# Patient Record
Sex: FEMALE | Race: BLACK OR AFRICAN AMERICAN | URBAN - METROPOLITAN AREA
[De-identification: names, ages, dates, MRNs, and addresses within clinical notes are randomized per-mention and may not be internally consistent; named-entity substitution may affect disease eponyms.]

---

## 2017-04-28 ENCOUNTER — INPATIENT (INPATIENT)
Facility: HOSPITAL | Age: 37
LOS: 10 days | Discharge: HOME | End: 2017-05-09
Attending: INTERNAL MEDICINE

## 2017-05-24 ENCOUNTER — INPATIENT (INPATIENT)
Facility: HOSPITAL | Age: 37
LOS: 18 days | Discharge: HOME HEALTH | End: 2017-06-12
Attending: INTERNAL MEDICINE

## 2017-05-24 DIAGNOSIS — R06.02 SHORTNESS OF BREATH: ICD-10-CM

## 2017-05-24 DIAGNOSIS — J84.89 OTHER SPECIFIED INTERSTITIAL PULMONARY DISEASES: ICD-10-CM

## 2017-06-13 ENCOUNTER — OUTPATIENT (OUTPATIENT)
Dept: OUTPATIENT SERVICES | Facility: HOSPITAL | Age: 37
LOS: 1 days | Discharge: HOME | End: 2017-06-13

## 2017-06-13 ENCOUNTER — OTHER (OUTPATIENT)
Age: 37
End: 2017-06-13

## 2017-06-13 DIAGNOSIS — E78.5 HYPERLIPIDEMIA, UNSPECIFIED: ICD-10-CM

## 2017-06-13 DIAGNOSIS — R06.02 SHORTNESS OF BREATH: ICD-10-CM

## 2017-06-13 DIAGNOSIS — J84.89 OTHER SPECIFIED INTERSTITIAL PULMONARY DISEASES: ICD-10-CM

## 2017-06-19 ENCOUNTER — APPOINTMENT (OUTPATIENT)
Dept: OBGYN | Facility: CLINIC | Age: 37
End: 2017-06-19

## 2017-06-28 DIAGNOSIS — J84.116 CRYPTOGENIC ORGANIZING PNEUMONIA: ICD-10-CM

## 2017-06-30 DIAGNOSIS — J82 PULMONARY EOSINOPHILIA, NOT ELSEWHERE CLASSIFIED: ICD-10-CM

## 2017-06-30 DIAGNOSIS — E78.5 HYPERLIPIDEMIA, UNSPECIFIED: ICD-10-CM

## 2017-06-30 DIAGNOSIS — E03.9 HYPOTHYROIDISM, UNSPECIFIED: ICD-10-CM

## 2017-06-30 DIAGNOSIS — E66.01 MORBID (SEVERE) OBESITY DUE TO EXCESS CALORIES: ICD-10-CM

## 2017-06-30 DIAGNOSIS — Z91.010 ALLERGY TO PEANUTS: ICD-10-CM

## 2017-06-30 DIAGNOSIS — T41.1X5A ADVERSE EFFECT OF INTRAVENOUS ANESTHETICS, INITIAL ENCOUNTER: ICD-10-CM

## 2017-06-30 DIAGNOSIS — Z91.013 ALLERGY TO SEAFOOD: ICD-10-CM

## 2017-06-30 DIAGNOSIS — E06.3 AUTOIMMUNE THYROIDITIS: ICD-10-CM

## 2017-06-30 DIAGNOSIS — J95.821 ACUTE POSTPROCEDURAL RESPIRATORY FAILURE: ICD-10-CM

## 2017-06-30 DIAGNOSIS — M79.1 MYALGIA: ICD-10-CM

## 2017-06-30 DIAGNOSIS — I95.2 HYPOTENSION DUE TO DRUGS: ICD-10-CM

## 2017-06-30 DIAGNOSIS — J84.116 CRYPTOGENIC ORGANIZING PNEUMONIA: ICD-10-CM

## 2017-07-11 ENCOUNTER — OUTPATIENT (OUTPATIENT)
Dept: OUTPATIENT SERVICES | Facility: HOSPITAL | Age: 37
LOS: 1 days | Discharge: HOME | End: 2017-07-11

## 2017-07-11 ENCOUNTER — APPOINTMENT (OUTPATIENT)
Dept: PULMONOLOGY | Facility: CLINIC | Age: 37
End: 2017-07-11

## 2017-07-11 VITALS
HEART RATE: 118 BPM | DIASTOLIC BLOOD PRESSURE: 80 MMHG | SYSTOLIC BLOOD PRESSURE: 110 MMHG | WEIGHT: 249 LBS | OXYGEN SATURATION: 94 %

## 2017-07-11 DIAGNOSIS — R06.02 SHORTNESS OF BREATH: ICD-10-CM

## 2017-07-11 DIAGNOSIS — J84.89 OTHER SPECIFIED INTERSTITIAL PULMONARY DISEASES: ICD-10-CM

## 2017-07-14 DIAGNOSIS — J84.9 INTERSTITIAL PULMONARY DISEASE, UNSPECIFIED: ICD-10-CM

## 2017-07-25 ENCOUNTER — APPOINTMENT (OUTPATIENT)
Dept: NEUROLOGY | Facility: CLINIC | Age: 37
End: 2017-07-25

## 2017-07-26 ENCOUNTER — RX RENEWAL (OUTPATIENT)
Age: 37
End: 2017-07-26

## 2017-07-31 DIAGNOSIS — E66.9 OBESITY, UNSPECIFIED: ICD-10-CM

## 2017-07-31 DIAGNOSIS — J20.9 ACUTE BRONCHITIS, UNSPECIFIED: ICD-10-CM

## 2017-07-31 DIAGNOSIS — J90 PLEURAL EFFUSION, NOT ELSEWHERE CLASSIFIED: ICD-10-CM

## 2017-07-31 DIAGNOSIS — D25.9 LEIOMYOMA OF UTERUS, UNSPECIFIED: ICD-10-CM

## 2017-07-31 DIAGNOSIS — Z91.013 ALLERGY TO SEAFOOD: ICD-10-CM

## 2017-07-31 DIAGNOSIS — R07.89 OTHER CHEST PAIN: ICD-10-CM

## 2017-07-31 DIAGNOSIS — J96.01 ACUTE RESPIRATORY FAILURE WITH HYPOXIA: ICD-10-CM

## 2017-07-31 DIAGNOSIS — J84.116 CRYPTOGENIC ORGANIZING PNEUMONIA: ICD-10-CM

## 2017-07-31 DIAGNOSIS — J98.11 ATELECTASIS: ICD-10-CM

## 2017-07-31 DIAGNOSIS — Z91.010 ALLERGY TO PEANUTS: ICD-10-CM

## 2017-07-31 DIAGNOSIS — J45.909 UNSPECIFIED ASTHMA, UNCOMPLICATED: ICD-10-CM

## 2017-07-31 DIAGNOSIS — M46.92 UNSPECIFIED INFLAMMATORY SPONDYLOPATHY, CERVICAL REGION: ICD-10-CM

## 2017-07-31 DIAGNOSIS — J18.9 PNEUMONIA, UNSPECIFIED ORGANISM: ICD-10-CM

## 2017-07-31 DIAGNOSIS — M35.00 SJOGREN SYNDROME, UNSPECIFIED: ICD-10-CM

## 2017-07-31 DIAGNOSIS — E06.3 AUTOIMMUNE THYROIDITIS: ICD-10-CM

## 2017-07-31 DIAGNOSIS — D86.0 SARCOIDOSIS OF LUNG: ICD-10-CM

## 2017-08-15 ENCOUNTER — APPOINTMENT (OUTPATIENT)
Dept: PULMONOLOGY | Facility: CLINIC | Age: 37
End: 2017-08-15

## 2017-09-26 ENCOUNTER — APPOINTMENT (OUTPATIENT)
Dept: PULMONOLOGY | Facility: CLINIC | Age: 37
End: 2017-09-26

## 2017-10-03 ENCOUNTER — MEDICATION RENEWAL (OUTPATIENT)
Age: 37
End: 2017-10-03

## 2017-10-10 ENCOUNTER — APPOINTMENT (OUTPATIENT)
Dept: PULMONOLOGY | Facility: CLINIC | Age: 37
End: 2017-10-10

## 2017-10-24 ENCOUNTER — APPOINTMENT (OUTPATIENT)
Dept: NEUROLOGY | Facility: CLINIC | Age: 37
End: 2017-10-24

## 2018-02-12 ENCOUNTER — MEDICATION RENEWAL (OUTPATIENT)
Age: 38
End: 2018-02-12

## 2018-02-12 ENCOUNTER — RX RENEWAL (OUTPATIENT)
Age: 38
End: 2018-02-12

## 2018-04-22 ENCOUNTER — INPATIENT (INPATIENT)
Facility: HOSPITAL | Age: 38
LOS: 5 days | Discharge: HOME HEALTH | End: 2018-04-28
Attending: INTERNAL MEDICINE | Admitting: INTERNAL MEDICINE

## 2018-04-22 VITALS
OXYGEN SATURATION: 98 % | DIASTOLIC BLOOD PRESSURE: 58 MMHG | RESPIRATION RATE: 20 BRPM | TEMPERATURE: 98 F | HEART RATE: 106 BPM | SYSTOLIC BLOOD PRESSURE: 115 MMHG

## 2018-04-22 LAB
ALBUMIN SERPL ELPH-MCNC: 2.9 G/DL — LOW (ref 3.5–5.2)
ALP SERPL-CCNC: 65 U/L — SIGNIFICANT CHANGE UP (ref 30–115)
ALT FLD-CCNC: 188 U/L — HIGH (ref 0–41)
ANION GAP SERPL CALC-SCNC: 13 MMOL/L — SIGNIFICANT CHANGE UP (ref 7–14)
APPEARANCE UR: (no result)
APTT BLD: 26.7 SEC — LOW (ref 27–39.2)
AST SERPL-CCNC: 376 U/L — HIGH (ref 0–41)
BACTERIA # UR AUTO: (no result) /HPF
BASOPHILS # BLD AUTO: 0.04 K/UL — SIGNIFICANT CHANGE UP (ref 0–0.2)
BASOPHILS NFR BLD AUTO: 0.3 % — SIGNIFICANT CHANGE UP (ref 0–1)
BILIRUB SERPL-MCNC: <0.2 MG/DL — SIGNIFICANT CHANGE UP (ref 0.2–1.2)
BILIRUB UR-MCNC: NEGATIVE — SIGNIFICANT CHANGE UP
BUN SERPL-MCNC: 11 MG/DL — SIGNIFICANT CHANGE UP (ref 10–20)
CALCIUM SERPL-MCNC: 7.9 MG/DL — LOW (ref 8.5–10.1)
CHLORIDE SERPL-SCNC: 100 MMOL/L — SIGNIFICANT CHANGE UP (ref 98–110)
CK MB CFR SERPL CALC: 283 NG/ML — HIGH (ref 0.6–6.3)
CK MB CFR SERPL CALC: >300 NG/ML — HIGH (ref 0.6–6.3)
CK SERPL-CCNC: >2000 U/L — HIGH (ref 0–225)
CK SERPL-CCNC: HIGH U/L (ref 0–225)
CO2 SERPL-SCNC: 23 MMOL/L — SIGNIFICANT CHANGE UP (ref 17–32)
COLOR SPEC: YELLOW — SIGNIFICANT CHANGE UP
CREAT SERPL-MCNC: 0.5 MG/DL — LOW (ref 0.7–1.5)
D DIMER BLD IA.RAPID-MCNC: 1192 NG/ML DDU — HIGH (ref 0–230)
DIFF PNL FLD: (no result)
EOSINOPHIL # BLD AUTO: 0.55 K/UL — SIGNIFICANT CHANGE UP (ref 0–0.7)
EOSINOPHIL NFR BLD AUTO: 3.7 % — SIGNIFICANT CHANGE UP (ref 0–8)
EPI CELLS # UR: (no result) /HPF
ERYTHROCYTE [SEDIMENTATION RATE] IN BLOOD: 70 MM/HR — HIGH (ref 0–15)
GLUCOSE SERPL-MCNC: 85 MG/DL — SIGNIFICANT CHANGE UP (ref 70–99)
GLUCOSE UR QL: NEGATIVE MG/DL — SIGNIFICANT CHANGE UP
HCG UR QL: NEGATIVE — SIGNIFICANT CHANGE UP
HCT VFR BLD CALC: 37.1 % — SIGNIFICANT CHANGE UP (ref 37–47)
HGB BLD-MCNC: 12 G/DL — SIGNIFICANT CHANGE UP (ref 12–16)
IMM GRANULOCYTES NFR BLD AUTO: 0.7 % — HIGH (ref 0.1–0.3)
INR BLD: 1.06 RATIO — SIGNIFICANT CHANGE UP (ref 0.65–1.3)
KETONES UR-MCNC: NEGATIVE — SIGNIFICANT CHANGE UP
LEUKOCYTE ESTERASE UR-ACNC: NEGATIVE — SIGNIFICANT CHANGE UP
LYMPHOCYTES # BLD AUTO: 0.78 K/UL — LOW (ref 1.2–3.4)
LYMPHOCYTES # BLD AUTO: 5.3 % — LOW (ref 20.5–51.1)
MAGNESIUM SERPL-MCNC: 1.8 MG/DL — SIGNIFICANT CHANGE UP (ref 1.8–2.4)
MCHC RBC-ENTMCNC: 30.5 PG — SIGNIFICANT CHANGE UP (ref 27–31)
MCHC RBC-ENTMCNC: 32.3 G/DL — SIGNIFICANT CHANGE UP (ref 32–37)
MCV RBC AUTO: 94.4 FL — SIGNIFICANT CHANGE UP (ref 81–99)
MONOCYTES # BLD AUTO: 0.46 K/UL — SIGNIFICANT CHANGE UP (ref 0.1–0.6)
MONOCYTES NFR BLD AUTO: 3.1 % — SIGNIFICANT CHANGE UP (ref 1.7–9.3)
NEUTROPHILS # BLD AUTO: 12.8 K/UL — HIGH (ref 1.4–6.5)
NEUTROPHILS NFR BLD AUTO: 86.9 % — HIGH (ref 42.2–75.2)
NITRITE UR-MCNC: NEGATIVE — SIGNIFICANT CHANGE UP
NRBC # BLD: 0 /100 WBCS — SIGNIFICANT CHANGE UP (ref 0–0)
NT-PROBNP SERPL-SCNC: 58 PG/ML — SIGNIFICANT CHANGE UP (ref 0–300)
PH UR: 6.5 — SIGNIFICANT CHANGE UP (ref 5–8)
PLATELET # BLD AUTO: 486 K/UL — HIGH (ref 130–400)
POTASSIUM SERPL-MCNC: 4.5 MMOL/L — SIGNIFICANT CHANGE UP (ref 3.5–5)
POTASSIUM SERPL-SCNC: 4.5 MMOL/L — SIGNIFICANT CHANGE UP (ref 3.5–5)
PROT SERPL-MCNC: 6.8 G/DL — SIGNIFICANT CHANGE UP (ref 6–8)
PROT UR-MCNC: 100 MG/DL
PROTHROM AB SERPL-ACNC: 11.5 SEC — SIGNIFICANT CHANGE UP (ref 9.95–12.87)
RBC # BLD: 3.93 M/UL — LOW (ref 4.2–5.4)
RBC # FLD: 14.9 % — HIGH (ref 11.5–14.5)
RBC CASTS # UR COMP ASSIST: (no result) /HPF
SODIUM SERPL-SCNC: 136 MMOL/L — SIGNIFICANT CHANGE UP (ref 135–146)
SP GR SPEC: 1.02 — SIGNIFICANT CHANGE UP (ref 1.01–1.03)
TROPONIN T SERPL-MCNC: 1.44 NG/ML — CRITICAL HIGH
UROBILINOGEN FLD QL: 1 MG/DL (ref 0.2–0.2)
WBC # BLD: 14.74 K/UL — HIGH (ref 4.8–10.8)
WBC # FLD AUTO: 14.74 K/UL — HIGH (ref 4.8–10.8)
WBC UR QL: SIGNIFICANT CHANGE UP /HPF

## 2018-04-22 RX ORDER — ACETAMINOPHEN 500 MG
650 TABLET ORAL ONCE
Qty: 0 | Refills: 0 | Status: COMPLETED | OUTPATIENT
Start: 2018-04-22 | End: 2018-04-22

## 2018-04-22 RX ORDER — CEFEPIME 1 G/1
1000 INJECTION, POWDER, FOR SOLUTION INTRAMUSCULAR; INTRAVENOUS EVERY 12 HOURS
Qty: 0 | Refills: 0 | Status: DISCONTINUED | OUTPATIENT
Start: 2018-04-22 | End: 2018-04-24

## 2018-04-22 RX ORDER — PANTOPRAZOLE SODIUM 20 MG/1
40 TABLET, DELAYED RELEASE ORAL
Qty: 0 | Refills: 0 | Status: DISCONTINUED | OUTPATIENT
Start: 2018-04-22 | End: 2018-04-28

## 2018-04-22 RX ORDER — METOPROLOL TARTRATE 50 MG
25 TABLET ORAL ONCE
Qty: 0 | Refills: 0 | Status: COMPLETED | OUTPATIENT
Start: 2018-04-22 | End: 2018-04-22

## 2018-04-22 RX ORDER — SODIUM CHLORIDE 9 MG/ML
1000 INJECTION, SOLUTION INTRAVENOUS ONCE
Qty: 0 | Refills: 0 | Status: COMPLETED | OUTPATIENT
Start: 2018-04-22 | End: 2018-04-22

## 2018-04-22 RX ORDER — AZATHIOPRINE 100 MG/1
50 TABLET ORAL DAILY
Qty: 0 | Refills: 0 | Status: DISCONTINUED | OUTPATIENT
Start: 2018-04-22 | End: 2018-04-23

## 2018-04-22 RX ORDER — AZTREONAM 2 G
1 VIAL (EA) INJECTION
Qty: 0 | Refills: 0 | COMMUNITY

## 2018-04-22 RX ORDER — ASPIRIN/CALCIUM CARB/MAGNESIUM 324 MG
325 TABLET ORAL ONCE
Qty: 0 | Refills: 0 | Status: COMPLETED | OUTPATIENT
Start: 2018-04-22 | End: 2018-04-22

## 2018-04-22 RX ORDER — SODIUM CHLORIDE 9 MG/ML
1000 INJECTION INTRAMUSCULAR; INTRAVENOUS; SUBCUTANEOUS
Qty: 0 | Refills: 0 | Status: DISCONTINUED | OUTPATIENT
Start: 2018-04-22 | End: 2018-04-25

## 2018-04-22 RX ORDER — METHOCARBAMOL 500 MG/1
750 TABLET, FILM COATED ORAL ONCE
Qty: 0 | Refills: 0 | Status: COMPLETED | OUTPATIENT
Start: 2018-04-22 | End: 2018-04-22

## 2018-04-22 RX ORDER — ENOXAPARIN SODIUM 100 MG/ML
40 INJECTION SUBCUTANEOUS EVERY 24 HOURS
Qty: 0 | Refills: 0 | Status: DISCONTINUED | OUTPATIENT
Start: 2018-04-22 | End: 2018-04-28

## 2018-04-22 RX ORDER — MORPHINE SULFATE 50 MG/1
6 CAPSULE, EXTENDED RELEASE ORAL ONCE
Qty: 0 | Refills: 0 | Status: DISCONTINUED | OUTPATIENT
Start: 2018-04-22 | End: 2018-04-22

## 2018-04-22 RX ORDER — KETOROLAC TROMETHAMINE 30 MG/ML
15 SYRINGE (ML) INJECTION ONCE
Qty: 0 | Refills: 0 | Status: DISCONTINUED | OUTPATIENT
Start: 2018-04-22 | End: 2018-04-22

## 2018-04-22 RX ADMIN — Medication 650 MILLIGRAM(S): at 12:21

## 2018-04-22 RX ADMIN — Medication 25 MILLIGRAM(S): at 23:23

## 2018-04-22 RX ADMIN — METHOCARBAMOL 750 MILLIGRAM(S): 500 TABLET, FILM COATED ORAL at 12:21

## 2018-04-22 RX ADMIN — Medication 15 MILLIGRAM(S): at 12:21

## 2018-04-22 RX ADMIN — Medication 15 MILLIGRAM(S): at 22:28

## 2018-04-22 RX ADMIN — SODIUM CHLORIDE 1000 MILLILITER(S): 9 INJECTION, SOLUTION INTRAVENOUS at 14:02

## 2018-04-22 RX ADMIN — MORPHINE SULFATE 6 MILLIGRAM(S): 50 CAPSULE, EXTENDED RELEASE ORAL at 23:23

## 2018-04-22 RX ADMIN — CEFEPIME 100 MILLIGRAM(S): 1 INJECTION, POWDER, FOR SOLUTION INTRAMUSCULAR; INTRAVENOUS at 23:23

## 2018-04-22 RX ADMIN — Medication 325 MILLIGRAM(S): at 16:10

## 2018-04-22 NOTE — ED PROVIDER NOTE - ATTENDING CONTRIBUTION TO CARE
38 y/o F pmh , on azathioprine, p/w chest tightness and exertional SOB worsening x about 1 wk, worse today.  also c/o diffuse myalgia and arthralgia.  + dry cough.  No fever, leg swelling, smoking, hx dvt/pe/mi.  PE: tired but non toxic; Reg tachycardia; faint bibasilar wheeze; abd s/nt; + pedal edema.  IMP: PE vs acs; consider autoimmune/ rheum; viral illness.  P: labs, ekg, cxr, ddimer, analgesia, reassess, admit.

## 2018-04-22 NOTE — ED PROVIDER NOTE - PROGRESS NOTE DETAILS
Discussed with cardiology case. cardio will f/u Cardiology at bedside.  Pt continues to have diffuse myalgia, but no focal cp, is well appearing. Bedside ECHO by Dr. Maloney; NL EF; no PCEFF, hyperdynamic.  He recc BBlocker, admission to CCU Bedside ECHO by Dr. Maloney; NL EF; no PCEFF, hyperdynamic.  He recc BBlocker, admission to CCU.  concern for myocarditis; also recc no anticoag at this time.

## 2018-04-22 NOTE — ED PROVIDER NOTE - OBJECTIVE STATEMENT
38y/o F w/ Cryptogenic organizing pneumonia (on azathioprine and smx since june 2017) present for generalized body pain for 6 days, but has been worsening. Today pt hand and feet were swollen.  Pt feels some tightness to chest--feels like she "just ran"--+sob. +dry cough.no congestion, runny nose, no vomiting, diarrhea, legg swelling. 38y/o F w/ Cryptogenic organizing pneumonia (on azathioprine and smx since june 2017) present for generalized body pain for 6 days, but has been worsening. Today pt hand and feet were swollen.  Pt feels some tightness to chest--feels like she "just ran"--+sob. +dry cough.no congestion, runny nose, no vomiting, diarrhea,

## 2018-04-22 NOTE — ED PROVIDER NOTE - NS ED ROS FT
ROS: No fever/chills, No headache/photophobia/eye pain/changes in vision, No ear pain/sore throat/dysphagia,No abdominal pain, No N/V/D/melena, no dysuria/frequency/discharge, No neck/back pain, no rash, no changes in neurological status/function.    +cough, weakness see hpi

## 2018-04-22 NOTE — ED PROVIDER NOTE - CARE PLAN
Principal Discharge DX:	Chest pain  Secondary Diagnosis:	Shortness of breath  Secondary Diagnosis:	Weakness

## 2018-04-22 NOTE — H&P ADULT - ASSESSMENT
38 y/o F w/ hx of , on azathioprine and bactrim, ? polymyositis / dermatomyositis was pending EMG, p/w fatigue and generalized muscle aches for the past weeks, along w/ sharp CP w/ SOB and cough productive of whitish sputum, found to have troponemia and transaminits    1- troponemia: 2/2 rhabdomyolisis due to inflammatory myopathy (?dermatomyosists / polymyositis) vs. less likely ACS vs. myocarditis / pericarditis    admit to CCU - approved by cardiology  f/u serial CE and CK  IVF NS @ 200 cc/hr  TTE to check EF, check BNP  serial EKGs  trend CK  check aldolase, ALEX, LDH, anti-Oksana, SRP, ESR, CRP   check TSH  will give prednisone at 1 mg/kg q24h    2- transaminitis: probably 2/2 inflammatory myopathy vs. less likely 2/2 CLD given pattern    workup and rx as above  will get RUQ sono  will get hepatitis serologies    3- COOP: will hold off AZA  prednisone as above    4- GI ppx: protonix 40 q24  DVT ppx: lovenox 40 q24  full code 36 y/o F w/ hx of , on azathioprine and bactrim, ? polymyositis / dermatomyositis was pending EMG, p/w fatigue and generalized muscle aches for the past weeks, along w/ sharp CP w/ SOB and cough productive of whitish sputum, found to have troponemia and transaminits    1- troponemia: 2/2 rhabdomyolisis due to inflammatory myopathy (?dermatomyosists / polymyositis) vs. less likely ACS vs. myocarditis / pericarditis    admit to CCU - approved by cardiology  f/u serial CE and CK  IVF NS @ 200 cc/hr  TTE to check EF, check BNP  serial EKGs  trend CK  check aldolase, ALEX, LDH, anti-Oksana, SRP, ESR, CRP   check TSH  will give prednisone at 1 mg/kg q24h  ASA 81 for now    2- transaminitis: probably 2/2 inflammatory myopathy vs. less likely 2/2 CLD given pattern    workup and rx as above  will get RUQ sono  will get hepatitis serologies    3- COOP: will hold off AZA  prednisone as above    4- GI ppx: protonix 40 q24  DVT ppx: lovenox 40 q24  full code

## 2018-04-22 NOTE — ED PROVIDER NOTE - PHYSICAL EXAMINATION
VITAL SIGNS: I have reviewed nursing notes and confirm.  CONSTITUTIONAL: Well-developed; well-nourished; in no acute distress. obese. uncomfortable moving around stretcher.  SKIN: skin exam is warm and dry, no acute rash. no visible swelling to hands and feet. no redness, no warmth.  HEAD: Normocephalic; atraumatic.  EYES:  EOM intact; conjunctiva and sclera clear.  ENT: No nasal discharge; airway clear. moist oral mucosa;     NECK: Supple; non tender.  CARD: S1, S2 normal; no murmurs, gallops, or rubs. Regular rate and rhythm. posterior tibial and radial pulses 2+  RESP: No wheezes, rales or rhonchi. cta b/l. no use of accessory muscles. no retractions  ABD: Normal bowel sounds; soft; non-distended; non-tender;  EXT: Normal ROM. No  cyanosis or edema.  BACK: No cva tenderness  LYMPH: No acute cervical adenopathy.  NEURO: Alert, oriented, grossly unremarkable.    PSYCH: Cooperative, appropriate.

## 2018-04-22 NOTE — H&P ADULT - HISTORY OF PRESENT ILLNESS
in ED:  / LR 1 L / cefepime 1 g / tylenol / ketorlac / robaxin / metoprolol ER 25 / morphine 6 IV 38 y/o F w/ hx of , on azathioprine and bactrim, ? polymyositis / dermatomyositis was pending EMG, p/w fatigue and generalized muscle aches for the past weeks.   pt also reports sharp CP and exertional SOB worsening over the past week, worse today but denies any palpitations, no lightheadedness dizziness or LOC.  pt reports having a cough productive of whitish sputum, feeling that extremities were swollen but denies fever, or chills, no N/V/D, no recent medication changes, no weight loss.  pt states that she has not been able to ambulate over the past week and has been unable to do ADLs as usual.  pt was being followed by Dr. Rubio last seen in July 2017 but did not follow up, workup was pending EMG for possible polymyositis, dermatomyositis diagnosis.   ROS otherwise negative.    in ED:  / LR 1 L / cefepime 1 g / tylenol / ketorlac / robaxin / metoprolol ER 25 / morphine 6 IV

## 2018-04-22 NOTE — H&P ADULT - NSHPLABSRESULTS_GEN_ALL_CORE
12.0   14.74 )-----------( 486      ( 2018 10:32 )             37.1           136  |  100  |  11  ----------------------------<  85  4.5   |  23  |  0.5<L>    Ca    7.9<L>      2018 10:32  Mg     1.8         TPro  6.8  /  Alb  2.9<L>  /  TBili  <0.2  /  DBili  x   /  AST  376<H>  /  ALT  188<H>  /  AlkPhos  65  -22          Urinalysis Basic - ( 2018 10:32 )    Color: Yellow / Appearance: Cloudy / S.025 / pH: x  Gluc: x / Ketone: Negative  / Bili: Negative / Urobili: 1.0 mg/dL   Blood: x / Protein: 100 mg/dL / Nitrite: Negative   Leuk Esterase: Negative / RBC: 10-25 /HPF / WBC 3-5 /HPF   Sq Epi: x / Non Sq Epi: Many /HPF / Bacteria: Moderate /HPF      PT/INR - ( 2018 20:51 )   PT: 11.50 sec;   INR: 1.06 ratio       PTT - ( 2018 20:51 )  PTT:26.7 sec      CARDIAC MARKERS ( 2018 20:50 )  x     / x     / 51017 U/L / x     / 283.0 ng/mL  CARDIAC MARKERS ( 2018 12:44 )  x     / 1.44 ng/mL / x     / x     / >300.0 ng/mL  CARDIAC MARKERS ( 2018 10:32 )  x     / x     / >2000 U/L / x     / x 12.0   14.74 )-----------( 486      ( 2018 10:32 )             37.1           136  |  100  |  11  ----------------------------<  85  4.5   |  23  |  0.5<L>    Ca    7.9<L>      2018 10:32  Mg     1.8         TPro  6.8  /  Alb  2.9<L>  /  TBili  <0.2  /  DBili  x   /  AST  376<H>  /  ALT  188<H>  /  AlkPhos  65  22          Urinalysis Basic - ( 2018 10:32 )    Color: Yellow / Appearance: Cloudy / S.025 / pH: x  Gluc: x / Ketone: Negative  / Bili: Negative / Urobili: 1.0 mg/dL   Blood: x / Protein: 100 mg/dL / Nitrite: Negative   Leuk Esterase: Negative / RBC: 10-25 /HPF / WBC 3-5 /HPF   Sq Epi: x / Non Sq Epi: Many /HPF / Bacteria: Moderate /HPF      PT/INR - ( 2018 20:51 )   PT: 11.50 sec;   INR: 1.06 ratio       PTT - ( 2018 20:51 )  PTT:26.7 sec      CARDIAC MARKERS ( 2018 20:50 )  x     / x     / 82578 U/L / x     / 283.0 ng/mL  CARDIAC MARKERS ( 2018 12:44 )  x     / 1.44 ng/mL / x     / x     / >300.0 ng/mL  CARDIAC MARKERS ( 2018 10:32 )  x     / x     / >2000 U/L / x     / x        CT Chest w/ IV Cont (18 @ 18:05)    No pulmonary embolus.  Bilateral lower lobe patchy airspace opacities, decreased from prior   exam. Findings may represent pneumonia in the correct clinical setting.        Xray Chest 2 Views PA/Lat (18 @ 12:38)    Low lung volumes, without radiographic evidence of acute cardiopulmonary   disease. 12.0   14.74 )-----------( 486      ( 2018 10:32 )             37.1           136  |  100  |  11  ----------------------------<  85  4.5   |  23  |  0.5<L>    Ca    7.9<L>      2018 10:32  Mg     1.8         TPro  6.8  /  Alb  2.9<L>  /  TBili  <0.2  /  DBili  x   /  AST  376<H>  /  ALT  188<H>  /  AlkPhos  65  22          Urinalysis Basic - ( 2018 10:32 )    Color: Yellow / Appearance: Cloudy / S.025 / pH: x  Gluc: x / Ketone: Negative  / Bili: Negative / Urobili: 1.0 mg/dL   Blood: x / Protein: 100 mg/dL / Nitrite: Negative   Leuk Esterase: Negative / RBC: 10-25 /HPF / WBC 3-5 /HPF   Sq Epi: x / Non Sq Epi: Many /HPF / Bacteria: Moderate /HPF      PT/INR - ( 2018 20:51 )   PT: 11.50 sec;   INR: 1.06 ratio       PTT - ( 2018 20:51 )  PTT:26.7 sec      CARDIAC MARKERS ( 2018 20:50 )  x     / x     / 06171 U/L / x     / 283.0 ng/mL  CARDIAC MARKERS ( 2018 12:44 )  x     / 1.44 ng/mL / x     / x     / >300.0 ng/mL  CARDIAC MARKERS ( 2018 10:32 )  x     / x     / >2000 U/L / x     / x      EKG: NSR, low voltage, ?RBBB, no acute ST, Q or t wave abnormalities      CT Chest w/ IV Cont (18 @ 18:05)    No pulmonary embolus.  Bilateral lower lobe patchy airspace opacities, decreased from prior   exam. Findings may represent pneumonia in the correct clinical setting.        Xray Chest 2 Views PA/Lat (18 @ 12:38)    Low lung volumes, without radiographic evidence of acute cardiopulmonary   disease.

## 2018-04-22 NOTE — H&P ADULT - ATTENDING COMMENTS
I fully agree with the resident's history/physical/assessment and plan after my independent history/exam/assessment.  I reviewed the cardiology consult and discussed the current care with the patient a few minutes ago.  I am waiting for the pulmonary and rheumatology teams and will repeat the labs in the morning.  patient will likely benefit from a physiatry eval also.

## 2018-04-22 NOTE — H&P ADULT - NSHPPHYSICALEXAM_GEN_ALL_CORE
GENERAL: NAD, well-developed  HEAD:  Atraumatic, Normocephalic  EYES: EOMI, PERRLA, conjunctiva and sclera clear  NECK: Supple, No JVD  CHEST/LUNG: Clear to auscultation bilaterally; No wheeze  HEART: Regular rate and rhythm; No murmurs, rubs, or gallops  ABDOMEN: Soft, Nontender, Nondistended; Bowel sounds present  EXTREMITIES:  2+ Peripheral Pulses, No clubbing, cyanosis, or edema  PSYCH: AAOx3  NEUROLOGY: non-focal  SKIN: No rashes or lesions GENERAL: NAD, well-developed  HEAD:  Atraumatic, Normocephalic  EYES: EOMI, PERRLA, conjunctiva and sclera clear  NECK: Supple, No JVD  CHEST/LUNG: + crackles at bases b/l, no rhonchi or wheezes  HEART: Regular rate and rhythm; No murmurs, rubs, or gallops  ABDOMEN: Soft, Nontender, Nondistended; Bowel sounds present, obese  EXTREMITIES:  2+ Peripheral Pulses, No clubbing, cyanosis, or edema  PSYCH: AAOx3  NEUROLOGY: non-focal

## 2018-04-23 LAB
ALBUMIN SERPL ELPH-MCNC: 2.7 G/DL — LOW (ref 3.5–5.2)
ALP SERPL-CCNC: 57 U/L — SIGNIFICANT CHANGE UP (ref 30–115)
ALT FLD-CCNC: 162 U/L — HIGH (ref 0–41)
ANION GAP SERPL CALC-SCNC: 13 MMOL/L — SIGNIFICANT CHANGE UP (ref 7–14)
ANION GAP SERPL CALC-SCNC: 9 MMOL/L — SIGNIFICANT CHANGE UP (ref 7–14)
AST SERPL-CCNC: 335 U/L — HIGH (ref 0–41)
BASOPHILS # BLD AUTO: 0.04 K/UL — SIGNIFICANT CHANGE UP (ref 0–0.2)
BASOPHILS NFR BLD AUTO: 0.4 % — SIGNIFICANT CHANGE UP (ref 0–1)
BILIRUB SERPL-MCNC: <0.2 MG/DL — SIGNIFICANT CHANGE UP (ref 0.2–1.2)
BUN SERPL-MCNC: 8 MG/DL — LOW (ref 10–20)
BUN SERPL-MCNC: 9 MG/DL — LOW (ref 10–20)
CALCIUM SERPL-MCNC: 8.1 MG/DL — LOW (ref 8.5–10.1)
CALCIUM SERPL-MCNC: 8.1 MG/DL — LOW (ref 8.5–10.1)
CHLORIDE SERPL-SCNC: 100 MMOL/L — SIGNIFICANT CHANGE UP (ref 98–110)
CHLORIDE SERPL-SCNC: 101 MMOL/L — SIGNIFICANT CHANGE UP (ref 98–110)
CHOLEST SERPL-MCNC: 257 MG/DL — HIGH (ref 100–200)
CK MB CFR SERPL CALC: 276.1 NG/ML — HIGH (ref 0.6–6.3)
CK MB CFR SERPL CALC: 297.5 NG/ML — HIGH (ref 0.6–6.3)
CK SERPL-CCNC: 8865 U/L — HIGH (ref 0–225)
CK SERPL-CCNC: >2000 U/L — HIGH (ref 0–225)
CK SERPL-CCNC: HIGH U/L (ref 0–225)
CK SERPL-CCNC: HIGH U/L (ref 0–225)
CO2 SERPL-SCNC: 24 MMOL/L — SIGNIFICANT CHANGE UP (ref 17–32)
CO2 SERPL-SCNC: 26 MMOL/L — SIGNIFICANT CHANGE UP (ref 17–32)
CREAT SERPL-MCNC: 0.5 MG/DL — LOW (ref 0.7–1.5)
CREAT SERPL-MCNC: 0.5 MG/DL — LOW (ref 0.7–1.5)
CRP SERPL-MCNC: 3.1 MG/DL — HIGH (ref 0–0.4)
CULTURE RESULTS: NO GROWTH — SIGNIFICANT CHANGE UP
EOSINOPHIL # BLD AUTO: 0.41 K/UL — SIGNIFICANT CHANGE UP (ref 0–0.7)
EOSINOPHIL NFR BLD AUTO: 3.7 % — SIGNIFICANT CHANGE UP (ref 0–8)
ERYTHROCYTE [SEDIMENTATION RATE] IN BLOOD: 69 MM/HR — HIGH (ref 0–15)
ESTIMATED AVERAGE GLUCOSE: 126 MG/DL — HIGH (ref 68–114)
GLUCOSE SERPL-MCNC: 82 MG/DL — SIGNIFICANT CHANGE UP (ref 70–99)
GLUCOSE SERPL-MCNC: 98 MG/DL — SIGNIFICANT CHANGE UP (ref 70–99)
HBA1C BLD-MCNC: 6 % — HIGH (ref 4–5.6)
HCT VFR BLD CALC: 36.9 % — LOW (ref 37–47)
HDLC SERPL-MCNC: 29 MG/DL — LOW (ref 40–125)
HGB BLD-MCNC: 11.9 G/DL — LOW (ref 12–16)
IMM GRANULOCYTES NFR BLD AUTO: 0.9 % — HIGH (ref 0.1–0.3)
LDH SERPL L TO P-CCNC: 819 — HIGH (ref 50–242)
LIPID PNL WITH DIRECT LDL SERPL: 188 MG/DL — HIGH (ref 4–129)
LYMPHOCYTES # BLD AUTO: 0.65 K/UL — LOW (ref 1.2–3.4)
LYMPHOCYTES # BLD AUTO: 5.9 % — LOW (ref 20.5–51.1)
MAGNESIUM SERPL-MCNC: 1.8 MG/DL — SIGNIFICANT CHANGE UP (ref 1.8–2.4)
MCHC RBC-ENTMCNC: 30.7 PG — SIGNIFICANT CHANGE UP (ref 27–31)
MCHC RBC-ENTMCNC: 32.2 G/DL — SIGNIFICANT CHANGE UP (ref 32–37)
MCV RBC AUTO: 95.3 FL — SIGNIFICANT CHANGE UP (ref 81–99)
MONOCYTES # BLD AUTO: 0.43 K/UL — SIGNIFICANT CHANGE UP (ref 0.1–0.6)
MONOCYTES NFR BLD AUTO: 3.9 % — SIGNIFICANT CHANGE UP (ref 1.7–9.3)
NEUTROPHILS # BLD AUTO: 9.41 K/UL — HIGH (ref 1.4–6.5)
NEUTROPHILS NFR BLD AUTO: 85.2 % — HIGH (ref 42.2–75.2)
PLATELET # BLD AUTO: 454 K/UL — HIGH (ref 130–400)
POTASSIUM SERPL-MCNC: 4.2 MMOL/L — SIGNIFICANT CHANGE UP (ref 3.5–5)
POTASSIUM SERPL-MCNC: 4.2 MMOL/L — SIGNIFICANT CHANGE UP (ref 3.5–5)
POTASSIUM SERPL-SCNC: 4.2 MMOL/L — SIGNIFICANT CHANGE UP (ref 3.5–5)
POTASSIUM SERPL-SCNC: 4.2 MMOL/L — SIGNIFICANT CHANGE UP (ref 3.5–5)
PROT SERPL-MCNC: 6.3 G/DL — SIGNIFICANT CHANGE UP (ref 6–8)
RBC # BLD: 3.87 M/UL — LOW (ref 4.2–5.4)
RBC # FLD: 15.4 % — HIGH (ref 11.5–14.5)
SODIUM SERPL-SCNC: 135 MMOL/L — SIGNIFICANT CHANGE UP (ref 135–146)
SODIUM SERPL-SCNC: 138 MMOL/L — SIGNIFICANT CHANGE UP (ref 135–146)
SPECIMEN SOURCE: SIGNIFICANT CHANGE UP
TOTAL CHOLESTEROL/HDL RATIO MEASUREMENT: 8.9 RATIO — HIGH (ref 4–5.5)
TRIGL SERPL-MCNC: 213 MG/DL — HIGH (ref 10–149)
TROPONIN T SERPL-MCNC: 1.58 NG/ML — CRITICAL HIGH
TROPONIN T SERPL-MCNC: 1.73 NG/ML — CRITICAL HIGH
TROPONIN T SERPL-MCNC: 1.78 NG/ML — CRITICAL HIGH
TROPONIN T SERPL-MCNC: 1.85 NG/ML — CRITICAL HIGH
WBC # BLD: 11.04 K/UL — HIGH (ref 4.8–10.8)
WBC # FLD AUTO: 11.04 K/UL — HIGH (ref 4.8–10.8)

## 2018-04-23 RX ORDER — MORPHINE SULFATE 50 MG/1
2 CAPSULE, EXTENDED RELEASE ORAL ONCE
Qty: 0 | Refills: 0 | Status: DISCONTINUED | OUTPATIENT
Start: 2018-04-23 | End: 2018-04-23

## 2018-04-23 RX ORDER — ASPIRIN/CALCIUM CARB/MAGNESIUM 324 MG
81 TABLET ORAL DAILY
Qty: 0 | Refills: 0 | Status: DISCONTINUED | OUTPATIENT
Start: 2018-04-23 | End: 2018-04-25

## 2018-04-23 RX ORDER — TRAMADOL HYDROCHLORIDE 50 MG/1
50 TABLET ORAL ONCE
Qty: 0 | Refills: 0 | Status: DISCONTINUED | OUTPATIENT
Start: 2018-04-23 | End: 2018-04-23

## 2018-04-23 RX ORDER — ACETAMINOPHEN 500 MG
650 TABLET ORAL EVERY 6 HOURS
Qty: 0 | Refills: 0 | Status: DISCONTINUED | OUTPATIENT
Start: 2018-04-23 | End: 2018-04-28

## 2018-04-23 RX ORDER — TRAMADOL HYDROCHLORIDE 50 MG/1
25 TABLET ORAL ONCE
Qty: 0 | Refills: 0 | Status: DISCONTINUED | OUTPATIENT
Start: 2018-04-23 | End: 2018-04-23

## 2018-04-23 RX ADMIN — CEFEPIME 100 MILLIGRAM(S): 1 INJECTION, POWDER, FOR SOLUTION INTRAMUSCULAR; INTRAVENOUS at 11:09

## 2018-04-23 RX ADMIN — TRAMADOL HYDROCHLORIDE 50 MILLIGRAM(S): 50 TABLET ORAL at 11:09

## 2018-04-23 RX ADMIN — MORPHINE SULFATE 6 MILLIGRAM(S): 50 CAPSULE, EXTENDED RELEASE ORAL at 00:50

## 2018-04-23 RX ADMIN — ENOXAPARIN SODIUM 40 MILLIGRAM(S): 100 INJECTION SUBCUTANEOUS at 14:43

## 2018-04-23 RX ADMIN — MORPHINE SULFATE 2 MILLIGRAM(S): 50 CAPSULE, EXTENDED RELEASE ORAL at 11:08

## 2018-04-23 RX ADMIN — MORPHINE SULFATE 2 MILLIGRAM(S): 50 CAPSULE, EXTENDED RELEASE ORAL at 10:20

## 2018-04-23 RX ADMIN — PANTOPRAZOLE SODIUM 40 MILLIGRAM(S): 20 TABLET, DELAYED RELEASE ORAL at 11:10

## 2018-04-23 RX ADMIN — MORPHINE SULFATE 2 MILLIGRAM(S): 50 CAPSULE, EXTENDED RELEASE ORAL at 23:14

## 2018-04-23 RX ADMIN — Medication 81 MILLIGRAM(S): at 11:09

## 2018-04-23 RX ADMIN — CEFEPIME 100 MILLIGRAM(S): 1 INJECTION, POWDER, FOR SOLUTION INTRAMUSCULAR; INTRAVENOUS at 17:18

## 2018-04-23 RX ADMIN — SODIUM CHLORIDE 100 MILLILITER(S): 9 INJECTION INTRAMUSCULAR; INTRAVENOUS; SUBCUTANEOUS at 12:40

## 2018-04-23 RX ADMIN — SODIUM CHLORIDE 100 MILLILITER(S): 9 INJECTION INTRAMUSCULAR; INTRAVENOUS; SUBCUTANEOUS at 14:44

## 2018-04-23 RX ADMIN — MORPHINE SULFATE 2 MILLIGRAM(S): 50 CAPSULE, EXTENDED RELEASE ORAL at 14:44

## 2018-04-23 NOTE — CONSULT NOTE ADULT - ASSESSMENT
38 y/o F w/ hx of , on azathioprine and bactrim, ? polymyositis / dermatomyositis was pending EMG, p/w fatigue and generalized muscle aches for the past weeks and SOB.     A/P    Abnormal CE  inflammatory etiology possible Myocarditis  No chest pain, No SWMA, No PE    Recommendations    admit ot ccu  serial CE, EKGs  low dose ASA  2d ECho  Pulmonary and Rheumatology eval 38 y/o F w/ hx of , on azathioprine and bactrim, ? polymyositis / dermatomyositis was pending EMG, p/w fatigue and generalized muscle aches for the past weeks and SOB.     A/P    Abnormal CE  inflammatory etiology possible Myocarditis  No chest pain, No SWMA, No PE    Recommendations    admit to tele  serial CE, EKGs  low dose ASA, steroids  2d ECho  Pulmonary and Rheumatology eval

## 2018-04-24 LAB
ANION GAP SERPL CALC-SCNC: 10 MMOL/L — SIGNIFICANT CHANGE UP (ref 7–14)
BASOPHILS # BLD AUTO: 0.03 K/UL — SIGNIFICANT CHANGE UP (ref 0–0.2)
BASOPHILS NFR BLD AUTO: 0.3 % — SIGNIFICANT CHANGE UP (ref 0–1)
BUN SERPL-MCNC: 7 MG/DL — LOW (ref 10–20)
CALCIUM SERPL-MCNC: 7.4 MG/DL — LOW (ref 8.5–10.1)
CHLORIDE SERPL-SCNC: 104 MMOL/L — SIGNIFICANT CHANGE UP (ref 98–110)
CK MB CFR SERPL CALC: 299.9 NG/ML — HIGH (ref 0.6–6.3)
CK MB CFR SERPL CALC: 325.3 NG/ML — HIGH (ref 0.6–6.3)
CK SERPL-CCNC: HIGH U/L (ref 0–225)
CO2 SERPL-SCNC: 22 MMOL/L — SIGNIFICANT CHANGE UP (ref 17–32)
CREAT SERPL-MCNC: 0.5 MG/DL — LOW (ref 0.7–1.5)
EOSINOPHIL # BLD AUTO: 0.53 K/UL — SIGNIFICANT CHANGE UP (ref 0–0.7)
EOSINOPHIL NFR BLD AUTO: 4.9 % — SIGNIFICANT CHANGE UP (ref 0–8)
GLUCOSE SERPL-MCNC: 76 MG/DL — SIGNIFICANT CHANGE UP (ref 70–99)
HAV IGM SER-ACNC: SIGNIFICANT CHANGE UP
HBV CORE IGM SER-ACNC: SIGNIFICANT CHANGE UP
HBV SURFACE AB SER-ACNC: SIGNIFICANT CHANGE UP
HBV SURFACE AG SER-ACNC: SIGNIFICANT CHANGE UP
HCT VFR BLD CALC: 34.2 % — LOW (ref 37–47)
HCV AB S/CO SERPL IA: 0.2 S/CO — SIGNIFICANT CHANGE UP
HCV AB SERPL-IMP: SIGNIFICANT CHANGE UP
HGB BLD-MCNC: 10.8 G/DL — LOW (ref 12–16)
IMM GRANULOCYTES NFR BLD AUTO: 1.2 % — HIGH (ref 0.1–0.3)
LYMPHOCYTES # BLD AUTO: 0.83 K/UL — LOW (ref 1.2–3.4)
LYMPHOCYTES # BLD AUTO: 7.7 % — LOW (ref 20.5–51.1)
MCHC RBC-ENTMCNC: 30.3 PG — SIGNIFICANT CHANGE UP (ref 27–31)
MCHC RBC-ENTMCNC: 31.6 G/DL — LOW (ref 32–37)
MCV RBC AUTO: 96.1 FL — SIGNIFICANT CHANGE UP (ref 81–99)
MONOCYTES # BLD AUTO: 0.46 K/UL — SIGNIFICANT CHANGE UP (ref 0.1–0.6)
MONOCYTES NFR BLD AUTO: 4.3 % — SIGNIFICANT CHANGE UP (ref 1.7–9.3)
NEUTROPHILS # BLD AUTO: 8.79 K/UL — HIGH (ref 1.4–6.5)
NEUTROPHILS NFR BLD AUTO: 81.6 % — HIGH (ref 42.2–75.2)
PLATELET # BLD AUTO: 430 K/UL — HIGH (ref 130–400)
POTASSIUM SERPL-MCNC: 4.3 MMOL/L — SIGNIFICANT CHANGE UP (ref 3.5–5)
POTASSIUM SERPL-SCNC: 4.3 MMOL/L — SIGNIFICANT CHANGE UP (ref 3.5–5)
RBC # BLD: 3.56 M/UL — LOW (ref 4.2–5.4)
RBC # FLD: 15.4 % — HIGH (ref 11.5–14.5)
SODIUM SERPL-SCNC: 136 MMOL/L — SIGNIFICANT CHANGE UP (ref 135–146)
TROPONIN T SERPL-MCNC: 1.5 NG/ML — CRITICAL HIGH
TROPONIN T SERPL-MCNC: 1.91 NG/ML — CRITICAL HIGH
TSH SERPL-MCNC: 4.3 UIU/ML — HIGH (ref 0.27–4.2)
WBC # BLD: 10.77 K/UL — SIGNIFICANT CHANGE UP (ref 4.8–10.8)
WBC # FLD AUTO: 10.77 K/UL — SIGNIFICANT CHANGE UP (ref 4.8–10.8)

## 2018-04-24 RX ORDER — MORPHINE SULFATE 50 MG/1
2 CAPSULE, EXTENDED RELEASE ORAL EVERY 4 HOURS
Qty: 0 | Refills: 0 | Status: DISCONTINUED | OUTPATIENT
Start: 2018-04-24 | End: 2018-04-25

## 2018-04-24 RX ORDER — MORPHINE SULFATE 50 MG/1
2 CAPSULE, EXTENDED RELEASE ORAL ONCE
Qty: 0 | Refills: 0 | Status: DISCONTINUED | OUTPATIENT
Start: 2018-04-24 | End: 2018-04-24

## 2018-04-24 RX ORDER — AZATHIOPRINE 100 MG/1
100 TABLET ORAL DAILY
Qty: 0 | Refills: 0 | Status: DISCONTINUED | OUTPATIENT
Start: 2018-04-24 | End: 2018-04-28

## 2018-04-24 RX ADMIN — MORPHINE SULFATE 2 MILLIGRAM(S): 50 CAPSULE, EXTENDED RELEASE ORAL at 22:58

## 2018-04-24 RX ADMIN — MORPHINE SULFATE 2 MILLIGRAM(S): 50 CAPSULE, EXTENDED RELEASE ORAL at 06:47

## 2018-04-24 RX ADMIN — MORPHINE SULFATE 2 MILLIGRAM(S): 50 CAPSULE, EXTENDED RELEASE ORAL at 04:06

## 2018-04-24 RX ADMIN — AZATHIOPRINE 100 MILLIGRAM(S): 100 TABLET ORAL at 12:51

## 2018-04-24 RX ADMIN — PANTOPRAZOLE SODIUM 40 MILLIGRAM(S): 20 TABLET, DELAYED RELEASE ORAL at 09:33

## 2018-04-24 RX ADMIN — MORPHINE SULFATE 2 MILLIGRAM(S): 50 CAPSULE, EXTENDED RELEASE ORAL at 09:33

## 2018-04-24 RX ADMIN — Medication 1 TABLET(S): at 12:51

## 2018-04-24 RX ADMIN — MORPHINE SULFATE 2 MILLIGRAM(S): 50 CAPSULE, EXTENDED RELEASE ORAL at 18:19

## 2018-04-24 RX ADMIN — SODIUM CHLORIDE 100 MILLILITER(S): 9 INJECTION INTRAMUSCULAR; INTRAVENOUS; SUBCUTANEOUS at 16:52

## 2018-04-24 RX ADMIN — MORPHINE SULFATE 2 MILLIGRAM(S): 50 CAPSULE, EXTENDED RELEASE ORAL at 16:52

## 2018-04-24 RX ADMIN — ENOXAPARIN SODIUM 40 MILLIGRAM(S): 100 INJECTION SUBCUTANEOUS at 12:52

## 2018-04-24 RX ADMIN — CEFEPIME 100 MILLIGRAM(S): 1 INJECTION, POWDER, FOR SOLUTION INTRAMUSCULAR; INTRAVENOUS at 06:46

## 2018-04-24 RX ADMIN — MORPHINE SULFATE 2 MILLIGRAM(S): 50 CAPSULE, EXTENDED RELEASE ORAL at 08:41

## 2018-04-24 RX ADMIN — TRAMADOL HYDROCHLORIDE 50 MILLIGRAM(S): 50 TABLET ORAL at 08:41

## 2018-04-24 RX ADMIN — Medication 81 MILLIGRAM(S): at 10:58

## 2018-04-24 RX ADMIN — MORPHINE SULFATE 2 MILLIGRAM(S): 50 CAPSULE, EXTENDED RELEASE ORAL at 09:51

## 2018-04-24 NOTE — PROGRESS NOTE ADULT - SUBJECTIVE AND OBJECTIVE BOX
Patient is a 37y old  Female who presents with a chief complaint of CP, SOB, cough and myalgia for last few days and admitted with Troponemia, , and possible Polymyositis      OVERNIGHT EVENTS: Her breathing is much better, no need of O2, she is still c/o diffuse muscle aches.    PAST MEDICAL & SURGICAL HISTORY:  Cryptogenic organizing pneumonia      Vital Signs Last 24 Hrs  T(C): 36.2 (24 Apr 2018 07:42), Max: 37.1 (23 Apr 2018 16:45)  T(F): 97.1 (24 Apr 2018 07:42), Max: 98.8 (23 Apr 2018 16:45)  HR: 106 (24 Apr 2018 07:42) (99 - 106)  BP: 111/63 (24 Apr 2018 07:42) (98/53 - 113/86)  BP(mean): --  RR: 20 (24 Apr 2018 07:42) (18 - 20)  SpO2: 97% (24 Apr 2018 07:42) (95% - 97%)    PHYSICAL EXAM:      Constitutional: well developed and well nourished    Respiratory: lungs B/L clear on auscultation    Cardiovascular: S1S2 normal, no murmur heard    Gastrointestinal: Abd soft, non distended, non tender, BS+    Extremities: No pedal edema, diffuse muscle tenderness    Neurological: AAOX3, No FND          I&O's Summary    24 Apr 2018 07:01  -  24 Apr 2018 13:23  --------------------------------------------------------  IN: 600 mL / OUT: 0 mL / NET: 600 mL                              10.8   10.77 )-----------( 430      ( 24 Apr 2018 07:20 )             34.2     04-24    136  |  104  |  7<L>  ----------------------------<  76  4.3   |  22  |  0.5<L>    Ca    7.4<L>      24 Apr 2018 07:20  Mg     1.8     04-23    TPro  6.3  /  Alb  2.7<L>  /  TBili  <0.2  /  DBili  x   /  AST  335<H>  /  ALT  162<H>  /  AlkPhos  57  04-23    CARDIAC MARKERS ( 24 Apr 2018 07:20 )  x     / 1.50 ng/mL / 61087 U/L / x     / 299.9 ng/mL  CARDIAC MARKERS ( 23 Apr 2018 16:19 )  x     / 1.85 ng/mL / >2000 U/L / x     / x      CARDIAC MARKERS ( 23 Apr 2018 12:16 )  x     / 1.73 ng/mL / 12397 U/L / x     / x      CARDIAC MARKERS ( 23 Apr 2018 07:13 )  x     / 1.78 ng/mL / 8865 U/L / x     / 297.5 ng/mL  CARDIAC MARKERS ( 23 Apr 2018 00:51 )  x     / 1.58 ng/mL / 06364 U/L / x     / 276.1 ng/mL  CARDIAC MARKERS ( 22 Apr 2018 20:50 )  x     / 1.58 ng/mL / 74287 U/L / x     / 283.0 ng/mL      CAPILLARY BLOOD GLUCOSE        PT/INR - ( 22 Apr 2018 20:51 )   PT: 11.50 sec;   INR: 1.06 ratio         PTT - ( 22 Apr 2018 20:51 )  PTT:26.7 sec    Culture - Urine (collected 22 Apr 2018 10:32)  Source: .Urine Clean Catch (Midstream)  Final Report (23 Apr 2018 22:37):    No growth        MEDICATIONS  (STANDING):  aspirin  chewable 81 milliGRAM(s) Oral daily  azaTHIOprine 100 milliGRAM(s) Oral daily  enoxaparin Injectable 40 milliGRAM(s) SubCutaneous every 24 hours  pantoprazole    Tablet 40 milliGRAM(s) Oral before breakfast  sodium chloride 0.9%. 1000 milliLiter(s) (100 mL/Hr) IV Continuous <Continuous>  trimethoprim  160 mG/sulfamethoxazole 800 mG 1 Tablet(s) Oral every 48 hours    MEDICATIONS  (PRN):  acetaminophen    Suspension. 650 milliGRAM(s) Oral every 6 hours PRN Moderate Pain (4 - 6)

## 2018-04-24 NOTE — PROGRESS NOTE ADULT - ASSESSMENT
37y old  Female who presents with a chief complaint of CP, SOB, cough and myalgia for last few days and admitted with Troponemia, COOP, and possible Polymyositis    # Troponemia : Likely 2/2 Myocarditis, pending ECHO, trend CE, c/w ASA 81 as of now.    # R/O Polymoyositis: get Oksana-1 antibodies, ALEX profile, start on prednisone 60MG daily ( Patient is refusing to take the medications because of previous side effects), increase Azathioprine to 100mg daily. pain control with morphine.  -I spoke to Dr. Phillips over the phone, will see the patient tomorrow. Agrees with the recommendation.    # : was following Dr. Burris as outpatient, on bacterim and azathioprine daily. As of now patient is stable and can f/u as outpatient.    Full code  DVT PPX  OOB with assistance  Diet: regular. 37y old  Female who presents with a chief complaint of CP, SOB, cough and myalgia for last few days and admitted with Troponemia, COOP, and possible Polymyositis    # Troponemia : Likely 2/2 Myocarditis, pending ECHO, trend CE, c/w ASA 81 as of now.    # R/O Polymoyositis: get Oksana-1 antibodies, ALEX profile, start on prednisone 60MG daily ( Patient is refusing to take the medications because of previous side effects) which is complicating the therapeutic approach,   increase Azathioprine to 100mg daily. pain control with morphine.  -I spoke to Dr. Phillips over the phone, will see the patient tomorrow. Agrees with the recommendation.    # : was following Dr. Burris as outpatient, on bacterim and azathioprine daily. As of now patient is stable and can f/u as outpatient.    Full code  DVT PPX  OOB with assistance  Diet: regular.

## 2018-04-25 DIAGNOSIS — J84.116 CRYPTOGENIC ORGANIZING PNEUMONIA: ICD-10-CM

## 2018-04-25 DIAGNOSIS — R53.1 WEAKNESS: ICD-10-CM

## 2018-04-25 LAB
ALBUMIN SERPL ELPH-MCNC: 2.5 G/DL — LOW (ref 3.5–5.2)
ALDOLASE SERPL-CCNC: 92 U/L — HIGH (ref 3.3–10.3)
ALP SERPL-CCNC: 61 U/L — SIGNIFICANT CHANGE UP (ref 30–115)
ALT FLD-CCNC: 209 U/L — HIGH (ref 0–41)
ANA PAT FLD IF-IMP: (no result)
ANA TITR SER: (no result)
ANION GAP SERPL CALC-SCNC: 10 MMOL/L — SIGNIFICANT CHANGE UP (ref 7–14)
AST SERPL-CCNC: 413 U/L — HIGH (ref 0–41)
BILIRUB SERPL-MCNC: 0.3 MG/DL — SIGNIFICANT CHANGE UP (ref 0.2–1.2)
BUN SERPL-MCNC: 7 MG/DL — LOW (ref 10–20)
CALCIUM SERPL-MCNC: 7.5 MG/DL — LOW (ref 8.5–10.1)
CHLORIDE SERPL-SCNC: 101 MMOL/L — SIGNIFICANT CHANGE UP (ref 98–110)
CO2 SERPL-SCNC: 23 MMOL/L — SIGNIFICANT CHANGE UP (ref 17–32)
CREAT SERPL-MCNC: 0.5 MG/DL — LOW (ref 0.7–1.5)
ENA JO1 AB SER-ACNC: >8 AI — HIGH
GLUCOSE SERPL-MCNC: 82 MG/DL — SIGNIFICANT CHANGE UP (ref 70–99)
POTASSIUM SERPL-MCNC: 4.5 MMOL/L — SIGNIFICANT CHANGE UP (ref 3.5–5)
POTASSIUM SERPL-SCNC: 4.5 MMOL/L — SIGNIFICANT CHANGE UP (ref 3.5–5)
PROT SERPL-MCNC: 5.8 G/DL — LOW (ref 6–8)
SODIUM SERPL-SCNC: 134 MMOL/L — LOW (ref 135–146)

## 2018-04-25 RX ORDER — OXYCODONE AND ACETAMINOPHEN 5; 325 MG/1; MG/1
1 TABLET ORAL EVERY 6 HOURS
Qty: 0 | Refills: 0 | Status: DISCONTINUED | OUTPATIENT
Start: 2018-04-25 | End: 2018-04-28

## 2018-04-25 RX ORDER — SODIUM CHLORIDE 9 MG/ML
1000 INJECTION INTRAMUSCULAR; INTRAVENOUS; SUBCUTANEOUS
Qty: 0 | Refills: 0 | Status: DISCONTINUED | OUTPATIENT
Start: 2018-04-25 | End: 2018-04-26

## 2018-04-25 RX ADMIN — OXYCODONE AND ACETAMINOPHEN 1 TABLET(S): 5; 325 TABLET ORAL at 14:45

## 2018-04-25 RX ADMIN — OXYCODONE AND ACETAMINOPHEN 1 TABLET(S): 5; 325 TABLET ORAL at 22:30

## 2018-04-25 RX ADMIN — SODIUM CHLORIDE 75 MILLILITER(S): 9 INJECTION INTRAMUSCULAR; INTRAVENOUS; SUBCUTANEOUS at 14:13

## 2018-04-25 RX ADMIN — ENOXAPARIN SODIUM 40 MILLIGRAM(S): 100 INJECTION SUBCUTANEOUS at 13:18

## 2018-04-25 RX ADMIN — AZATHIOPRINE 100 MILLIGRAM(S): 100 TABLET ORAL at 12:21

## 2018-04-25 RX ADMIN — PANTOPRAZOLE SODIUM 40 MILLIGRAM(S): 20 TABLET, DELAYED RELEASE ORAL at 06:16

## 2018-04-25 RX ADMIN — MORPHINE SULFATE 2 MILLIGRAM(S): 50 CAPSULE, EXTENDED RELEASE ORAL at 08:09

## 2018-04-25 RX ADMIN — MORPHINE SULFATE 2 MILLIGRAM(S): 50 CAPSULE, EXTENDED RELEASE ORAL at 04:00

## 2018-04-25 RX ADMIN — MORPHINE SULFATE 2 MILLIGRAM(S): 50 CAPSULE, EXTENDED RELEASE ORAL at 09:34

## 2018-04-25 RX ADMIN — OXYCODONE AND ACETAMINOPHEN 1 TABLET(S): 5; 325 TABLET ORAL at 22:16

## 2018-04-25 RX ADMIN — Medication 58 MILLIGRAM(S): at 14:46

## 2018-04-25 RX ADMIN — OXYCODONE AND ACETAMINOPHEN 1 TABLET(S): 5; 325 TABLET ORAL at 14:30

## 2018-04-25 NOTE — CONSULT NOTE ADULT - SUBJECTIVE AND OBJECTIVE BOX
Cardiology Consultation    CHIEF COMPLAINT: Patient is a 37y old  Female who presents with a chief complaint of weakness (22 Apr 2018 22:10)      HPI:  36 y/o F w/ hx of , on azathioprine and bactrim, ? polymyositis / dermatomyositis was pending EMG, p/w fatigue and generalized muscle aches for the past weeks.   pt also reports sharp CP and exertional SOB worsening over the past week, worse today but denies any palpitations, no lightheadedness dizziness or LOC.  pt reports having a cough productive of whitish sputum, feeling that extremities were swollen but denies fever, or chills, no N/V/D, no recent medication changes, no weight loss.  pt states that she has not been able to ambulate over the past week and has been unable to do ADLs as usual.  pt was being followed by Dr. Rubio last seen in July 2017 but did not follow up, workup was pending EMG for possible polymyositis, dermatomyositis diagnosis.   ROS otherwise negative.    in ED:  / LR 1 L / cefepime 1 g / tylenol / ketorlac / robaxin / metoprolol ER 25 / morphine 6 IV (22 Apr 2018 22:10)      PAST MEDICAL & SURGICAL HISTORY:  Cryptogenic organizing pneumonia      SOCIAL HISTORY: Non smoker, no etoh or drug abuse    FAMILY HISTORY: FAMILY HISTORY:  Family history of sarcoidosis (Father)      Home Medications:  azaTHIOprine 50 mg oral tablet: 1 tab(s) orally once a day (22 Apr 2018 23:03)  Bactrim  mg-160 mg oral tablet: 1 tab(s) orally 3 times a week (22 Apr 2018 23:03)      MEDICATIONS  (STANDING):  cefepime  IVPB 1000 milliGRAM(s) IV Intermittent every 12 hours  enoxaparin Injectable 40 milliGRAM(s) SubCutaneous every 24 hours  morphine  - Injectable 2 milliGRAM(s) IV Push once  pantoprazole    Tablet 40 milliGRAM(s) Oral before breakfast  predniSONE   Tablet 90 milliGRAM(s) Oral daily  sodium chloride 0.9%. 1000 milliLiter(s) (200 mL/Hr) IV Continuous <Continuous>    MEDICATIONS  (PRN):  acetaminophen    Suspension. 650 milliGRAM(s) Oral every 6 hours PRN Moderate Pain (4 - 6)      Allergies    contrast media (iodine-based) (Unknown)  peanuts (Unknown)  shellfish (Unknown)    Intolerances        REVIEW OF SYSTEMS:    All other review of systems is negative unless indicated above    VITAL SIGNS:   Vital Signs Last 24 Hrs  T(C): 36.7 (22 Apr 2018 16:27), Max: 36.7 (22 Apr 2018 16:27)  T(F): 98.1 (22 Apr 2018 16:27), Max: 98.1 (22 Apr 2018 16:27)  HR: 104 (22 Apr 2018 16:27) (104 - 106)  BP: 97/52 (22 Apr 2018 16:27) (97/52 - 115/58)  BP(mean): --  RR: 18 (22 Apr 2018 16:27) (18 - 20)  SpO2: 97% (22 Apr 2018 16:27) (97% - 98%)    I&O's Summary      PHYSICAL EXAM:    Constitutional: obese, eye xanthomas  Pulmonary: Non-labored, breath sounds are clear bilaterally, No wheezing, rales or rhonchi  Cardiovascular:  S1 and S2, no murmurs, +ve gallops    Gastrointestinal: Bowel Sounds present, soft, nontender.   Neurological: Alert, no focal deficits  LE: no edema      LABS: All Labs Reviewed:                        12.0   14.74 )-----------( 486      ( 22 Apr 2018 10:32 )             37.1     22 Apr 2018 10:32    136    |  100    |  11     ----------------------------<  85     4.5     |  23     |  0.5      Ca    7.9        22 Apr 2018 10:32  Mg     1.8       22 Apr 2018 10:32    TPro  6.8    /  Alb  2.9    /  TBili  <0.2   /  DBili  x      /  AST  376    /  ALT  188    /  AlkPhos  65     22 Apr 2018 10:32    PT/INR - ( 22 Apr 2018 20:51 )   PT: 11.50 sec;   INR: 1.06 ratio         PTT - ( 22 Apr 2018 20:51 )  PTT:26.7 sec  CARDIAC MARKERS ( 23 Apr 2018 00:51 )  x     / x     / x     / x     / 276.1 ng/mL  CARDIAC MARKERS ( 22 Apr 2018 20:50 )  x     / 1.58 ng/mL / 67280 U/L / x     / 283.0 ng/mL  CARDIAC MARKERS ( 22 Apr 2018 12:44 )  x     / 1.44 ng/mL / x     / x     / >300.0 ng/mL  CARDIAC MARKERS ( 22 Apr 2018 10:32 )  x     / x     / >2000 U/L / x     / x          Serum Pro-Brain Natriuretic Peptide: 58 pg/mL (04-22-18 @ 15:17)      04-22 @ 15:17  Pro Bnp 58        RADIOLOGY/EKG:  EKG    < from: 12 Lead ECG (04.22.18 @ 19:45) >  Ventricular Rate 99 BPM    Atrial Rate 99 BPM    P-R Interval 150 ms    QRS Duration 80 ms    Q-T Interval 362 ms    QTC Calculation(Bezet) 464 ms    P Axis 54 degrees    R Axis -7 degrees    T Axis 8 degrees    Diagnosis Line Normal sinus rhythm  Low voltage QRS  Abnormal ECG    < end of copied text >    ECHO Bedside    Hyperdynamic LV, Normal EF, No SWMA, moderate TR
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 Patient notes improvement in  lung symptoms but developed progressive pain and proximal muscle weakness beginning  early this year but worsening over last 3 weeks. She now has difficulty raising her arms  to shoulder level. Patient noted to have elevated CK (>10,000) she denies rashes , hair loss.  She was previously treated with prednisone and notes she didn't tolerate it after one month.    Physical exam  Vital Signs Last 24 Hrs  T(C): 37.2 (25 Apr 2018 05:04), Max: 37.6 (24 Apr 2018 20:00)  T(F): 99 (25 Apr 2018 05:04), Max: 99.6 (24 Apr 2018 20:00)  HR: 99 (25 Apr 2018 05:04) (99 - 113)  BP: 123/72 (25 Apr 2018 05:04) (115/57 - 170/63)  BP(mean): --  RR: 18 (25 Apr 2018 05:04) (18 - 18)  SpO2: 97% (25 Apr 2018 06:03) (97% - 97%)    WD female in NAD  w/o rash  Chest -clear  Cor S1S2  Abdomen-BS+  Ext- w/o synovitis  Neuro- neck strength 5/5  deltoids 2/5  hip flex 3/5
HPI:  38 y/o F w/ hx of , on azathioprine and bactrim, ? polymyositis / dermatomyositis was pending EMG, p/w fatigue and generalized muscle aches for the past weeks.   pt also reports sharp CP and exertional SOB worsening over the past week, worse today but denies any palpitations, no lightheadedness dizziness or LOC.  pt reports having a cough productive of whitish sputum, feeling that extremities were swollen but denies fever, or chills, no N/V/D, no recent medication changes, no weight loss.  pt states that she has not been able to ambulate over the past week and has been unable to do ADLs as usual.  pt was being followed by Dr. Rubio last seen in July 2017 but did not follow up, workup was pending EMG for possible polymyositis, dermatomyositis diagnosis.   ROS otherwise negative.    in ED:  / LR 1 L / cefepime 1 g / tylenol / ketorlac / robaxin / metoprolol ER 25 / morphine 6 IV (22 Apr 2018 22:10)      PAST MEDICAL & SURGICAL HISTORY:  Cryptogenic organizing pneumonia      Hospital Course:    TODAY'S SUBJECTIVE & REVIEW OF SYMPTOMS:     Constitutional WNL   Cardio WNL   Resp WNL   GI WNL  Heme WNL  Endo WNL  Skin WNL  MSK generalized body aches  Neuro WNL  Cognitive WNL  Psych WNL      MEDICATIONS  (STANDING):  azaTHIOprine 100 milliGRAM(s) Oral daily  enoxaparin Injectable 40 milliGRAM(s) SubCutaneous every 24 hours  methylPREDNISolone sodium succinate IVPB 1000 milliGRAM(s) IV Intermittent daily  pantoprazole    Tablet 40 milliGRAM(s) Oral before breakfast  sodium chloride 0.9%. 1000 milliLiter(s) (75 mL/Hr) IV Continuous <Continuous>    MEDICATIONS  (PRN):  acetaminophen    Suspension. 650 milliGRAM(s) Oral every 6 hours PRN Moderate Pain (4 - 6)  oxyCODONE    5 mG/acetaminophen 325 mG 1 Tablet(s) Oral every 6 hours PRN Moderate Pain (4 - 6)      FAMILY HISTORY:  Family history of sarcoidosis (Father)      Allergies    contrast media (iodine-based) (Unknown)  peanuts (Unknown)  shellfish (Unknown)    Intolerances        SOCIAL HISTORY:    [  ] Etoh  [  ] Smoking  [  ] Substance abuse     Home Environment:  [  ] Home Alone  [x  ] Lives with Family  [  ] Home Health Aid    Dwelling:  [x  ] Apartment  [  ] Private House  [  ] Adult Home  [  ] Skilled Nursing Facility      [  ] Short Term  [  ] Long Term  [  ] Stairs       Elevator [x  ]    FUNCTIONAL STATUS PTA: (Check all that apply)  Ambulation: [x   ]Independent    [  ] Dependent     [  ] Non-Ambulatory  Assistive Device: [  ] SA Cane  [  ]  Q Cane  [  ] Walker  [  ]  Wheelchair  ADL : [x  ] Independent  [  ]  Dependent       Vital Signs Last 24 Hrs  T(C): 37.4 (25 Apr 2018 13:41), Max: 37.6 (24 Apr 2018 20:00)  T(F): 99.4 (25 Apr 2018 13:41), Max: 99.6 (24 Apr 2018 20:00)  HR: 116 (25 Apr 2018 13:41) (99 - 116)  BP: 125/76 (25 Apr 2018 13:41) (115/57 - 125/76)  BP(mean): --  RR: 18 (25 Apr 2018 13:41) (18 - 18)  SpO2: 97% (25 Apr 2018 06:03) (97% - 97%)      PHYSICAL EXAM: Alert & Oriented X3  GENERAL: NAD, well-groomed, well-developed  HEAD:  Atraumatic, Normocephalic  CHEST/LUNG: Clear  HEART: Regular   ABDOMEN: Soft, Nontender  EXTREMITIES:  no calf tenderness    NERVOUS SYSTEM:  Cranial Nerves 2-12 intact [  ] Abnormal  [  ]  ROM: WFL all extremities [  ]  Abnormal [x  ]limited jose maria shoulders  Motor Strength: WFL all extremities  [  ]  Abnormal [x  ]4/5 all ext  Sensation: intact to light touch [x  ] Abnormal [  ]  Reflexes: Symmetric [  ]  Abnormal [  ]    FUNCTIONAL STATUS:  Bed Mobility: Independent [  ]  Supervision [  ]  Needs Assistance [ x ]  N/A [  ]  Transfers: Independent [  ]  Supervision [  ]  Needs Assistance [x  ]  N/A [  ]   Ambulation: Independent [  ]  Supervision [  ]  Needs Assistance [x  ]  N/A [  ]  ADL: Independent [  ] Requires Assistance [  ] N/A [  ]      LABS:                        10.8   10.77 )-----------( 430      ( 24 Apr 2018 07:20 )             34.2     04-25    134<L>  |  101  |  7<L>  ----------------------------<  82  4.5   |  23  |  0.5<L>    Ca    7.5<L>      25 Apr 2018 07:19    TPro  5.8<L>  /  Alb  2.5<L>  /  TBili  0.3  /  DBili  x   /  AST  413<H>  /  ALT  209<H>  /  AlkPhos  61  04-25          RADIOLOGY & ADDITIONAL STUDIES:    Assesment:

## 2018-04-25 NOTE — PROGRESS NOTE ADULT - SUBJECTIVE AND OBJECTIVE BOX
SUBJECTIVE:    Patient is a 37y old Female who presents with a chief complaint of weakness (22 Apr 2018 22:10)    Currently admitted to medicine with the primary diagnosis of Chest pain     Today is hospital day 3d. This morning she is resting comfortably in bed and reports no new issues or overnight events.     PAST MEDICAL & SURGICAL HISTORY  Cryptogenic organizing pneumonia    SOCIAL HISTORY:  Negative for smoking/alcohol/drug use.     ALLERGIES:  contrast media (iodine-based) (Unknown)  peanuts (Unknown)  shellfish (Unknown)    MEDICATIONS:  STANDING MEDICATIONS  aspirin  chewable 81 milliGRAM(s) Oral daily  azaTHIOprine 100 milliGRAM(s) Oral daily  enoxaparin Injectable 40 milliGRAM(s) SubCutaneous every 24 hours  pantoprazole    Tablet 40 milliGRAM(s) Oral before breakfast  sodium chloride 0.9%. 1000 milliLiter(s) IV Continuous <Continuous>  trimethoprim  160 mG/sulfamethoxazole 800 mG 1 Tablet(s) Oral every 48 hours    PRN MEDICATIONS  acetaminophen    Suspension. 650 milliGRAM(s) Oral every 6 hours PRN  morphine  - Injectable 2 milliGRAM(s) IV Push every 4 hours PRN    VITALS:   T(F): 99  HR: 99  BP: 123/72  RR: 18  SpO2: 97%    LABS:                        10.8   10.77 )-----------( 430      ( 24 Apr 2018 07:20 )             34.2     04-25    134<L>  |  101  |  7<L>  ----------------------------<  82  4.5   |  23  |  0.5<L>    Ca    7.5<L>      25 Apr 2018 07:19    TPro  5.8<L>  /  Alb  2.5<L>  /  TBili  0.3  /  DBili  x   /  AST  413<H>  /  ALT  209<H>  /  AlkPhos  61  04-25          Troponin T, Serum: 1.91 ng/mL <HH> (04-24-18 @ 17:09)      CARDIAC MARKERS ( 24 Apr 2018 17:09 )  x     / 1.91 ng/mL / x     / x     / 325.3 ng/mL  CARDIAC MARKERS ( 24 Apr 2018 07:20 )  x     / 1.50 ng/mL / 34457 U/L / x     / 299.9 ng/mL  CARDIAC MARKERS ( 23 Apr 2018 16:19 )  x     / 1.85 ng/mL / >2000 U/L / x     / x      CARDIAC MARKERS ( 23 Apr 2018 12:16 )  x     / 1.73 ng/mL / 40916 U/L / x     / x          RADIOLOGY:  < from: US Abdomen Limited (04.23.18 @ 06:29) >    Unremarkable right upper quadrant ultrasound.    < end of copied text >  < from: Transthoracic Echocardiogram (04.23.18 @ 09:25) >  1. Left ventricular ejection fraction, by visual estimation, is 65 to   70%.   2. Normal global left ventricular systolic function.    < end of copied text >        PHYSICAL EXAM:  GEN: No acute distress  LUNGS: Clear to auscultation bilaterally   HEART: S1/S2 present. RRR.   ABD: Soft, non-tender, non-distended. Bowel sounds present  EXT: NC/NC/NE/2+PP/[place]  NEURO: AAOX3 SUBJECTIVE:    Patient is a 37y old Female who presents with a chief complaint of weakness (22 Apr 2018 22:10)    Currently admitted to medicine with the primary diagnosis of Chest pain     Today is hospital day 3d. This morning she is resting comfortably in bed and reports no new issues or overnight events.     PAST MEDICAL & SURGICAL HISTORY  Cryptogenic organizing pneumonia    SOCIAL HISTORY:  Negative for smoking/alcohol/drug use.     ALLERGIES:  contrast media (iodine-based) (Unknown)  peanuts (Unknown)  shellfish (Unknown)    MEDICATIONS:  STANDING MEDICATIONS  aspirin  chewable 81 milliGRAM(s) Oral daily  azaTHIOprine 100 milliGRAM(s) Oral daily  enoxaparin Injectable 40 milliGRAM(s) SubCutaneous every 24 hours  pantoprazole    Tablet 40 milliGRAM(s) Oral before breakfast  sodium chloride 0.9%. 1000 milliLiter(s) IV Continuous <Continuous>  trimethoprim  160 mG/sulfamethoxazole 800 mG 1 Tablet(s) Oral every 48 hours    PRN MEDICATIONS  acetaminophen    Suspension. 650 milliGRAM(s) Oral every 6 hours PRN  morphine  - Injectable 2 milliGRAM(s) IV Push every 4 hours PRN    VITALS:   T(F): 99  HR: 99  BP: 123/72  RR: 18  SpO2: 97%    LABS:                        10.8   10.77 )-----------( 430      ( 24 Apr 2018 07:20 )             34.2     04-25    134<L>  |  101  |  7<L>  ----------------------------<  82  4.5   |  23  |  0.5<L>    Ca    7.5<L>      25 Apr 2018 07:19    TPro  5.8<L>  /  Alb  2.5<L>  /  TBili  0.3  /  DBili  x   /  AST  413<H>  /  ALT  209<H>  /  AlkPhos  61  04-25          Troponin T, Serum: 1.91 ng/mL <HH> (04-24-18 @ 17:09)      CARDIAC MARKERS ( 24 Apr 2018 17:09 )  x     / 1.91 ng/mL / x     / x     / 325.3 ng/mL  CARDIAC MARKERS ( 24 Apr 2018 07:20 )  x     / 1.50 ng/mL / 79296 U/L / x     / 299.9 ng/mL  CARDIAC MARKERS ( 23 Apr 2018 16:19 )  x     / 1.85 ng/mL / >2000 U/L / x     / x      CARDIAC MARKERS ( 23 Apr 2018 12:16 )  x     / 1.73 ng/mL / 05234 U/L / x     / x          RADIOLOGY:  < from: US Abdomen Limited (04.23.18 @ 06:29) >    Unremarkable right upper quadrant ultrasound.    < end of copied text >  < from: Transthoracic Echocardiogram (04.23.18 @ 09:25) >  1. Left ventricular ejection fraction, by visual estimation, is 65 to   70%.   2. Normal global left ventricular systolic function.    < end of copied text >  < from: CT Chest w/ IV Cont (04.22.18 @ 18:05) >  No pulmonary bolus.    Bilateral lower lobe patchy airspace opacities, decreased from prior   exam. Findings may represent pneumonia in the correct clinical setting.    < end of copied text >          PHYSICAL EXAM:  GEN: No acute distress  LUNGS: Clear to auscultation bilaterally   HEART: S1/S2 present. RRR.   ABD: Soft, non-tender, non-distended. Bowel sounds present  EXT: no edema  NEURO: AAOX3 SUBJECTIVE:    Patient is a 37y old Female who presents with a chief complaint of weakness (22 Apr 2018 22:10)    Currently admitted to medicine with the primary diagnosis of Chest pain     Today is hospital day 3d. This morning she is resting comfortably in bed and reports no new issues or overnight events.     PAST MEDICAL & SURGICAL HISTORY  Cryptogenic organizing pneumonia    SOCIAL HISTORY:  Negative for smoking/alcohol/drug use.     ALLERGIES:  contrast media (iodine-based) (Unknown)  peanuts (Unknown)  shellfish (Unknown)    MEDICATIONS:  STANDING MEDICATIONS  aspirin  chewable 81 milliGRAM(s) Oral daily  azaTHIOprine 100 milliGRAM(s) Oral daily  enoxaparin Injectable 40 milliGRAM(s) SubCutaneous every 24 hours  pantoprazole    Tablet 40 milliGRAM(s) Oral before breakfast  sodium chloride 0.9%. 1000 milliLiter(s) IV Continuous <Continuous>  trimethoprim  160 mG/sulfamethoxazole 800 mG 1 Tablet(s) Oral every 48 hours    PRN MEDICATIONS  acetaminophen    Suspension. 650 milliGRAM(s) Oral every 6 hours PRN  morphine  - Injectable 2 milliGRAM(s) IV Push every 4 hours PRN    VITALS:   T(F): 99  HR: 99  BP: 123/72  RR: 18  SpO2: 97%    LABS:                        10.8   10.77 )-----------( 430      ( 24 Apr 2018 07:20 )             34.2     04-25    134<L>  |  101  |  7<L>  ----------------------------<  82  4.5   |  23  |  0.5<L>    Ca    7.5<L>      25 Apr 2018 07:19    TPro  5.8<L>  /  Alb  2.5<L>  /  TBili  0.3  /  DBili  x   /  AST  413<H>  /  ALT  209<H>  /  AlkPhos  61  04-25          Troponin T, Serum: 1.91 ng/mL <HH> (04-24-18 @ 17:09)      CARDIAC MARKERS ( 24 Apr 2018 17:09 )  x     / 1.91 ng/mL / x     / x     / 325.3 ng/mL  CARDIAC MARKERS ( 24 Apr 2018 07:20 )  x     / 1.50 ng/mL / 05285 U/L / x     / 299.9 ng/mL  CARDIAC MARKERS ( 23 Apr 2018 16:19 )  x     / 1.85 ng/mL / >2000 U/L / x     / x      CARDIAC MARKERS ( 23 Apr 2018 12:16 )  x     / 1.73 ng/mL / 53739 U/L / x     / x          RADIOLOGY:  < from: US Abdomen Limited (04.23.18 @ 06:29) >    Unremarkable right upper quadrant ultrasound.    < end of copied text >  < from: Transthoracic Echocardiogram (04.23.18 @ 09:25) >  1. Left ventricular ejection fraction, by visual estimation, is 65 to   70%.   2. Normal global left ventricular systolic function.    < end of copied text >  < from: CT Chest w/ IV Cont (04.22.18 @ 18:05) >  No pulmonary bolus.    Bilateral lower lobe patchy airspace opacities, decreased from prior   exam. Findings may represent pneumonia in the correct clinical setting.    < end of copied text >          PHYSICAL EXAM:  GEN: No acute distress  PULM: Clear to auscultation bilaterally   CV: S1/S2 present. RRR.   GI: Soft, non-tender, non-distended. Bowel sounds present  EXT: no edema  NEURO: 2/5 B/L UPPER EXTREMITIES, SENSATION INTACT  PSYCH: AAOX3

## 2018-04-25 NOTE — CONSULT NOTE ADULT - PROBLEM SELECTOR RECOMMENDATION 9
Discussed diagnosis and treatment with patient who is willing take short course of corticosteroids-suggest IV medrol 1000mg QD X3 increase azathioprine to 100mg and then 150 next week, consider adding methotrexate if WBC OK as out patient. Patient need f/u in rheumatology clinic . Check ALEX anti Oksana-1 antibodies

## 2018-04-25 NOTE — PROGRESS NOTE ADULT - ASSESSMENT
37y old  Female who presents with a chief complaint of CP, SOB, cough and myalgia for last few days and admitted with Troponemia, COOP, and possible Polymyositis    # Troponemia : Likely 2/2 Myocarditis  Cardio following  c/w aspirin    # R/O Inflammatory Myopathy/ Polymoyositis:   - Rheum cs  - ESR elevated   f/u Oksana-1 antibodies, ALEX profile, started on prednisone 60MG daily ( Patient is refusing to take the medications because of previous side effects) which is complicating the therapeutic approach,   increased Azathioprine to 100mg daily. pain control with morphine.    # Transaminitis likely autoimmune related  rheum cs    # h/o :    on bacterim and azathioprine daily.   OP pulm f/u Dr Jim    Full code  DVT PPX  OOB with assistance  Diet: regular. 37y old  Female who presents with a chief complaint of CP, SOB, cough and myalgia for last few days and admitted with Troponemia, COOP, and possible Polymyositis    # Troponemia : Likely 2/2 Myositis  Cardio following  c/w aspirin    # R/O Inflammatory Myopathy/ Polymoyositis:   - Rheum cs- pulse steroids for 3 days, increase azathioprine to 100mg OD, will consider starting methotrexate later on  - ESR elevated   f/u Oksana-1 antibodies, ALEX profile,  increased Azathioprine to 100mg daily.  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   OP pulm f/u Dr Jim    Full code  DVT PPX  OOB with assistance  Diet: regular. 37y old  Female who presents with a chief complaint of CP, SOB, cough and myalgia for last few days and admitted with Troponemia, COOP, and possible Polymyositis    # R/O Inflammatory Myopathy/ Polymoyositis:   - Rheum cs- pulse steroids for 3 days, increase azathioprine to 100mg OD, will consider starting methotrexate later on  - ESR elevated   f/u Oksana-1 antibodies, ALEX profile,  increased Azathioprine to 100mg daily.  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

## 2018-04-25 NOTE — CONSULT NOTE ADULT - CONSULT REASON
36 yo female with hx of BOOP now with progressive  proximal muscle weakness
gait dysfunction
Abnormal troponin

## 2018-04-25 NOTE — CONSULT NOTE ADULT - ASSESSMENT

## 2018-04-25 NOTE — PROGRESS NOTE ADULT - ATTENDING COMMENTS
I FULLY AGREE WITHE RESIDENT'S EXCELLENT HISTORY/EXAM/ASSESSMENT AFTER MY INDEPENDENT EXAM/ASSESSMENT.  I ALSO REVIEWED THE RHEUM AND PHYSIATRY CONSULTS AND WILL MONITOR HER RESPONSE TO IV STEROIDS AND P/T.    TELEMETRY D/CD

## 2018-04-25 NOTE — CONSULT NOTE ADULT - ASSESSMENT
36 yo female with history of organizing pneumonia on low dose  azathioprine now with subacute progression of muscle pain and weakness  associated with elevated Muscle enzymes c/w polymyositis

## 2018-04-26 LAB
ALBUMIN SERPL ELPH-MCNC: 2.9 G/DL — LOW (ref 3.5–5.2)
ALP SERPL-CCNC: 65 U/L — SIGNIFICANT CHANGE UP (ref 30–115)
ALT FLD-CCNC: 232 U/L — HIGH (ref 0–41)
ANA PAT FLD IF-IMP: (no result)
ANA TITR SER: (no result)
ANION GAP SERPL CALC-SCNC: 13 MMOL/L — SIGNIFICANT CHANGE UP (ref 7–14)
AST SERPL-CCNC: 403 U/L — HIGH (ref 0–41)
BILIRUB SERPL-MCNC: 0.2 MG/DL — SIGNIFICANT CHANGE UP (ref 0.2–1.2)
BUN SERPL-MCNC: 11 MG/DL — SIGNIFICANT CHANGE UP (ref 10–20)
CALCIUM SERPL-MCNC: 8.2 MG/DL — LOW (ref 8.5–10.1)
CHLORIDE SERPL-SCNC: 100 MMOL/L — SIGNIFICANT CHANGE UP (ref 98–110)
CK SERPL-CCNC: HIGH U/L (ref 0–225)
CO2 SERPL-SCNC: 22 MMOL/L — SIGNIFICANT CHANGE UP (ref 17–32)
CREAT SERPL-MCNC: <0.5 MG/DL — LOW (ref 0.7–1.5)
GLUCOSE SERPL-MCNC: 139 MG/DL — HIGH (ref 70–99)
HCT VFR BLD CALC: 36.6 % — LOW (ref 37–47)
HGB BLD-MCNC: 11.8 G/DL — LOW (ref 12–16)
MAGNESIUM SERPL-MCNC: 2.1 MG/DL — SIGNIFICANT CHANGE UP (ref 1.8–2.4)
MCHC RBC-ENTMCNC: 30.5 PG — SIGNIFICANT CHANGE UP (ref 27–31)
MCHC RBC-ENTMCNC: 32.2 G/DL — SIGNIFICANT CHANGE UP (ref 32–37)
MCV RBC AUTO: 94.6 FL — SIGNIFICANT CHANGE UP (ref 81–99)
NRBC # BLD: 0 /100 WBCS — SIGNIFICANT CHANGE UP (ref 0–0)
PLATELET # BLD AUTO: 529 K/UL — HIGH (ref 130–400)
POTASSIUM SERPL-MCNC: 4.6 MMOL/L — SIGNIFICANT CHANGE UP (ref 3.5–5)
POTASSIUM SERPL-SCNC: 4.6 MMOL/L — SIGNIFICANT CHANGE UP (ref 3.5–5)
PROT SERPL-MCNC: 6.9 G/DL — SIGNIFICANT CHANGE UP (ref 6–8)
RBC # BLD: 3.87 M/UL — LOW (ref 4.2–5.4)
RBC # FLD: 15 % — HIGH (ref 11.5–14.5)
SODIUM SERPL-SCNC: 135 MMOL/L — SIGNIFICANT CHANGE UP (ref 135–146)
WBC # BLD: 16.25 K/UL — HIGH (ref 4.8–10.8)
WBC # FLD AUTO: 16.25 K/UL — HIGH (ref 4.8–10.8)

## 2018-04-26 RX ORDER — SENNA PLUS 8.6 MG/1
1 TABLET ORAL
Qty: 0 | Refills: 0 | Status: DISCONTINUED | OUTPATIENT
Start: 2018-04-26 | End: 2018-04-28

## 2018-04-26 RX ORDER — DOCUSATE SODIUM 100 MG
100 CAPSULE ORAL THREE TIMES A DAY
Qty: 0 | Refills: 0 | Status: DISCONTINUED | OUTPATIENT
Start: 2018-04-26 | End: 2018-04-28

## 2018-04-26 RX ADMIN — Medication 650 MILLIGRAM(S): at 21:30

## 2018-04-26 RX ADMIN — OXYCODONE AND ACETAMINOPHEN 1 TABLET(S): 5; 325 TABLET ORAL at 15:00

## 2018-04-26 RX ADMIN — SENNA PLUS 1 TABLET(S): 8.6 TABLET ORAL at 17:20

## 2018-04-26 RX ADMIN — ENOXAPARIN SODIUM 40 MILLIGRAM(S): 100 INJECTION SUBCUTANEOUS at 14:30

## 2018-04-26 RX ADMIN — PANTOPRAZOLE SODIUM 40 MILLIGRAM(S): 20 TABLET, DELAYED RELEASE ORAL at 09:10

## 2018-04-26 RX ADMIN — Medication 650 MILLIGRAM(S): at 20:49

## 2018-04-26 RX ADMIN — Medication 100 MILLIGRAM(S): at 14:30

## 2018-04-26 RX ADMIN — OXYCODONE AND ACETAMINOPHEN 1 TABLET(S): 5; 325 TABLET ORAL at 23:05

## 2018-04-26 RX ADMIN — OXYCODONE AND ACETAMINOPHEN 1 TABLET(S): 5; 325 TABLET ORAL at 14:30

## 2018-04-26 RX ADMIN — Medication 58 MILLIGRAM(S): at 06:07

## 2018-04-26 RX ADMIN — AZATHIOPRINE 100 MILLIGRAM(S): 100 TABLET ORAL at 12:09

## 2018-04-26 RX ADMIN — OXYCODONE AND ACETAMINOPHEN 1 TABLET(S): 5; 325 TABLET ORAL at 06:22

## 2018-04-26 NOTE — PROGRESS NOTE ADULT - ASSESSMENT
37y old  Female who presents with a chief complaint of CP, SOB, cough and myalgia for last few days and admitted with Troponemia, COOP, and possible Polymyositis    # R/O Inflammatory Myopathy/ Polymoyositis:   - Rheum cs- pulse steroids for 3 days, increase azathioprine to 100mg OD, will consider starting methotrexate later on OP  - ESR elevated   f/u Oksana-1 antibodies, ALEX profile,  increased Azathioprine to 100mg daily.  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  rheum following    # h/o / BOOP 2/2 autoimmune etiology:   - was on azathriprine; increased to 100mg OD  - was on bactrim - couldnot find why in charts; DC for now  OP pulm f/u Dr Jim    # DVT PPX    # Dispo  home with PT 37y old  Female who presents with a chief complaint of CP, SOB, cough and myalgia for last few days and admitted with Troponemia, COOP, and possible Polymyositis    # Inflammatory Myopathy/ Polymoyositis:   - Rheum cs- pulse steroids for 3 days, increase azathioprine to 100mg OD, will consider starting methotrexate later on OP  - ESR elevated  - positive anti Oksana-1 antibodies, positive ALEX profile,  increased Azathioprine to 100mg daily.  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

## 2018-04-26 NOTE — PROGRESS NOTE ADULT - ASSESSMENT
37y old  Female who presents with a chief complaint of CP, SOB, cough and myalgia for last few days and admitted with Troponemia, COOP, and possible Polymyositis    # R/O Inflammatory Myopathy/ Polymoyositis:   - Rheum cs- pulse steroids for 3 days, increase azathioprine to 100mg OD, will consider starting methotrexate later on OP  - ESR elevated   f/u Oksana-1 antibodies, ALEX profile,  increased Azathioprine to 100mg daily.  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

## 2018-04-26 NOTE — PROGRESS NOTE ADULT - SUBJECTIVE AND OBJECTIVE BOX
SUBJECTIVE:    Patient is a 37y old Female who presents with a chief complaint of weakness (22 Apr 2018 22:10)    Currently admitted to medicine with the primary diagnosis of Chest pain     Today is hospital day 4d. This morning she is resting comfortably in bed and reports no new issues or overnight events.     PAST MEDICAL & SURGICAL HISTORY  Cryptogenic organizing pneumonia    SOCIAL HISTORY:  Negative for smoking/alcohol/drug use.     ALLERGIES:  contrast media (iodine-based) (Unknown)  peanuts (Unknown)  shellfish (Unknown)    MEDICATIONS:  STANDING MEDICATIONS  azaTHIOprine 100 milliGRAM(s) Oral daily  enoxaparin Injectable 40 milliGRAM(s) SubCutaneous every 24 hours  methylPREDNISolone sodium succinate IVPB 1000 milliGRAM(s) IV Intermittent daily  pantoprazole    Tablet 40 milliGRAM(s) Oral before breakfast    PRN MEDICATIONS  acetaminophen    Suspension. 650 milliGRAM(s) Oral every 6 hours PRN  oxyCODONE    5 mG/acetaminophen 325 mG 1 Tablet(s) Oral every 6 hours PRN    VITALS:   T(F): 97  HR: 96  BP: 126/71  RR: 18  SpO2: --    LABS:                        11.8   16.25 )-----------( 529      ( 26 Apr 2018 08:42 )             36.6     04-26    135  |  100  |  11  ----------------------------<  139<H>  4.6   |  22  |  <0.5<L>    Ca    8.2<L>      26 Apr 2018 08:42  Mg     2.1     04-26    TPro  6.9  /  Alb  2.9<L>  /  TBili  0.2  /  DBili  x   /  AST  403<H>  /  ALT  232<H>  /  AlkPhos  65  04-26          Creatine Kinase, Serum: 69184 U/L <H> (04-26-18 @ 08:42)      CARDIAC MARKERS ( 26 Apr 2018 08:42 )  x     / x     / 66855 U/L / x     / x      CARDIAC MARKERS ( 24 Apr 2018 17:09 )  x     / 1.91 ng/mL / x     / x     / 325.3 ng/mL      RADIOLOGY:    PHYSICAL EXAM:  GEN: No acute distress  LUNGS: Clear to auscultation bilaterally   HEART: S1/S2 present. RRR.   ABD: Soft, non-tender, non-distended. Bowel sounds present  EXT: NC/NC/NE/2+PP/FRANKLIN  NEURO: AAOX3

## 2018-04-26 NOTE — PROGRESS NOTE ADULT - SUBJECTIVE AND OBJECTIVE BOX
SUBJECTIVE:    Patient is a 37y old Female who presents with a chief complaint of weakness (22 Apr 2018 22:10)    Currently admitted to medicine with the primary diagnosis of Chest pain     Today is hospital day 4d. This morning she is resting comfortably in bed and reports no new issues or overnight events.     PAST MEDICAL & SURGICAL HISTORY  Cryptogenic organizing pneumonia    SOCIAL HISTORY:  Negative for smoking/alcohol/drug use.     ALLERGIES:  contrast media (iodine-based) (Unknown)  peanuts (Unknown)  shellfish (Unknown)    MEDICATIONS:  STANDING MEDICATIONS  azaTHIOprine 100 milliGRAM(s) Oral daily  enoxaparin Injectable 40 milliGRAM(s) SubCutaneous every 24 hours  methylPREDNISolone sodium succinate IVPB 1000 milliGRAM(s) IV Intermittent daily  pantoprazole    Tablet 40 milliGRAM(s) Oral before breakfast  sodium chloride 0.9%. 1000 milliLiter(s) IV Continuous <Continuous>    PRN MEDICATIONS  acetaminophen    Suspension. 650 milliGRAM(s) Oral every 6 hours PRN  oxyCODONE    5 mG/acetaminophen 325 mG 1 Tablet(s) Oral every 6 hours PRN    VITALS:   T(F): 97  HR: 96  BP: 126/71  RR: 18  SpO2: --    LABS:    04-25    134<L>  |  101  |  7<L>  ----------------------------<  82  4.5   |  23  |  0.5<L>    Ca    7.5<L>      25 Apr 2018 07:19    TPro  5.8<L>  /  Alb  2.5<L>  /  TBili  0.3  /  DBili  x   /  AST  413<H>  /  ALT  209<H>  /  AlkPhos  61  04-25        CARDIAC MARKERS ( 24 Apr 2018 17:09 )  x     / 1.91 ng/mL / x     / x     / 325.3 ng/mL      RADIOLOGY:\  < from: Transthoracic Echocardiogram (04.23.18 @ 09:25) >  1. Left ventricular ejection fraction, by visual estimation, is 65 to   70%.   2. Normal global left ventricular systolic function.    < end of copied text >      PHYSICAL EXAM:  GEN: No acute distress  LUNGS: Clear to auscultation bilaterally   HEART: S1/S2 present. RRR.   ABD: Soft, non-tender, non-distended. Bowel sounds present  EXT: NC/NC/NE/2+PP/FRANKLIN  NEURO: AAOX3

## 2018-04-27 ENCOUNTER — TRANSCRIPTION ENCOUNTER (OUTPATIENT)
Age: 38
End: 2018-04-27

## 2018-04-27 LAB
ALBUMIN SERPL ELPH-MCNC: 2.7 G/DL — LOW (ref 3.5–5.2)
ALP SERPL-CCNC: 54 U/L — SIGNIFICANT CHANGE UP (ref 30–115)
ALT FLD-CCNC: 198 U/L — HIGH (ref 0–41)
ANION GAP SERPL CALC-SCNC: 10 MMOL/L — SIGNIFICANT CHANGE UP (ref 7–14)
AST SERPL-CCNC: 265 U/L — HIGH (ref 0–41)
BILIRUB SERPL-MCNC: <0.2 MG/DL — SIGNIFICANT CHANGE UP (ref 0.2–1.2)
BUN SERPL-MCNC: 14 MG/DL — SIGNIFICANT CHANGE UP (ref 10–20)
CALCIUM SERPL-MCNC: 8.3 MG/DL — LOW (ref 8.5–10.1)
CHLORIDE SERPL-SCNC: 103 MMOL/L — SIGNIFICANT CHANGE UP (ref 98–110)
CK SERPL-CCNC: 6821 U/L — HIGH (ref 0–225)
CO2 SERPL-SCNC: 26 MMOL/L — SIGNIFICANT CHANGE UP (ref 17–32)
CREAT SERPL-MCNC: 0.5 MG/DL — LOW (ref 0.7–1.5)
GLUCOSE SERPL-MCNC: 106 MG/DL — HIGH (ref 70–99)
HCT VFR BLD CALC: 34 % — LOW (ref 37–47)
HGB BLD-MCNC: 10.8 G/DL — LOW (ref 12–16)
MAGNESIUM SERPL-MCNC: 2.1 MG/DL — SIGNIFICANT CHANGE UP (ref 1.8–2.4)
MCHC RBC-ENTMCNC: 30.7 PG — SIGNIFICANT CHANGE UP (ref 27–31)
MCHC RBC-ENTMCNC: 31.8 G/DL — LOW (ref 32–37)
MCV RBC AUTO: 96.6 FL — SIGNIFICANT CHANGE UP (ref 81–99)
NRBC # BLD: 0 /100 WBCS — SIGNIFICANT CHANGE UP (ref 0–0)
PLATELET # BLD AUTO: 531 K/UL — HIGH (ref 130–400)
POTASSIUM SERPL-MCNC: 4.4 MMOL/L — SIGNIFICANT CHANGE UP (ref 3.5–5)
POTASSIUM SERPL-SCNC: 4.4 MMOL/L — SIGNIFICANT CHANGE UP (ref 3.5–5)
PROT SERPL-MCNC: 6.4 G/DL — SIGNIFICANT CHANGE UP (ref 6–8)
RBC # BLD: 3.52 M/UL — LOW (ref 4.2–5.4)
RBC # FLD: 15 % — HIGH (ref 11.5–14.5)
SODIUM SERPL-SCNC: 139 MMOL/L — SIGNIFICANT CHANGE UP (ref 135–146)
WBC # BLD: 18.28 K/UL — HIGH (ref 4.8–10.8)
WBC # FLD AUTO: 18.28 K/UL — HIGH (ref 4.8–10.8)

## 2018-04-27 RX ORDER — AZATHIOPRINE 100 MG/1
1 TABLET ORAL
Qty: 30 | Refills: 0 | OUTPATIENT
Start: 2018-04-27

## 2018-04-27 RX ORDER — AZTREONAM 2 G
1 VIAL (EA) INJECTION
Qty: 0 | Refills: 0 | COMMUNITY

## 2018-04-27 RX ORDER — AZATHIOPRINE 100 MG/1
1 TABLET ORAL
Qty: 0 | Refills: 0 | COMMUNITY

## 2018-04-27 RX ORDER — TRAMADOL HYDROCHLORIDE 50 MG/1
1 TABLET ORAL
Qty: 15 | Refills: 0 | OUTPATIENT
Start: 2018-04-27

## 2018-04-27 RX ADMIN — Medication 100 MILLIGRAM(S): at 06:18

## 2018-04-27 RX ADMIN — SENNA PLUS 1 TABLET(S): 8.6 TABLET ORAL at 17:21

## 2018-04-27 RX ADMIN — OXYCODONE AND ACETAMINOPHEN 1 TABLET(S): 5; 325 TABLET ORAL at 18:54

## 2018-04-27 RX ADMIN — Medication 58 MILLIGRAM(S): at 06:14

## 2018-04-27 RX ADMIN — OXYCODONE AND ACETAMINOPHEN 1 TABLET(S): 5; 325 TABLET ORAL at 10:13

## 2018-04-27 RX ADMIN — OXYCODONE AND ACETAMINOPHEN 1 TABLET(S): 5; 325 TABLET ORAL at 21:57

## 2018-04-27 RX ADMIN — ENOXAPARIN SODIUM 40 MILLIGRAM(S): 100 INJECTION SUBCUTANEOUS at 14:25

## 2018-04-27 RX ADMIN — PANTOPRAZOLE SODIUM 40 MILLIGRAM(S): 20 TABLET, DELAYED RELEASE ORAL at 06:15

## 2018-04-27 RX ADMIN — AZATHIOPRINE 100 MILLIGRAM(S): 100 TABLET ORAL at 12:04

## 2018-04-27 RX ADMIN — SENNA PLUS 1 TABLET(S): 8.6 TABLET ORAL at 06:18

## 2018-04-27 RX ADMIN — Medication 100 MILLIGRAM(S): at 14:23

## 2018-04-27 RX ADMIN — OXYCODONE AND ACETAMINOPHEN 1 TABLET(S): 5; 325 TABLET ORAL at 06:14

## 2018-04-27 NOTE — DISCHARGE NOTE ADULT - CARE PLAN
Principal Discharge DX:	Polymyositis associated with autoimmune disease  Goal:	medical management  Assessment and plan of treatment:	f/u with pmd & rheumatology

## 2018-04-27 NOTE — PROGRESS NOTE ADULT - SUBJECTIVE AND OBJECTIVE BOX
SUBJECTIVE:    Patient is a 37y old Female who presents with a chief complaint of weakness (27 Apr 2018 10:27)    Currently admitted to medicine with the primary diagnosis of Polymyositis associated with autoimmune disease     Today is hospital day 5d. This morning she is resting comfortably in bed and reports no new issues or overnight events.     PAST MEDICAL & SURGICAL HISTORY  Cryptogenic organizing pneumonia    SOCIAL HISTORY:  Negative for smoking/alcohol/drug use.     ALLERGIES:  contrast media (iodine-based) (Unknown)  peanuts (Unknown)  shellfish (Unknown)    MEDICATIONS:  STANDING MEDICATIONS  azaTHIOprine 100 milliGRAM(s) Oral daily  docusate sodium 100 milliGRAM(s) Oral three times a day  enoxaparin Injectable 40 milliGRAM(s) SubCutaneous every 24 hours  pantoprazole    Tablet 40 milliGRAM(s) Oral before breakfast  senna 1 Tablet(s) Oral two times a day    PRN MEDICATIONS  acetaminophen    Suspension. 650 milliGRAM(s) Oral every 6 hours PRN  oxyCODONE    5 mG/acetaminophen 325 mG 1 Tablet(s) Oral every 6 hours PRN    VITALS:   T(F): 98  HR: 83  BP: 154/70  RR: 18  SpO2: --    LABS:                        10.8   18.28 )-----------( 531      ( 27 Apr 2018 07:44 )             34.0     04-27    139  |  103  |  14  ----------------------------<  106<H>  4.4   |  26  |  0.5<L>    Ca    8.3<L>      27 Apr 2018 07:44  Mg     2.1     04-27    TPro  6.4  /  Alb  2.7<L>  /  TBili  <0.2  /  DBili  x   /  AST  265<H>  /  ALT  198<H>  /  AlkPhos  54  04-27          Creatine Kinase, Serum: 6821 U/L <H> (04-27-18 @ 07:44)      CARDIAC MARKERS ( 27 Apr 2018 07:44 )  x     / x     / 6821 U/L / x     / x      CARDIAC MARKERS ( 26 Apr 2018 08:42 )  x     / x     / 65298 U/L / x     / x          RADIOLOGY:    PHYSICAL EXAM:  GEN: No acute distress  LUNGS: Clear to auscultation bilaterally   HEART: S1/S2 present. RRR.   ABD: Soft, non-tender, non-distended. Bowel sounds present  EXT: NC/NC/NE/2+PP/FRANKLIN  NEURO: AAOX3

## 2018-04-27 NOTE — DISCHARGE NOTE ADULT - PATIENT PORTAL LINK FT
You can access the [facility] Patient Portal, offered by [facility], by registering with the following website: [email or web address]

## 2018-04-27 NOTE — PROGRESS NOTE ADULT - SUBJECTIVE AND OBJECTIVE BOX
SUBJECTIVE:    Patient is a 37y old Female who presents with a chief complaint of weakness (22 Apr 2018 22:10)    Currently admitted to medicine with the primary diagnosis of Chest pain     Today is hospital day 5d. This morning she is resting comfortably in bed and reports no new issues or overnight events.     PAST MEDICAL & SURGICAL HISTORY  Cryptogenic organizing pneumonia    SOCIAL HISTORY:  Negative for smoking/alcohol/drug use.     ALLERGIES:  contrast media (iodine-based) (Unknown)  peanuts (Unknown)  shellfish (Unknown)    MEDICATIONS:  STANDING MEDICATIONS  azaTHIOprine 100 milliGRAM(s) Oral daily  docusate sodium 100 milliGRAM(s) Oral three times a day  enoxaparin Injectable 40 milliGRAM(s) SubCutaneous every 24 hours  pantoprazole    Tablet 40 milliGRAM(s) Oral before breakfast  senna 1 Tablet(s) Oral two times a day    PRN MEDICATIONS  acetaminophen    Suspension. 650 milliGRAM(s) Oral every 6 hours PRN  oxyCODONE    5 mG/acetaminophen 325 mG 1 Tablet(s) Oral every 6 hours PRN    VITALS:   T(F): 98  HR: 83  BP: 154/70  RR: 18  SpO2: --    LABS:                        10.8   18.28 )-----------( 531      ( 27 Apr 2018 07:44 )             34.0     04-27    139  |  103  |  14  ----------------------------<  106<H>  4.4   |  26  |  0.5<L>    Ca    8.3<L>      27 Apr 2018 07:44  Mg     2.1     04-27    TPro  6.4  /  Alb  2.7<L>  /  TBili  <0.2  /  DBili  x   /  AST  265<H>  /  ALT  198<H>  /  AlkPhos  54  04-27      Creatine Kinase, Serum: 6821 U/L <H> (04-27-18 @ 07:44)    CARDIAC MARKERS ( 27 Apr 2018 07:44 )  x     / x     / 6821 U/L / x     / x      CARDIAC MARKERS ( 26 Apr 2018 08:42 )  x     / x     / 99560 U/L / x     / x        RADIOLOGY:  < from: Transthoracic Echocardiogram (04.23.18 @ 09:25) >   1. Left ventricular ejection fraction, by visual estimation, is 65 to   70%.   2. Normal global left ventricular systolic function.    < end of copied text >    PHYSICAL EXAM:  GEN: No acute distress  LUNGS: Clear to auscultation bilaterally   HEART: S1/S2 present. RRR.   ABD: Soft, non-tender, non-distended. Bowel sounds present  EXT: NC/NC/NE/2+PP/FRANKLIN  NEURO: AAOX3

## 2018-04-27 NOTE — DISCHARGE NOTE ADULT - MEDICATION SUMMARY - MEDICATIONS TO TAKE
I will START or STAY ON the medications listed below when I get home from the hospital:    traMADol 50 mg oral tablet  -- 1 tab(s) by mouth once a day, As Needed MDD:2 tablets  -- Caution federal law prohibits the transfer of this drug to any person other  than the person for whom it was prescribed.  May cause drowsiness.  Alcohol may intensify this effect.  Use care when operating dangerous machinery.  Obtain medical advice before taking any non-prescription drugs as some may affect the action of this medication.    -- Indication: For Pain    azaTHIOprine 100 mg oral tablet  -- 1 tab(s) by mouth once a day   -- Do not take this drug if you are pregnant.  It is very important that you take or use this exactly as directed.  Do not skip doses or discontinue unless directed by your doctor.  Take with food or milk.    -- Indication: For Polymyositis associated with autoimmune disease

## 2018-04-27 NOTE — PROGRESS NOTE ADULT - ASSESSMENT
37y old  Female who presents with a chief complaint of CP, SOB, cough and myalgia for last few days and admitted with Troponemia, COOP, and possible Polymyositis    # R/O Inflammatory Myopathy/ Polymoyositis:   - Rheum cs- pulse steroids for 3 days, increase azathioprine to 100mg OD, will consider starting methotrexate later on OP  - ESR elevated   f/u Oksana-1 antibodies.>8, ALEX 1:320 with speckled pattern,  increased Azathioprine to 100mg daily.  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 Refuses to leave today.

## 2018-04-27 NOTE — DISCHARGE NOTE ADULT - CARE PROVIDERS DIRECT ADDRESSES
[email or web address]

## 2018-04-27 NOTE — PROGRESS NOTE ADULT - ASSESSMENT
37y old  Female who presents with a chief complaint of CP, SOB, cough and myalgia for last few days and admitted with Troponemia, COOP, and possible Polymyositis    # Inflammatory Myopathy/ Polymoyositis:   - Rheum cs- pulse steroids for 3 days, increase azathioprine to 100mg OD, will consider starting methotrexate later on OP  - ESR elevated  - positive anti Oksana-1 antibodies, positive ALEX profile,  increased Azathioprine to 100mg daily.  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

## 2018-04-27 NOTE — DISCHARGE NOTE ADULT - CARE PROVIDER_API CALL
Tomás Phillips), Internal Medicine; Rheumatology  [address] [place] [address]  Phone: [phone]  Fax: [phone]    Wisam Jim; CHAO), Critical Care Medicine; Internal Medicine; Pulmonary Disease  [address] [place] [address]  Phone: [phone]  Fax: [phone]

## 2018-04-27 NOTE — DISCHARGE NOTE ADULT - HOSPITAL COURSE
37y old  Female who presents with a chief complaint of CP, SOB, cough and myalgia for last few days and admitted with Polymyositis;  During hospitalization, pt was seen by rheumatolgy; pt's antibody titres were positive  - pt was treated with steroids  - pt advised to f/u with pmd & rheumatolgy

## 2018-04-28 VITALS
HEART RATE: 92 BPM | RESPIRATION RATE: 18 BRPM | DIASTOLIC BLOOD PRESSURE: 57 MMHG | TEMPERATURE: 98 F | SYSTOLIC BLOOD PRESSURE: 114 MMHG

## 2018-04-28 RX ADMIN — AZATHIOPRINE 100 MILLIGRAM(S): 100 TABLET ORAL at 11:36

## 2018-04-28 RX ADMIN — PANTOPRAZOLE SODIUM 40 MILLIGRAM(S): 20 TABLET, DELAYED RELEASE ORAL at 10:03

## 2018-04-28 RX ADMIN — OXYCODONE AND ACETAMINOPHEN 1 TABLET(S): 5; 325 TABLET ORAL at 05:56

## 2018-04-28 RX ADMIN — ENOXAPARIN SODIUM 40 MILLIGRAM(S): 100 INJECTION SUBCUTANEOUS at 13:12

## 2018-04-28 RX ADMIN — Medication 100 MILLIGRAM(S): at 05:57

## 2018-04-28 RX ADMIN — SENNA PLUS 1 TABLET(S): 8.6 TABLET ORAL at 05:57

## 2018-04-28 RX ADMIN — OXYCODONE AND ACETAMINOPHEN 1 TABLET(S): 5; 325 TABLET ORAL at 11:35

## 2018-04-30 LAB — ALDOLASE SERPL-CCNC: 36.1 U/L — HIGH (ref 3.3–10.3)

## 2018-05-02 PROBLEM — J84.116 CRYPTOGENIC ORGANIZING PNEUMONIA: Chronic | Status: ACTIVE | Noted: 2018-04-22

## 2018-05-10 DIAGNOSIS — Z91.041 RADIOGRAPHIC DYE ALLERGY STATUS: ICD-10-CM

## 2018-05-10 DIAGNOSIS — Z91.013 ALLERGY TO SEAFOOD: ICD-10-CM

## 2018-05-10 DIAGNOSIS — M79.1 MYALGIA: ICD-10-CM

## 2018-05-10 DIAGNOSIS — M33.20 POLYMYOSITIS, ORGAN INVOLVEMENT UNSPECIFIED: ICD-10-CM

## 2018-05-10 DIAGNOSIS — R74.0 NONSPECIFIC ELEVATION OF LEVELS OF TRANSAMINASE AND LACTIC ACID DEHYDROGENASE [LDH]: ICD-10-CM

## 2018-05-10 DIAGNOSIS — R53.1 WEAKNESS: ICD-10-CM

## 2018-05-10 DIAGNOSIS — Z87.01 PERSONAL HISTORY OF PNEUMONIA (RECURRENT): ICD-10-CM

## 2018-05-10 DIAGNOSIS — Z91.010 ALLERGY TO PEANUTS: ICD-10-CM

## 2018-05-14 ENCOUNTER — APPOINTMENT (OUTPATIENT)
Dept: INTERNAL MEDICINE | Facility: CLINIC | Age: 38
End: 2018-05-14

## 2018-05-14 ENCOUNTER — OUTPATIENT (OUTPATIENT)
Dept: OUTPATIENT SERVICES | Facility: HOSPITAL | Age: 38
LOS: 1 days | Discharge: HOME | End: 2018-05-14

## 2018-05-14 VITALS
DIASTOLIC BLOOD PRESSURE: 79 MMHG | WEIGHT: 242 LBS | TEMPERATURE: 98.8 F | HEIGHT: 64 IN | BODY MASS INDEX: 41.32 KG/M2 | SYSTOLIC BLOOD PRESSURE: 115 MMHG | HEART RATE: 111 BPM

## 2018-05-14 DIAGNOSIS — Z82.69 FAMILY HISTORY OF OTHER DISEASES OF THE MUSCULOSKELETAL SYSTEM AND CONNECTIVE TISSUE: ICD-10-CM

## 2018-05-14 DIAGNOSIS — Z00.00 ENCOUNTER FOR GENERAL ADULT MEDICAL EXAMINATION W/OUT ABNORMAL FINDINGS: ICD-10-CM

## 2018-05-14 DIAGNOSIS — Z83.2 FAMILY HISTORY OF DISEASES OF THE BLOOD AND BLOOD-FORMING ORGANS AND CERTAIN DISORDERS INVOLVING THE IMMUNE MECHANISM: ICD-10-CM

## 2018-05-14 DIAGNOSIS — Z83.3 FAMILY HISTORY OF DIABETES MELLITUS: ICD-10-CM

## 2018-05-14 RX ORDER — AZATHIOPRINE 50 1/1
50 TABLET ORAL DAILY
Qty: 90 | Refills: 3 | Status: COMPLETED | COMMUNITY
Start: 2017-10-03 | End: 2018-05-14

## 2018-05-14 RX ORDER — SULFAMETHOXAZOLE AND TRIMETHOPRIM 800; 160 MG/1; MG/1
800-160 TABLET ORAL
Qty: 90 | Refills: 2 | Status: ACTIVE | COMMUNITY
Start: 2017-07-26 | End: 1900-01-01

## 2018-05-14 RX ORDER — PREDNISONE 5 MG/1
5 TABLET ORAL
Qty: 60 | Refills: 0 | Status: COMPLETED | COMMUNITY
Start: 2018-02-12 | End: 2018-05-14

## 2018-05-24 DIAGNOSIS — J84.116 CRYPTOGENIC ORGANIZING PNEUMONIA: ICD-10-CM

## 2018-05-24 DIAGNOSIS — M33.22 POLYMYOSITIS WITH MYOPATHY: ICD-10-CM

## 2018-05-29 ENCOUNTER — APPOINTMENT (OUTPATIENT)
Dept: PULMONOLOGY | Facility: CLINIC | Age: 38
End: 2018-05-29

## 2018-06-07 ENCOUNTER — OUTPATIENT (OUTPATIENT)
Dept: OUTPATIENT SERVICES | Facility: HOSPITAL | Age: 38
LOS: 1 days | Discharge: HOME | End: 2018-06-07

## 2018-06-07 ENCOUNTER — APPOINTMENT (OUTPATIENT)
Dept: RHEUMATOLOGY | Facility: CLINIC | Age: 38
End: 2018-06-07

## 2018-06-07 VITALS — DIASTOLIC BLOOD PRESSURE: 78 MMHG | SYSTOLIC BLOOD PRESSURE: 110 MMHG | HEART RATE: 100 BPM

## 2018-06-07 VITALS — BODY MASS INDEX: 41.83 KG/M2 | HEIGHT: 64 IN | WEIGHT: 245 LBS

## 2018-06-07 LAB
BASOPHILS # BLD AUTO: 0.08 K/UL
BASOPHILS NFR BLD AUTO: 0.8 %
EOSINOPHIL # BLD AUTO: 0.8 K/UL
EOSINOPHIL NFR BLD AUTO: 7.8 %
HCT VFR BLD CALC: 42.5 %
HGB BLD-MCNC: 13 G/DL
IMM GRANULOCYTES NFR BLD AUTO: 0.9 %
LYMPHOCYTES # BLD AUTO: 0.76 K/UL
LYMPHOCYTES NFR BLD AUTO: 7.4 %
MAN DIFF?: NORMAL
MCHC RBC-ENTMCNC: 30.6 G/DL
MCHC RBC-ENTMCNC: 30.6 PG
MCV RBC AUTO: 100 FL
MONOCYTES # BLD AUTO: 0.43 K/UL
MONOCYTES NFR BLD AUTO: 4.2 %
NEUTROPHILS # BLD AUTO: 8.11 K/UL
NEUTROPHILS NFR BLD AUTO: 78.9 %
PLATELET # BLD AUTO: 468 K/UL
RBC # BLD: 4.25 M/UL
RBC # FLD: 16 %
WBC # FLD AUTO: 10.27 K/UL

## 2018-06-07 RX ORDER — AZATHIOPRINE 50 1/1
50 TABLET ORAL DAILY
Qty: 1 | Refills: 1 | Status: ACTIVE | COMMUNITY
Start: 2017-06-13 | End: 1900-01-01

## 2018-06-14 ENCOUNTER — OTHER (OUTPATIENT)
Age: 38
End: 2018-06-14

## 2018-06-21 ENCOUNTER — APPOINTMENT (OUTPATIENT)
Dept: RHEUMATOLOGY | Facility: CLINIC | Age: 38
End: 2018-06-21

## 2018-06-21 LAB
ALBUMIN SERPL ELPH-MCNC: 3.2 G/DL
ALP BLD-CCNC: 72 U/L
ALT SERPL-CCNC: 135 U/L
ANA PAT FLD IF-IMP: ABNORMAL
ANA SER IF-ACNC: ABNORMAL
ANION GAP SERPL CALC-SCNC: 15 MMOL/L
AST SERPL-CCNC: 299 U/L
BILIRUB SERPL-MCNC: 0.2 MG/DL
BUN SERPL-MCNC: 9 MG/DL
CALCIUM SERPL-MCNC: 8.7 MG/DL
CHLORIDE SERPL-SCNC: 98 MMOL/L
CK SERPL-CCNC: ABNORMAL U/L
CO2 SERPL-SCNC: 22 MMOL/L
CREAT SERPL-MCNC: 0.6 MG/DL
ENA JO1 AB SER IA-ACNC: >8 AL
ENA RNP AB SER IA-ACNC: 0.3 AL
GLUCOSE SERPL-MCNC: 73 MG/DL
MYELOPEROXIDASE AB SER QL IA: <5 UNITS
MYELOPEROXIDASE CELLS FLD QL: NEGATIVE
POTASSIUM SERPL-SCNC: 4.5 MMOL/L
PROT SERPL-MCNC: 8 G/DL
PROTEINASE3 AB SER IA-ACNC: 9.9 UNITS
PROTEINASE3 AB SER-ACNC: NEGATIVE
SODIUM SERPL-SCNC: 135 MMOL/L

## 2018-06-26 ENCOUNTER — OUTPATIENT (OUTPATIENT)
Dept: OUTPATIENT SERVICES | Facility: HOSPITAL | Age: 38
LOS: 1 days | Discharge: HOME | End: 2018-06-26

## 2018-06-26 ENCOUNTER — APPOINTMENT (OUTPATIENT)
Dept: NEUROLOGY | Facility: CLINIC | Age: 38
End: 2018-06-26

## 2018-06-26 ENCOUNTER — INPATIENT (INPATIENT)
Facility: HOSPITAL | Age: 38
LOS: 2 days | Discharge: HOME | End: 2018-06-29
Attending: INTERNAL MEDICINE | Admitting: INTERNAL MEDICINE

## 2018-06-26 VITALS
WEIGHT: 236 LBS | TEMPERATURE: 97 F | DIASTOLIC BLOOD PRESSURE: 63 MMHG | BODY MASS INDEX: 40.29 KG/M2 | SYSTOLIC BLOOD PRESSURE: 127 MMHG | HEIGHT: 64 IN | HEART RATE: 130 BPM

## 2018-06-26 VITALS
HEART RATE: 71 BPM | OXYGEN SATURATION: 98 % | SYSTOLIC BLOOD PRESSURE: 113 MMHG | DIASTOLIC BLOOD PRESSURE: 65 MMHG | TEMPERATURE: 97 F | RESPIRATION RATE: 18 BRPM

## 2018-06-26 DIAGNOSIS — E78.5 HYPERLIPIDEMIA, UNSPECIFIED: ICD-10-CM

## 2018-06-26 LAB
ALBUMIN SERPL ELPH-MCNC: 3.2 G/DL — LOW (ref 3.5–5.2)
ALP SERPL-CCNC: 80 U/L — SIGNIFICANT CHANGE UP (ref 30–115)
ALT FLD-CCNC: 92 U/L — HIGH (ref 0–41)
ANION GAP SERPL CALC-SCNC: 16 MMOL/L — HIGH (ref 7–14)
AST SERPL-CCNC: 194 U/L — HIGH (ref 0–41)
BASE EXCESS BLDV CALC-SCNC: -2.3 MMOL/L — LOW (ref -2–2)
BASOPHILS # BLD AUTO: 0.04 K/UL — SIGNIFICANT CHANGE UP (ref 0–0.2)
BASOPHILS NFR BLD AUTO: 0.4 % — SIGNIFICANT CHANGE UP (ref 0–1)
BILIRUB SERPL-MCNC: 0.2 MG/DL — SIGNIFICANT CHANGE UP (ref 0.2–1.2)
BUN SERPL-MCNC: 12 MG/DL — SIGNIFICANT CHANGE UP (ref 10–20)
CA-I SERPL-SCNC: 1.17 MMOL/L — SIGNIFICANT CHANGE UP (ref 1.12–1.3)
CALCIUM SERPL-MCNC: 8.6 MG/DL — SIGNIFICANT CHANGE UP (ref 8.5–10.1)
CHLORIDE SERPL-SCNC: 98 MMOL/L — SIGNIFICANT CHANGE UP (ref 98–110)
CK MB CFR SERPL CALC: 173 NG/ML — HIGH (ref 0.6–6.3)
CK SERPL-CCNC: 6411 U/L — HIGH (ref 0–225)
CO2 SERPL-SCNC: 20 MMOL/L — SIGNIFICANT CHANGE UP (ref 17–32)
CREAT SERPL-MCNC: 0.5 MG/DL — LOW (ref 0.7–1.5)
EOSINOPHIL # BLD AUTO: 0.68 K/UL — SIGNIFICANT CHANGE UP (ref 0–0.7)
EOSINOPHIL NFR BLD AUTO: 6.9 % — SIGNIFICANT CHANGE UP (ref 0–8)
GAS PNL BLDV: 133 MMOL/L — LOW (ref 136–145)
GAS PNL BLDV: SIGNIFICANT CHANGE UP
GLUCOSE SERPL-MCNC: 72 MG/DL — SIGNIFICANT CHANGE UP (ref 70–99)
HCG SERPL QL: NEGATIVE — SIGNIFICANT CHANGE UP
HCO3 BLDV-SCNC: 23 MMOL/L — SIGNIFICANT CHANGE UP (ref 22–29)
HCT VFR BLD CALC: 35.3 % — LOW (ref 37–47)
HCT VFR BLDA CALC: 38.4 % — SIGNIFICANT CHANGE UP (ref 34–44)
HGB BLD CALC-MCNC: 12.5 G/DL — LOW (ref 14–18)
HGB BLD-MCNC: 11.3 G/DL — LOW (ref 12–16)
IMM GRANULOCYTES NFR BLD AUTO: 1 % — HIGH (ref 0.1–0.3)
LACTATE BLDV-MCNC: 1 MMOL/L — SIGNIFICANT CHANGE UP (ref 0.5–1.6)
LACTATE SERPL-SCNC: 1.2 MMOL/L — SIGNIFICANT CHANGE UP (ref 0.5–2.2)
LYMPHOCYTES # BLD AUTO: 0.45 K/UL — LOW (ref 1.2–3.4)
LYMPHOCYTES # BLD AUTO: 4.5 % — LOW (ref 20.5–51.1)
MCHC RBC-ENTMCNC: 30.1 PG — SIGNIFICANT CHANGE UP (ref 27–31)
MCHC RBC-ENTMCNC: 32 G/DL — SIGNIFICANT CHANGE UP (ref 32–37)
MCV RBC AUTO: 94.1 FL — SIGNIFICANT CHANGE UP (ref 81–99)
MONOCYTES # BLD AUTO: 0.31 K/UL — SIGNIFICANT CHANGE UP (ref 0.1–0.6)
MONOCYTES NFR BLD AUTO: 3.1 % — SIGNIFICANT CHANGE UP (ref 1.7–9.3)
NEUTROPHILS # BLD AUTO: 8.32 K/UL — HIGH (ref 1.4–6.5)
NEUTROPHILS NFR BLD AUTO: 84.1 % — HIGH (ref 42.2–75.2)
NRBC # BLD: 0 /100 WBCS — SIGNIFICANT CHANGE UP (ref 0–0)
PCO2 BLDV: 42 MMHG — SIGNIFICANT CHANGE UP (ref 41–51)
PH BLDV: 7.35 — SIGNIFICANT CHANGE UP (ref 7.26–7.43)
PLATELET # BLD AUTO: 496 K/UL — HIGH (ref 130–400)
PO2 BLDV: 35 MMHG — SIGNIFICANT CHANGE UP (ref 20–40)
POTASSIUM BLDV-SCNC: 4.1 MMOL/L — SIGNIFICANT CHANGE UP (ref 3.3–5.6)
POTASSIUM SERPL-MCNC: 4.4 MMOL/L — SIGNIFICANT CHANGE UP (ref 3.5–5)
POTASSIUM SERPL-SCNC: 4.4 MMOL/L — SIGNIFICANT CHANGE UP (ref 3.5–5)
PROT SERPL-MCNC: 7.9 G/DL — SIGNIFICANT CHANGE UP (ref 6–8)
RBC # BLD: 3.75 M/UL — LOW (ref 4.2–5.4)
RBC # FLD: 15.5 % — HIGH (ref 11.5–14.5)
SAO2 % BLDV: 58 % — SIGNIFICANT CHANGE UP
SODIUM SERPL-SCNC: 134 MMOL/L — LOW (ref 135–146)
TROPONIN T SERPL-MCNC: 1.25 NG/ML — CRITICAL HIGH
WBC # BLD: 9.9 K/UL — SIGNIFICANT CHANGE UP (ref 4.8–10.8)
WBC # FLD AUTO: 9.9 K/UL — SIGNIFICANT CHANGE UP (ref 4.8–10.8)

## 2018-06-26 RX ORDER — SODIUM CHLORIDE 9 MG/ML
1000 INJECTION INTRAMUSCULAR; INTRAVENOUS; SUBCUTANEOUS ONCE
Qty: 0 | Refills: 0 | Status: COMPLETED | OUTPATIENT
Start: 2018-06-26 | End: 2018-06-26

## 2018-06-26 RX ORDER — SODIUM CHLORIDE 9 MG/ML
1000 INJECTION, SOLUTION INTRAVENOUS ONCE
Qty: 0 | Refills: 0 | Status: COMPLETED | OUTPATIENT
Start: 2018-06-26 | End: 2018-06-26

## 2018-06-26 RX ORDER — MORPHINE SULFATE 50 MG/1
4 CAPSULE, EXTENDED RELEASE ORAL ONCE
Qty: 0 | Refills: 0 | Status: DISCONTINUED | OUTPATIENT
Start: 2018-06-26 | End: 2018-06-26

## 2018-06-26 RX ADMIN — MORPHINE SULFATE 4 MILLIGRAM(S): 50 CAPSULE, EXTENDED RELEASE ORAL at 21:53

## 2018-06-26 RX ADMIN — Medication 258 MILLIGRAM(S): at 22:46

## 2018-06-26 RX ADMIN — MORPHINE SULFATE 4 MILLIGRAM(S): 50 CAPSULE, EXTENDED RELEASE ORAL at 21:23

## 2018-06-26 RX ADMIN — SODIUM CHLORIDE 1000 MILLILITER(S): 9 INJECTION, SOLUTION INTRAVENOUS at 21:23

## 2018-06-26 NOTE — ED ADULT NURSE NOTE - OBJECTIVE STATEMENT
Pt sent from [facility] for increasing weakness and muscle pain, pt dx with polymyositis, pt states her pain is currently a 10/10  and has been increasing worse since the start of the new year. Pt sent for IV steroids x3days.

## 2018-06-26 NOTE — ED PROVIDER NOTE - MEDICAL DECISION MAKING DETAILS
pt aware of all labs and imaging, cardiolgoy following due to elevated troponin, ekg reviewed, no monty or depressions, pt resting on stretcher, aware of plan for admission and agrees, admitting team made aware of pt as well.

## 2018-06-26 NOTE — ED ADULT NURSE REASSESSMENT NOTE - NS ED NURSE REASSESS COMMENT FT1
Attempted to get IV access on pt x2. Unable to obtain IV access, ELIZABETH Joel made aware and will assess.

## 2018-06-26 NOTE — ED PROVIDER NOTE - CARE PLAN
Principal Discharge DX:	Polymyositis with other organ involvement  Secondary Diagnosis:	Elevated creatine kinase  Secondary Diagnosis:	Elevated troponin

## 2018-06-26 NOTE — ED ADULT NURSE REASSESSMENT NOTE - NS ED NURSE REASSESS COMMENT FT1
Pt resting comfortably, pain medication given, pt given PO diet as per ELIZABETH Joel. Pt tolerating PO diet well. IV fluids in place, awaiting solumedrol IV.

## 2018-06-26 NOTE — ED PROVIDER NOTE - OBJECTIVE STATEMENT
38 yo F with PMHx of cryptogenic organizing pneumonia on Bactrim x 1 year, hyperlipidemia and polymyositis presents to the ED sent in for [facility] for admission by Dr. Hatfield. Pt has had worsening generalized weakness 2/2 worsening of polymyositis, pt to get IV 1 gram solumedrol x 3 days and an increase in azathioprine dose. Pt denies fever, chills, nausea, vomiting, abdominal pain, diarrhea, headache, dizziness, chest pain, SOB, back pain, LOC, trauma, urinary symptoms, cough, calf pain/swelling, recent travel, recent surgery.

## 2018-06-26 NOTE — ED PROVIDER NOTE - PROGRESS NOTE DETAILS
I personally evaluated the patient. I reviewed the Resident’s or Physician Assistant’s note (as assigned above), and agree with the findings and plan except as documented in my note.     38 y/o M with PMH of organizing pneumonia on a year of Bactrim, polymyositis that was diagnosed in April 2018, was admitted and seen by Rheumatologist Dr. Phillips, presents from the [facility] clinic after being seen by neurologist Dr. Hatfield for generalized weakness. Pt was told that her polymyositis is worsening so he wanted her admitted  for IV Solumedrol 1 gram daily x 3 days, increase azathioprine to 200MG/day and rheumatology consultation with Dr. Lucia Rea or Dr. Goldberg for methotrexate.No fever, chills, n/v, cp, sob, pleuritic cp, palpitations, diaphoresis, cough, ha/lh/dizziness, numbness/tingling, neck pain/ stiffness, abd pain, diarrhea, constipation, melena/brbpr, urinary symptoms, trauma, edema, calf pain/swelling/erythema, sick contacts, recent travel or rash. Vital Signs: I have reviewed the initial vital signs. Constitutional: WDWN in nad. Sitting on stretcher in nad. Integumentary: No rash. ENT: MMM NECK: Supple, non-tender, no menineal signs. Cardiovascular: RRR, radial pulses 2/4 b/l. No JVD. Respiratory: BS present b/l, ctabl, no wheezing or crackles, good resp effort and excursion, good air exchange,  no accessory muscle use, no stridor. Gastrointestinal: BS present throughout all 4 quadrants, soft, nd, nt, no rebound tenderness or guarding, no cvat. Musculoskeletal: FROM, no edema, no calf pain/swelling/erythema. Reports generalized pain to palpation over her muscles. Neurologic: AAOx3, motor 4/5 to upper and lower extremity, sensation intact throughout upper and lowe ext, CN II-XII intact, No facial droop or slurring of speech. No focal deficits. Plan: Will get EKG, CXR, labs, IVF, IV Solumedrol, admission and reassess. I personally evaluated the patient. I reviewed the Resident’s or Physician Assistant’s note (as assigned above), and agree with the findings and plan except as documented in my note.     36 y/o f with PMH of organizing pneumonia on a year of Bactrim, polymyositis that was diagnosed in April 2018, was admitted and seen by Rheumatologist Dr. Goldberg, presents from the [facility] clinic after being seen by neurologist Dr. Hatfield for generalized weakness. Pt was told that her polymyositis is worsening so he wanted her admitted  for IV Solumedrol 1 gram daily x 3 days, increase azathioprine to 200MG/day and rheumatology consultation with Dr. Lucia Rea or Dr. Goldberg for methotrexate. No fever, chills, n/v, cp, sob, pleuritic cp, palpitations, diaphoresis, cough, ha/lh/dizziness, numbness/tingling, neck pain/ stiffness, abd pain, diarrhea, constipation, melena/brbpr, urinary symptoms, trauma, edema, calf pain/swelling/erythema, sick contacts, recent travel or rash. Vital Signs: I have reviewed the initial vital signs. Constitutional: WDWN in nad. Sitting on stretcher in nad. Integumentary: No rash. ENT: MMM NECK: Supple, non-tender, no menineal signs. Cardiovascular: RRR, radial pulses 2/4 b/l. No JVD. Respiratory: BS present b/l, ctabl, no wheezing or crackles, good resp effort and excursion, good air exchange,  no accessory muscle use, no stridor. Gastrointestinal: BS present throughout all 4 quadrants, soft, nd, nt, no rebound tenderness or guarding, no cvat. Musculoskeletal: Able to range ext but with weakness no edema, no calf pain/swelling/erythema. Reports generalized pain to palpation over her muscles. Neurologic: AAOx3, motor 4/5 to upper and lower extremity, sensation intact throughout upper and lowre ext, CN II-XII intact, No facial droop or slurring of speech. No focal deficits. Plan: Will get EKG, CXR, labs, IVF, IV Solumedrol, admission and reassess. pt ck level was 123,370 on april 26, 6821 on april 27, today 6411, receiving fluids, troponin has ranged on previous visits and admission from 1.44-1.91 today 1.25. CPK = 11,000 on June 7 Spoke to Dr. Mckeon aware of pt and previous labs and todays' labs reports pt can be admitted to TELE, repeat CE can be sent and medical admitting team will follow, cardiology will follow as well. Dr. Zheng saw pt on previous admission, pt aware of labs and admission agrees, will speak to medical admitting team and admit.

## 2018-06-26 NOTE — ED PROVIDER NOTE - CRITICAL CARE PROVIDED
documentation/consult w/ pt's family directly relating to pts condition/additional history taking/interpretation of diagnostic studies/consultation with other physicians/direct patient care (not related to procedure)

## 2018-06-26 NOTE — ED PROVIDER NOTE - PHYSICAL EXAMINATION
VITAL SIGNS: I have reviewed nursing notes and confirm.  CONSTITUTIONAL: Well-developed; well-nourished; in no acute distress.  SKIN: Skin exam is warm and dry, no acute rash.  HEAD: Normocephalic; atraumatic.  EYES: PERRL, EOM intact; conjunctiva and sclera clear.  ENT: No nasal discharge; airway clear.   NECK: Supple; non tender.  CARD: S1, S2 normal; no murmurs, gallops, or rubs. Regular rate and rhythm.  RESP: No wheezes, rales or rhonchi. Speaking in full sentences.   ABD: Normal bowel sounds; soft; non-distended; non-tender; No rebound or guarding.   EXT: Normal ROM. No clubbing, cyanosis or edema.  NEURO: Alert, oriented. Grossly unremarkable. No focal deficits. Sensation intact throughout. Strength 4/5 throughout.

## 2018-06-27 DIAGNOSIS — S82.899A OTHER FRACTURE OF UNSPECIFIED LOWER LEG, INITIAL ENCOUNTER FOR CLOSED FRACTURE: Chronic | ICD-10-CM

## 2018-06-27 LAB
CK MB CFR SERPL CALC: 163.6 NG/ML — HIGH (ref 0.6–6.3)
CK SERPL-CCNC: 5881 U/L — HIGH (ref 0–225)
TROPONIN T SERPL-MCNC: 1.23 NG/ML — CRITICAL HIGH

## 2018-06-27 RX ORDER — SODIUM CHLORIDE 9 MG/ML
1000 INJECTION INTRAMUSCULAR; INTRAVENOUS; SUBCUTANEOUS
Qty: 0 | Refills: 0 | Status: DISCONTINUED | OUTPATIENT
Start: 2018-06-27 | End: 2018-06-29

## 2018-06-27 RX ORDER — ENOXAPARIN SODIUM 100 MG/ML
40 INJECTION SUBCUTANEOUS DAILY
Qty: 0 | Refills: 0 | Status: DISCONTINUED | OUTPATIENT
Start: 2018-06-27 | End: 2018-06-29

## 2018-06-27 RX ORDER — AZATHIOPRINE 100 MG/1
200 TABLET ORAL DAILY
Qty: 0 | Refills: 0 | Status: DISCONTINUED | OUTPATIENT
Start: 2018-06-27 | End: 2018-06-29

## 2018-06-27 RX ORDER — TRAMADOL HYDROCHLORIDE 50 MG/1
50 TABLET ORAL EVERY 12 HOURS
Qty: 0 | Refills: 0 | Status: DISCONTINUED | OUTPATIENT
Start: 2018-06-27 | End: 2018-06-29

## 2018-06-27 RX ORDER — OXYCODONE AND ACETAMINOPHEN 5; 325 MG/1; MG/1
1 TABLET ORAL EVERY 6 HOURS
Qty: 0 | Refills: 0 | Status: DISCONTINUED | OUTPATIENT
Start: 2018-06-27 | End: 2018-06-29

## 2018-06-27 RX ORDER — HYDROCORTISONE 1 %
1 OINTMENT (GRAM) TOPICAL EVERY 12 HOURS
Qty: 0 | Refills: 0 | Status: DISCONTINUED | OUTPATIENT
Start: 2018-06-27 | End: 2018-06-29

## 2018-06-27 RX ADMIN — Medication 258 MILLIGRAM(S): at 05:46

## 2018-06-27 RX ADMIN — Medication 1 APPLICATION(S): at 17:55

## 2018-06-27 RX ADMIN — TRAMADOL HYDROCHLORIDE 50 MILLIGRAM(S): 50 TABLET ORAL at 06:12

## 2018-06-27 RX ADMIN — ENOXAPARIN SODIUM 40 MILLIGRAM(S): 100 INJECTION SUBCUTANEOUS at 10:55

## 2018-06-27 RX ADMIN — OXYCODONE AND ACETAMINOPHEN 1 TABLET(S): 5; 325 TABLET ORAL at 09:49

## 2018-06-27 RX ADMIN — AZATHIOPRINE 200 MILLIGRAM(S): 100 TABLET ORAL at 10:55

## 2018-06-27 RX ADMIN — OXYCODONE AND ACETAMINOPHEN 1 TABLET(S): 5; 325 TABLET ORAL at 09:47

## 2018-06-27 RX ADMIN — SODIUM CHLORIDE 100 MILLILITER(S): 9 INJECTION INTRAMUSCULAR; INTRAVENOUS; SUBCUTANEOUS at 07:24

## 2018-06-27 RX ADMIN — Medication 1 APPLICATION(S): at 05:47

## 2018-06-27 RX ADMIN — SODIUM CHLORIDE 1000 MILLILITER(S): 9 INJECTION INTRAMUSCULAR; INTRAVENOUS; SUBCUTANEOUS at 02:32

## 2018-06-27 RX ADMIN — TRAMADOL HYDROCHLORIDE 50 MILLIGRAM(S): 50 TABLET ORAL at 05:42

## 2018-06-27 NOTE — H&P ADULT - HISTORY OF PRESENT ILLNESS
37F w PMHx of organizing PNA on chronic Bactrim and dermatomyositis sent to ED by neurologist for dermatomyositis flare and IV meds. Patient was diagnosed with myositis in April 2018, was already being treated with azathioprine for organizing PNA, and had medication increased at around that time. Since approximately Mother's Day she states she has had gradual progression of her weakness. She was seen by Dr. Hatfield today, stated she was having worsening weakness and new rash beginning approximately 2 weeks ago. Rash is on her chest in V sign distribution, described as "rough patches" with small erythematous lesions interspersed. Additionally she has similar patches on her face in malar distribution without erythema and c/o dry, cracking skin on hands. Also states her urine is dark, appears like red wine, and has odd odor.    In ED, as per Dr. Hatfield, patient was given 1000mg Solumedrol which is to be continued for 3 doses daily total. Troponin was elevated, however near baseline, cardiology recommended echo. CK was 5881, below her previous admission.

## 2018-06-27 NOTE — H&P ADULT - ASSESSMENT
37F w PMHx of dermatomyositis and organizing pneumonia presented with weakness and admitted for dermatomyositis progression.    Dermatomyositis: Worsening weakness since May, unable to ambulate most days, with eruption of rash on face and cheeks in past 2 weeks; sent in by Dr. Hatfield; pt was refusing outpatient prednisone due to hx of adverse reactions  - As per neuro (recommendation in [employer]), solumderol 1000mg QD for 3 days  - Increase azathioprine to 200mg QD  - Consider methotrexate, however must weigh benefits against risk of worsening   - Neuro consult  - Rheum consult  - Consider PT  - IVF for elevated CK  - 2D echo as per cardiology (cardiomyopathy associated with polymyositis)    Organizing Pneumonia: No respiratory complaints, stable  - Azathioprine increased to 200mg QD  - Holding Bactrim, patient states she is taking it as prophylaxis, however it was supposed to be held once prednisone was stopped; may resume if patient is going to restart daily prednisone on DC    Disposition: From home, mother is caregiver 37F w PMHx of dermatomyositis and organizing pneumonia presented with weakness and admitted for dermatomyositis progression.    Dermatomyositis: Worsening weakness since May, unable to ambulate most days, with eruption of rash on face and cheeks in past 2 weeks; sent in by Dr. Hatfield; pt was refusing outpatient prednisone due to hx of adverse reactions  - As per neuro (recommendation in [employer]), solumderol 1000mg QD for 3 days  - Increase azathioprine to 200mg QD  - Consider methotrexate, however must weigh benefits against risk of worsening OP  - Neuro consult  - Rheum consult  - Consider PT  - IVF for elevated CK  - 2D echo as per cardiology (cardiomyopathy associated with polymyositis)    Organizing Pneumonia: No respiratory complaints, stable  - Azathioprine increased to 200mg QD  - Holding Bactrim, patient states she is taking it as prophylaxis, however it was supposed to be held once prednisone was stopped; may resume if patient is going to restart daily prednisone on DC (however at this time she is refusing)    Disposition: From home, mother is caregiver

## 2018-06-27 NOTE — H&P ADULT - NSHPLABSRESULTS_GEN_ALL_CORE
11.3   9.90  )-----------( 496      ( 26 Jun 2018 19:40 )             35.3   06-26    134<L>  |  98  |  12  ----------------------------<  72  4.4   |  20  |  0.5<L>    Ca    8.6      26 Jun 2018 19:40    TPro  7.9  /  Alb  3.2<L>  /  TBili  0.2  /  DBili  x   /  AST  194<H>  /  ALT  92<H>  /  AlkPhos  80  06-26  CARDIAC MARKERS ( 26 Jun 2018 22:55 )  x     / 1.23 ng/mL / 5881 U/L / x     / 163.6 ng/mL  CARDIAC MARKERS ( 26 Jun 2018 19:40 )  x     / 1.25 ng/mL / 6411 U/L / x     / 173.0 ng/mL

## 2018-06-27 NOTE — ED ADULT NURSE REASSESSMENT NOTE - NS ED NURSE REASSESS COMMENT FT1
Pt assessed, resting comfortably, pt put on bedpan due to weakness, perineal care provided. Pt on monitor at this time. Pt Hr 103 B/P 108/56 MD Rodgers aware. Fluids in progress, No interventions at this time, will continue to monitor.

## 2018-06-27 NOTE — CONSULT NOTE ADULT - ASSESSMENT
Elevated Troponin secondary to polymyositis  (elevated Ck and CKMB) similar to previous values on previous admission   Polymyositis   DLD     plan:  no further cardiac workup   DC tele   Neurology follow up

## 2018-06-27 NOTE — H&P ADULT - NSHPPHYSICALEXAM_GEN_ALL_CORE
General: Obese, laying comfortably in bed  HEENT: NCAT, dry, fine papules in malar distribution  Pulmonary: CTAB, no accessory muscle use  Cardiovascular: RRR  GI: Soft, nt, nd  : No suprapubic tenderness  Extremities: No rash, no swelling  Derm: V sign distribution of very fine papules on chest, small erythematous lesion below clavicle  Neuro: AAOx4, 4/5 strength

## 2018-06-27 NOTE — PROGRESS NOTE ADULT - SUBJECTIVE AND OBJECTIVE BOX
Neurology Follow up note    JESSI Hidalgo      Interval History:               CK:5881  ckmb:163  troponin:1.23  roseanna-1 antibody >8      Vital Signs Last 24 Hrs  T(C): 36.3 (27 Jun 2018 02:48), Max: 36.3 (27 Jun 2018 02:48)  T(F): 97.3 (27 Jun 2018 02:48), Max: 97.3 (27 Jun 2018 02:48)  HR: 103 (27 Jun 2018 02:48) (71 - 103)  BP: 108/56 (27 Jun 2018 02:48) (108/56 - 113/65)  BP(mean): --  RR: 18 (27 Jun 2018 02:48) (18 - 18)  SpO2: 98% (27 Jun 2018 02:48) (98% - 98%)  ICU Vital Signs Last 24 Hrs  T(C): 36.3 (27 Jun 2018 02:48), Max: 36.3 (27 Jun 2018 02:48)  T(F): 97.3 (27 Jun 2018 02:48), Max: 97.3 (27 Jun 2018 02:48)  HR: 103 (27 Jun 2018 02:48) (71 - 103)  BP: 108/56 (27 Jun 2018 02:48) (108/56 - 113/65)  BP(mean): --  ABP: --  ABP(mean): --  RR: 18 (27 Jun 2018 02:48) (18 - 18)  SpO2: 98% (27 Jun 2018 02:48) (98% - 98%)          Neurological Exam:   Mental status: Awake, alert and oriented x3.  Recent and remote memory intact.  Naming, repetition and comprehension intact.  Attention/concentration intact.  No dysarthria, no aphasia.  Fund of knowledge appropriate.    Cranial nerves: Fundoscopic exam demonstrated no abnormalities, pupils equally round and reactive to light, visual fields full, no nystagmus, extraocular muscles intact, V1 through V3 intact bilaterally and symmetric, face symmetric, hearing intact to finger rub, palate elevation symmetric, tongue was midline, sternocleidomastoid/shoulder shrug strength bilaterally 5/5.    Motor:  Normal bulk and tone, strength 5/5 in bilateral upper and lower extremities.   strength 5/5.  Rapid alternating movements intact and symmetric.   Sensation: Intact to light touch, proprioception, and pinprick.  No neglect.   Coordination: No dysmetria on finger-to-nose and heel-to-shin.  No clumsiness.  Reflexes: 2+ in upper and lower extremities, downgoing toes bilaterally  Gait: Narrow and steady. No ataxia.  Romberg negative    Medications  azaTHIOprine 200 milliGRAM(s) Oral daily  enoxaparin Injectable 40 milliGRAM(s) SubCutaneous daily  hydrocortisone 1% Cream 1 Application(s) Topical every 12 hours  methylPREDNISolone sodium succinate IVPB 1000 milliGRAM(s) IV Intermittent daily  sodium chloride 0.9%. 1000 milliLiter(s) IV Continuous <Continuous>  traMADol 50 milliGRAM(s) Oral every 12 hours PRN      Lab                        11.3   9.90  )-----------( 496      ( 26 Jun 2018 19:40 )             35.3     06-26    134<L>  |  98  |  12  ----------------------------<  72  4.4   |  20  |  0.5<L>    Ca    8.6      26 Jun 2018 19:40    TPro  7.9  /  Alb  3.2<L>  /  TBili  0.2  /  DBili  x   /  AST  194<H>  /  ALT  92<H>  /  AlkPhos  80  06-26    CAPILLARY BLOOD GLUCOSE        LIVER FUNCTIONS - ( 26 Jun 2018 19:40 )  Alb: 3.2 g/dL / Pro: 7.9 g/dL / ALK PHOS: 80 U/L / ALT: 92 U/L / AST: 194 U/L / GGT: x               Radiology    Assessment-  Severe Dermatomyositis        Plan: continue Solumedrol 1 gm each day for 2 more doses            continue Aziathioprine 200 mg one tablet each day Neurology Follow up note    JESSI Hidalgo      37F w PMHx of organizing PNA on chronic Bactrim and dermatomyositis sent to ED by neurologist for dermatomyositis flare and IV meds. Patient was diagnosed with myositis in April 2018, was already being treated with azathioprine for organizing PNA, and had medication increased at around that time. Since approximately Mother's Day she states she has had gradual progression of her weakness. She was seen by Dr. Hatfield today, stated she was having worsening weakness and new rash beginning approximately 2 weeks ago. Rash is on her chest in V sign distribution, described as "rough patches" with small erythematous lesions interspersed. Additionally she has similar patches on her face in malar distribution without erythema and c/o dry, cracking skin on hands. Also states her urine is dark, appears like red wine, and has odd odor.    In ED, as per Dr. Hatfield, patient was given 1000mg Solumedrol which is to be continued for 3 doses daily total. Troponin was elevated, however near baseline, cardiology recommended echo. CK was 5881, below her previous admission. (27 Jun 2018 02:01)    PAST MEDICAL & SURGICAL HISTORY:  Dermatomyositis  Polymyositis  Hyperlipidemia  Cryptogenic organizing pneumonia  Ankle fracture    interval history: patient has only minimal improvement in pain and weakness.                         urine colour is still red.            CK:5881  ckmb:163  troponin:1.23  roseanna-1 antibody >8      Vital Signs Last 24 Hrs  T(C): 36.3 (27 Jun 2018 02:48), Max: 36.3 (27 Jun 2018 02:48)  T(F): 97.3 (27 Jun 2018 02:48), Max: 97.3 (27 Jun 2018 02:48)  HR: 103 (27 Jun 2018 02:48) (71 - 103)  BP: 108/56 (27 Jun 2018 02:48) (108/56 - 113/65)  BP(mean): --  RR: 18 (27 Jun 2018 02:48) (18 - 18)  SpO2: 98% (27 Jun 2018 02:48) (98% - 98%)  ICU Vital Signs Last 24 Hrs  T(C): 36.3 (27 Jun 2018 02:48), Max: 36.3 (27 Jun 2018 02:48)  T(F): 97.3 (27 Jun 2018 02:48), Max: 97.3 (27 Jun 2018 02:48)  HR: 103 (27 Jun 2018 02:48) (71 - 103)  BP: 108/56 (27 Jun 2018 02:48) (108/56 - 113/65)  BP(mean): --  ABP: --  ABP(mean): --  RR: 18 (27 Jun 2018 02:48) (18 - 18)  SpO2: 98% (27 Jun 2018 02:48) (98% - 98%)          Neurological Exam:   Mental status: Awake, alert and oriented x3.  Recent and remote memory intact.  Naming, repetition and comprehension intact.  Attention/concentration intact.  No dysarthria, no aphasia.  Fund of knowledge appropriate.    Cranial nerves: Fundoscopic exam demonstrated no abnormalities, pupils equally round and reactive to light, visual fields full, no nystagmus, extraocular muscles intact, V1 through V3 intact bilaterally and symmetric, face symmetric, hearing intact to finger rub, palate elevation symmetric, tongue was midline, sternocleidomastoid/shoulder shrug strength bilaterally 5/5.    Motor:  Normal bulk and tone, strength 5/5 in bilateral upper and lower extremities.   strength 5/5.  Rapid alternating movements intact and symmetric.   Sensation: Intact to light touch, proprioception, and pinprick.  No neglect.   Coordination: No dysmetria on finger-to-nose and heel-to-shin.  No clumsiness.  Reflexes: 2+ in upper and lower extremities, downgoing toes bilaterally  Gait: Narrow and steady. No ataxia.  Romberg negative    Medications  azaTHIOprine 200 milliGRAM(s) Oral daily  enoxaparin Injectable 40 milliGRAM(s) SubCutaneous daily  hydrocortisone 1% Cream 1 Application(s) Topical every 12 hours  methylPREDNISolone sodium succinate IVPB 1000 milliGRAM(s) IV Intermittent daily  sodium chloride 0.9%. 1000 milliLiter(s) IV Continuous <Continuous>  traMADol 50 milliGRAM(s) Oral every 12 hours PRN      Lab                        11.3   9.90  )-----------( 496      ( 26 Jun 2018 19:40 )             35.3     06-26    134<L>  |  98  |  12  ----------------------------<  72  4.4   |  20  |  0.5<L>    Ca    8.6      26 Jun 2018 19:40    TPro  7.9  /  Alb  3.2<L>  /  TBili  0.2  /  DBili  x   /  AST  194<H>  /  ALT  92<H>  /  AlkPhos  80  06-26    CAPILLARY BLOOD GLUCOSE        LIVER FUNCTIONS - ( 26 Jun 2018 19:40 )  Alb: 3.2 g/dL / Pro: 7.9 g/dL / ALK PHOS: 80 U/L / ALT: 92 U/L / AST: 194 U/L / GGT: x               Radiology    Assessment-  Severe Dermatomyositis        Plan: continue Solumedrol 1 gm each day for 2 more doses            continue Aziathioprine 200 mg one tablet each day

## 2018-06-27 NOTE — CONSULT NOTE ADULT - SUBJECTIVE AND OBJECTIVE BOX
CHIEF COMPLAINT:Patient is a 37y old  Female who presents with a chief complaint of dermatomyositis (27 Jun 2018 02:01)    HISTORY OF PRESENT ILLNESS:  HPI:  37F w PMHx of organizing PNA on chronic Bactrim and dermatomyositis sent to ED by neurologist for dermatomyositis flare and IV meds. Patient was diagnosed with myositis in April 2018, was already being treated with azathioprine for organizing PNA, and had medication increased at around that time. Since approximately Mother's Day she states she has had gradual progression of her weakness. She was seen by Dr. Hatfield today, stated she was having worsening weakness and new rash beginning approximately 2 weeks ago. Rash is on her chest in V sign distribution, described as "rough patches" with small erythematous lesions interspersed. Additionally she has similar patches on her face in malar distribution without erythema and c/o dry, cracking skin on hands. Also states her urine is dark, appears like red wine, and has odd odor.    In ED, as per Dr. Hatfield, patient was given 1000mg Solumedrol which is to be continued for 3 doses daily total. Troponin was elevated, however near baseline, cardiology recommended echo. CK was 5881, below her previous admission. (27 Jun 2018 02:01)    PAST MEDICAL & SURGICAL HISTORY:  Dermatomyositis  Polymyositis  Hyperlipidemia  Cryptogenic organizing pneumonia  Ankle fracture    FAMILY HISTORY:  Family history of sarcoidosis (Father)    Allergies    contrast media (iodine-based) (Unknown)  peanuts (Unknown)  shellfish (Unknown)    Intolerances    	  Home Medications:  Bactrim  mg-160 mg oral tablet: MWF (27 Jun 2018 02:19)  Cortizone-10 topical cream:  (27 Jun 2018 02:19)    MEDICATIONS  (STANDING):  azaTHIOprine 200 milliGRAM(s) Oral daily  enoxaparin Injectable 40 milliGRAM(s) SubCutaneous daily  hydrocortisone 1% Cream 1 Application(s) Topical every 12 hours  methylPREDNISolone sodium succinate IVPB 1000 milliGRAM(s) IV Intermittent daily  sodium chloride 0.9%. 1000 milliLiter(s) (100 mL/Hr) IV Continuous <Continuous>    MEDICATIONS  (PRN):  traMADol 50 milliGRAM(s) Oral every 12 hours PRN Severe Pain (7 - 10)    SOCIAL HISTORY:    [x ] Non-smoker  [ ] Smoker  [ ] Alcohol  REVIEW OF SYSTEMS:  negative except as mentioned above     PHYSICAL EXAM:  T(C): 36.3 (06-27-18 @ 02:48), Max: 36.3 (06-27-18 @ 02:48)  HR: 103 (06-27-18 @ 02:48) (71 - 103)  BP: 108/56 (06-27-18 @ 02:48) (108/56 - 113/65)  RR: 18 (06-27-18 @ 02:48) (18 - 18)  SpO2: 98% (06-27-18 @ 02:48) (98% - 98%)  Wt(kg): --  I&O's Summary    Daily     Daily   General Appearance: Normal	  Cardiovascular: Normal S1 S2, No JVD, No murmurs, No edema  Respiratory: Lungs clear to auscultation	  Psychiatry: A & O x 3, Mood & affect appropriate  Gastrointestinal:  Soft, Non-tender  Skin: No rashes, No ecchymoses, No cyanosis	  Extremities: no edema   Vascular: Peripheral pulses palpable 2+ bilaterally  LABS:	 	                        11.3   9.90  )-----------( 496      ( 26 Jun 2018 19:40 )             35.3     06-26    134<L>  |  98  |  12  ----------------------------<  72  4.4   |  20  |  0.5<L>    Ca    8.6      26 Jun 2018 19:40    TPro  7.9  /  Alb  3.2<L>  /  TBili  0.2  /  DBili  x   /  AST  194<H>  /  ALT  92<H>  /  AlkPhos  80  06-26    proBNP: Serum Pro-Brain Natriuretic Peptide: 58 pg/mL (04-22 @ 15:17)    Lipid Profile:   HgA1c: Hemoglobin A1C, Whole Blood: 6.0 % (04-23 @ 07:13)    TSH: Thyroid Stimulating Hormone, Serum: 4.30 uIU/mL (04-23 @ 07:13)    CARDIAC MARKERS:  CARDIAC MARKERS ( 26 Jun 2018 22:55 )  SN0098 U/L / KTUS131.6 ng/mL / Troponin T1.23 ng/mL /  CARDIAC MARKERS ( 26 Jun 2018 19:40 )  [id number] U/L / LPXJ551.0 ng/mL / Troponin T1.25 ng/mL /      TELEMETRY EVENTS: 	    ECG:  	< from: 12 Lead ECG (06.26.18 @ 21:39) >  Diagnosis Line Sinus tachycardia  Possible Left atrial enlargement  Borderline ECG    Confirmed by Milind Zheng ([id number]) on 6/27/2018 5:59:30 AM    < end of copied text >    RADIOLOGY:  OTHER: 	  PREVIOUS DIAGNOSTIC TESTING:    [ ] Echocardiogram:< from: Transthoracic Echocardiogram (04.23.18 @ 09:25) >    Summary:   1. Left ventricular ejection fraction, by visual estimation, is 65 to   70%.   2. Normal global left ventricular systolic function.    < end of copied text >    [ ]  Catheterization:  [ ] Stress Test:

## 2018-06-28 ENCOUNTER — TRANSCRIPTION ENCOUNTER (OUTPATIENT)
Age: 38
End: 2018-06-28

## 2018-06-28 LAB
ANION GAP SERPL CALC-SCNC: 13 MMOL/L — SIGNIFICANT CHANGE UP (ref 7–14)
BASOPHILS # BLD AUTO: 0.01 K/UL — SIGNIFICANT CHANGE UP (ref 0–0.2)
BASOPHILS NFR BLD AUTO: 0.1 % — SIGNIFICANT CHANGE UP (ref 0–1)
BUN SERPL-MCNC: 14 MG/DL — SIGNIFICANT CHANGE UP (ref 10–20)
CALCIUM SERPL-MCNC: 8.6 MG/DL — SIGNIFICANT CHANGE UP (ref 8.5–10.1)
CHLORIDE SERPL-SCNC: 103 MMOL/L — SIGNIFICANT CHANGE UP (ref 98–110)
CO2 SERPL-SCNC: 23 MMOL/L — SIGNIFICANT CHANGE UP (ref 17–32)
CREAT SERPL-MCNC: <0.5 MG/DL — LOW (ref 0.7–1.5)
EOSINOPHIL # BLD AUTO: 0 K/UL — SIGNIFICANT CHANGE UP (ref 0–0.7)
EOSINOPHIL NFR BLD AUTO: 0 % — SIGNIFICANT CHANGE UP (ref 0–8)
GLUCOSE SERPL-MCNC: 169 MG/DL — HIGH (ref 70–99)
HCT VFR BLD CALC: 32.6 % — LOW (ref 37–47)
HGB BLD-MCNC: 10.2 G/DL — LOW (ref 12–16)
IMM GRANULOCYTES NFR BLD AUTO: 1.2 % — HIGH (ref 0.1–0.3)
LYMPHOCYTES # BLD AUTO: 0.45 K/UL — LOW (ref 1.2–3.4)
LYMPHOCYTES # BLD AUTO: 4 % — LOW (ref 20.5–51.1)
MAGNESIUM SERPL-MCNC: 1.9 MG/DL — SIGNIFICANT CHANGE UP (ref 1.8–2.4)
MCHC RBC-ENTMCNC: 30.4 PG — SIGNIFICANT CHANGE UP (ref 27–31)
MCHC RBC-ENTMCNC: 31.3 G/DL — LOW (ref 32–37)
MCV RBC AUTO: 97 FL — SIGNIFICANT CHANGE UP (ref 81–99)
MONOCYTES # BLD AUTO: 0.22 K/UL — SIGNIFICANT CHANGE UP (ref 0.1–0.6)
MONOCYTES NFR BLD AUTO: 1.9 % — SIGNIFICANT CHANGE UP (ref 1.7–9.3)
NEUTROPHILS # BLD AUTO: 10.47 K/UL — HIGH (ref 1.4–6.5)
NEUTROPHILS NFR BLD AUTO: 92.8 % — HIGH (ref 42.2–75.2)
PLATELET # BLD AUTO: 497 K/UL — HIGH (ref 130–400)
POTASSIUM SERPL-MCNC: 4.6 MMOL/L — SIGNIFICANT CHANGE UP (ref 3.5–5)
POTASSIUM SERPL-SCNC: 4.6 MMOL/L — SIGNIFICANT CHANGE UP (ref 3.5–5)
RBC # BLD: 3.36 M/UL — LOW (ref 4.2–5.4)
RBC # FLD: 15.1 % — HIGH (ref 11.5–14.5)
SODIUM SERPL-SCNC: 139 MMOL/L — SIGNIFICANT CHANGE UP (ref 135–146)
WBC # BLD: 11.29 K/UL — HIGH (ref 4.8–10.8)
WBC # FLD AUTO: 11.29 K/UL — HIGH (ref 4.8–10.8)

## 2018-06-28 RX ORDER — LANOLIN ALCOHOL/MO/W.PET/CERES
3 CREAM (GRAM) TOPICAL ONCE
Qty: 0 | Refills: 0 | Status: COMPLETED | OUTPATIENT
Start: 2018-06-28 | End: 2018-06-28

## 2018-06-28 RX ORDER — MYCOPHENOLATE MOFETIL 250 MG/1
500 CAPSULE ORAL
Qty: 0 | Refills: 0 | Status: DISCONTINUED | OUTPATIENT
Start: 2018-06-28 | End: 2018-06-29

## 2018-06-28 RX ORDER — PANTOPRAZOLE SODIUM 20 MG/1
40 TABLET, DELAYED RELEASE ORAL DAILY
Qty: 0 | Refills: 0 | Status: DISCONTINUED | OUTPATIENT
Start: 2018-06-28 | End: 2018-06-29

## 2018-06-28 RX ADMIN — PANTOPRAZOLE SODIUM 40 MILLIGRAM(S): 20 TABLET, DELAYED RELEASE ORAL at 12:19

## 2018-06-28 RX ADMIN — OXYCODONE AND ACETAMINOPHEN 1 TABLET(S): 5; 325 TABLET ORAL at 22:09

## 2018-06-28 RX ADMIN — Medication 3 MILLIGRAM(S): at 23:51

## 2018-06-28 RX ADMIN — Medication 1 APPLICATION(S): at 05:08

## 2018-06-28 RX ADMIN — TRAMADOL HYDROCHLORIDE 50 MILLIGRAM(S): 50 TABLET ORAL at 00:19

## 2018-06-28 RX ADMIN — MYCOPHENOLATE MOFETIL 500 MILLIGRAM(S): 250 CAPSULE ORAL at 21:36

## 2018-06-28 RX ADMIN — TRAMADOL HYDROCHLORIDE 50 MILLIGRAM(S): 50 TABLET ORAL at 00:49

## 2018-06-28 RX ADMIN — OXYCODONE AND ACETAMINOPHEN 1 TABLET(S): 5; 325 TABLET ORAL at 21:40

## 2018-06-28 RX ADMIN — AZATHIOPRINE 200 MILLIGRAM(S): 100 TABLET ORAL at 12:19

## 2018-06-28 RX ADMIN — Medication 258 MILLIGRAM(S): at 05:07

## 2018-06-28 RX ADMIN — Medication 1 APPLICATION(S): at 19:06

## 2018-06-28 RX ADMIN — ENOXAPARIN SODIUM 40 MILLIGRAM(S): 100 INJECTION SUBCUTANEOUS at 12:19

## 2018-06-28 RX ADMIN — Medication 3 MILLIGRAM(S): at 00:51

## 2018-06-28 NOTE — DISCHARGE NOTE ADULT - PLAN OF CARE
Improvement of symptoms Continue Cellcept 500mg oral twice daily  Continue azathiopine 200mg oral once daily  Follow up with Rheumatology Dr. Rea in 1 week  Follow up with PCP in 1-2 weeks Resolution of symptoms, likely secondary to acute flair of dermatomyositis Follow up with PCP in 1-2 weeks Stable Continue home medication  Follow up with PCP in 1-2 weeks Continue Cellcept 500mg oral twice daily  Continue azathiopine 200mg oral once daily  Follow up with Rheumatology Dr. Rea in 1 week  Follow up with PCP and Neurology in 1-2 weeks

## 2018-06-28 NOTE — DISCHARGE NOTE ADULT - CARE PLAN
Principal Discharge DX:	Polymyositis with other organ involvement  Goal:	Improvement of symptoms  Secondary Diagnosis:	Elevated troponin  Secondary Diagnosis:	Elevated creatine kinase  Secondary Diagnosis:	Cryptogenic organizing pneumonia Principal Discharge DX:	Polymyositis with other organ involvement  Goal:	Improvement of symptoms  Assessment and plan of treatment:	Continue Cellcept 500mg oral twice daily  Continue azathiopine 200mg oral once daily  Follow up with Rheumatology Dr. Rea in 1 week  Follow up with PCP in 1-2 weeks  Secondary Diagnosis:	Elevated troponin  Goal:	Resolution of symptoms, likely secondary to acute flair of dermatomyositis  Assessment and plan of treatment:	Follow up with PCP in 1-2 weeks  Secondary Diagnosis:	Elevated creatine kinase  Goal:	Resolution of symptoms, likely secondary to acute flair of dermatomyositis  Assessment and plan of treatment:	Follow up with PCP in 1-2 weeks  Secondary Diagnosis:	Cryptogenic organizing pneumonia  Goal:	Stable  Assessment and plan of treatment:	Continue home medication  Follow up with PCP in 1-2 weeks Principal Discharge DX:	Polymyositis with other organ involvement  Goal:	Improvement of symptoms  Assessment and plan of treatment:	Continue Cellcept 500mg oral twice daily  Continue azathiopine 200mg oral once daily  Follow up with Rheumatology Dr. Rea in 1 week  Follow up with PCP and Neurology in 1-2 weeks  Secondary Diagnosis:	Elevated troponin  Goal:	Resolution of symptoms, likely secondary to acute flair of dermatomyositis  Assessment and plan of treatment:	Follow up with PCP in 1-2 weeks  Secondary Diagnosis:	Elevated creatine kinase  Goal:	Resolution of symptoms, likely secondary to acute flair of dermatomyositis  Assessment and plan of treatment:	Follow up with PCP in 1-2 weeks  Secondary Diagnosis:	Cryptogenic organizing pneumonia  Goal:	Stable  Assessment and plan of treatment:	Continue home medication  Follow up with PCP in 1-2 weeks

## 2018-06-28 NOTE — DISCHARGE NOTE ADULT - CARE PROVIDERS DIRECT ADDRESSES
,[email or web address],[email or web address],DirectAddress_Unknown

## 2018-06-28 NOTE — CONSULT NOTE ADULT - ASSESSMENT
IMPRESSION: Rehab for    PRECAUTIONS: [  ] Cardiac  [  ] Respiratory  [  ] Seizures [  ] Contact Isolation  [  ] Droplet Isolation  [  ] Other    Weight Bearing Status:     RECOMMENDATION:    Out of Bed to Chair     DVT/Decubiti Prophylaxis    REHAB PLAN:     [   ] Bedside P/T 3-5 times a week   [   ]   Bedside O/T  2-3 times a week             [   ] No Rehab Therapy Indicated                   [   ]  Speech Therapy   Conditioning/ROM                                    ADL  Bed Mobility                                               Conditioning/ROM  Transfers                                                     Bed Mobility  Sitting /Standing Balance                         Transfers                                        Gait Training                                               Sitting/Standing Balance  Stair Training [   ]Applicable                    Home equipment Eval                                                                        Splinting  [   ] Only      GOALS:   ADL   [   ]   Independent                    Transfers  [   ] Independent                          Ambulation  [   ] Independent     [    ] With device                            [   ]  CG                                                         [   ]  CG                                                                  [   ] CG                            [    ] Min A                                                   [   ] Min A                                                              [   ] Min  A          DISCHARGE PLAN:   [   ]  Good candidate for Intensive Rehabilitation/Hospital based-[facility]A [facility]                                             Will tolerate 3hrs Intensive Rehab Daily                                       [   X ]  Short Term Rehab in Skilled Nursing Facility                                       [  X  ]  Home with Outpatient or VN services                                         [    ]  Possible Candidate for Intensive Hospital based Rehab      Thank you. IMPRESSION: Rehab for polymyositis    PRECAUTIONS: [  ] Cardiac  [  ] Respiratory  [  ] Seizures [  ] Contact Isolation  [  ] Droplet Isolation  [  ] Other    Weight Bearing Status:  WBAT    RECOMMENDATION:    Out of Bed to Chair     DVT/Decubiti Prophylaxis    REHAB PLAN:     [ X  ] Bedside P/T 3-5 times a week   [   ]   Bedside O/T  2-3 times a week             [   ] No Rehab Therapy Indicated                   [   ]  Speech Therapy   Conditioning/ROM                                    ADL  Bed Mobility                                               Conditioning/ROM  Transfers                                                     Bed Mobility  Sitting /Standing Balance                         Transfers                                        Gait Training                                               Sitting/Standing Balance  Stair Training [   ]Applicable                    Home equipment Eval                                                                        Splinting  [   ] Only      GOALS:   ADL   [   ]   Independent                    Transfers  [   ] Independent                          Ambulation  [   ] Independent     [  X  ] With device                            [   ]  CG                                                         [   ]  CG                                                                  [   ] CG                            [    ] Min A                                                   [  X ] Min A                                                              [  X] Min  A          DISCHARGE PLAN:   [   ]  Good candidate for Intensive Rehabilitation/Hospital based-[facility]A [facility]                                             Will tolerate 3hrs Intensive Rehab Daily                                       [   X ]  Short Term Rehab in Skilled Nursing Facility                                       [  X  ]  Home with Outpatient or  services                                         [    ]  Possible Candidate for Intensive Hospital based Rehab      Thank you.

## 2018-06-28 NOTE — PROGRESS NOTE ADULT - SUBJECTIVE AND OBJECTIVE BOX
JESSI KING  37y, Female  Allergy: contrast media (iodine-based) (Unknown)  peanuts (Unknown)  shellfish (Unknown)      OVERNIGHT EVENTS: none, feeels stronger with improved hand  and upper body motion  no chest pain, nausea, vision changes, dizziness; good appetite        PAST MEDICAL & SURGICAL HISTORY:  Dermatomyositis  Polymyositis  Hyperlipidemia  Cryptogenic organizing pneumonia  Ankle fracture      VITALS:  T(F): 96.7 (06-28-18 @ 13:13), Max: 97.7 (06-27-18 @ 15:58)  HR: 95 (06-28-18 @ 13:13)  BP: 124/60 (06-28-18 @ 13:13) (103/58 - 128/66)  RR: 18 (06-28-18 @ 13:13)  SpO2: 95% (06-27-18 @ 15:58)    TESTS & MEASUREMENTS:  Height (cm): 162.56 (06-27-18 @ 21:31)  Weight (kg): 109.6 (06-27-18 @ 21:31)  BMI (kg/m2): 41.5 (06-27-18 @ 21:31)    perrla: eomi  pulm: cta b/l  cv: rrr s1s2  gi: +bs, soft, nt/nd  psych: a x o x 3  neuro: 4+/5 upper extremities, 3/5 lower extr.                          10.2   11.29 )-----------( 497      ( 28 Jun 2018 08:33 )             32.6       06-28    139  |  103  |  14  ----------------------------<  169<H>  4.6   |  23  |  <0.5<L>    Ca    8.6      28 Jun 2018 08:33  Mg     1.9     06-28    TPro  7.9  /  Alb  3.2<L>  /  TBili  0.2  /  DBili  x   /  AST  194<H>  /  ALT  92<H>  /  AlkPhos  80  06-26    LIVER FUNCTIONS - ( 26 Jun 2018 19:40 )  Alb: 3.2 g/dL / Pro: 7.9 g/dL / ALK PHOS: 80 U/L / ALT: 92 U/L / AST: 194 U/L / GGT: x           CARDIAC MARKERS ( 26 Jun 2018 22:55 )  x     / 1.23 ng/mL / 5881 U/L / x     / 163.6 ng/mL  CARDIAC MARKERS ( 26 Jun 2018 19:40 )  x     / 1.25 ng/mL / 6411 U/L / x     / 173.0 ng/mL            RADIOLOGY & ADDITIONAL TESTS:    MEDICATIONS:  MEDICATIONS  (STANDING):  azaTHIOprine 200 milliGRAM(s) Oral daily  enoxaparin Injectable 40 milliGRAM(s) SubCutaneous daily  hydrocortisone 1% Cream 1 Application(s) Topical every 12 hours  mycophenolate mofetil 500 milliGRAM(s) Oral two times a day  pantoprazole    Tablet 40 milliGRAM(s) Oral daily  sodium chloride 0.9%. 1000 milliLiter(s) (100 mL/Hr) IV Continuous <Continuous>    MEDICATIONS  (PRN):  oxyCODONE    5 mG/acetaminophen 325 mG 1 Tablet(s) Oral every 6 hours PRN Severe Pain (7 - 10)  traMADol 50 milliGRAM(s) Oral every 12 hours PRN Severe Pain (7 - 10)    A/P     DERMATOMYOSITIS FLARE  ORGANIZING PNEUMONIA  MORBID OBESITY BMI 41.5  ANEMIA, CHRONIC BLOOD LOSS  THROMBOCYTOSIS  -CONTINUE PULSE STEROIDS  -CELLCEPT PER RHEUM (DISCUSSED WITH RHEUM ATTENDING)  -OUTPT MONITORING AND COUNSELING OF DIET BUT LIKELY STEROIDS/MEDS PLAYING A SIGNIFICANT ROLE ALSO IN THIS CHALLENGE  -HEMATOLOGIC PARAMETERS ESSENTIALLY STABLE, TO F/U AS OUTPT.

## 2018-06-28 NOTE — DISCHARGE NOTE ADULT - HOSPITAL COURSE
37F w PMHx of organizing PNA on chronic Bactrim and dermatomyositis sent to ED by neurologist for dermatomyositis flare and IV meds. Patient was diagnosed with myositis in April 2018, was already being treated with azathioprine for organizing PNA, and had medication increased at around that time. Since approximately Mother's Day she states she has had gradual progression of her weakness. She was seen by Dr. Hatfield today, stated she was having worsening weakness and new rash beginning approximately 2 weeks ago. Rash is on her chest in V sign distribution, described as "rough patches" with small erythematous lesions interspersed. Additionally she has similar patches on her face in malar distribution without erythema and c/o dry, cracking skin on hands. Also states her urine is dark, appears like red wine, and has odd odor.    In ED, as per Dr. Hatfield, patient was given 1000mg Solumedrol which is to be continued for 3 doses daily total. Troponin was elevated, however near baseline, cardiology recommended echo. CK was 5881, below her previous admission. Patient's ECHO shows EF62%, Grade 1 DD, mild TR, and borderline pulmonary HTN. Patient was evaluated by her rheumatologist Dr. Rea and started on Cellcept 500mg BID. Patient's muscle strength and pain have improved after 3 doses of solumedrol. Patient is stable to be discharged home with outpatient PT to regain strength of her extremities. Patient is advised to follow up with her PCP, rheumatologist, and neurologist after discharge.

## 2018-06-28 NOTE — PROGRESS NOTE ADULT - ATTENDING COMMENTS
Patient seen and examined agree with above except as noted.  Patient subjectively feels better.  Will complete 3 days solumedrol and would consider starting methotrexate.  Rheum follow up pending
Patient seen and examined agree with above except as noted.  Patient seen buy Dr. Hatfield in clinic yesterday with worsening dermatomyositis on azathioprine    Exam  CN 2-12 normal  power diffuse diminished with proximal UE and LE being 3/5 and 2/5 respectively.  Distal UE and LE 4-/5  Sensory to PP, Temp, Vib symmetric  DTR 2+ throughout  FTN not dysmetric but cannot reach her nose  Gait non ambulatory at this time    A/P  Patient with diagnosis of Oksana-1(+) dermatomyositis with progressing disease  1. Solumedrol 1g QD x 3 days  2. Rheumatology evaluation Dr. Phillips for starting methotrexate  3. PT/OR/Rehab

## 2018-06-28 NOTE — CONSULT NOTE ADULT - SUBJECTIVE AND OBJECTIVE BOX
HPI:  37 y.o. F w PMHx of organizing PNA on chronic Bactrim and dermatomyositis sent to ED by neurologist for dermatomyositis flare and IV meds. Patient was diagnosed with myositis in April 2018, was already being treated with azathioprine for organizing PNA, and had medication increased at around that time. Since approximately Mother's Day she states she has had gradual progression of her weakness. She was seen by Dr. Hatfield today, stated she was having worsening weakness and new rash beginning approximately 2 weeks ago. Rash is on her chest in V sign distribution, described as "rough patches" with small erythematous lesions interspersed. Additionally she has similar patches on her face in malar distribution without erythema and c/o dry, cracking skin on hands. Also states her urine is dark, appears like red wine, and has odd odor.    In ED, as per Dr. Hatfield, patient was given 1000mg Solumedrol which is to be continued for 3 doses daily total. Troponin was elevated, however near baseline, cardiology recommended echo. CK was 5881, below her previous admission. (27 Jun 2018 02:01)      PAST MEDICAL & SURGICAL HISTORY:  Dermatomyositis  Polymyositis  Hyperlipidemia  Cryptogenic organizing pneumonia  Ankle fracture      Hospital Course:  Received IV Solumedrol    TODAY'S SUBJECTIVE & REVIEW OF SYMPTOMS:     Constitutional WNL   Cardio WNL   Resp WNL   GI WNL  Heme WNL  Endo WNL  Skin WNL  MSK WNL  Neuro WNL  Cognitive WNL  Psych WNL      MEDICATIONS  (STANDING):  azaTHIOprine 200 milliGRAM(s) Oral daily  enoxaparin Injectable 40 milliGRAM(s) SubCutaneous daily  hydrocortisone 1% Cream 1 Application(s) Topical every 12 hours  pantoprazole    Tablet 40 milliGRAM(s) Oral daily  sodium chloride 0.9%. 1000 milliLiter(s) (100 mL/Hr) IV Continuous <Continuous>    MEDICATIONS  (PRN):  oxyCODONE    5 mG/acetaminophen 325 mG 1 Tablet(s) Oral every 6 hours PRN Severe Pain (7 - 10)  traMADol 50 milliGRAM(s) Oral every 12 hours PRN Severe Pain (7 - 10)      FAMILY HISTORY:  Family history of sarcoidosis (Father)      Allergies    contrast media (iodine-based) (Unknown)  peanuts (Unknown)  shellfish (Unknown)    Intolerances        SOCIAL HISTORY:    [  ] EtOH  [  ] Smoking  [  ] Substance abuse     Home Environment:  [  ] Home Alone  [  ] Lives with Family  [  ] Home Health Aid    Dwelling:  [  ] Apartment  [  ] Private House  [  ] Adult Home  [  ] Skilled Nursing Facility      [  ] Short Term  [  ] Long Term  [  ] Stairs       Elevator [  ]    FUNCTIONAL STATUS PTA: (Check all that apply)  Ambulation: [   ]Independent    [  ] Dependent     [  ] Non-Ambulatory  Assistive Device: [  ] SA Cane  [  ]  Q Cane  [  ] Walker  [  ]  Wheelchair  ADL : [  ] Independent  [  ]  Dependent       Vital Signs Last 24 Hrs  T(C): 35.9 (28 Jun 2018 05:24), Max: 36.5 (27 Jun 2018 15:58)  T(F): 96.6 (28 Jun 2018 05:24), Max: 97.7 (27 Jun 2018 15:58)  HR: 99 (28 Jun 2018 05:24) (99 - 108)  BP: 115/64 (28 Jun 2018 05:24) (103/58 - 128/66)  BP(mean): --  RR: 18 (28 Jun 2018 05:24) (18 - 18)  SpO2: 95% (27 Jun 2018 15:58) (95% - 95%)      PHYSICAL EXAM: Alert & Oriented X3  GENERAL: NAD, well-groomed, well-developed  HEAD:  Atraumatic, Normocephalic  EYES: EOMI, PERRLA, conjunctiva and sclera clear  NECK: Supple, No JVD, Normal thyroid  CHEST/LUNG: Clear to percussion bilaterally; No rales, rhonchi, wheezing, or rubs  HEART: Regular rate and rhythm; No murmurs, rubs, or gallops  ABDOMEN: Soft, Nontender, Nondistended; Bowel sounds present  EXTREMITIES:  2+ Peripheral Pulses, No clubbing, cyanosis, or edema    NERVOUS SYSTEM:  Cranial Nerves 2-12 intact [  ] Abnormal  [  ]  ROM: WFL all extremities [  ]  Abnormal [  ]  Motor Strength: WFL all extremities  [  ]  Abnormal [  ]  Sensation: intact to light touch [  ] Abnormal [  ]  Reflexes: Symmetric [  ]  Abnormal [  ]    FUNCTIONAL STATUS:  Bed Mobility: Independent [  ]  Supervision [  ]  Needs Assistance [  ]  N/A [  ]  Transfers: Independent [  ]  Supervision [  ]  Needs Assistance [  ]  N/A [  ]   Ambulation: Independent [  ]  Supervision [  ]  Needs Assistance [  ]  N/A [  ]  ADL: Independent [  ] Requires Assistance [  ] N/A [  ]      LABS:                        10.2   11.29 )-----------( 497      ( 28 Jun 2018 08:33 )             32.6     06-28    139  |  103  |  14  ----------------------------<  169<H>  4.6   |  23  |  <0.5<L>    Ca    8.6      28 Jun 2018 08:33  Mg     1.9     06-28    TPro  7.9  /  Alb  3.2<L>  /  TBili  0.2  /  DBili  x   /  AST  194<H>  /  ALT  92<H>  /  AlkPhos  80  06-26          RADIOLOGY & ADDITIONAL STUDIES:    EXAM:  XR CHEST PORTABLE URGENT 1V            PROCEDURE DATE:  06/26/2018            INTERPRETATION:  Clinical History / Reason for exam: Pneumonia.    Comparison : Chest radiograph April 22, 2018.    Technique/Positioning: Frontal portable, low lung volumes.    Findings:    Support devices: None.    Cardiac/mediastinum/hilum: Unremarkable.    Lung parenchyma/Pleura: Within normal limits.    Skeleton/soft tissues: Unremarkable.    Impression:      No radiographic evidence of acute cardiopulmonary disease.    Unchanged. HPI:  37 y.o. right-handed F with PMHx of organizing PNA on chronic Bactrim and dermatomyositis sent to ED by neurologist for dermatomyositis flare and IV meds. Patient was diagnosed with myositis in April 2018, was already being treated with azathioprine for organizing PNA, and had medication increased at around that time. Since approximately Mother's Day she states she has had gradual progression of her weakness. She was seen by Dr. Hatfield today, stated she was having worsening weakness and new rash beginning approximately 2 weeks ago. Rash is on her chest in V sign distribution, described as "rough patches" with small erythematous lesions interspersed. Additionally she has similar patches on her face in malar distribution without erythema and c/o dry, cracking skin on hands. Also states her urine is dark, appears like red wine, and has odd odor.    In ED, as per Dr. Hatfield, patient was given 1000mg Solumedrol which is to be continued for 3 doses daily total. Troponin was elevated, however near baseline, cardiology recommended echo. CK was 5881, below her previous admission. (27 Jun 2018 02:01)      PAST MEDICAL & SURGICAL HISTORY:  Dermatomyositis  Polymyositis  Hyperlipidemia  Cryptogenic organizing pneumonia  Ankle fracture      Hospital Course:  Received IV Solumedrol    TODAY'S SUBJECTIVE & REVIEW OF SYMPTOMS:     Constitutional + weakness   Cardio WNL   Resp WNL   GI WNL  Heme WNL  Endo WNL  Skin WNL  MSK + mild discomfort  Neuro WNL  Cognitive WNL  Psych WNL      MEDICATIONS  (STANDING):  azaTHIOprine 200 milliGRAM(s) Oral daily  enoxaparin Injectable 40 milliGRAM(s) SubCutaneous daily  hydrocortisone 1% Cream 1 Application(s) Topical every 12 hours  pantoprazole    Tablet 40 milliGRAM(s) Oral daily  sodium chloride 0.9%. 1000 milliLiter(s) (100 mL/Hr) IV Continuous <Continuous>    MEDICATIONS  (PRN):  oxyCODONE    5 mG/acetaminophen 325 mG 1 Tablet(s) Oral every 6 hours PRN Severe Pain (7 - 10)  traMADol 50 milliGRAM(s) Oral every 12 hours PRN Severe Pain (7 - 10)      FAMILY HISTORY:  Family history of sarcoidosis (Father)      Allergies    contrast media (iodine-based) (Unknown)  peanuts (Unknown)  shellfish (Unknown)    Intolerances        SOCIAL HISTORY:    [  ] EtOH  [  ] Smoking  [  ] Substance abuse     Home Environment:  [  ] Home Alone  [ X ] Lives with Family  [ X ] Home Health Aide--to start in July 2018    Dwelling:  [X ] Apartment  [  ] Private House  [  ] Adult Home  [  ] Skilled Nursing Facility      [  ] Short Term  [  ] Long Term  [  ] Stairs       Elevator [ X ]    FUNCTIONAL STATUS PTA: (Check all that apply)  Ambulation: [   ]Independent    [ X ] with aassist    [  ] Non-Ambulatory  Assistive Device: [  ] SA Cane  [  ]  Q Cane  [ X ] Walker  [  ]  Wheelchair  ADL : [  ] Independent  [ X ]  With assist      Vital Signs Last 24 Hrs  T(C): 35.9 (28 Jun 2018 05:24), Max: 36.5 (27 Jun 2018 15:58)  T(F): 96.6 (28 Jun 2018 05:24), Max: 97.7 (27 Jun 2018 15:58)  HR: 99 (28 Jun 2018 05:24) (99 - 108)  BP: 115/64 (28 Jun 2018 05:24) (103/58 - 128/66)  BP(mean): --  RR: 18 (28 Jun 2018 05:24) (18 - 18)  SpO2: 95% (27 Jun 2018 15:58) (95% - 95%)      PHYSICAL EXAM: Alert & Oriented X 3  GENERAL: NAD, well-groomed, well-developed  HEAD:  Atraumatic, Normocephalic  EYES: EOMI, PERRLA, conjunctiva and sclera clear  NECK: Supple, No JVD, Normal thyroid  CHEST/LUNG: Clear to percussion bilaterally; No rales, rhonchi, wheezing, or rubs  HEART: Regular rate and rhythm; No murmurs, rubs, or gallops  ABDOMEN: Soft, Nontender, Nondistended; Bowel sounds present  EXTREMITIES:  2+ Peripheral Pulses, No clubbing, cyanosis, or edema    NERVOUS SYSTEM:  Cranial Nerves 2-12 intact [ X ] Abnormal  [  ]  ROM: WFL all extremities [ X ]  Abnormal [  ]  Motor Strength: WFL all extremities  [  ]  Abnormal [ X ]  Sensation: intact to light touch [ X ] Abnormal [  ]    FUNCTIONAL STATUS:  Bed Mobility: Independent [  ]  Supervision [  ]  Needs Assistance [ X ]  N/A [  ]  Transfers: Independent [  ]  Supervision [  ]  Needs Assistance [ X ]  N/A [  ]   Ambulation: Independent [  ]  Supervision [  ]  Needs Assistance [ X ]  N/A [  ]  ADL: Independent [  ] Requires Assistance [ X ] N/A [  ]      LABS:                        10.2   11.29 )-----------( 497      ( 28 Jun 2018 08:33 )             32.6     06-28    139  |  103  |  14  ----------------------------<  169<H>  4.6   |  23  |  <0.5<L>    Ca    8.6      28 Jun 2018 08:33  Mg     1.9     06-28    TPro  7.9  /  Alb  3.2<L>  /  TBili  0.2  /  DBili  x   /  AST  194<H>  /  ALT  92<H>  /  AlkPhos  80  06-26          RADIOLOGY & ADDITIONAL STUDIES:    EXAM:  XR CHEST PORTABLE URGENT 1V            PROCEDURE DATE:  06/26/2018            INTERPRETATION:  Clinical History / Reason for exam: Pneumonia.    Comparison : Chest radiograph April 22, 2018.    Technique/Positioning: Frontal portable, low lung volumes.    Findings:    Support devices: None.    Cardiac/mediastinum/hilum: Unremarkable.    Lung parenchyma/Pleura: Within normal limits.    Skeleton/soft tissues: Unremarkable.    Impression:      No radiographic evidence of acute cardiopulmonary disease.    Unchanged.

## 2018-06-28 NOTE — DISCHARGE NOTE ADULT - PATIENT PORTAL LINK FT
You can access the [facility] Patient Portal, offered by [facility], by registering with the following website: [email or web address]

## 2018-06-28 NOTE — DISCHARGE NOTE ADULT - CARE PROVIDER_API CALL
Sari Espinoza), Internal Medicine  [address]  [address]  [place] [address]  Phone: [phone]  Fax: [phone]    Lucia Rea (), Internal Medicine  [address] [place] [id number]  Phone: [phone]  Fax: [phone]    Ford Hatfield), Neurology  [address]  [address]  [place] [address]  Phone: [phone]  Fax: [phone]

## 2018-06-28 NOTE — CONSULT NOTE ADULT - ASSESSMENT
38 yo female with Jo1 dermatomyositis/ organizing pna admitted for flare of disease activity. Currently improving with 2nd dose of pulse solumedrol, and increased Imuran to 200mg daily.  However, she needs to be started on additional treatment as she refuses PO prednisone given hx of adverse effects, and imuran solely is not enough.  Recommend starting cellcept 500mg BID now, physical therapy, and f/u with rheum as outpatient. Will increase cellcept to goal of 1500mg BID as tolerated. Methotrexate therapy is also an option, however, given hx of lung disease, cellcept may be a better choice.   Had long discussion with patient re: age-related malignancy workup as pts with dermatomyositis have a signficantly increased risk of malignancy. 36 yo female with Jo1 dermatomyositis/ organizing pna admitted for flare of disease activity. Currently improving with 2nd dose of pulse solumedrol, and increased Imuran to 200mg daily.  However, she needs to be started on additional treatment as she refuses PO prednisone given hx of adverse effects, and imuran solely is not enough.  Recommend starting cellcept 500mg BID now, physical therapy, and f/u with rheum as outpatient. Will increase cellcept to goal of 1500mg BID as tolerated. Methotrexate therapy is also an option, however, given hx of lung disease, cellcept may be a better choice.   Had long discussion with patient re: age-related malignancy workup as pts with dermatomyositis have a signficantly increased risk of malignancy.      Discussed plan with Dr Anderson [phone]

## 2018-06-28 NOTE — PHYSICAL THERAPY INITIAL EVALUATION ADULT - LEVEL OF INDEPENDENCE: SUPINE/SIT, REHAB EVAL
"Chief Complaint   Patient presents with     Shoulder Pain       Initial /81  Pulse 93  Temp 98.2  F (36.8  C) (Oral)  Resp 20  SpO2 93% Estimated body mass index is 28.07 kg/(m^2) as calculated from the following:    Height as of 1/18/18: 5' 7\" (1.702 m).    Weight as of 3/27/18: 179 lb 3.2 oz (81.3 kg).  Medication Reconciliation: complete       Kang Valladares MA        " minimum assist (75% patients effort)

## 2018-06-28 NOTE — PROGRESS NOTE ADULT - ASSESSMENT
JESSI KING 37y Female  MRN#: [id number]   CODE STATUS: Full code      SUBJECTIVE  Patient is a 37y old Female who presents with a chief complaint of increased muscle pain and weakness  Currently admitted to medicine with the primary diagnosis of acute flair of dematomyocitis  Today is hospital day 1d, and this morning she is resting comfortably in bed having breakfast. Patient reports improved UE and LE strength bilaterally since solumedrol started, however still feels weaker on LE and have episodes of urinary incontinence. Patient denies any dizziness, chest pain, shortness of breath, or abdominal pain.    Present Today:           Crenshaw Catheter (X)No/ ()Yes? Indication:          Central Line (X)No/ ()Yes? Indication:          IV Fluids (X)No/ ()Yes? Type:  Rate:  Indication:      OBJECTIVE  PAST MEDICAL & SURGICAL HISTORY  Dermatomyositis  Polymyositis  Hyperlipidemia  Cryptogenic organizing pneumonia  Ankle fracture    ALLERGIES:  contrast media (iodine-based) (Unknown)  peanuts (Unknown)  shellfish (Unknown)    MEDICATIONS:  STANDING MEDICATIONS  azaTHIOprine 200 milliGRAM(s) Oral daily  enoxaparin Injectable 40 milliGRAM(s) SubCutaneous daily  hydrocortisone 1% Cream 1 Application(s) Topical every 12 hours  mycophenolate mofetil 500 milliGRAM(s) Oral two times a day  pantoprazole    Tablet 40 milliGRAM(s) Oral daily  sodium chloride 0.9%. 1000 milliLiter(s) IV Continuous <Continuous>    PRN MEDICATIONS  oxyCODONE    5 mG/acetaminophen 325 mG 1 Tablet(s) Oral every 6 hours PRN  traMADol 50 milliGRAM(s) Oral every 12 hours PRN      VITAL SIGNS: Last 24 Hours  T(C): 35.9 (28 Jun 2018 13:13), Max: 35.9 (28 Jun 2018 05:24)  T(F): 96.7 (28 Jun 2018 13:13), Max: 96.7 (28 Jun 2018 13:13)  HR: 95 (28 Jun 2018 13:13) (95 - 108)  BP: 124/60 (28 Jun 2018 13:13) (103/58 - 124/60)  BP(mean): --  RR: 18 (28 Jun 2018 13:13) (18 - 18)  SpO2: --    LABS:                        10.2   11.29 )-----------( 497      ( 28 Jun 2018 08:33 )             32.6     06-28    139  |  103  |  14  ----------------------------<  169<H>  4.6   |  23  |  <0.5<L>    Ca    8.6      28 Jun 2018 08:33  Mg     1.9     06-28    TPro  7.9  /  Alb  3.2<L>  /  TBili  0.2  /  DBili  x   /  AST  194<H>  /  ALT  92<H>  /  AlkPhos  80  06-26      CARDIAC MARKERS ( 26 Jun 2018 22:55 )  x     / 1.23 ng/mL / 5881 U/L / x     / 163.6 ng/mL  CARDIAC MARKERS ( 26 Jun 2018 19:40 )  x     / 1.25 ng/mL / 6411 U/L / x     / 173.0 ng/mL      RADIOLOGY:  < from: Xray Chest 1 View- PORTABLE-Urgent (06.26.18 @ 19:57) >  No radiographic evidence of acute cardiopulmonary disease.    < end of copied text >    PHYSICAL EXAM:  GENERAL: NAD, well-developed, AAOx3, not in distress  HEENT:  Atraumatic, Normocephalic. Conjunctiva and sclera clear  PULMONARY: Clear to auscultation bilaterally; No wheeze  CARDIOVASCULAR: Regular rate and rhythm; No murmurs, rubs, or gallops  GASTROINTESTINAL: Soft, Nontender, Nondistended; Bowel sounds present  MUSCULOSKELETAL: No clubbing, cyanosis, or edema  NEUROLOGY: Hand  strength L>R. UE strength 4/5 bilaterally, and LE strength 3/5 bilaterally. No sensory deficits.   SKIN: Sandpaper rash on bilateral cheek and upper chest      ADMISSION SUMMARY  Patient is a 37y old Female who presents with a chief complaint of increased muscle pain and weakness  Currently admitted to medicine with the primary diagnosis of acute flair of dematomyocitis  Patient was seen by neurologist Dr. Hatfield on the day of admission and he recommends solumedrol 1g IV for 3 days. Patient's condition improved after 1 dose. Patient has been following up with Rheumatologist         ASSESSMENT & PLAN    1. POLYMYOSITIS WITH OTHER ORGAN INVOLVEMENT      2.    3. Dermatomyositis  Polymyositis  Hyperlipidemia  Cryptogenic organizing pneumonia        Present today:  ( ) Congestive Heart Failure, Yes? ( )Acute / ( )Acute on Chronic / ( )Chronic  :  ( )Systolic / ( )Diastolic               Plan:  ( ) Complicated Pneumonia, Type?  ( )Parapneumonic effusion / ( )Abscess / ( ) Multilobar / ( )Other               Plan:  ( ) Morbid Obesity, Yes? BMI:               Plan:  ( ) Functional Quadriplegia               Plan:  ( ) Encephalopathy               Plan:    ( ) Discussion with patient and/or family regarding goals of care  ( ) Discussed Case and Plan with Medical Attending, Name:      # Planned Disposition: ________ JESSI KING 37y Female  MRN#: [id number]   CODE STATUS: Full code      SUBJECTIVE  Patient is a 37y old Female who presents with a chief complaint of increased muscle pain and weakness  Currently admitted to medicine with the primary diagnosis of acute flair of dematomyocitis  Today is hospital day 1d, and this morning she is resting comfortably in bed having breakfast. Patient reports improved UE and LE strength bilaterally since solumedrol started, however still feels weaker on LE and have episodes of urinary incontinence. Patient denies any dizziness, chest pain, shortness of breath, or abdominal pain.    Present Today:           Crenshaw Catheter (X)No/ ()Yes? Indication:          Central Line (X)No/ ()Yes? Indication:          IV Fluids (X)No/ ()Yes? Type:  Rate:  Indication:      OBJECTIVE  PAST MEDICAL & SURGICAL HISTORY  Dermatomyositis  Polymyositis  Hyperlipidemia  Cryptogenic organizing pneumonia  Ankle fracture    ALLERGIES:  contrast media (iodine-based) (Unknown)  peanuts (Unknown)  shellfish (Unknown)    MEDICATIONS:  STANDING MEDICATIONS  azaTHIOprine 200 milliGRAM(s) Oral daily  enoxaparin Injectable 40 milliGRAM(s) SubCutaneous daily  hydrocortisone 1% Cream 1 Application(s) Topical every 12 hours  mycophenolate mofetil 500 milliGRAM(s) Oral two times a day  pantoprazole    Tablet 40 milliGRAM(s) Oral daily  sodium chloride 0.9%. 1000 milliLiter(s) IV Continuous <Continuous>    PRN MEDICATIONS  oxyCODONE    5 mG/acetaminophen 325 mG 1 Tablet(s) Oral every 6 hours PRN  traMADol 50 milliGRAM(s) Oral every 12 hours PRN      VITAL SIGNS: Last 24 Hours  T(C): 35.9 (28 Jun 2018 13:13), Max: 35.9 (28 Jun 2018 05:24)  T(F): 96.7 (28 Jun 2018 13:13), Max: 96.7 (28 Jun 2018 13:13)  HR: 95 (28 Jun 2018 13:13) (95 - 108)  BP: 124/60 (28 Jun 2018 13:13) (103/58 - 124/60)  BP(mean): --  RR: 18 (28 Jun 2018 13:13) (18 - 18)  SpO2: --    LABS:                        10.2   11.29 )-----------( 497      ( 28 Jun 2018 08:33 )             32.6     06-28    139  |  103  |  14  ----------------------------<  169<H>  4.6   |  23  |  <0.5<L>    Ca    8.6      28 Jun 2018 08:33  Mg     1.9     06-28    TPro  7.9  /  Alb  3.2<L>  /  TBili  0.2  /  DBili  x   /  AST  194<H>  /  ALT  92<H>  /  AlkPhos  80  06-26      CARDIAC MARKERS ( 26 Jun 2018 22:55 )  x     / 1.23 ng/mL / 5881 U/L / x     / 163.6 ng/mL  CARDIAC MARKERS ( 26 Jun 2018 19:40 )  x     / 1.25 ng/mL / 6411 U/L / x     / 173.0 ng/mL      RADIOLOGY:  < from: Xray Chest 1 View- PORTABLE-Urgent (06.26.18 @ 19:57) >  No radiographic evidence of acute cardiopulmonary disease.    < end of copied text >    PHYSICAL EXAM:  GENERAL: NAD, well-developed, AAOx3, not in distress  HEENT:  Atraumatic, Normocephalic. Conjunctiva and sclera clear  PULMONARY: Clear to auscultation bilaterally; No wheeze  CARDIOVASCULAR: Regular rate and rhythm; No murmurs, rubs, or gallops  GASTROINTESTINAL: Soft, Nontender, Nondistended; Bowel sounds present  MUSCULOSKELETAL: No clubbing, cyanosis, or edema  NEUROLOGY: Hand  strength L>R. UE strength 4/5 bilaterally, and LE strength 3/5 bilaterally. No sensory deficits.   SKIN: Sandpaper rash on bilateral cheek and upper chest      ADMISSION SUMMARY  Patient is a 37y old Female who presents with a chief complaint of increased muscle pain and weakness  Currently admitted to medicine with the primary diagnosis of acute flair of dematomyocitis  Patient was seen by neurologist Dr. Hatfield on the day of admission and he recommends solumedrol 1g IV for 3 days. Patient's condition improved after 1 dose. Patient has been following up with Rheumatologist Dr. Rea.       ASSESSMENT & PLAN    1. Acute flair of dermatomyositis  - Improved with IV solumedrol 1g Q24 x 3 days (now on day 2)  - Continue azathiopine 200mg QD  - Pain management  - Neuro on board: Finish pulse course of solumedrol, follow up with rheumatologic  - Rheu: Start cellcept 500mg PO BID, will consider increasing to 150mg BID as tolerated, will follow up outpatient      2. Cryptogenic Organizing Pneumonia  - Stable  - CXR shows no acute cardiopulmonary diseases  - Follow up with pulmonary and rheumatology outpatient    3. GI prophylaxis  - Start protonix 40mg PO QD    4. DVT prophylaxis  - heparin subQ  - PT/rehab consulted, will increase activity as tolerated    (X) Discussion with patient and/or family regarding goals of care  ( ) Discussed Case and Plan with Medical Attending, Name:      # Planned Disposition: PT recommends SNF or home with outpatient or VN service

## 2018-06-28 NOTE — DISCHARGE NOTE ADULT - MEDICATION SUMMARY - MEDICATIONS TO TAKE
I will START or STAY ON the medications listed below when I get home from the hospital:    traMADol 50 mg oral tablet  -- 1 tab(s) by mouth once a day, As Needed MDD:2 tablets  -- Caution federal law prohibits the transfer of this drug to any person other  than the person for whom it was prescribed.  May cause drowsiness.  Alcohol may intensify this effect.  Use care when operating dangerous machinery.  Obtain medical advice before taking any non-prescription drugs as some may affect the action of this medication.    -- Indication: For Polymyositis    Cortizone-10 topical cream  -- Indication: For Rash    azaTHIOprine 100 mg oral tablet  -- 2 tab(s) by mouth once a day   -- Do not take this drug if you are pregnant.  It is very important that you take or use this exactly as directed.  Do not skip doses or discontinue unless directed by your doctor.  Take with food or milk.    -- Indication: For Dermatomyositis    mycophenolate mofetil 250 mg oral capsule  -- 2 cap(s) by mouth 2 times a day   -- Indication: For Dermatomyositis    Bactrim  mg-160 mg oral tablet  -- MWF  -- Indication: For Cryptogenic organizing pneumonia

## 2018-06-28 NOTE — PROGRESS NOTE ADULT - ASSESSMENT
37F w PMHx of organizing PNA on chronic Bactrim and dermatomyositis  dermatomyositis flare and IV meds. Patient is s/p solumedrol x 1 dose with mild improvement in weakness and generalized pain.    Plan  -continue solumedrol 1 gm q daily for a total of 3 days  -follow up pending rheumatology eval  -pt/rehab  -neuro attending note will follow

## 2018-06-28 NOTE — DISCHARGE NOTE ADULT - MEDICATION SUMMARY - MEDICATIONS TO CHANGE
I will SWITCH the dose or number of times a day I take the medications listed below when I get home from the hospital:    azaTHIOprine 100 mg oral tablet  -- 1 tab(s) by mouth once a day   -- Do not take this drug if you are pregnant.  It is very important that you take or use this exactly as directed.  Do not skip doses or discontinue unless directed by your doctor.  Take with food or milk.

## 2018-06-28 NOTE — CONSULT NOTE ADULT - SUBJECTIVE AND OBJECTIVE BOX
38 yo AA female with dx of organizing pneumonia and Jo1+ dermatomyositis currently admitted with worsening weakness after evaluation at outpt neurology appt.  Had similar presentation in April 2018, received pulse-dose therapy and dc'ed with PO prednisone and Imuran. However, patient reports extreme mood irritability, weight gain of 50 lbs, buffalo hump on high dose prednisone and was tapered off quickly/ stopped them, while increasing imuran. Was seen in neuro clinic 2 days ago for progressive worsening weakness and skin rash. Reports worsening dryness of hands, rough patches in malar distribution and V sign erythema, in addition to urine "looks just like red wine". Pt now s/p 2nd dose of 1g solumedrol, with signifcant improvement.  now able to use her arms to hold a cup, cellphone. still difficulty with overhead activities. also with improvement in skin rashes.   Of note, patient has a fam hx of sarcoidosis. Denies chest pain, SOB, cough, mucocutaneous sores, hair loss, joint pains, n/v/abdominal pain, raynauds.     VS: T 96.7  HR 95  RR 18  / 60  PE: General: AAO x 3, NAD  HEENT: NC/ AT, no mucocutaneous sores  Cardio: s1 s2+  Resp: mildly decreased BS b/l   Abd: soft, non-tender, non-distended  ExtL: normal peripheral pulses  MSK/ neuro:  UE 4/5 motor, LE 3/5 motor; no erythema/ active synovits of joints  Skin: mild non-erythematous rough malar rash, faint erythema in V sign distribution, dry, cracked hands     Labs:   wbc 11.29, H/H 10.2/ 32.6, platelets 497  lymph 4%  Na 139, K 4.6, Bun/ Ct 14/0.5;  calcium 8.6  AST/ ALT - 194/ 92 (tredning down from 400/ 200)  negatve pregnancy test  aldolase 36.1 (down from 92)  CK 5881  + Jo1, ALEX 1:320 speckled   negative hepatitis panel 36 yo AA female with dx of organizing pneumonia and Jo1+ dermatomyositis currently admitted with worsening weakness after evaluation at outpt neurology appt.  Had similar presentation in April 2018, received pulse-dose therapy and dc'ed with PO prednisone and Imuran. However, patient reports extreme mood irritability, weight gain of 50 lbs, buffalo hump on high dose prednisone and was tapered off quickly/ stopped them, while increasing imuran. Was seen in neuro clinic 2 days ago for progressive worsening weakness and skin rash. Reports worsening dryness of hands, rough patches in malar distribution and V sign erythema, in addition to urine "looks just like red wine". Pt now s/p 2nd dose of 1g solumedrol, with signifcant improvement.  now able to use her arms to hold a cup, cellphone. still difficulty with overhead activities. also with improvement in skin rashes.   Of note, patient  has a fam hx of sarcoidosis. Denies chest pain, SOB, cough, mucocutaneous sores, hair loss, joint pains, n/v/abdominal pain, raynauds. Has 5 children, no miscarriages, no DVTs/ PEs.     VS: T 96.7  HR 95  RR 18  / 60  PE: General: AAO x 3, NAD  HEENT: NC/ AT, no mucocutaneous sores  Cardio: s1 s2+  Resp: mildly decreased BS b/l   Abd: soft, non-tender, non-distended  ExtL: normal peripheral pulses  MSK/ neuro:  UE 4/5 motor, LE 3/5 motor; no erythema/ active synovits of joints  Skin: mild non-erythematous rough malar rash, faint erythema in V sign distribution, dry, cracked hands     Labs:   wbc 11.29, H/H 10.2/ 32.6, platelets 497  lymph 4%  Na 139, K 4.6, Bun/ Ct 14/0.5;  calcium 8.6  AST/ ALT - 194/ 92 (tredning down from 400/ 200)  negatve pregnancy test  aldolase 36.1 (down from 92)  CK 5881  + Jo1, ALEX 1:320 speckled   negative hepatitis panel

## 2018-06-29 VITALS
SYSTOLIC BLOOD PRESSURE: 130 MMHG | RESPIRATION RATE: 18 BRPM | HEART RATE: 708 BPM | TEMPERATURE: 97 F | DIASTOLIC BLOOD PRESSURE: 70 MMHG

## 2018-06-29 LAB
ANION GAP SERPL CALC-SCNC: 11 MMOL/L — SIGNIFICANT CHANGE UP (ref 7–14)
BASOPHILS # BLD AUTO: 0.01 K/UL — SIGNIFICANT CHANGE UP (ref 0–0.2)
BASOPHILS NFR BLD AUTO: 0.1 % — SIGNIFICANT CHANGE UP (ref 0–1)
BUN SERPL-MCNC: 17 MG/DL — SIGNIFICANT CHANGE UP (ref 10–20)
CALCIUM SERPL-MCNC: 8.4 MG/DL — LOW (ref 8.5–10.1)
CHLORIDE SERPL-SCNC: 103 MMOL/L — SIGNIFICANT CHANGE UP (ref 98–110)
CO2 SERPL-SCNC: 24 MMOL/L — SIGNIFICANT CHANGE UP (ref 17–32)
CREAT SERPL-MCNC: 0.5 MG/DL — LOW (ref 0.7–1.5)
EOSINOPHIL # BLD AUTO: 0 K/UL — SIGNIFICANT CHANGE UP (ref 0–0.7)
EOSINOPHIL NFR BLD AUTO: 0 % — SIGNIFICANT CHANGE UP (ref 0–8)
GLUCOSE SERPL-MCNC: 138 MG/DL — HIGH (ref 70–99)
HCT VFR BLD CALC: 31.5 % — LOW (ref 37–47)
HGB BLD-MCNC: 9.6 G/DL — LOW (ref 12–16)
IMM GRANULOCYTES NFR BLD AUTO: 1.4 % — HIGH (ref 0.1–0.3)
LYMPHOCYTES # BLD AUTO: 0.65 K/UL — LOW (ref 1.2–3.4)
LYMPHOCYTES # BLD AUTO: 5.9 % — LOW (ref 20.5–51.1)
MAGNESIUM SERPL-MCNC: 1.9 MG/DL — SIGNIFICANT CHANGE UP (ref 1.8–2.4)
MCHC RBC-ENTMCNC: 29.4 PG — SIGNIFICANT CHANGE UP (ref 27–31)
MCHC RBC-ENTMCNC: 30.5 G/DL — LOW (ref 32–37)
MCV RBC AUTO: 96.3 FL — SIGNIFICANT CHANGE UP (ref 81–99)
MONOCYTES # BLD AUTO: 0.95 K/UL — HIGH (ref 0.1–0.6)
MONOCYTES NFR BLD AUTO: 8.6 % — SIGNIFICANT CHANGE UP (ref 1.7–9.3)
NEUTROPHILS # BLD AUTO: 9.35 K/UL — HIGH (ref 1.4–6.5)
NEUTROPHILS NFR BLD AUTO: 84 % — HIGH (ref 42.2–75.2)
NRBC # BLD: 0 /100 WBCS — SIGNIFICANT CHANGE UP (ref 0–0)
PLATELET # BLD AUTO: 467 K/UL — HIGH (ref 130–400)
POTASSIUM SERPL-MCNC: 4.2 MMOL/L — SIGNIFICANT CHANGE UP (ref 3.5–5)
POTASSIUM SERPL-SCNC: 4.2 MMOL/L — SIGNIFICANT CHANGE UP (ref 3.5–5)
RBC # BLD: 3.27 M/UL — LOW (ref 4.2–5.4)
RBC # FLD: 15.1 % — HIGH (ref 11.5–14.5)
SODIUM SERPL-SCNC: 138 MMOL/L — SIGNIFICANT CHANGE UP (ref 135–146)
WBC # BLD: 11.11 K/UL — HIGH (ref 4.8–10.8)
WBC # FLD AUTO: 11.11 K/UL — HIGH (ref 4.8–10.8)

## 2018-06-29 RX ORDER — AZATHIOPRINE 100 MG/1
2 TABLET ORAL
Qty: 60 | Refills: 2 | OUTPATIENT
Start: 2018-06-29 | End: 2018-09-26

## 2018-06-29 RX ORDER — FUROSEMIDE 40 MG
40 TABLET ORAL ONCE
Qty: 0 | Refills: 0 | Status: DISCONTINUED | OUTPATIENT
Start: 2018-06-29 | End: 2018-06-29

## 2018-06-29 RX ORDER — MYCOPHENOLATE MOFETIL 250 MG/1
2 CAPSULE ORAL
Qty: 120 | Refills: 1 | OUTPATIENT
Start: 2018-06-29 | End: 2018-08-27

## 2018-06-29 RX ORDER — AZATHIOPRINE 100 MG/1
4 TABLET ORAL
Qty: 120 | Refills: 2 | OUTPATIENT
Start: 2018-06-29 | End: 2018-09-26

## 2018-06-29 RX ADMIN — AZATHIOPRINE 200 MILLIGRAM(S): 100 TABLET ORAL at 11:45

## 2018-06-29 RX ADMIN — Medication 1 APPLICATION(S): at 17:02

## 2018-06-29 RX ADMIN — Medication 1 APPLICATION(S): at 05:17

## 2018-06-29 RX ADMIN — ENOXAPARIN SODIUM 40 MILLIGRAM(S): 100 INJECTION SUBCUTANEOUS at 11:46

## 2018-06-29 RX ADMIN — MYCOPHENOLATE MOFETIL 500 MILLIGRAM(S): 250 CAPSULE ORAL at 05:15

## 2018-06-29 RX ADMIN — PANTOPRAZOLE SODIUM 40 MILLIGRAM(S): 20 TABLET, DELAYED RELEASE ORAL at 11:46

## 2018-06-29 RX ADMIN — MYCOPHENOLATE MOFETIL 500 MILLIGRAM(S): 250 CAPSULE ORAL at 17:02

## 2018-06-29 NOTE — PROGRESS NOTE ADULT - SUBJECTIVE AND OBJECTIVE BOX
Neurology Follow up note    Name  JESSI KING    HPI:  37F w PMHx of organizing PNA on chronic Bactrim and dermatomyositis sent to ED by neurologist for dermatomyositis flare and IV meds. Patient was diagnosed with myositis in April 2018, was already being treated with azathioprine for organizing PNA, and had medication increased at around that time. Since approximately Mother's Day she states she has had gradual progression of her weakness. She was seen by Dr. Hatfield today, stated she was having worsening weakness and new rash beginning approximately 2 weeks ago. Rash is on her chest in V sign distribution, described as "rough patches" with small erythematous lesions interspersed. Additionally she has similar patches on her face in malar distribution without erythema and c/o dry, cracking skin on hands. Also states her urine is dark, appears like red wine, and has odd odor.    In ED, as per Dr. Hatfield, patient was given 1000mg Solumedrol which is to be continued for 3 doses daily total. Troponin was elevated, however near baseline, cardiology recommended echo. CK was 5881, below her previous admission. (27 Jun 2018 02:01)      Interval History -Patient subjectively feels much better and no pain now          Vital Signs Last 24 Hrs  T(C): 36 (29 Jun 2018 05:20), Max: 36 (29 Jun 2018 05:20)  T(F): 96.8 (29 Jun 2018 05:20), Max: 96.8 (29 Jun 2018 05:20)  HR: 79 (29 Jun 2018 05:20) (78 - 95)  BP: 119/75 (29 Jun 2018 05:20) (119/66 - 124/60)  BP(mean): --  RR: 18 (29 Jun 2018 05:20) (18 - 18)  SpO2: --  ICU Vital Signs Last 24 Hrs  T(C): 36 (29 Jun 2018 05:20), Max: 36 (29 Jun 2018 05:20)  T(F): 96.8 (29 Jun 2018 05:20), Max: 96.8 (29 Jun 2018 05:20)  HR: 79 (29 Jun 2018 05:20) (78 - 95)  BP: 119/75 (29 Jun 2018 05:20) (119/66 - 124/60)  BP(mean): --  ABP: --  ABP(mean): --  RR: 18 (29 Jun 2018 05:20) (18 - 18)  SpO2: --          Neurological Exam:   CN 2-12 normal  proximal weakness 4/5  distal power 5/5 except right foot 4+/5  Sensory symmetric  DTR 3+ throughout    Medications  azaTHIOprine 200 milliGRAM(s) Oral daily  enoxaparin Injectable 40 milliGRAM(s) SubCutaneous daily  hydrocortisone 1% Cream 1 Application(s) Topical every 12 hours  mycophenolate mofetil 500 milliGRAM(s) Oral two times a day  oxyCODONE    5 mG/acetaminophen 325 mG 1 Tablet(s) Oral every 6 hours PRN  pantoprazole    Tablet 40 milliGRAM(s) Oral daily  sodium chloride 0.9%. 1000 milliLiter(s) IV Continuous <Continuous>  traMADol 50 milliGRAM(s) Oral every 12 hours PRN      Lab                        10.2   11.29 )-----------( 497      ( 28 Jun 2018 08:33 )             32.6     06-28    139  |  103  |  14  ----------------------------<  169<H>  4.6   |  23  |  <0.5<L>    Ca    8.6      28 Jun 2018 08:33  Mg     1.9     06-28      CAPILLARY BLOOD GLUCOSE  Creatine Kinase, Serum (06.26.18 @ 22:55)    Creatine Kinase, Serum: 5881 U/L          Assessment-Patient with dermatomyositis on solumedrol currently improving.  Started on cellcept with goal of 1500mg BID    Plan  1. Continue cellcept as per rheum recommendations  2. PT/OT/Rehab  3. Cleared from neuro perspective

## 2018-06-29 NOTE — PROGRESS NOTE ADULT - SUBJECTIVE AND OBJECTIVE BOX
JESSI KING  37y, Female  Allergy: contrast media (iodine-based) (Unknown)  peanuts (Unknown)  shellfish (Unknown)      OVERNIGHT EVENTS: none    patient feeling stronger and improved function of upper and lower extremities    PAST MEDICAL & SURGICAL HISTORY:  Dermatomyositis  Polymyositis  Hyperlipidemia  Cryptogenic organizing pneumonia  Ankle fracture      VITALS:  T(F): 96.8 (06-29-18 @ 05:20), Max: 96.8 (06-29-18 @ 05:20)  HR: 79 (06-29-18 @ 05:20)  BP: 119/75 (06-29-18 @ 05:20) (119/66 - 124/60)  RR: 18 (06-29-18 @ 05:20)  SpO2: --    ophtho: perrla/eomi  pulm: cta b/l  cv: rrr s1s2  neuro: 4+/5 b/l upper extre, 4/5 b/l lower extrem                      9.6    11.11 )-----------( 467      ( 29 Jun 2018 09:06 )             31.5       06-29    138  |  103  |  17  ----------------------------<  138<H>  4.2   |  24  |  0.5<L>    Ca    8.4<L>      29 Jun 2018 09:06  Mg     1.9     06-29      MEDICATIONS:  MEDICATIONS  (STANDING):  azaTHIOprine 200 milliGRAM(s) Oral daily  enoxaparin Injectable 40 milliGRAM(s) SubCutaneous daily  hydrocortisone 1% Cream 1 Application(s) Topical every 12 hours  mycophenolate mofetil 500 milliGRAM(s) Oral two times a day  pantoprazole    Tablet 40 milliGRAM(s) Oral daily  sodium chloride 0.9%. 1000 milliLiter(s) (100 mL/Hr) IV Continuous <Continuous>    MEDICATIONS  (PRN):  oxyCODONE    5 mG/acetaminophen 325 mG 1 Tablet(s) Oral every 6 hours PRN Severe Pain (7 - 10)  traMADol 50 milliGRAM(s) Oral every 12 hours PRN Severe Pain (7 - 10)      a/p    DERMATOMYOSITIS FLARE  MORBID OBESITY BMI 41.5  WEAKNESS  ORGANIZING PNEUMONIA  -STABLE FOR DISCHARGE TODAY  -MEDS PER RHEUM  -PT HAS WALKER AT HOME    TIME SPENT ON D/C: 35 MINUTES

## 2018-07-05 DIAGNOSIS — D47.3 ESSENTIAL (HEMORRHAGIC) THROMBOCYTHEMIA: ICD-10-CM

## 2018-07-05 DIAGNOSIS — E66.01 MORBID (SEVERE) OBESITY DUE TO EXCESS CALORIES: ICD-10-CM

## 2018-07-05 DIAGNOSIS — M33.99 DERMATOPOLYMYOSITIS, UNSPECIFIED WITH OTHER ORGAN INVOLVEMENT: ICD-10-CM

## 2018-07-05 DIAGNOSIS — Z87.09 PERSONAL HISTORY OF OTHER DISEASES OF THE RESPIRATORY SYSTEM: ICD-10-CM

## 2018-07-05 DIAGNOSIS — R79.89 OTHER SPECIFIED ABNORMAL FINDINGS OF BLOOD CHEMISTRY: ICD-10-CM

## 2018-07-05 DIAGNOSIS — R53.1 WEAKNESS: ICD-10-CM

## 2018-07-05 DIAGNOSIS — Z91.010 ALLERGY TO PEANUTS: ICD-10-CM

## 2018-07-05 DIAGNOSIS — J84.116 CRYPTOGENIC ORGANIZING PNEUMONIA: ICD-10-CM

## 2018-07-05 DIAGNOSIS — I27.20 PULMONARY HYPERTENSION, UNSPECIFIED: ICD-10-CM

## 2018-07-05 DIAGNOSIS — E78.5 HYPERLIPIDEMIA, UNSPECIFIED: ICD-10-CM

## 2018-07-05 DIAGNOSIS — D50.0 IRON DEFICIENCY ANEMIA SECONDARY TO BLOOD LOSS (CHRONIC): ICD-10-CM

## 2018-07-05 DIAGNOSIS — Z91.013 ALLERGY TO SEAFOOD: ICD-10-CM

## 2018-07-09 ENCOUNTER — APPOINTMENT (OUTPATIENT)
Dept: INTERNAL MEDICINE | Facility: CLINIC | Age: 38
End: 2018-07-09

## 2018-07-09 ENCOUNTER — OUTPATIENT (OUTPATIENT)
Dept: OUTPATIENT SERVICES | Facility: HOSPITAL | Age: 38
LOS: 1 days | Discharge: HOME | End: 2018-07-09

## 2018-07-09 VITALS
DIASTOLIC BLOOD PRESSURE: 76 MMHG | WEIGHT: 231 LBS | TEMPERATURE: 97.2 F | HEIGHT: 64 IN | BODY MASS INDEX: 39.44 KG/M2 | HEART RATE: 112 BPM | SYSTOLIC BLOOD PRESSURE: 110 MMHG

## 2018-07-09 DIAGNOSIS — J84.89 OTHER SPECIFIED INTERSTITIAL PULMONARY DISEASES: ICD-10-CM

## 2018-07-09 DIAGNOSIS — M33.20 POLYMYOSITIS, ORGAN INVOLVEMENT UNSPECIFIED: ICD-10-CM

## 2018-07-09 DIAGNOSIS — S82.899A OTHER FRACTURE OF UNSPECIFIED LOWER LEG, INITIAL ENCOUNTER FOR CLOSED FRACTURE: Chronic | ICD-10-CM

## 2018-07-09 DIAGNOSIS — Z88.9 ALLERGY STATUS TO UNSPECIFIED DRUGS, MEDICAMENTS AND BIOLOGICAL SUBSTANCES: ICD-10-CM

## 2018-07-09 RX ORDER — EPINEPHRINE 0.3 MG/.3ML
0.3 INJECTION INTRAMUSCULAR
Qty: 1 | Refills: 1 | Status: ACTIVE | COMMUNITY
Start: 2018-07-09 | End: 1900-01-01

## 2018-07-09 RX ORDER — TRAMADOL HYDROCHLORIDE 50 MG/1
50 TABLET, COATED ORAL
Qty: 15 | Refills: 0 | Status: DISCONTINUED | COMMUNITY
Start: 2018-04-27

## 2018-07-09 NOTE — HISTORY OF PRESENT ILLNESS
[Parent] : parent [FreeTextEntry1] : Follow up after admission for flare up of dermatomyositis. [de-identified] : Patient  is a 37 year old lady known to have organizing pneumonia and newly diagnosed dermatomyositis\par She presents for follow up after being recently admitted for a dermatomyositis flare up ( 26 June).\par She complains that her diffuse weakness and pain are getting worse again. She is frustrated  that she is still unable to do her daily activities.

## 2018-07-09 NOTE — REVIEW OF SYSTEMS
[Fever] : fever [Fatigue] : fatigue [Sore Throat] : sore throat [Muscle Weakness] : muscle weakness [Muscle Pain] : muscle pain [Unsteady Walking] : ataxia [Anxiety] : anxiety [Negative] : Heme/Lymph [Headache] : no headache [Dizziness] : no dizziness [Fainting] : no fainting [Confusion] : no confusion [Memory Loss] : no memory loss

## 2018-07-09 NOTE — ASSESSMENT
[FreeTextEntry1] : #) Dermatomyositis: Patient having dermatomyositis with symptoms poorly controlled on current regimen ( azathioprine 200 mg daily and mycophenolate 500 BID)\par Will refer to PT for rehab.\par Will refer to rheumatology for possibly increasing dosage of mycophenolate.\par #) Organizing PNA: will refer to pulmonary service for follow up of organizing pneumonia and assessment of possibly starting methotrexate. Pt has missed multiple apt in past. Extensive counseling done to f/up with pulmo. Pt verbalized understanding. \par \par #) h/oa llergies with serious reactions: Epipen given. \par \par #) HCM: Pap is UTD. \par \par RTC in 3 months.

## 2018-07-09 NOTE — PHYSICAL EXAM
[No Acute Distress] : no acute distress [Normal Sclera/Conjunctiva] : normal sclera/conjunctiva [PERRL] : pupils equal round and reactive to light [EOMI] : extraocular movements intact [Normal Oropharynx] : the oropharynx was normal [No Rash] : no rash [No JVD] : no jugular venous distention [No Respiratory Distress] : no respiratory distress  [Clear to Auscultation] : lungs were clear to auscultation bilaterally [No Accessory Muscle Use] : no accessory muscle use [Normal Percussion] : the chest was normal to percussion [Normal Rate] : normal rate  [Regular Rhythm] : with a regular rhythm [Normal S1, S2] : normal S1 and S2 [No Abdominal Bruit] : a ~M bruit was not heard ~T in the abdomen [Pedal Pulses Present] : the pedal pulses are present [No Edema] : there was no peripheral edema [Soft] : abdomen soft [Non Tender] : non-tender [Non-distended] : non-distended [Speech Grossly Normal] : speech grossly normal [Alert and Oriented x3] : oriented to person, place, and time [de-identified] : anxious [de-identified] : diffuse weakness (motor strength +4) [de-identified] : intact cranial nerve exam, intact sensation [de-identified] : anxious

## 2018-07-10 DIAGNOSIS — J84.9 INTERSTITIAL PULMONARY DISEASE, UNSPECIFIED: ICD-10-CM

## 2018-07-10 DIAGNOSIS — M33.22 POLYMYOSITIS WITH MYOPATHY: ICD-10-CM

## 2018-07-10 RX ORDER — ALBUTEROL SULFATE 108 UG/1
108 (90 BASE) AEROSOL, METERED RESPIRATORY (INHALATION) EVERY 4 HOURS
Qty: 1 | Refills: 3 | Status: ACTIVE | COMMUNITY
Start: 2018-07-09 | End: 1900-01-01

## 2018-08-02 ENCOUNTER — OTHER (OUTPATIENT)
Age: 38
End: 2018-08-02

## 2018-09-28 ENCOUNTER — APPOINTMENT (OUTPATIENT)
Dept: PULMONOLOGY | Facility: CLINIC | Age: 38
End: 2018-09-28

## 2018-10-22 ENCOUNTER — APPOINTMENT (OUTPATIENT)
Dept: INTERNAL MEDICINE | Facility: CLINIC | Age: 38
End: 2018-10-22

## 2018-10-23 ENCOUNTER — APPOINTMENT (OUTPATIENT)
Dept: NEUROLOGY | Facility: CLINIC | Age: 38
End: 2018-10-23

## 2018-10-30 ENCOUNTER — APPOINTMENT (OUTPATIENT)
Dept: NEUROLOGY | Facility: CLINIC | Age: 38
End: 2018-10-30

## 2018-11-20 ENCOUNTER — APPOINTMENT (OUTPATIENT)
Dept: PULMONOLOGY | Facility: CLINIC | Age: 38
End: 2018-11-20

## 2018-12-01 ENCOUNTER — OUTPATIENT (OUTPATIENT)
Dept: OUTPATIENT SERVICES | Facility: HOSPITAL | Age: 38
LOS: 1 days | End: 2018-12-01
Payer: MEDICAID

## 2018-12-01 DIAGNOSIS — S82.899A OTHER FRACTURE OF UNSPECIFIED LOWER LEG, INITIAL ENCOUNTER FOR CLOSED FRACTURE: Chronic | ICD-10-CM

## 2018-12-12 ENCOUNTER — EMERGENCY (EMERGENCY)
Facility: HOSPITAL | Age: 38
LOS: 0 days | Discharge: HOME | End: 2018-12-12
Attending: EMERGENCY MEDICINE | Admitting: EMERGENCY MEDICINE

## 2018-12-12 VITALS
HEART RATE: 96 BPM | SYSTOLIC BLOOD PRESSURE: 140 MMHG | DIASTOLIC BLOOD PRESSURE: 61 MMHG | RESPIRATION RATE: 18 BRPM | TEMPERATURE: 97 F | OXYGEN SATURATION: 98 %

## 2018-12-12 DIAGNOSIS — Z79.51 LONG TERM (CURRENT) USE OF INHALED STEROIDS: ICD-10-CM

## 2018-12-12 DIAGNOSIS — Z91.041 RADIOGRAPHIC DYE ALLERGY STATUS: ICD-10-CM

## 2018-12-12 DIAGNOSIS — Z79.899 OTHER LONG TERM (CURRENT) DRUG THERAPY: ICD-10-CM

## 2018-12-12 DIAGNOSIS — Z91.010 ALLERGY TO PEANUTS: ICD-10-CM

## 2018-12-12 DIAGNOSIS — J18.9 PNEUMONIA, UNSPECIFIED ORGANISM: ICD-10-CM

## 2018-12-12 DIAGNOSIS — S82.899A OTHER FRACTURE OF UNSPECIFIED LOWER LEG, INITIAL ENCOUNTER FOR CLOSED FRACTURE: Chronic | ICD-10-CM

## 2018-12-12 DIAGNOSIS — Z91.013 ALLERGY TO SEAFOOD: ICD-10-CM

## 2018-12-12 DIAGNOSIS — E78.5 HYPERLIPIDEMIA, UNSPECIFIED: ICD-10-CM

## 2018-12-12 DIAGNOSIS — R06.02 SHORTNESS OF BREATH: ICD-10-CM

## 2018-12-12 LAB
ALBUMIN SERPL ELPH-MCNC: 3.5 G/DL — SIGNIFICANT CHANGE UP (ref 3.5–5.2)
ALP SERPL-CCNC: 48 U/L — SIGNIFICANT CHANGE UP (ref 30–115)
ALT FLD-CCNC: 40 U/L — SIGNIFICANT CHANGE UP (ref 0–41)
ANION GAP SERPL CALC-SCNC: 12 MMOL/L — SIGNIFICANT CHANGE UP (ref 7–14)
AST SERPL-CCNC: 58 U/L — HIGH (ref 0–41)
BASOPHILS # BLD AUTO: 0.03 K/UL — SIGNIFICANT CHANGE UP (ref 0–0.2)
BASOPHILS NFR BLD AUTO: 0.3 % — SIGNIFICANT CHANGE UP (ref 0–1)
BILIRUB SERPL-MCNC: <0.2 MG/DL — SIGNIFICANT CHANGE UP (ref 0.2–1.2)
BUN SERPL-MCNC: 12 MG/DL — SIGNIFICANT CHANGE UP (ref 10–20)
CALCIUM SERPL-MCNC: 9 MG/DL — SIGNIFICANT CHANGE UP (ref 8.5–10.1)
CHLORIDE SERPL-SCNC: 99 MMOL/L — SIGNIFICANT CHANGE UP (ref 98–110)
CO2 SERPL-SCNC: 25 MMOL/L — SIGNIFICANT CHANGE UP (ref 17–32)
CREAT SERPL-MCNC: 0.6 MG/DL — LOW (ref 0.7–1.5)
EOSINOPHIL # BLD AUTO: 0.13 K/UL — SIGNIFICANT CHANGE UP (ref 0–0.7)
EOSINOPHIL NFR BLD AUTO: 1.5 % — SIGNIFICANT CHANGE UP (ref 0–8)
GLUCOSE SERPL-MCNC: 87 MG/DL — SIGNIFICANT CHANGE UP (ref 70–99)
HCT VFR BLD CALC: 38 % — SIGNIFICANT CHANGE UP (ref 37–47)
HGB BLD-MCNC: 12.2 G/DL — SIGNIFICANT CHANGE UP (ref 12–16)
IMM GRANULOCYTES NFR BLD AUTO: 0.7 % — HIGH (ref 0.1–0.3)
LACTATE SERPL-SCNC: 0.9 MMOL/L — SIGNIFICANT CHANGE UP (ref 0.5–2.2)
LYMPHOCYTES # BLD AUTO: 0.81 K/UL — LOW (ref 1.2–3.4)
LYMPHOCYTES # BLD AUTO: 9.3 % — LOW (ref 20.5–51.1)
MCHC RBC-ENTMCNC: 30.7 PG — SIGNIFICANT CHANGE UP (ref 27–31)
MCHC RBC-ENTMCNC: 32.1 G/DL — SIGNIFICANT CHANGE UP (ref 32–37)
MCV RBC AUTO: 95.7 FL — SIGNIFICANT CHANGE UP (ref 81–99)
MONOCYTES # BLD AUTO: 0.54 K/UL — SIGNIFICANT CHANGE UP (ref 0.1–0.6)
MONOCYTES NFR BLD AUTO: 6.2 % — SIGNIFICANT CHANGE UP (ref 1.7–9.3)
NEUTROPHILS # BLD AUTO: 7.13 K/UL — HIGH (ref 1.4–6.5)
NEUTROPHILS NFR BLD AUTO: 82 % — HIGH (ref 42.2–75.2)
NRBC # BLD: 0 /100 WBCS — SIGNIFICANT CHANGE UP (ref 0–0)
PLATELET # BLD AUTO: 368 K/UL — SIGNIFICANT CHANGE UP (ref 130–400)
POTASSIUM SERPL-MCNC: 4.1 MMOL/L — SIGNIFICANT CHANGE UP (ref 3.5–5)
POTASSIUM SERPL-SCNC: 4.1 MMOL/L — SIGNIFICANT CHANGE UP (ref 3.5–5)
PROT SERPL-MCNC: 7.4 G/DL — SIGNIFICANT CHANGE UP (ref 6–8)
RBC # BLD: 3.97 M/UL — LOW (ref 4.2–5.4)
RBC # FLD: 14.4 % — SIGNIFICANT CHANGE UP (ref 11.5–14.5)
SODIUM SERPL-SCNC: 136 MMOL/L — SIGNIFICANT CHANGE UP (ref 135–146)
WBC # BLD: 8.7 K/UL — SIGNIFICANT CHANGE UP (ref 4.8–10.8)
WBC # FLD AUTO: 8.7 K/UL — SIGNIFICANT CHANGE UP (ref 4.8–10.8)

## 2018-12-12 RX ORDER — IPRATROPIUM/ALBUTEROL SULFATE 18-103MCG
3 AEROSOL WITH ADAPTER (GRAM) INHALATION ONCE
Qty: 0 | Refills: 0 | Status: COMPLETED | OUTPATIENT
Start: 2018-12-12 | End: 2018-12-12

## 2018-12-12 RX ORDER — ALBUTEROL 90 UG/1
2 AEROSOL, METERED ORAL
Qty: 1 | Refills: 0
Start: 2018-12-12 | End: 2019-01-10

## 2018-12-12 RX ORDER — DEXAMETHASONE 0.5 MG/5ML
10 ELIXIR ORAL ONCE
Qty: 0 | Refills: 0 | Status: COMPLETED | OUTPATIENT
Start: 2018-12-12 | End: 2018-12-12

## 2018-12-12 RX ADMIN — Medication 102 MILLIGRAM(S): at 20:45

## 2018-12-12 RX ADMIN — Medication 3 MILLILITER(S): at 20:45

## 2018-12-12 NOTE — ED PROVIDER NOTE - PHYSICAL EXAMINATION
CONST: Well appearing in NAD  EYES: PERRL, EOMI, Sclera and conjunctiva clear.  ENT: No nasal discharge. Oropharynx normal appearing, no erythema or exudates. No abscess or swelling. Uvula midline. No temporal artery or mastoid tenderness.  NECK: Non-tender, no meningeal signs. normal ROM. supple   CARD: Normal S1 S2; Normal rate and rhythm  RESP: B/L distant crakles  GI: Soft, non-tender, non-distended. no cva tenderness. normal BS  MS: No midline spinal tenderness. pulses 2 +. no calf tenderness or swelling  SKIN: Warm, dry, no acute rashes. Good turgor  NEURO: A&Ox3, No focal deficits. Strength 5/5 with no sensory deficits.

## 2018-12-12 NOTE — ED PROVIDER NOTE - NS ED ROS FT
Constitutional: (-) fever  Eyes/ENT: (-) blurry vision, (-) epistaxis  Cardiovascular: (-) chest pain, (-) syncope  Respiratory: (+) cough, (-) shortness of breath  Gastrointestinal: (-) vomiting, (-) diarrhea  Musculoskeletal: (-) neck pain, (-) back pain, (-) joint pain  Integumentary: (-) rash, (-) edema  Neurological: (-) headache, (-) altered mental status  Psychiatric: (-) hallucinations  Allergic/Immunologic: (-) pruritus

## 2018-12-12 NOTE — ED PROVIDER NOTE - MEDICAL DECISION MAKING DETAILS
patient evaluated for pna, possibe early rllb infiltrate on cxr. I offered admission but patient prefers to go home, she was treated with nebs steroids and dose of levaquin here. she will fu with her pmd or pulm in next 2-3 days or if sytmpoms becing to worsen she will return to ED immediately. her lab work is nml and her vitals are nml.

## 2018-12-12 NOTE — ED PROVIDER NOTE - ATTENDING CONTRIBUTION TO CARE
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 here persistent cough, deneis fevers or hemopysis, tdenies no leg swelling or chest pain. on exam she has slight rhonchi bilaterally, xray with rll infiltrate, IMP: pna given patient is on preventive abx and on immunosuppresants will admit and treat with ceftriaxone and azithro. 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 here persistent cough, deneis fevers or hemopysis, tdenies no leg swelling or chest pain. on exam she has slight rhonchi bilaterally, xray with rll infiltrate, IMP: pna given patient is on preventive abx and on immunosuppresants will conside admission if paitent does not improve or labs are abn.

## 2018-12-12 NOTE — ED PROVIDER NOTE - OBJECTIVE STATEMENT
38y F with PMH of Organized PNA and polymyositis presents with cough. Pt states the cough started 1wk ago after he son visited who was sick with Strep throat. Since then it has worsened with associated sweats and chills. Pt states the cough reminds her of her Organized PNA, she monitors her O2sat at home with a low of 97% on room air. Pt denies fever, HA, weakness, SOB, CP, N/V/D/C

## 2018-12-13 NOTE — ED ADULT NURSE NOTE - NSIMPLEMENTINTERV_GEN_ALL_ED
Implemented All Universal Safety Interventions:  [place] to call system. Call bell, personal items and telephone within reach. Instruct patient to call for assistance. Room bathroom lighting operational. Non-slip footwear when patient is off stretcher. Physically safe environment: no spills, clutter or unnecessary equipment. Stretcher in lowest position, wheels locked, appropriate side rails in place.

## 2018-12-21 ENCOUNTER — INPATIENT (INPATIENT)
Facility: HOSPITAL | Age: 38
LOS: 2 days | Discharge: HOME | End: 2018-12-24
Attending: INTERNAL MEDICINE | Admitting: INTERNAL MEDICINE

## 2018-12-21 VITALS
OXYGEN SATURATION: 95 % | SYSTOLIC BLOOD PRESSURE: 124 MMHG | TEMPERATURE: 97 F | RESPIRATION RATE: 20 BRPM | DIASTOLIC BLOOD PRESSURE: 81 MMHG | HEART RATE: 104 BPM

## 2018-12-21 DIAGNOSIS — S82.899A OTHER FRACTURE OF UNSPECIFIED LOWER LEG, INITIAL ENCOUNTER FOR CLOSED FRACTURE: Chronic | ICD-10-CM

## 2018-12-21 LAB
ALBUMIN SERPL ELPH-MCNC: 3.7 G/DL — SIGNIFICANT CHANGE UP (ref 3.5–5.2)
ALP SERPL-CCNC: 59 U/L — SIGNIFICANT CHANGE UP (ref 30–115)
ALT FLD-CCNC: 32 U/L — SIGNIFICANT CHANGE UP (ref 0–41)
ANION GAP SERPL CALC-SCNC: 16 MMOL/L — HIGH (ref 7–14)
APPEARANCE UR: CLEAR — SIGNIFICANT CHANGE UP
APTT BLD: 28.9 SEC — SIGNIFICANT CHANGE UP (ref 27–39.2)
AST SERPL-CCNC: 47 U/L — HIGH (ref 0–41)
BACTERIA # UR AUTO: ABNORMAL /HPF
BASE EXCESS BLDV CALC-SCNC: 3.7 MMOL/L — HIGH (ref -2–2)
BASOPHILS # BLD AUTO: 0.03 K/UL — SIGNIFICANT CHANGE UP (ref 0–0.2)
BASOPHILS NFR BLD AUTO: 0.5 % — SIGNIFICANT CHANGE UP (ref 0–1)
BILIRUB SERPL-MCNC: 0.2 MG/DL — SIGNIFICANT CHANGE UP (ref 0.2–1.2)
BILIRUB UR-MCNC: NEGATIVE — SIGNIFICANT CHANGE UP
BUN SERPL-MCNC: 10 MG/DL — SIGNIFICANT CHANGE UP (ref 10–20)
CA-I SERPL-SCNC: 1.22 MMOL/L — SIGNIFICANT CHANGE UP (ref 1.12–1.3)
CALCIUM SERPL-MCNC: 9.4 MG/DL — SIGNIFICANT CHANGE UP (ref 8.5–10.1)
CHLORIDE SERPL-SCNC: 98 MMOL/L — SIGNIFICANT CHANGE UP (ref 98–110)
CO2 SERPL-SCNC: 25 MMOL/L — SIGNIFICANT CHANGE UP (ref 17–32)
COLOR SPEC: YELLOW — SIGNIFICANT CHANGE UP
CREAT SERPL-MCNC: 0.6 MG/DL — LOW (ref 0.7–1.5)
DIFF PNL FLD: ABNORMAL
EOSINOPHIL # BLD AUTO: 0.06 K/UL — SIGNIFICANT CHANGE UP (ref 0–0.7)
EOSINOPHIL NFR BLD AUTO: 1 % — SIGNIFICANT CHANGE UP (ref 0–8)
EPI CELLS # UR: ABNORMAL /HPF
GAS PNL BLDV: 141 MMOL/L — SIGNIFICANT CHANGE UP (ref 136–145)
GAS PNL BLDV: SIGNIFICANT CHANGE UP
GLUCOSE SERPL-MCNC: 70 MG/DL — SIGNIFICANT CHANGE UP (ref 70–99)
GLUCOSE UR QL: NEGATIVE MG/DL — SIGNIFICANT CHANGE UP
HCO3 BLDV-SCNC: 30 MMOL/L — HIGH (ref 22–29)
HCT VFR BLD CALC: 42.1 % — SIGNIFICANT CHANGE UP (ref 37–47)
HCT VFR BLDA CALC: 42 % — SIGNIFICANT CHANGE UP (ref 34–44)
HGB BLD CALC-MCNC: 13.7 G/DL — LOW (ref 14–18)
HGB BLD-MCNC: 13.3 G/DL — SIGNIFICANT CHANGE UP (ref 12–16)
IMM GRANULOCYTES NFR BLD AUTO: 0.8 % — HIGH (ref 0.1–0.3)
INR BLD: 0.95 RATIO — SIGNIFICANT CHANGE UP (ref 0.65–1.3)
KETONES UR-MCNC: NEGATIVE — SIGNIFICANT CHANGE UP
LACTATE BLDV-MCNC: 0.9 MMOL/L — SIGNIFICANT CHANGE UP (ref 0.5–1.6)
LACTATE SERPL-SCNC: 0.9 MMOL/L — SIGNIFICANT CHANGE UP (ref 0.5–2.2)
LEUKOCYTE ESTERASE UR-ACNC: ABNORMAL
LYMPHOCYTES # BLD AUTO: 0.9 K/UL — LOW (ref 1.2–3.4)
LYMPHOCYTES # BLD AUTO: 14.5 % — LOW (ref 20.5–51.1)
MCHC RBC-ENTMCNC: 30.4 PG — SIGNIFICANT CHANGE UP (ref 27–31)
MCHC RBC-ENTMCNC: 31.6 G/DL — LOW (ref 32–37)
MCV RBC AUTO: 96.1 FL — SIGNIFICANT CHANGE UP (ref 81–99)
MONOCYTES # BLD AUTO: 0.28 K/UL — SIGNIFICANT CHANGE UP (ref 0.1–0.6)
MONOCYTES NFR BLD AUTO: 4.5 % — SIGNIFICANT CHANGE UP (ref 1.7–9.3)
NEUTROPHILS # BLD AUTO: 4.9 K/UL — SIGNIFICANT CHANGE UP (ref 1.4–6.5)
NEUTROPHILS NFR BLD AUTO: 78.7 % — HIGH (ref 42.2–75.2)
NITRITE UR-MCNC: NEGATIVE — SIGNIFICANT CHANGE UP
NRBC # BLD: 0 /100 WBCS — SIGNIFICANT CHANGE UP (ref 0–0)
PCO2 BLDV: 51 MMHG — SIGNIFICANT CHANGE UP (ref 41–51)
PH BLDV: 7.38 — SIGNIFICANT CHANGE UP (ref 7.26–7.43)
PH UR: 7 — SIGNIFICANT CHANGE UP (ref 5–8)
PLATELET # BLD AUTO: 358 K/UL — SIGNIFICANT CHANGE UP (ref 130–400)
PO2 BLDV: 21 MMHG — SIGNIFICANT CHANGE UP (ref 20–40)
POTASSIUM BLDV-SCNC: 3.8 MMOL/L — SIGNIFICANT CHANGE UP (ref 3.3–5.6)
POTASSIUM SERPL-MCNC: 4.6 MMOL/L — SIGNIFICANT CHANGE UP (ref 3.5–5)
POTASSIUM SERPL-SCNC: 4.6 MMOL/L — SIGNIFICANT CHANGE UP (ref 3.5–5)
PROT SERPL-MCNC: 8.1 G/DL — HIGH (ref 6–8)
PROT UR-MCNC: ABNORMAL MG/DL
PROTHROM AB SERPL-ACNC: 10.9 SEC — SIGNIFICANT CHANGE UP (ref 9.95–12.87)
RBC # BLD: 4.38 M/UL — SIGNIFICANT CHANGE UP (ref 4.2–5.4)
RBC # FLD: 14.7 % — HIGH (ref 11.5–14.5)
RBC CASTS # UR COMP ASSIST: SIGNIFICANT CHANGE UP /HPF
SAO2 % BLDV: 30 % — SIGNIFICANT CHANGE UP
SODIUM SERPL-SCNC: 139 MMOL/L — SIGNIFICANT CHANGE UP (ref 135–146)
SP GR SPEC: 1.02 — SIGNIFICANT CHANGE UP (ref 1.01–1.03)
UROBILINOGEN FLD QL: 0.2 MG/DL — SIGNIFICANT CHANGE UP (ref 0.2–0.2)
WBC # BLD: 6.22 K/UL — SIGNIFICANT CHANGE UP (ref 4.8–10.8)
WBC # FLD AUTO: 6.22 K/UL — SIGNIFICANT CHANGE UP (ref 4.8–10.8)
WBC UR QL: SIGNIFICANT CHANGE UP /HPF

## 2018-12-21 RX ORDER — AZITHROMYCIN 500 MG/1
500 TABLET, FILM COATED ORAL ONCE
Qty: 0 | Refills: 0 | Status: COMPLETED | OUTPATIENT
Start: 2018-12-21 | End: 2018-12-21

## 2018-12-21 RX ORDER — CEFEPIME 1 G/1
1000 INJECTION, POWDER, FOR SOLUTION INTRAMUSCULAR; INTRAVENOUS ONCE
Qty: 0 | Refills: 0 | Status: COMPLETED | OUTPATIENT
Start: 2018-12-21 | End: 2018-12-21

## 2018-12-21 RX ADMIN — AZITHROMYCIN 255 MILLIGRAM(S): 500 TABLET, FILM COATED ORAL at 23:54

## 2018-12-21 NOTE — ED PROVIDER NOTE - PHYSICAL EXAMINATION
Well appearing NAD non toxic. NCAT EOMI conjunctiva nml. No nasal discharge. MMM. Neck supple, non tender, full ROM. RRR no MRG +S1S2. CTA b/l. Abd s NT ND +BS. Ext WWP x4, moving all extremities, no edema. 2+ equal pulses throughout. Cooperative, appropriate. CN2-12 grossly intact no sensory throughout, no drift. motor to b/l quads and hamstrings 4/5 - chronic as per patient since dx of polymyositis

## 2018-12-21 NOTE — ED PROVIDER NOTE - OBJECTIVE STATEMENT
39 y/o F with hx of organized PNA dx 6/17, polymyositis 6/18 on azathioprine and Mycophenolate presenting to ED for cough. Patient states she was recently seen in the ED in early december, at that time, she was given nebs/steroids/levaquin for PNA. Patient states worsening productive cough, whitish sputum, chills/diarrhea for past 3 days, and b/l knee pain. States knee pain is chronic from her dx of polymyositis. Patient denies any fevers at home. no cp or sick contacts.

## 2018-12-21 NOTE — ED PROVIDER NOTE - NS ED ROS FT
Constitutional: See HPI.  Eyes: No visual changes, eye pain or discharge. No Photophobia  ENMT: No hearing changes, pain, discharge or infections. No neck pain or stiffness. No limited ROM  Cardiac: No edema. No chest pain with exertion.  Respiratory: +cough. No respiratory distress. No hemoptysis. No history of asthma or RAD.  GI: +diarrhea. No nausea, vomiting, abdominal pain.  : No dysuria, frequency or burning. No Discharge  MS: +joint pain. No myalgia or back pain.  Neuro: No headache or weakness. No LOC.  Skin: No skin rash.  Except as documented in the HPI, all other systems are negative.

## 2018-12-21 NOTE — ED PROVIDER NOTE - ATTENDING CONTRIBUTION TO CARE
I personally evaluated the patient. I reviewed the Resident’s or Physician Assistant’s note (as assigned above), and agree with the findings and plan except as documented in my note.  39 yo woman with immunosuppression presents with signs and symptoms of pneumonia.  Patient likely has interstitial lung disease which has been contributing to the extreme nature of her symptoms as well.   CXRAY shows chronic basilar infiltrates bilaterally.   On exam with bilateral scattered rales and wheezing.  Will admit for IV antibiotics, IVF and nebs with likely pulmonary and ID eval.

## 2018-12-21 NOTE — ED ADULT NURSE NOTE - NSIMPLEMENTINTERV_GEN_ALL_ED
Implemented All Fall Risk Interventions:  [place] to call system. Call bell, personal items and telephone within reach. Instruct patient to call for assistance. Room bathroom lighting operational. Non-slip footwear when patient is off stretcher. Physically safe environment: no spills, clutter or unnecessary equipment. Stretcher in lowest position, wheels locked, appropriate side rails in place. Provide visual cue, wrist band, yellow gown, etc. Monitor gait and stability. Monitor for mental status changes and reorient to person, place, and time. Review medications for side effects contributing to fall risk. Reinforce activity limits and safety measures with patient and family.

## 2018-12-21 NOTE — ED PROVIDER NOTE - MEDICAL DECISION MAKING DETAILS
Chart finished.  I personally evaluated the patient. I reviewed the Resident’s or Physician Assistant’s note (as assigned above), and agree with the findings and plan except as documented in my note.  37 yo woman with immunosuppression presents with signs and symptoms of pneumonia.  Patient likely has interstitial lung disease which has been contributing to the extreme nature of her symptoms as well.   CXRAY shows chronic basilar infiltrates bilaterally.   On exam with bilateral scattered rales and wheezing.  Will admit for IV antibiotics, IVF and nebs with likely pulmonary and ID eval.

## 2018-12-22 PROBLEM — M33.90 DERMATOPOLYMYOSITIS, UNSPECIFIED, ORGAN INVOLVEMENT UNSPECIFIED: Chronic | Status: INACTIVE | Noted: 2018-06-27 | Resolved: 2018-12-22

## 2018-12-22 LAB
ALBUMIN SERPL ELPH-MCNC: 3.4 G/DL — LOW (ref 3.5–5.2)
ALP SERPL-CCNC: 52 U/L — SIGNIFICANT CHANGE UP (ref 30–115)
ALT FLD-CCNC: 29 U/L — SIGNIFICANT CHANGE UP (ref 0–41)
ANION GAP SERPL CALC-SCNC: 16 MMOL/L — HIGH (ref 7–14)
APTT BLD: 30 SEC — SIGNIFICANT CHANGE UP (ref 27–39.2)
AST SERPL-CCNC: 46 U/L — HIGH (ref 0–41)
BASOPHILS # BLD AUTO: 0.04 K/UL — SIGNIFICANT CHANGE UP (ref 0–0.2)
BASOPHILS NFR BLD AUTO: 0.5 % — SIGNIFICANT CHANGE UP (ref 0–1)
BILIRUB SERPL-MCNC: 0.2 MG/DL — SIGNIFICANT CHANGE UP (ref 0.2–1.2)
BLD GP AB SCN SERPL QL: SIGNIFICANT CHANGE UP
BUN SERPL-MCNC: 10 MG/DL — SIGNIFICANT CHANGE UP (ref 10–20)
CALCIUM SERPL-MCNC: 8.8 MG/DL — SIGNIFICANT CHANGE UP (ref 8.5–10.1)
CHLORIDE SERPL-SCNC: 94 MMOL/L — LOW (ref 98–110)
CK SERPL-CCNC: 1941 U/L — HIGH (ref 0–225)
CO2 SERPL-SCNC: 23 MMOL/L — SIGNIFICANT CHANGE UP (ref 17–32)
CREAT SERPL-MCNC: 0.6 MG/DL — LOW (ref 0.7–1.5)
CRP SERPL-MCNC: 2.28 MG/DL — HIGH (ref 0–0.4)
EOSINOPHIL # BLD AUTO: 0.05 K/UL — SIGNIFICANT CHANGE UP (ref 0–0.7)
EOSINOPHIL NFR BLD AUTO: 0.6 % — SIGNIFICANT CHANGE UP (ref 0–8)
ERYTHROCYTE [SEDIMENTATION RATE] IN BLOOD: 72 MM/HR — HIGH (ref 0–15)
FLU A RESULT: NEGATIVE — SIGNIFICANT CHANGE UP
FLU A RESULT: NEGATIVE — SIGNIFICANT CHANGE UP
FLUAV AG NPH QL: NEGATIVE — SIGNIFICANT CHANGE UP
FLUBV AG NPH QL: NEGATIVE — SIGNIFICANT CHANGE UP
GLUCOSE SERPL-MCNC: 101 MG/DL — HIGH (ref 70–99)
HCT VFR BLD CALC: 38.1 % — SIGNIFICANT CHANGE UP (ref 37–47)
HGB BLD-MCNC: 12 G/DL — SIGNIFICANT CHANGE UP (ref 12–16)
IMM GRANULOCYTES NFR BLD AUTO: 0.9 % — HIGH (ref 0.1–0.3)
INR BLD: 0.99 RATIO — SIGNIFICANT CHANGE UP (ref 0.65–1.3)
LYMPHOCYTES # BLD AUTO: 0.57 K/UL — LOW (ref 1.2–3.4)
LYMPHOCYTES # BLD AUTO: 7 % — LOW (ref 20.5–51.1)
MAGNESIUM SERPL-MCNC: 1.9 MG/DL — SIGNIFICANT CHANGE UP (ref 1.8–2.4)
MCHC RBC-ENTMCNC: 30.2 PG — SIGNIFICANT CHANGE UP (ref 27–31)
MCHC RBC-ENTMCNC: 31.5 G/DL — LOW (ref 32–37)
MCV RBC AUTO: 96 FL — SIGNIFICANT CHANGE UP (ref 81–99)
MONOCYTES # BLD AUTO: 0.32 K/UL — SIGNIFICANT CHANGE UP (ref 0.1–0.6)
MONOCYTES NFR BLD AUTO: 4 % — SIGNIFICANT CHANGE UP (ref 1.7–9.3)
NEUTROPHILS # BLD AUTO: 7.04 K/UL — HIGH (ref 1.4–6.5)
NEUTROPHILS NFR BLD AUTO: 87 % — HIGH (ref 42.2–75.2)
NRBC # BLD: 0 /100 WBCS — SIGNIFICANT CHANGE UP (ref 0–0)
PLATELET # BLD AUTO: 344 K/UL — SIGNIFICANT CHANGE UP (ref 130–400)
POTASSIUM SERPL-MCNC: 4.1 MMOL/L — SIGNIFICANT CHANGE UP (ref 3.5–5)
POTASSIUM SERPL-SCNC: 4.1 MMOL/L — SIGNIFICANT CHANGE UP (ref 3.5–5)
PROT SERPL-MCNC: 7.9 G/DL — SIGNIFICANT CHANGE UP (ref 6–8)
PROTHROM AB SERPL-ACNC: 11.4 SEC — SIGNIFICANT CHANGE UP (ref 9.95–12.87)
RBC # BLD: 3.97 M/UL — LOW (ref 4.2–5.4)
RBC # FLD: 15 % — HIGH (ref 11.5–14.5)
RSV RESULT: NEGATIVE — SIGNIFICANT CHANGE UP
RSV RNA RESP QL NAA+PROBE: NEGATIVE — SIGNIFICANT CHANGE UP
SODIUM SERPL-SCNC: 133 MMOL/L — LOW (ref 135–146)
TYPE + AB SCN PNL BLD: SIGNIFICANT CHANGE UP
WBC # BLD: 8.09 K/UL — SIGNIFICANT CHANGE UP (ref 4.8–10.8)
WBC # FLD AUTO: 8.09 K/UL — SIGNIFICANT CHANGE UP (ref 4.8–10.8)

## 2018-12-22 RX ORDER — ALBUTEROL 90 UG/1
2 AEROSOL, METERED ORAL EVERY 4 HOURS
Qty: 0 | Refills: 0 | Status: DISCONTINUED | OUTPATIENT
Start: 2018-12-22 | End: 2018-12-24

## 2018-12-22 RX ORDER — HYDROCORTISONE 1 %
1 OINTMENT (GRAM) TOPICAL
Qty: 0 | Refills: 0 | COMMUNITY

## 2018-12-22 RX ORDER — SODIUM CHLORIDE 9 MG/ML
1000 INJECTION INTRAMUSCULAR; INTRAVENOUS; SUBCUTANEOUS
Qty: 0 | Refills: 0 | Status: DISCONTINUED | OUTPATIENT
Start: 2018-12-22 | End: 2018-12-23

## 2018-12-22 RX ORDER — ACETAMINOPHEN 500 MG
650 TABLET ORAL EVERY 6 HOURS
Qty: 0 | Refills: 0 | Status: DISCONTINUED | OUTPATIENT
Start: 2018-12-22 | End: 2018-12-24

## 2018-12-22 RX ORDER — HYDROCORTISONE 1 %
1 OINTMENT (GRAM) TOPICAL DAILY
Qty: 0 | Refills: 0 | Status: DISCONTINUED | OUTPATIENT
Start: 2018-12-22 | End: 2018-12-24

## 2018-12-22 RX ORDER — OXYCODONE AND ACETAMINOPHEN 5; 325 MG/1; MG/1
1 TABLET ORAL ONCE
Qty: 0 | Refills: 0 | Status: DISCONTINUED | OUTPATIENT
Start: 2018-12-22 | End: 2018-12-22

## 2018-12-22 RX ORDER — IPRATROPIUM/ALBUTEROL SULFATE 18-103MCG
3 AEROSOL WITH ADAPTER (GRAM) INHALATION ONCE
Qty: 0 | Refills: 0 | Status: COMPLETED | OUTPATIENT
Start: 2018-12-22 | End: 2018-12-22

## 2018-12-22 RX ORDER — AZTREONAM 2 G
1 VIAL (EA) INJECTION
Qty: 0 | Refills: 0 | COMMUNITY

## 2018-12-22 RX ORDER — ENOXAPARIN SODIUM 100 MG/ML
40 INJECTION SUBCUTANEOUS DAILY
Qty: 0 | Refills: 0 | Status: DISCONTINUED | OUTPATIENT
Start: 2018-12-22 | End: 2018-12-24

## 2018-12-22 RX ORDER — OXYCODONE AND ACETAMINOPHEN 5; 325 MG/1; MG/1
1 TABLET ORAL EVERY 4 HOURS
Qty: 0 | Refills: 0 | Status: DISCONTINUED | OUTPATIENT
Start: 2018-12-22 | End: 2018-12-24

## 2018-12-22 RX ORDER — AZATHIOPRINE 100 MG/1
150 TABLET ORAL DAILY
Qty: 0 | Refills: 0 | Status: DISCONTINUED | OUTPATIENT
Start: 2018-12-22 | End: 2018-12-24

## 2018-12-22 RX ORDER — MYCOPHENOLATE MOFETIL 250 MG/1
1500 CAPSULE ORAL
Qty: 0 | Refills: 0 | Status: DISCONTINUED | OUTPATIENT
Start: 2018-12-22 | End: 2018-12-24

## 2018-12-22 RX ORDER — ACETAMINOPHEN 500 MG
650 TABLET ORAL ONCE
Qty: 0 | Refills: 0 | Status: COMPLETED | OUTPATIENT
Start: 2018-12-22 | End: 2018-12-22

## 2018-12-22 RX ADMIN — OXYCODONE AND ACETAMINOPHEN 1 TABLET(S): 5; 325 TABLET ORAL at 01:55

## 2018-12-22 RX ADMIN — OXYCODONE AND ACETAMINOPHEN 1 TABLET(S): 5; 325 TABLET ORAL at 01:23

## 2018-12-22 RX ADMIN — Medication 10 MILLIGRAM(S): at 06:24

## 2018-12-22 RX ADMIN — MYCOPHENOLATE MOFETIL 1500 MILLIGRAM(S): 250 CAPSULE ORAL at 06:24

## 2018-12-22 RX ADMIN — MYCOPHENOLATE MOFETIL 1500 MILLIGRAM(S): 250 CAPSULE ORAL at 17:22

## 2018-12-22 RX ADMIN — AZATHIOPRINE 150 MILLIGRAM(S): 100 TABLET ORAL at 12:13

## 2018-12-22 RX ADMIN — Medication 650 MILLIGRAM(S): at 22:18

## 2018-12-22 RX ADMIN — Medication 650 MILLIGRAM(S): at 22:40

## 2018-12-22 RX ADMIN — Medication 1200 MILLIGRAM(S): at 07:41

## 2018-12-22 RX ADMIN — Medication 1 TABLET(S): at 06:23

## 2018-12-22 RX ADMIN — ENOXAPARIN SODIUM 40 MILLIGRAM(S): 100 INJECTION SUBCUTANEOUS at 12:12

## 2018-12-22 RX ADMIN — Medication 3 MILLILITER(S): at 04:49

## 2018-12-22 RX ADMIN — Medication 1200 MILLIGRAM(S): at 17:22

## 2018-12-22 RX ADMIN — Medication 650 MILLIGRAM(S): at 05:00

## 2018-12-22 RX ADMIN — OXYCODONE AND ACETAMINOPHEN 1 TABLET(S): 5; 325 TABLET ORAL at 06:50

## 2018-12-22 RX ADMIN — SODIUM CHLORIDE 60 MILLILITER(S): 9 INJECTION INTRAMUSCULAR; INTRAVENOUS; SUBCUTANEOUS at 21:39

## 2018-12-22 RX ADMIN — CEFEPIME 100 MILLIGRAM(S): 1 INJECTION, POWDER, FOR SOLUTION INTRAMUSCULAR; INTRAVENOUS at 01:00

## 2018-12-22 NOTE — H&P ADULT - NSHPLABSRESULTS_GEN_ALL_CORE
Labs                        13.3   6.22  )-----------( 358      ( 21 Dec 2018 19:43 )             42.1       139  |  98  |  10  ----------------------------<  70  4.6   |  25  |  0.6<L>  Ca    9.4      21 Dec 2018 19:43  TPro  8.1<H>  /  Alb  3.7  /  TBili  0.2  /  DBili  x   /  AST  47<H>  /  ALT  32  /  AlkPhos  59  12  PT/INR - ( 21 Dec 2018 19:43 )   PT: 10.90 sec;   INR: 0.95 ratio    PTT - ( 21 Dec 2018 19:43 )  PTT:28.9 sec    Blood Gas Venous - Lactate (12..18 @ 20:43)    Blood Gas Venous - Lactate: 0.9 mmoL/L    Blood Gas Profile - Venous (.. @ 20:43)    pH, Venous: 7.38    pCO2, Venous: 51 mmHg    pO2, Venous: 21 mmHg    HCO3, Venous: 30 mmoL/L    Base Excess, Venous: 3.7 mmoL/L    Oxygen Saturation, Venous: 30 %    Urinalysis Basic - ( 21 Dec 2018 22:40 )  Color: Yellow / Appearance: Clear / S.020 / pH: x  Gluc: x / Ketone: Negative  / Bili: Negative / Urobili: 0.2 mg/dL   Blood: x / Protein: Trace mg/dL / Nitrite: Negative   Leuk Esterase: Trace / RBC: 1-2 /HPF / WBC 1-2 /HPF   Sq Epi: x / Non Sq Epi: Occasional /HPF / Bacteria: Few /HPF      Radio  CXR: No acute cardiopulmonary disease, pending official read Labs                            13.3     6.22  )-----------( 358      ( 21 Dec 2018 19:43 )               42.1             139  |  98  |  10    ----------------------------<  70    4.6   |  25  |  0.6<L>    Ca    9.4      21 Dec 2018 19:43    TPro  8.1<H>  /  Alb  3.7  /  TBili  0.2  /  DBili  x   /  AST  47<H>  /  ALT  32  /  AlkPhos  59  12    PT/INR - ( 21 Dec 2018 19:43 )   PT: 10.90 sec;   INR: 0.95 ratio      PTT - ( 21 Dec 2018 19:43 )  PTT:28.9 sec        Blood Gas Venous - Lactate (12..18 @ 20:43)      Blood Gas Venous - Lactate: 0.9 mmoL/L        Blood Gas Profile - Venous (.. @ 20:43)      pH, Venous: 7.38      pCO2, Venous: 51 mmHg      pO2, Venous: 21 mmHg      HCO3, Venous: 30 mmoL/L      Base Excess, Venous: 3.7 mmoL/L      Oxygen Saturation, Venous: 30 %        Urinalysis Basic - ( 21 Dec 2018 22:40 )    Color: Yellow / Appearance: Clear / S.020 / pH: x    Gluc: x / Ketone: Negative  / Bili: Negative / Urobili: 0.2 mg/dL     Blood: x / Protein: Trace mg/dL / Nitrite: Negative     Leuk Esterase: Trace / RBC: 1-2 /HPF / WBC 1-2 /HPF     Sq Epi: x / Non Sq Epi: Occasional /HPF / Bacteria: Few /HPF            Radio    CXR: No acute cardiopulmonary disease, pending official read Labs               13.3   6.22  )-----------( 358      ( 21 Dec 2018 19:43 )             42.1       139  |  98  |  10  ---------------------------<  70  4.6   |  25  |  0.6<L>  Ca    9.4      21 Dec 2018 19:43  TPro  8.1<H>  /  Alb  3.7  /  TBili  0.2  /  DBili  x   /  AST  47<H>  /  ALT  32  /  AlkPhos  59  12  PT/INR - ( 21 Dec 2018 19:43 )   PT: 10.90 sec;   INR: 0.95 ratio    PTT - ( 21 Dec 2018 19:43 )  PTT:28.9 sec    Blood Gas Venous - Lactate (12..18 @ 20:43    Blood Gas Venous - Lactate: 0.9 mmoL/L    Blood Gas Profile - Venous (. @ 20:43)    pH, Venous: 7.38    pCO2, Venous: 51 mmHg    pO2, Venous: 21 mmHg    HCO3, Venous: 30 mmoL/L    Base Excess, Venous: 3.7 mmoL/L    Oxygen Saturation, Venous: 30 %    Urinalysis Basic - ( 21 Dec 2018 22:40 )  Color: Yellow / Appearance: Clear / S.020 / pH: x  Gluc: x / Ketone: Negative  / Bili: Negative / Urobili: 0.2 mg/dL   Blood: x / Protein: Trace mg/dL / Nitrite: Negative   Leuk Esterase: Trace / RBC: 1-2 /HPF / WBC 1-2 /HPF   Sq Epi: x / Non Sq Epi: Occasional /HPF / Bacteria: Few /HPF    Radio  CXR: No acute cardiopulmonary disease, pending official read

## 2018-12-22 NOTE — PATIENT PROFILE ADULT - STATED REASON FOR ADMISSION
patient had recent fall today at 3pm at home.  patient also has polymyositis that causes severe pain in b/l knees. patient also c/o persistent cough, diarrhea, and coughing up green/grey sputum

## 2018-12-22 NOTE — H&P ADULT - PMH
Cryptogenic organizing pneumonia    Hyperlipidemia    Polymyositis BOOP (bronchiolitis obliterans with organizing pneumonia)    Cryptogenic organizing pneumonia    Hyperlipidemia    Polymyositis

## 2018-12-22 NOTE — H&P ADULT - ASSESSMENT
Atypical pneumonia vs bronchitis  - status post Azithromycin and Cefepime  - VS stable, clinically & hemodynamically stable, not hypoxic  - Start on Levofloxacin 750 IVPG daily  - pulse ox at rest and ambulation    Polymyositis; resume Azathioprine and MFM    DVT prophylaxis; Lovenox  Ambulate as tolerated  Diet; regular  Dispo; 48-72 hours 38 year old female with a past medical history of Bronchiolitis obliterans with organizing pneumonia/Cryptogenic organizing Pneumonia 6/17 on Prednisone 10 daily (prednisone > 1 year) and Polymyositis on Azathioprine and MFM 6/18 presenting for cough, bilateral knee pain.    Atypical pneumonia vs bronchitis in the setting of Bronchiolitis obliterans with organizing pneumonia on daily prednisone  - status post Azithromycin and Cefepime  - VS stable, clinically & hemodynamically stable, not hypoxic  - Start on Levofloxacin 750 IVPG daily  - pulse ox at rest and ambulation  - resume Prednisone 10mg  - Pulmonary consult (has not been following as outpatient, has appointment next month)    Polymyositis; resume Azathioprine and MFM    DVT prophylaxis; Lovenox  Ambulate as tolerated  Diet; regular  Dispo; 48-72 hours 38 year old female with a past medical history of Bronchiolitis obliterans with organizing pneumonia/Cryptogenic organizing Pneumonia 6/17 on Prednisone 10 daily (prednisone > 1 year) and Polymyositis on Azathioprine and MFM 6/18 presenting for cough, bilateral knee pain.    Atypical pneumonia vs bronchitis in the setting of Bronchiolitis obliterans with organizing pneumonia on daily prednisone  - status post Azithromycin and Cefepime  - VS stable, clinically & hemodynamically stable, not hypoxic  - Start on Levofloxacin 750 IVPG daily, guaifenasin 1200 ER bid  - Duoneb once now  - pulse ox at rest and ambulation  - resume Prednisone 10mg, Ventolin prn  - Pulmonary consult (has not been following as outpatient, has appointment next month)    Polymyositis, bilateral knee pain  - resume Azathioprine and MFM  - percocet prn  - Rheumatology consult    DVT prophylaxis; Lovenox  Ambulate as tolerated  Diet; regular  Dispo; 48-72 hours

## 2018-12-22 NOTE — H&P ADULT - HISTORY OF PRESENT ILLNESS
38 year old female with a past medical history of Cryptogenic organizing Pneumonia 6/17 and Polymyositis on Azathioprine and MFM 6/18 38 year old female with a past medical history of Bronchiolitis obliterans with organizing pneumonia/Cryptogenic organizing Pneumonia 6/17 on Prednisone 10 daily (prednisone > 1 year) and Polymyositis on Azathioprine and MFM 6/18 presenting for cough, bilateral knee pain.  Patient reports that the cough has been going on throughout the month of December, presented to the ED last Wednesday, given an antibiotic with no improvement. Her cough has been persistently the same since last week, dry, associated with a whistle-like wheezing noise, constant throughout the day, unable to sleep because of her cough, not relieved with antitussives, not exacerbated by any factors, worsening with time, associated with shortness of breath, chills, diaphoresis (day sweats and night sweats) and diarrhea for the past 5 days. Patient reports that her shortness of breath is at rest and on exertion, unable to complete daily activities, able to walk a block with the walker. Patient reports that the diarrhea is around 2-3 times a day, watery, non-foul smelling, associated with increased thirst. Denies decreased oral intake or abdominal pain.  Patient reports bilateral knee pain that has been chronic associated with her Polymyositis (in addition to bilateral wrists) associated with tingling in her fingers. Reports pain on active flexion of the knees, relieved by extension. No swelling or erythema in the joints, no rash, no locking of the joints.  No recent travels, no pets at home, exposure to son with Strep throat (but patient had a negative Strep test previously).

## 2018-12-22 NOTE — H&P ADULT - NSHPSOCIALHISTORY_GEN_ALL_CORE
Non smoker  No alcohol intake  No illicit drugs    Uses walker and cane to ambulate  Active in ADLs but with difficulty  Unable to work

## 2018-12-22 NOTE — H&P ADULT - ATTENDING COMMENTS
38 year old female with a past medical history of Bronchiolitis obliterans with organizing pneumonia/Cryptogenic organizing Pneumonia 6/17 on Prednisone 10 daily (prednisone > 1 year) and Polymyositis on Azathioprine and MFM 6/18 presenting for cough, bilateral knee pain.    Atypical pneumonia vs bronchitis in the setting of Bronchiolitis obliterans with organizing pneumonia on daily prednisone  - status post Azithromycin and Cefepime  - VS stable, clinically & hemodynamically stable, not hypoxic  - Start on Levofloxacin 750 IVPG daily, guaifenasin 1200 ER bid  - pulse ox at rest and ambulation  - resume Prednisone 10mg, Ventolin prn  - Pulmonary consult (has not been following as outpatient, has appointment next month)    Polymyositis, bilateral knee pain  - resume Azathioprine and MFM  - percocet prn  - Rheumatology consult    DVT prophylaxis; Lovenox  Ambulate as tolerated  Diet; regular  Dispo; 48-72 hours 38 year old female with a past medical history of Bronchiolitis obliterans with organizing pneumonia/Cryptogenic organizing Pneumonia 6/17 on Prednisone 10 daily (prednisone > 1 year) and Polymyositis on Azathioprine and MFM 6/18 presenting for cough, bilateral knee pain.    Atypical pneumonia vs bronchitis in the setting of Bronchiolitis obliterans with organizing pneumonia on daily prednisone  - status post Azithromycin and Cefepime  - VS stable, clinically & hemodynamically stable, not hypoxic  - Start on Levofloxacin 750 IVPG daily, guaifenasin 1200 ER bid  - pulse ox at rest and ambulation  - resume Prednisone 10mg, Ventolin prn  - Pulmonary consult (has not been following as outpatient, has appointment next month)    Polymyositis, bilateral knee pain  - resume Azathioprine and MFM  - percocet prn  - Rheumatology consult    #CK is high. But may be baseline d/t her polymyositis.   Cr is fine. Will trend CK. Can do NS @ 60 meanwhile.     DVT prophylaxis; Lovenox  Ambulate as tolerated  Diet; regular  Dispo; 48-72 hours

## 2018-12-22 NOTE — H&P ADULT - NSHPPHYSICALEXAM_GEN_ALL_CORE
POC Vital Signs Last 24 Hrs  T(C): 36.3 (21 Dec 2018 18:46), Max: 36.3 (21 Dec 2018 18:46)  T(F): 97.3 (21 Dec 2018 18:46), Max: 97.3 (21 Dec 2018 18:46)  HR: 104 (21 Dec 2018 18:46) (104 - 104)  BP: 124/81 (21 Dec 2018 18:46) (124/81 - 124/81)  BP(mean): --  RR: 20 (21 Dec 2018 18:46) (20 - 20)  SpO2: 95% (21 Dec 2018 18:46) (95% - 95%)    Gen:  HEENT:  CV:  Pulm:  GI:  ONUR/Ext:  Neuro: Vital Signs Last 24 Hrs    T(C): 36.3 (21 Dec 2018 18:46), Max: 36.3 (21 Dec 2018 18:46)    T(F): 97.3 (21 Dec 2018 18:46), Max: 97.3 (21 Dec 2018 18:46)    HR: 104 (21 Dec 2018 18:46) (104 - 104)    BP: 124/81 (21 Dec 2018 18:46) (124/81 - 124/81)    BP(mean): --    RR: 20 (21 Dec 2018 18:46) (20 - 20)    SpO2: 95% (21 Dec 2018 18:46) (95% - 95%)        Gen: NAD, coughing, lying comfortably in bed, speaking in short sentences    HEENT: NCAT, EOMI, PERRLA, moist mucous membranes, nonerythematous oropharynx, nonexudative tonsils, neck supple, no JVD    CV: S1 S2, RRR, no audible murmurs    Pulm:     GI: +BS, soft, NTND    MSK/Ext: pain on palpation of bilateral knees, pain on active flexion, +2 PP b/l DP, -ve LLE b/l    Neuro: AAOx3, motor 5/5, no facial asymmetry Vital Signs Last 24 Hrs  T(C): 36.3 (21 Dec 2018 18:46), Max: 36.3 (21 Dec 2018 18:46)  T(F): 97.3 (21 Dec 2018 18:46), Max: 97.3 (21 Dec 2018 18:46)  HR: 104 (21 Dec 2018 18:46) (104 - 104)  BP: 124/81 (21 Dec 2018 18:46) (124/81 - 124/81)  BP(mean): --  RR: 20 (21 Dec 2018 18:46) (20 - 20)  SpO2: 95% (21 Dec 2018 18:46) (95% - 95%)    Gen: NAD, coughing, lying comfortably in bed, speaking in short sentences  HEENT: NCAT, EOMI, PERRLA, moist mucous membranes, nonerythematous oropharynx, nonexudative tonsils, neck supple, no JVD  CV: S1 S2, RRR, no audible murmurs  Pulm: GBAE, velcro-like inspiratory breath sounds at the bases, no audible wheezes, occasional cough  GI: +BS, soft, NTND  MSK/Ext: pain on palpation of bilateral knees, pain on active flexion, +2 PP b/l DP, -ve LLE b/l  Neuro: AAOx3, motor 5/5, no facial asymmetry

## 2018-12-23 LAB
ALBUMIN SERPL ELPH-MCNC: 3 G/DL — LOW (ref 3.5–5.2)
ALP SERPL-CCNC: 48 U/L — SIGNIFICANT CHANGE UP (ref 30–115)
ALT FLD-CCNC: 27 U/L — SIGNIFICANT CHANGE UP (ref 0–41)
ANION GAP SERPL CALC-SCNC: 12 MMOL/L — SIGNIFICANT CHANGE UP (ref 7–14)
AST SERPL-CCNC: 42 U/L — HIGH (ref 0–41)
BILIRUB SERPL-MCNC: 0.3 MG/DL — SIGNIFICANT CHANGE UP (ref 0.2–1.2)
BUN SERPL-MCNC: 10 MG/DL — SIGNIFICANT CHANGE UP (ref 10–20)
CALCIUM SERPL-MCNC: 8.5 MG/DL — SIGNIFICANT CHANGE UP (ref 8.5–10.1)
CHLORIDE SERPL-SCNC: 99 MMOL/L — SIGNIFICANT CHANGE UP (ref 98–110)
CK SERPL-CCNC: 1669 U/L — HIGH (ref 0–225)
CO2 SERPL-SCNC: 25 MMOL/L — SIGNIFICANT CHANGE UP (ref 17–32)
CREAT SERPL-MCNC: 0.5 MG/DL — LOW (ref 0.7–1.5)
CULTURE RESULTS: SIGNIFICANT CHANGE UP
GLUCOSE SERPL-MCNC: 99 MG/DL — SIGNIFICANT CHANGE UP (ref 70–99)
HIV 1+2 AB+HIV1 P24 AG SERPL QL IA: SIGNIFICANT CHANGE UP
MAGNESIUM SERPL-MCNC: 1.8 MG/DL — SIGNIFICANT CHANGE UP (ref 1.8–2.4)
POTASSIUM SERPL-MCNC: 4.2 MMOL/L — SIGNIFICANT CHANGE UP (ref 3.5–5)
POTASSIUM SERPL-SCNC: 4.2 MMOL/L — SIGNIFICANT CHANGE UP (ref 3.5–5)
PROT SERPL-MCNC: 7.1 G/DL — SIGNIFICANT CHANGE UP (ref 6–8)
RAPID RVP RESULT: SIGNIFICANT CHANGE UP
SODIUM SERPL-SCNC: 136 MMOL/L — SIGNIFICANT CHANGE UP (ref 135–146)
SPECIMEN SOURCE: SIGNIFICANT CHANGE UP
TSH SERPL-MCNC: 2.05 UIU/ML — SIGNIFICANT CHANGE UP (ref 0.27–4.2)

## 2018-12-23 RX ORDER — LANOLIN ALCOHOL/MO/W.PET/CERES
1 CREAM (GRAM) TOPICAL ONCE
Qty: 0 | Refills: 0 | Status: COMPLETED | OUTPATIENT
Start: 2018-12-23 | End: 2018-12-23

## 2018-12-23 RX ORDER — LANOLIN ALCOHOL/MO/W.PET/CERES
3 CREAM (GRAM) TOPICAL ONCE
Qty: 0 | Refills: 0 | Status: COMPLETED | OUTPATIENT
Start: 2018-12-23 | End: 2018-12-23

## 2018-12-23 RX ADMIN — Medication 1200 MILLIGRAM(S): at 17:36

## 2018-12-23 RX ADMIN — ENOXAPARIN SODIUM 40 MILLIGRAM(S): 100 INJECTION SUBCUTANEOUS at 13:20

## 2018-12-23 RX ADMIN — MYCOPHENOLATE MOFETIL 1500 MILLIGRAM(S): 250 CAPSULE ORAL at 17:36

## 2018-12-23 RX ADMIN — Medication 10 MILLIGRAM(S): at 06:47

## 2018-12-23 RX ADMIN — Medication 1 TABLET(S): at 06:47

## 2018-12-23 RX ADMIN — Medication 3 MILLIGRAM(S): at 21:49

## 2018-12-23 RX ADMIN — Medication 1200 MILLIGRAM(S): at 06:47

## 2018-12-23 RX ADMIN — MYCOPHENOLATE MOFETIL 1500 MILLIGRAM(S): 250 CAPSULE ORAL at 06:47

## 2018-12-23 RX ADMIN — AZATHIOPRINE 150 MILLIGRAM(S): 100 TABLET ORAL at 13:21

## 2018-12-23 RX ADMIN — Medication 1 MILLIGRAM(S): at 10:11

## 2018-12-23 NOTE — PROGRESS NOTE ADULT - ASSESSMENT
38 year old female with a past medical history of Bronchiolitis obliterans with organizing pneumonia/Cryptogenic organizing Pneumonia 6/17 on Prednisone 10 daily (prednisone > 1 year) and Polymyositis on Azathioprine and MFM 6/18 presenting for cough, bilateral knee pain.    Atypical pneumonia vs bronchitis in the setting of Bronchiolitis obliterans with organizing pneumonia on daily prednisone  - status post Azithromycin and Cefepime  - VS stable, clinically & hemodynamically stable, not hypoxic  - Start on Levofloxacin 750 IVPG daily, guaifenasin 1200 ER bid  - pulse ox at rest and ambulation  - resume Prednisone 10mg, Ventolin prn  - Pulmonary consult (has not been following as outpatient, has appointment next month)    Polymyositis, bilateral knee pain  - resume Azathioprine and MFM at her home doses.  - percocet prn  - Discussed with Rheumatologist Dr Tse over the phone (she is a patient of her partner). PAtient's knee pain is  apparently chronic. No difference in management. C/w her chronic Polymyositis meds. Will cancel Rheumatology consult  For any further concerns may text/call Dr. Tse at [phone]    #CK is high. But may be baseline d/t her polymyositis.   Cr is fine. Will trend CK. Can do NS @ 60 meanwhile.     DVT prophylaxis; Lovenox  Ambulate as tolerated  Diet; regular  Dispo; 48-72 hours . 38 year old female with a past medical history of Bronchiolitis obliterans with organizing pneumonia/Cryptogenic organizing Pneumonia 6/17 on Prednisone 10 daily (prednisone > 1 year) and Polymyositis on Azathioprine and MFM 6/18 presenting for cough, bilateral knee pain.    Atypical pneumonia vs bronchitis in the setting of Bronchiolitis obliterans with organizing pneumonia on daily prednisone  - status post Azithromycin and Cefepime  - VS stable, clinically & hemodynamically stable, not hypoxic  - Start on Levofloxacin 750 IVPG daily, guaifenasin 1200 ER bid  - pulse ox at rest and ambulation  - resume Prednisone 10mg, Ventolin prn  - Pulmonary consult ordered (has not been following as outpatient, has appointment next month) - check with Pulm if she needs to increase steroids in case this is BOOP ?     Polymyositis, bilateral knee pain  - resume Azathioprine and MFM at her home doses.  - percocet prn  - Discussed with Rheumatologist Dr Tse over the phone (she is a patient of her partner). PAtient's knee pain is  apparently chronic. No difference in management. C/w her chronic Polymyositis meds. Will cancel Rheumatology consult  For any further concerns may text/call Dr. Tse at [phone]    #CK is high. remained the same after 1 day of NS @ 60.   checked with her Rheum doc - in 4/2018 it was around 6000. So this is Chronic. D/c IVF. no need to monitor CK.     DVT prophylaxis; Lovenox  Ambulate as tolerated  Diet; regular  Dispo; 48-72 hours .

## 2018-12-24 ENCOUNTER — TRANSCRIPTION ENCOUNTER (OUTPATIENT)
Age: 38
End: 2018-12-24

## 2018-12-24 VITALS
SYSTOLIC BLOOD PRESSURE: 100 MMHG | TEMPERATURE: 98 F | RESPIRATION RATE: 18 BRPM | DIASTOLIC BLOOD PRESSURE: 70 MMHG | HEART RATE: 113 BPM

## 2018-12-24 LAB
ALBUMIN SERPL ELPH-MCNC: 3.2 G/DL — LOW (ref 3.5–5.2)
ALP SERPL-CCNC: 53 U/L — SIGNIFICANT CHANGE UP (ref 30–115)
ALT FLD-CCNC: 29 U/L — SIGNIFICANT CHANGE UP (ref 0–41)
ANION GAP SERPL CALC-SCNC: 17 MMOL/L — HIGH (ref 7–14)
AST SERPL-CCNC: 54 U/L — HIGH (ref 0–41)
BASOPHILS # BLD AUTO: 0.03 K/UL — SIGNIFICANT CHANGE UP (ref 0–0.2)
BASOPHILS NFR BLD AUTO: 0.4 % — SIGNIFICANT CHANGE UP (ref 0–1)
BILIRUB SERPL-MCNC: 0.2 MG/DL — SIGNIFICANT CHANGE UP (ref 0.2–1.2)
BLD GP AB SCN SERPL QL: SIGNIFICANT CHANGE UP
BUN SERPL-MCNC: 10 MG/DL — SIGNIFICANT CHANGE UP (ref 10–20)
CALCIUM SERPL-MCNC: 8.4 MG/DL — LOW (ref 8.5–10.1)
CHLORIDE SERPL-SCNC: 96 MMOL/L — LOW (ref 98–110)
CO2 SERPL-SCNC: 21 MMOL/L — SIGNIFICANT CHANGE UP (ref 17–32)
CREAT SERPL-MCNC: 0.7 MG/DL — SIGNIFICANT CHANGE UP (ref 0.7–1.5)
EOSINOPHIL # BLD AUTO: 0.12 K/UL — SIGNIFICANT CHANGE UP (ref 0–0.7)
EOSINOPHIL NFR BLD AUTO: 1.6 % — SIGNIFICANT CHANGE UP (ref 0–8)
GLUCOSE SERPL-MCNC: 88 MG/DL — SIGNIFICANT CHANGE UP (ref 70–99)
HCT VFR BLD CALC: 37.5 % — SIGNIFICANT CHANGE UP (ref 37–47)
HGB BLD-MCNC: 11.9 G/DL — LOW (ref 12–16)
IMM GRANULOCYTES NFR BLD AUTO: 1.2 % — HIGH (ref 0.1–0.3)
LYMPHOCYTES # BLD AUTO: 0.93 K/UL — LOW (ref 1.2–3.4)
LYMPHOCYTES # BLD AUTO: 12.4 % — LOW (ref 20.5–51.1)
MAGNESIUM SERPL-MCNC: 1.9 MG/DL — SIGNIFICANT CHANGE UP (ref 1.8–2.4)
MCHC RBC-ENTMCNC: 30.8 PG — SIGNIFICANT CHANGE UP (ref 27–31)
MCHC RBC-ENTMCNC: 31.7 G/DL — LOW (ref 32–37)
MCV RBC AUTO: 97.2 FL — SIGNIFICANT CHANGE UP (ref 81–99)
MONOCYTES # BLD AUTO: 0.4 K/UL — SIGNIFICANT CHANGE UP (ref 0.1–0.6)
MONOCYTES NFR BLD AUTO: 5.3 % — SIGNIFICANT CHANGE UP (ref 1.7–9.3)
NEUTROPHILS # BLD AUTO: 5.96 K/UL — SIGNIFICANT CHANGE UP (ref 1.4–6.5)
NEUTROPHILS NFR BLD AUTO: 79.1 % — HIGH (ref 42.2–75.2)
NRBC # BLD: 0 /100 WBCS — SIGNIFICANT CHANGE UP (ref 0–0)
PLATELET # BLD AUTO: 314 K/UL — SIGNIFICANT CHANGE UP (ref 130–400)
POTASSIUM SERPL-MCNC: 4.6 MMOL/L — SIGNIFICANT CHANGE UP (ref 3.5–5)
POTASSIUM SERPL-SCNC: 4.6 MMOL/L — SIGNIFICANT CHANGE UP (ref 3.5–5)
PROT SERPL-MCNC: 7.6 G/DL — SIGNIFICANT CHANGE UP (ref 6–8)
RBC # BLD: 3.86 M/UL — LOW (ref 4.2–5.4)
RBC # FLD: 14.9 % — HIGH (ref 11.5–14.5)
SODIUM SERPL-SCNC: 134 MMOL/L — LOW (ref 135–146)
TYPE + AB SCN PNL BLD: SIGNIFICANT CHANGE UP
WBC # BLD: 7.53 K/UL — SIGNIFICANT CHANGE UP (ref 4.8–10.8)
WBC # FLD AUTO: 7.53 K/UL — SIGNIFICANT CHANGE UP (ref 4.8–10.8)

## 2018-12-24 RX ORDER — MYCOPHENOLATE MOFETIL 250 MG/1
3 CAPSULE ORAL
Qty: 0 | Refills: 1 | COMMUNITY

## 2018-12-24 RX ORDER — MYCOPHENOLATE MOFETIL 250 MG/1
1 CAPSULE ORAL
Qty: 0 | Refills: 2 | DISCHARGE
Start: 2018-12-24 | End: 2019-03-23

## 2018-12-24 RX ORDER — MYCOPHENOLATE MOFETIL 250 MG/1
1 CAPSULE ORAL
Refills: 2 | DISCHARGE
Start: 2018-12-24 | End: 2019-03-23

## 2018-12-24 RX ORDER — MYCOPHENOLATE MOFETIL 250 MG/1
3 CAPSULE ORAL
Qty: 0 | Refills: 2 | DISCHARGE
Start: 2018-12-24 | End: 2019-03-23

## 2018-12-24 RX ORDER — GUAIFENESIN/DEXTROMETHORPHAN 600MG-30MG
10 TABLET, EXTENDED RELEASE 12 HR ORAL
Qty: 15 | Refills: 0 | OUTPATIENT
Start: 2018-12-24 | End: 2019-01-07

## 2018-12-24 RX ORDER — MYCOPHENOLATE MOFETIL 250 MG/1
3 CAPSULE ORAL
Qty: 30 | Refills: 2
Start: 2018-12-24 | End: 2019-03-23

## 2018-12-24 RX ADMIN — Medication 1 TABLET(S): at 06:02

## 2018-12-24 RX ADMIN — AZATHIOPRINE 150 MILLIGRAM(S): 100 TABLET ORAL at 13:19

## 2018-12-24 RX ADMIN — Medication 10 MILLIGRAM(S): at 06:02

## 2018-12-24 RX ADMIN — Medication 1200 MILLIGRAM(S): at 06:02

## 2018-12-24 RX ADMIN — MYCOPHENOLATE MOFETIL 1500 MILLIGRAM(S): 250 CAPSULE ORAL at 06:02

## 2018-12-24 NOTE — PROGRESS NOTE ADULT - ATTENDING COMMENTS
Patient was evaluated and examined by bedside, still c/o moderate cough, no dyspnea at rest, no wheezing, tolerating diet well.    All labs, radiology studies, VS was reviewed  I agree with medical plan outlined by Medical resident as stated above.  -acute bronchitis with h/o bronchiolitis obliterans pneumonia- will complete abx. tx., continue prednisone, will prescribe cough suppressant tx. , patient was advised to complete CT chest post d/c home, f/up results with Pulmonary specialist.  -h/o Polymyositis - outpatient Rheumatology/Immunology specialist f/up  -d/c plan: patient anticipated for d/c home today

## 2018-12-24 NOTE — DISCHARGE NOTE ADULT - PATIENT PORTAL LINK FT
You can access the [facility] Patient Portal, offered by [facility], by registering with the following website: [email or web address]

## 2018-12-24 NOTE — CONSULT NOTE ADULT - ASSESSMENT
IMPRESSION:     Subacute cough  , on steroids and steroid sparing agents - No evidence of worsening whether clinically or radiographically  History of polymyosistis - treated with immunosuppressives  Obesity/RODRICK ?  R/O GERD  Upper airway cough syndrome    PLAN:     Start PPI, intranasal fluticasone  Outpatient spirometry with bronchodilator response testing  Needs outpatient sleep study  Nebs only as needed  HRCT of the lungs to follow up on last CT  Check 2decho  Continue with prednisone and Azathioprine for now. The prednisone in  is slowly tapered over 12 months.   If cough persists despite these interventions, then would do bronch with BAL and endobronchial biopsies to rule out non asthmatic eosinophilic bronchitis  No evidence of pneumonia on cxr, no elevation in wbc count, all cultures and viral workup is negative: no obvious need for abx  Will discuss with attending IMPRESSION:     Subacute cough  , on steroids and steroid sparing agents - No evidence of worsening whether clinically or radiographically  History of polymyosistis - treated with immunosuppressives  Obesity/RODRICK ?  R/O GERD  Upper airway cough syndrome    PLAN:     Start PPI, intranasal fluticasone  Outpatient spirometry with bronchodilator response testing  Needs outpatient sleep study  Nebs only as needed  HRCT of the lungs to follow up on last CT  Check 2decho  Continue with prednisone and Azathioprine for now. The prednisone in  is slowly tapered over 12 months.   If cough persists despite these interventions, then would do bronch with BAL and endobronchial biopsies to rule out non asthmatic eosinophilic bronchitis  No evidence of pneumonia on cxr, no elevation in wbc count, all cultures and viral workup is negative: no obvious need for abx  Case discussed with Rheumatology attending, as patient is taking both MMF and Azathioprine, and it was made clear that the patient was supposed to be switch to MMF as she was not controlled on Azathioprine.  Plan is to continue her meds for now, and then follow up with rheumatology and pulmonology to complete workup and adjust home medications. IMPRESSION:     Subacute cough  Review of chest x-ray shows bilateral irregular interstitial changes worse at the lung bases consistent with interstitial lung disease associated with polymyositis.   Presently on low dose prednisone but is non compliant with immunosuppressives. She stated that she ran out of mycophenolate and has only been taking imuran which is a low dose.  The target dose of mycophenolate was 1500 mg/day.  Her CPK on admission was near 2,000 supporting that her polymyositis is uncontrolled, however it should be noted that there is a lack of temporal correlation between myositis and pulmonary involvement. Pulmonary involvement has repeatedly been shown to be a negative prognostic factor and significant cause of death in patients with PM/DM. Her non compliance also portends a poor prognosis. In a [facility] series of 58 patients with PM/DM and ILD, the 5 year survival was 60%.  She previously had biopsy consistent with organizing pneumonia. We must consider active organizing pneumonia as a cause of her cough and at present we only have a plain chest x-ray to evaluate.  We will obtain an outpatient chest CT to assess for active alveolitis and pulmonary function test to assess function.    Obesity/RODRICK   R/O GERD  Upper airway cough syndrome a possibility however we must be concerned about active interstitial lung disease given the natural history of ILD in polymyositis      PLAN:     Start PPI, intranasal fluticasone  Outpatient spirometry with bronchodilator response testing  Needs outpatient sleep study  Nebs as needed  HRCT of the lungs to look for alveolitis  Check 2decho  Continue Dr. Rea's treatment for her polymyositis    No evidence of pneumonia on cxr, no elevation in wbc count, all cultures and viral workup is negative: no need for abx  Case discussed with Rheumatology attending, as patient is taking both MMF and Azathioprine, and it was made clear that the patient was supposed to be switch to MMF as she was not controlled on Azathioprine.  Plan is to continue her meds for now, and then follow up with rheumatology and pulmonology to complete workup and adjust home medications.

## 2018-12-24 NOTE — PROGRESS NOTE ADULT - SUBJECTIVE AND OBJECTIVE BOX
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 on prednisone/ polymyositis on azathioprine MMF          T(C): 36.6 (12-24-18 @ 05:12), Max: 36.6 (12-24-18 @ 05:12)  HR: 113 (12-24-18 @ 05:12) (99 - 113)  BP: 100/70 (12-24-18 @ 05:12) (100/70 - 114/64)  RR: 18 (12-24-18 @ 05:12) (18 - 18)  SpO2: 98% (12-23-18 @ 18:49) (98% - 98%)    PHYSICAL EXAM:  GENERAL: NAD, well-groomed, well-developed  CHEST/LUNG: Clear to percussion bilaterally;   HEART: Regular rate and rhythm; No murmurs, rubs, or gallops  ABDOMEN: Soft, Nontender, Nondistended; Bowel sounds present  EXTREMITIES:  2+ Peripheral Pulses, No clubbing, cyanosis, or edema  LYMPH: No lymphadenopathy noted  SKIN: No rashes or lesions    LABS:    12-24    134<L>  |  96<L>  |  10  ----------------------------<  88  4.6   |  21  |  0.7    Ca    8.4<L>      24 Dec 2018 05:45  Mg     1.9     12-24    TPro  7.6  /  Alb  3.2<L>  /  TBili  0.2  /  DBili  x   /  AST  54<H>  /  ALT  29  /  AlkPhos  53  12-24                          11.9   7.53  )-----------( 314      ( 24 Dec 2018 05:45 )             37.5       RADIOLOGY & ADDITIONAL TESTS:    < from: Xray Chest 1 View-PORTABLE IMMEDIATE (12.21.18 @ 20:27) >  Impression:      Stable pulmonary findings.      Imaging Personally Reviewed:  [ ] YES  [ ] NO  acetaminophen   Tablet .. 650 milliGRAM(s) Oral every 6 hours PRN  ALBUTerol    90 MICROgram(s) HFA Inhaler 2 Puff(s) Inhalation every 4 hours PRN  azaTHIOprine 150 milliGRAM(s) Oral daily  enoxaparin Injectable 40 milliGRAM(s) SubCutaneous daily  guaiFENesin ER 1200 milliGRAM(s) Oral every 12 hours  hydrocortisone 1% Cream 1 Application(s) Topical daily PRN  levoFLOXacin IVPB 750 milliGRAM(s) IV Intermittent every 24 hours  mycophenolate mofetil 1500 milliGRAM(s) Oral two times a day  oxyCODONE    5 mG/acetaminophen 325 mG 1 Tablet(s) Oral every 4 hours PRN  predniSONE   Tablet 10 milliGRAM(s) Oral daily  trimethoprim  160 mG/sulfamethoxazole 800 mG 1 Tablet(s) Oral every 24 hours

## 2018-12-24 NOTE — DISCHARGE NOTE ADULT - MEDICATION SUMMARY - MEDICATIONS TO TAKE
I will START or STAY ON the medications listed below when I get home from the hospital:    predniSONE 10 mg oral tablet  -- 1 tab(s) by mouth once a day  -- Indication: For Polymyositis    Ventolin HFA 90 mcg/inh inhalation aerosol  -- 2 puff(s) inhaled 4 times a day   -- For inhalation only.  It is very important that you take or use this exactly as directed.  Do not skip doses or discontinue unless directed by your doctor.  Obtain medical advice before taking any non-prescription drugs as some may affect the action of this medication.  Shake well before use.    -- Indication: For BOOP (bronchiolitis obliterans with organizing pneumonia)    Cortizone-10 topical cream  -- Apply on skin to affected area once a day, As Needed  -- Indication: For skin    guaiFENesin 1200 mg oral tablet, extended release  -- 1 tab(s) by mouth every 12 hours  -- Indication: For PNA (pneumonia)    mycophenolate mofetil 500 mg oral tablet  -- 3 tab(s) by mouth 2 times a day  -- Indication: For Polymyosistis    azaTHIOprine 50 mg oral tablet  -- 3 tab(s) by mouth once a day  -- Indication: For Polymyosistis    Levaquin 750 mg oral tablet  -- 1 tab(s) by mouth once a day   -- Avoid prolonged or excessive exposure to direct and/or artificial sunlight while taking this medication.  Do not take dairy products, antacids, or iron preparations within one hour of this medication.  Finish all this medication unless otherwise directed by prescriber.  May cause drowsiness or dizziness.  Medication should be taken with plenty of water.    -- Indication: For acute bronchitis    Bactrim  mg-160 mg oral tablet  -- 1 tab(s) by mouth Monday, Wednesday, and Friday  -- Indication: For Prophylaxis I will START or STAY ON the medications listed below when I get home from the hospital:    predniSONE 10 mg oral tablet  -- 1 tab(s) by mouth once a day  -- Indication: For Polymyositis    benzonatate 100 mg oral capsule  -- 1 cap(s) by mouth 3 times a day   -- May cause drowsiness.  Alcohol may intensify this effect.  Use care when operating dangerous machinery.  Swallow whole.  Do not crush.    -- Indication: For Cough    Ventolin HFA 90 mcg/inh inhalation aerosol  -- 2 puff(s) inhaled 4 times a day   -- For inhalation only.  It is very important that you take or use this exactly as directed.  Do not skip doses or discontinue unless directed by your doctor.  Obtain medical advice before taking any non-prescription drugs as some may affect the action of this medication.  Shake well before use.    -- Indication: For BOOP (bronchiolitis obliterans with organizing pneumonia)    Cortizone-10 topical cream  -- Apply on skin to affected area once a day, As Needed  -- Indication: For skin    azaTHIOprine 50 mg oral tablet  -- 3 tab(s) by mouth once a day  -- Indication: For Polymyosistis    mycophenolate mofetil 500 mg oral tablet  -- 3 tab(s) by mouth 2 times a day  -- Indication: For Polymyosistis    Levaquin 750 mg oral tablet  -- 1 tab(s) by mouth once a day   -- Avoid prolonged or excessive exposure to direct and/or artificial sunlight while taking this medication.  Do not take dairy products, antacids, or iron preparations within one hour of this medication.  Finish all this medication unless otherwise directed by prescriber.  May cause drowsiness or dizziness.  Medication should be taken with plenty of water.    -- Indication: For acute bronchitis    Bactrim  mg-160 mg oral tablet  -- 1 tab(s) by mouth Monday, Wednesday, and Friday  -- Indication: For Prophylaxis    guaifenesin-dextromethorphan 100 mg-10 mg/5 mL oral liquid  -- 10 milliliter(s) by mouth 2 times a day   -- Indication: For Cough

## 2018-12-24 NOTE — DISCHARGE NOTE ADULT - OTHER SIGNIFICANT FINDINGS
12-24    134<L>  |  96<L>  |  10  ----------------------------<  88  4.6   |  21  |  0.7    Ca    8.4<L>      24 Dec 2018 05:45  Mg     1.9     12-24    TPro  7.6  /  Alb  3.2<L>  /  TBili  0.2  /  DBili  x   /  AST  54<H>  /  ALT  29  /  AlkPhos  53  12-24                          11.9   7.53  )-----------( 314      ( 24 Dec 2018 05:45 )             37.5   < from: Xray Chest 1 View-PORTABLE IMMEDIATE (12.21.18 @ 20:27) >  Impression:      Stable pulmonary findings.    < end of copied text >

## 2018-12-24 NOTE — DISCHARGE NOTE ADULT - HOSPITAL COURSE
38 year old female with a past medical history of Bronchiolitis obliterans with organizing pneumonia/Cryptogenic organizing Pneumonia 6/17 on Prednisone 10 daily (prednisone > 1 year) and Polymyositis on Azathioprine and MFM 6/18 presenting for cough, bilateral knee pain.  Patient reports that the cough has been going on throughout the month of December, presented to the ED last Wednesday, given an antibiotic with no improvement. Her cough has been persistently the same since last week, dry, associated with a whistle-like wheezing noise, constant throughout the day, unable to sleep because of her cough, not relieved with antitussives, not exacerbated by any factors, worsening with time, associated with shortness of breath, chills, diaphoresis (day sweats and night sweats) and diarrhea for the past 5 days.     she was being treated as atypical pneumonia versus acute bronchitis seen by pulmonologist plan to do HRCT as outpt.  will complete antibiotics course for a total of 5 days  follow up with rheumatologist and pulmonologist as outpt 38 year old female with a past medical history of Bronchiolitis obliterans with organizing pneumonia/Cryptogenic organizing Pneumonia 6/17 on Prednisone 10 daily (prednisone > 1 year) and Polymyositis on Azathioprine and MFM 6/18 presenting for cough, bilateral knee pain.  Patient reports that the cough has been going on throughout the month of December, presented to the ED last Wednesday, given an antibiotic with no improvement. Her cough has been persistently the same since last week, dry, associated with a whistle-like wheezing noise, constant throughout the day, unable to sleep because of her cough, not relieved with antitussives, not exacerbated by any factors, worsening with time, associated with shortness of breath, chills, diaphoresis (day sweats and night sweats) and diarrhea for the past 5 days.     she was being treated as atypical pneumonia versus acute bronchitis seen by pulmonologist plan to do HRCT as outpt.  will complete antibiotics course for a total of 5 days  follow up with rheumatologist and pulmonologist as outpt  jan 14

## 2018-12-24 NOTE — DISCHARGE NOTE ADULT - CARE PLAN
Principal Discharge DX:	Acute bronchitis  Goal:	treatment with proper antibiotics and avoid recurrence  Assessment and plan of treatment:	you were treated with iv levofloaxcin , will give you two more doses to complete total of 5 days.  try to use cough syrup for cough  follow with pulmonologist as out pt  Secondary Diagnosis:	Cryptogenic organizing pneumonia  Assessment and plan of treatment:	continue ur current medication  follow with pulmonologist as outpt  continue taking prophylaxis septrin for now  do HRCT as outpt, ur pulmonologist will arrange for it  Secondary Diagnosis:	Polymyositis  Assessment and plan of treatment:	follow with rheumatologist in 3 weeks  continue taking MMF/ azathioprine/ prednisone

## 2018-12-24 NOTE — DISCHARGE NOTE ADULT - PLAN OF CARE
treatment with proper antibiotics and avoid recurrence you were treated with iv levofloaxcin , will give you two more doses to complete total of 5 days.  try to use cough syrup for cough  follow with pulmonologist as out pt continue ur current medication  follow with pulmonologist as outpt  continue taking prophylaxis septrin for now  do HRCT as outpt, ur pulmonologist will arrange for it follow with rheumatologist in 3 weeks  continue taking MMF/ azathioprine/ prednisone

## 2018-12-24 NOTE — DISCHARGE NOTE ADULT - CARE PROVIDERS DIRECT ADDRESSES
,[email or web address]

## 2018-12-24 NOTE — PROGRESS NOTE ADULT - ASSESSMENT
38 year old female with a past medical history of Bronchiolitis obliterans with organizing pneumonia/Cryptogenic organizing Pneumonia 6/17 on Prednisone 10 daily (prednisone > 1 year) and Polymyositis on Azathioprine and MFM 6/18 presenting for cough, bilateral knee pain.    # h/o of bronchiolitis obliterans with organizing pneumonia     acute bronchitis     on levofloxacin for 4 days, will give 1 more day     as per pulmonologist can be discharged      follow with pulmonologist as out pt      sleep study as outpt      # h/o of polymyositis     on MMF/ azathioprine /  prednisone     follow up with rheumatologist in 3 weeks    # GI prophylaxis pantoprazole 40 mg po once daily    # DVt prophylaxis enoxaparin 40 mg subcut    # plan to discharge home with follow up with rheumatologist and pulmonologist

## 2018-12-24 NOTE — CONSULT NOTE ADULT - SUBJECTIVE AND OBJECTIVE BOX
Patient is a 38y old  Female who presents with a chief complaint of cough and bilateral knee pain (23 Dec 2018 18:13)      HPI:    38 year old female previously diagnosed with  on an open lung biopsy, as well as polymyosistis, currently on Azathioprine and chronic low dose prednisone with both conditions unde control. Now presenting with a month of cough, that is productive of clear sputum, worse when laying down and at night, and associated with chills, no fever, no significant dyspnea or oxygen desaturation. She was recently give 5 days of Levaquin with no improvement. She is not actively wheezing, and has had no recent sick contacts or travel. Patient states that she has had watery bowel movement (2-3/day).   Here, a CXR did not show any new infiltrates, and blood work was grossly unremarkable. Patient did not require any oxygen, and physical exam was grossly benign.  She was rule out for the flu, and RSV.      PAST MEDICAL & SURGICAL HISTORY:  BOOP (bronchiolitis obliterans with organizing pneumonia)  Polymyositis  Hyperlipidemia  Cryptogenic organizing pneumonia  Ankle fracture      SOCIAL HX:   Smoking  negative                       ETOH  negative                            Other    FAMILY HISTORY:  Family history of sarcoidosis (Father)  :  No known cardiovacular family hisotry     ROS:  See HPI     Allergies    contrast media (iodine-based) (Unknown)  peanuts (Unknown)  shellfish (Unknown)    Intolerances          PHYSICAL EXAM    ICU Vital Signs Last 24 Hrs  T(C): 36.6 (24 Dec 2018 05:12), Max: 36.7 (23 Dec 2018 09:32)  T(F): 97.9 (24 Dec 2018 05:12), Max: 98 (23 Dec 2018 09:32)  HR: 113 (24 Dec 2018 05:12) (99 - 113)  BP: 100/70 (24 Dec 2018 05:12) (100/70 - 131/61)  BP(mean): --  ABP: --  ABP(mean): --  RR: 18 (24 Dec 2018 05:12) (18 - 18)  SpO2: 98% (23 Dec 2018 18:49) (97% - 98%)      General: In NAD, obese  HEENT:  AC              Lymphatic system: No cervical LN   Lungs: Bilateral BS, clear bilaterally with no wheezing  Cardiovascular: Regular  Gastrointestinal: Soft, Positive BS  Musculoskeletal: No clubbing.  Moves all extremities.  Full range of motion   Skin: Warm.  Intact  Neurological: No motor or sensory deficit         LABS:                          11.9   7.53  )-----------( 314      ( 24 Dec 2018 05:45 )             37.5                                               12-23    136  |  99  |  10  ----------------------------<  99  4.2   |  25  |  0.5<L>    Ca    8.5      23 Dec 2018 06:40  Mg     1.8     12-23    TPro  7.1  /  Alb  3.0<L>  /  TBili  0.3  /  DBili  x   /  AST  42<H>  /  ALT  27  /  AlkPhos  48  12-23      PT/INR - ( 22 Dec 2018 09:45 )   PT: 11.40 sec;   INR: 0.99 ratio         PTT - ( 22 Dec 2018 09:45 )  PTT:30.0 sec                                           CARDIAC MARKERS ( 23 Dec 2018 06:40 )  x     / x     / 1669 U/L / x     / x      CARDIAC MARKERS ( 22 Dec 2018 09:45 )  x     / x     / 1941 U/L / x     / x                                                LIVER FUNCTIONS - ( 23 Dec 2018 06:40 )  Alb: 3.0 g/dL / Pro: 7.1 g/dL / ALK PHOS: 48 U/L / ALT: 27 U/L / AST: 42 U/L / GGT: x                                                  Culture - Urine (collected 21 Dec 2018 22:40)  Source: .Urine Clean Catch (Midstream)  Final Report (23 Dec 2018 16:28):    <10,000 CFU/ml Normal Urogenital joon present    Culture - Blood (collected 21 Dec 2018 20:38)  Source: .Blood Blood-Peripheral  Preliminary Report (23 Dec 2018 03:01):    No growth to date.    Culture - Blood (collected 21 Dec 2018 20:38)  Source: .Blood Blood-Peripheral  Preliminary Report (23 Dec 2018 03:01):    No growth to date.                                                                                           X-Rays : no infiltrates, clear lung fields bilaterally                                                                                    MEDICATIONS  (STANDING):  azaTHIOprine 150 milliGRAM(s) Oral daily  enoxaparin Injectable 40 milliGRAM(s) SubCutaneous daily  guaiFENesin ER 1200 milliGRAM(s) Oral every 12 hours  levoFLOXacin IVPB 750 milliGRAM(s) IV Intermittent every 24 hours  mycophenolate mofetil 1500 milliGRAM(s) Oral two times a day  predniSONE   Tablet 10 milliGRAM(s) Oral daily  trimethoprim  160 mG/sulfamethoxazole 800 mG 1 Tablet(s) Oral every 24 hours    MEDICATIONS  (PRN):  acetaminophen   Tablet .. 650 milliGRAM(s) Oral every 6 hours PRN Temp greater or equal to 38C (100.4F), Mild Pain (1 - 3)  ALBUTerol    90 MICROgram(s) HFA Inhaler 2 Puff(s) Inhalation every 4 hours PRN Bronchospasm  hydrocortisone 1% Cream 1 Application(s) Topical daily PRN Itching  oxyCODONE    5 mG/acetaminophen 325 mG 1 Tablet(s) Oral every 4 hours PRN Severe Pain (7 - 10)

## 2018-12-26 DIAGNOSIS — Z71.89 OTHER SPECIFIED COUNSELING: ICD-10-CM

## 2018-12-27 LAB
CULTURE RESULTS: SIGNIFICANT CHANGE UP
CULTURE RESULTS: SIGNIFICANT CHANGE UP
SPECIMEN SOURCE: SIGNIFICANT CHANGE UP
SPECIMEN SOURCE: SIGNIFICANT CHANGE UP

## 2018-12-28 DIAGNOSIS — Z79.52 LONG TERM (CURRENT) USE OF SYSTEMIC STEROIDS: ICD-10-CM

## 2018-12-28 DIAGNOSIS — Z79.2 LONG TERM (CURRENT) USE OF ANTIBIOTICS: ICD-10-CM

## 2018-12-28 DIAGNOSIS — E78.5 HYPERLIPIDEMIA, UNSPECIFIED: ICD-10-CM

## 2018-12-28 DIAGNOSIS — M33.20 POLYMYOSITIS, ORGAN INVOLVEMENT UNSPECIFIED: ICD-10-CM

## 2018-12-28 DIAGNOSIS — J20.9 ACUTE BRONCHITIS, UNSPECIFIED: ICD-10-CM

## 2018-12-28 DIAGNOSIS — M25.561 PAIN IN RIGHT KNEE: ICD-10-CM

## 2018-12-28 DIAGNOSIS — Z87.01 PERSONAL HISTORY OF PNEUMONIA (RECURRENT): ICD-10-CM

## 2018-12-28 DIAGNOSIS — J18.9 PNEUMONIA, UNSPECIFIED ORGANISM: ICD-10-CM

## 2018-12-28 DIAGNOSIS — J84.9 INTERSTITIAL PULMONARY DISEASE, UNSPECIFIED: ICD-10-CM

## 2018-12-28 DIAGNOSIS — Z91.14 PATIENT'S OTHER NONCOMPLIANCE WITH MEDICATION REGIMEN: ICD-10-CM

## 2018-12-28 DIAGNOSIS — E66.9 OBESITY, UNSPECIFIED: ICD-10-CM

## 2018-12-28 DIAGNOSIS — M25.562 PAIN IN LEFT KNEE: ICD-10-CM

## 2018-12-28 DIAGNOSIS — G47.33 OBSTRUCTIVE SLEEP APNEA (ADULT) (PEDIATRIC): ICD-10-CM

## 2018-12-28 DIAGNOSIS — G89.29 OTHER CHRONIC PAIN: ICD-10-CM

## 2018-12-28 DIAGNOSIS — R06.02 SHORTNESS OF BREATH: ICD-10-CM

## 2018-12-28 DIAGNOSIS — R74.8 ABNORMAL LEVELS OF OTHER SERUM ENZYMES: ICD-10-CM

## 2018-12-30 NOTE — DISCHARGE NOTE ADULT - HOME CARE AGENCY
Hyperglycemia with borderline diabetes based on A1c      Results from last 7 days  Lab Units 12/28/18  0432   HEMOGLOBIN A1C % 6 4* [facility] [place]

## 2019-02-20 ENCOUNTER — INPATIENT (INPATIENT)
Facility: HOSPITAL | Age: 39
LOS: 9 days | Discharge: HOME HEALTH | End: 2019-03-02
Attending: HOSPITALIST | Admitting: HOSPITALIST
Payer: MEDICAID

## 2019-02-20 VITALS
DIASTOLIC BLOOD PRESSURE: 60 MMHG | RESPIRATION RATE: 18 BRPM | HEART RATE: 100 BPM | SYSTOLIC BLOOD PRESSURE: 131 MMHG | TEMPERATURE: 99 F | OXYGEN SATURATION: 99 %

## 2019-02-20 DIAGNOSIS — Z98.890 OTHER SPECIFIED POSTPROCEDURAL STATES: Chronic | ICD-10-CM

## 2019-02-20 DIAGNOSIS — S82.899A OTHER FRACTURE OF UNSPECIFIED LOWER LEG, INITIAL ENCOUNTER FOR CLOSED FRACTURE: Chronic | ICD-10-CM

## 2019-02-20 LAB
ALBUMIN SERPL ELPH-MCNC: 3.2 G/DL — LOW (ref 3.5–5.2)
ALP SERPL-CCNC: 63 U/L — SIGNIFICANT CHANGE UP (ref 30–115)
ALT FLD-CCNC: 94 U/L — HIGH (ref 0–41)
ANION GAP SERPL CALC-SCNC: 10 MMOL/L — SIGNIFICANT CHANGE UP (ref 7–14)
AST SERPL-CCNC: 217 U/L — HIGH (ref 0–41)
BASOPHILS # BLD AUTO: 0.05 K/UL — SIGNIFICANT CHANGE UP (ref 0–0.2)
BASOPHILS NFR BLD AUTO: 0.5 % — SIGNIFICANT CHANGE UP (ref 0–1)
BILIRUB DIRECT SERPL-MCNC: <0.2 MG/DL — SIGNIFICANT CHANGE UP (ref 0–0.2)
BILIRUB INDIRECT FLD-MCNC: SIGNIFICANT CHANGE UP MG/DL (ref 0.2–1.2)
BILIRUB SERPL-MCNC: <0.2 MG/DL — SIGNIFICANT CHANGE UP (ref 0.2–1.2)
BUN SERPL-MCNC: 12 MG/DL — SIGNIFICANT CHANGE UP (ref 10–20)
CALCIUM SERPL-MCNC: 8.9 MG/DL — SIGNIFICANT CHANGE UP (ref 8.5–10.1)
CHLORIDE SERPL-SCNC: 102 MMOL/L — SIGNIFICANT CHANGE UP (ref 98–110)
CK SERPL-CCNC: HIGH U/L (ref 0–225)
CO2 SERPL-SCNC: 23 MMOL/L — SIGNIFICANT CHANGE UP (ref 17–32)
CREAT SERPL-MCNC: 0.6 MG/DL — LOW (ref 0.7–1.5)
EOSINOPHIL # BLD AUTO: 0.29 K/UL — SIGNIFICANT CHANGE UP (ref 0–0.7)
EOSINOPHIL NFR BLD AUTO: 2.8 % — SIGNIFICANT CHANGE UP (ref 0–8)
GLUCOSE SERPL-MCNC: 80 MG/DL — SIGNIFICANT CHANGE UP (ref 70–99)
HCT VFR BLD CALC: 38.3 % — SIGNIFICANT CHANGE UP (ref 37–47)
HGB BLD-MCNC: 12 G/DL — SIGNIFICANT CHANGE UP (ref 12–16)
IMM GRANULOCYTES NFR BLD AUTO: 0.9 % — HIGH (ref 0.1–0.3)
LYMPHOCYTES # BLD AUTO: 0.46 K/UL — LOW (ref 1.2–3.4)
LYMPHOCYTES # BLD AUTO: 4.5 % — LOW (ref 20.5–51.1)
MCHC RBC-ENTMCNC: 30.7 PG — SIGNIFICANT CHANGE UP (ref 27–31)
MCHC RBC-ENTMCNC: 31.3 G/DL — LOW (ref 32–37)
MCV RBC AUTO: 98 FL — SIGNIFICANT CHANGE UP (ref 81–99)
MONOCYTES # BLD AUTO: 0.22 K/UL — SIGNIFICANT CHANGE UP (ref 0.1–0.6)
MONOCYTES NFR BLD AUTO: 2.1 % — SIGNIFICANT CHANGE UP (ref 1.7–9.3)
NEUTROPHILS # BLD AUTO: 9.16 K/UL — HIGH (ref 1.4–6.5)
NEUTROPHILS NFR BLD AUTO: 89.2 % — HIGH (ref 42.2–75.2)
NRBC # BLD: 0 /100 WBCS — SIGNIFICANT CHANGE UP (ref 0–0)
PLATELET # BLD AUTO: 618 K/UL — HIGH (ref 130–400)
POTASSIUM SERPL-MCNC: 5 MMOL/L — SIGNIFICANT CHANGE UP (ref 3.5–5)
POTASSIUM SERPL-SCNC: 5 MMOL/L — SIGNIFICANT CHANGE UP (ref 3.5–5)
PROT SERPL-MCNC: 7.8 G/DL — SIGNIFICANT CHANGE UP (ref 6–8)
RBC # BLD: 3.91 M/UL — LOW (ref 4.2–5.4)
RBC # FLD: 15.8 % — HIGH (ref 11.5–14.5)
SODIUM SERPL-SCNC: 135 MMOL/L — SIGNIFICANT CHANGE UP (ref 135–146)
WBC # BLD: 10.27 K/UL — SIGNIFICANT CHANGE UP (ref 4.8–10.8)
WBC # FLD AUTO: 10.27 K/UL — SIGNIFICANT CHANGE UP (ref 4.8–10.8)

## 2019-02-20 RX ORDER — SODIUM CHLORIDE 9 MG/ML
1000 INJECTION INTRAMUSCULAR; INTRAVENOUS; SUBCUTANEOUS
Qty: 0 | Refills: 0 | Status: DISCONTINUED | OUTPATIENT
Start: 2019-02-20 | End: 2019-02-25

## 2019-02-20 RX ORDER — SODIUM CHLORIDE 9 MG/ML
1000 INJECTION INTRAMUSCULAR; INTRAVENOUS; SUBCUTANEOUS
Qty: 0 | Refills: 0 | Status: DISCONTINUED | OUTPATIENT
Start: 2019-02-20 | End: 2019-02-21

## 2019-02-20 RX ORDER — MORPHINE SULFATE 50 MG/1
4 CAPSULE, EXTENDED RELEASE ORAL ONCE
Qty: 0 | Refills: 0 | Status: DISCONTINUED | OUTPATIENT
Start: 2019-02-20 | End: 2019-02-20

## 2019-02-20 RX ORDER — AZATHIOPRINE 100 MG/1
3 TABLET ORAL
Qty: 0 | Refills: 2 | COMMUNITY

## 2019-02-20 RX ORDER — ALBUTEROL 90 UG/1
2 AEROSOL, METERED ORAL EVERY 6 HOURS
Qty: 0 | Refills: 0 | Status: DISCONTINUED | OUTPATIENT
Start: 2019-02-20 | End: 2019-03-02

## 2019-02-20 RX ORDER — MYCOPHENOLATE MOFETIL 250 MG/1
1500 CAPSULE ORAL
Qty: 0 | Refills: 0 | Status: DISCONTINUED | OUTPATIENT
Start: 2019-02-20 | End: 2019-03-02

## 2019-02-20 RX ORDER — HEPARIN SODIUM 5000 [USP'U]/ML
5000 INJECTION INTRAVENOUS; SUBCUTANEOUS EVERY 8 HOURS
Qty: 0 | Refills: 0 | Status: DISCONTINUED | OUTPATIENT
Start: 2019-02-20 | End: 2019-03-02

## 2019-02-20 RX ORDER — HYDROCORTISONE 1 %
1 OINTMENT (GRAM) TOPICAL DAILY
Qty: 0 | Refills: 0 | Status: DISCONTINUED | OUTPATIENT
Start: 2019-02-20 | End: 2019-03-02

## 2019-02-20 RX ORDER — AZATHIOPRINE 100 MG/1
50 TABLET ORAL
Qty: 0 | Refills: 0 | Status: DISCONTINUED | OUTPATIENT
Start: 2019-02-20 | End: 2019-02-21

## 2019-02-20 RX ORDER — MORPHINE SULFATE 50 MG/1
2 CAPSULE, EXTENDED RELEASE ORAL EVERY 4 HOURS
Qty: 0 | Refills: 0 | Status: DISCONTINUED | OUTPATIENT
Start: 2019-02-20 | End: 2019-02-25

## 2019-02-20 RX ADMIN — SODIUM CHLORIDE 250 MILLILITER(S): 9 INJECTION INTRAMUSCULAR; INTRAVENOUS; SUBCUTANEOUS at 18:09

## 2019-02-20 RX ADMIN — MORPHINE SULFATE 4 MILLIGRAM(S): 50 CAPSULE, EXTENDED RELEASE ORAL at 20:04

## 2019-02-20 RX ADMIN — SODIUM CHLORIDE 250 MILLILITER(S): 9 INJECTION INTRAMUSCULAR; INTRAVENOUS; SUBCUTANEOUS at 21:28

## 2019-02-20 RX ADMIN — Medication 125 MILLIGRAM(S): at 18:09

## 2019-02-20 NOTE — ED ADULT NURSE NOTE - NSFALLRSKUNASSIST_ED_ALL_ED
Post-Op Assessment Note      CV Status:  Stable    Mental Status:  Alert and awake    Hydration Status:  Stable and euvolemic    PONV Controlled:  None    Airway Patency:  Patent and adequate    Post Op Vitals Reviewed: Yes          Staff: Anesthesiologist, CRNA           /75 (04/11/18 1341)    Temp      Pulse 61 (04/11/18 1341)   Resp 16 (04/11/18 1341)    SpO2 95 % (04/11/18 1341) no

## 2019-02-20 NOTE — ED ADULT NURSE NOTE - PMH
BOOP (bronchiolitis obliterans with organizing pneumonia)    Cryptogenic organizing pneumonia    Hyperlipidemia    Polymyositis

## 2019-02-20 NOTE — H&P ADULT - NSHPPHYSICALEXAM_GEN_ALL_CORE
General: NAD, comfortable, appears stated age  HEENT: NCAT, EOMI, mild skin darkening around cheeks and chin  Pulm: No rhonchi or wheezing, no respiratory distress  CVS: RRR  GI: Soft, NT, ND  : No suprpubic tenderness  Extremities: No edema,   Derm: Mild V sign present on chest  Neuro: AAOx4, unable to raise arm above shoulders, difficulty standing from seated position 3/5  b/l, 3/5 leg extension b/l

## 2019-02-20 NOTE — ED ADULT NURSE NOTE - NSIMPLEMENTINTERV_GEN_ALL_ED
Implemented All Fall with Harm Risk Interventions:  [place] to call system. Call bell, personal items and telephone within reach. Instruct patient to call for assistance. Room bathroom lighting operational. Non-slip footwear when patient is off stretcher. Physically safe environment: no spills, clutter or unnecessary equipment. Stretcher in lowest position, wheels locked, appropriate side rails in place. Provide visual cue, wrist band, yellow gown, etc. Monitor gait and stability. Monitor for mental status changes and reorient to person, place, and time. Review medications for side effects contributing to fall risk. Reinforce activity limits and safety measures with patient and family. Provide visual clues: red socks.

## 2019-02-20 NOTE — ED PROVIDER NOTE - CLINICAL SUMMARY MEDICAL DECISION MAKING FREE TEXT BOX
pt with rhadbomyolysis likely 2/2 polymyositis flare up. steroids given, labs done, 2L saline given. admitted for further care/hydration/trend ck.

## 2019-02-20 NOTE — ED PROVIDER NOTE - CARE PLAN
Principal Discharge DX:	Non-traumatic rhabdomyolysis  Secondary Diagnosis:	Polymyositis  Secondary Diagnosis:	Cryptogenic organizing pneumonia

## 2019-02-20 NOTE — H&P ADULT - ASSESSMENT
38F with PMHx of dermatomyositis, cryptogenic organizing pneumonia/BOOP with recent admission in December for cough, discharged on MMP, AZA and prednisone presents for increasing muscle aches and weakness, found to have rhabdomyolysis/dermatomyositis flare.     Dermatomyositis flare with rhabdomyolysis: Currently on MMP, azathioprine, prednison 10mg at home; received solumedrol 125mg in ED and morphine, pain improved  - Rheum consult  - 1mg/kg prednisone, dose reduction per rheum  - 250cc/hr NS  - Monitor BMP, CK  - Pain control  - Bactrim ppx MWF    Cough with hx of /BOOP: Afebrile, no consolidations on CXR (my read), no leukocytosis  - Consider pulmonary consult (Dr. Burris)  - Will be on steroids for above  - F/u official CXR   - PPI, fluticasone  - Per previous admission, patient needs HRCT, spirometry with bronchodilator response and sleep study    DVT ppx: Hepain SC  Diet: Normal  Dispo: From home

## 2019-02-20 NOTE — H&P ADULT - NSHPLABSRESULTS_GEN_ALL_CORE
12.0   10.27 )-----------( 618      ( 20 Feb 2019 18:00 )             38.3   02-20    135  |  102  |  12  ----------------------------<  80  5.0   |  23  |  0.6<L>    Ca    8.9      20 Feb 2019 18:00    TPro  7.8  /  Alb  3.2<L>  /  TBili  <0.2  /  DBili  <0.2  /  AST  217<H>  /  ALT  94<H>  /  AlkPhos  63  02-20

## 2019-02-20 NOTE — H&P ADULT - FAMILY HISTORY
Father  Still living? Unknown  Family history of sarcoidosis, Age at diagnosis: Age Unknown     Mother  Still living? Unknown  Family history of fibromyalgia, Age at diagnosis: Age Unknown

## 2019-02-20 NOTE — ED PROVIDER NOTE - OBJECTIVE STATEMENT
39 yo f hx of polymyositis, /BOOP, c/o 4 days of fatigue, gen body aches, muscle cramps. called her Rheum and told to take prednisone 10 mg QD. no fever, chills, ha, neck pain, cp, sob. +mild cough, non prod. no erwin, orthopnea. no leg swelling.

## 2019-02-21 LAB
ALBUMIN SERPL ELPH-MCNC: 3.3 G/DL — LOW (ref 3.5–5.2)
ALP SERPL-CCNC: 63 U/L — SIGNIFICANT CHANGE UP (ref 30–115)
ALT FLD-CCNC: 85 U/L — HIGH (ref 0–41)
ANION GAP SERPL CALC-SCNC: 16 MMOL/L — HIGH (ref 7–14)
ANION GAP SERPL CALC-SCNC: 18 MMOL/L — HIGH (ref 7–14)
AST SERPL-CCNC: 166 U/L — HIGH (ref 0–41)
BASOPHILS # BLD AUTO: 0.01 K/UL — SIGNIFICANT CHANGE UP (ref 0–0.2)
BASOPHILS NFR BLD AUTO: 0.1 % — SIGNIFICANT CHANGE UP (ref 0–1)
BILIRUB SERPL-MCNC: <0.2 MG/DL — SIGNIFICANT CHANGE UP (ref 0.2–1.2)
BUN SERPL-MCNC: 14 MG/DL — SIGNIFICANT CHANGE UP (ref 10–20)
BUN SERPL-MCNC: 17 MG/DL — SIGNIFICANT CHANGE UP (ref 10–20)
CALCIUM SERPL-MCNC: 8.6 MG/DL — SIGNIFICANT CHANGE UP (ref 8.5–10.1)
CALCIUM SERPL-MCNC: 8.7 MG/DL — SIGNIFICANT CHANGE UP (ref 8.5–10.1)
CHLORIDE SERPL-SCNC: 98 MMOL/L — SIGNIFICANT CHANGE UP (ref 98–110)
CHLORIDE SERPL-SCNC: 98 MMOL/L — SIGNIFICANT CHANGE UP (ref 98–110)
CK SERPL-CCNC: 6788 U/L — HIGH (ref 0–225)
CK SERPL-CCNC: 8816 U/L — HIGH (ref 0–225)
CK SERPL-CCNC: HIGH U/L (ref 0–225)
CO2 SERPL-SCNC: 20 MMOL/L — SIGNIFICANT CHANGE UP (ref 17–32)
CO2 SERPL-SCNC: 21 MMOL/L — SIGNIFICANT CHANGE UP (ref 17–32)
CREAT SERPL-MCNC: 0.5 MG/DL — LOW (ref 0.7–1.5)
CREAT SERPL-MCNC: <0.5 MG/DL — LOW (ref 0.7–1.5)
EOSINOPHIL # BLD AUTO: 0 K/UL — SIGNIFICANT CHANGE UP (ref 0–0.7)
EOSINOPHIL NFR BLD AUTO: 0 % — SIGNIFICANT CHANGE UP (ref 0–8)
ERYTHROCYTE [SEDIMENTATION RATE] IN BLOOD: 77 MM/HR — HIGH (ref 0–15)
GLUCOSE SERPL-MCNC: 130 MG/DL — HIGH (ref 70–99)
GLUCOSE SERPL-MCNC: 134 MG/DL — HIGH (ref 70–99)
HCT VFR BLD CALC: 36.9 % — LOW (ref 37–47)
HGB BLD-MCNC: 11.6 G/DL — LOW (ref 12–16)
IMM GRANULOCYTES NFR BLD AUTO: 0.8 % — HIGH (ref 0.1–0.3)
LACTATE SERPL-SCNC: 1.3 MMOL/L — SIGNIFICANT CHANGE UP (ref 0.5–2.2)
LYMPHOCYTES # BLD AUTO: 0.45 K/UL — LOW (ref 1.2–3.4)
LYMPHOCYTES # BLD AUTO: 4.4 % — LOW (ref 20.5–51.1)
MAGNESIUM SERPL-MCNC: 2 MG/DL — SIGNIFICANT CHANGE UP (ref 1.8–2.4)
MCHC RBC-ENTMCNC: 30.9 PG — SIGNIFICANT CHANGE UP (ref 27–31)
MCHC RBC-ENTMCNC: 31.4 G/DL — LOW (ref 32–37)
MCV RBC AUTO: 98.1 FL — SIGNIFICANT CHANGE UP (ref 81–99)
MONOCYTES # BLD AUTO: 0.08 K/UL — LOW (ref 0.1–0.6)
MONOCYTES NFR BLD AUTO: 0.8 % — LOW (ref 1.7–9.3)
NEUTROPHILS # BLD AUTO: 9.62 K/UL — HIGH (ref 1.4–6.5)
NEUTROPHILS NFR BLD AUTO: 93.9 % — HIGH (ref 42.2–75.2)
NRBC # BLD: 0 /100 WBCS — SIGNIFICANT CHANGE UP (ref 0–0)
PHOSPHATE SERPL-MCNC: 3.6 MG/DL — SIGNIFICANT CHANGE UP (ref 2.1–4.9)
PLATELET # BLD AUTO: 627 K/UL — HIGH (ref 130–400)
POTASSIUM SERPL-MCNC: 4.7 MMOL/L — SIGNIFICANT CHANGE UP (ref 3.5–5)
POTASSIUM SERPL-MCNC: 4.8 MMOL/L — SIGNIFICANT CHANGE UP (ref 3.5–5)
POTASSIUM SERPL-SCNC: 4.7 MMOL/L — SIGNIFICANT CHANGE UP (ref 3.5–5)
POTASSIUM SERPL-SCNC: 4.8 MMOL/L — SIGNIFICANT CHANGE UP (ref 3.5–5)
PROT SERPL-MCNC: 8 G/DL — SIGNIFICANT CHANGE UP (ref 6–8)
RBC # BLD: 3.76 M/UL — LOW (ref 4.2–5.4)
RBC # FLD: 15.4 % — HIGH (ref 11.5–14.5)
SODIUM SERPL-SCNC: 135 MMOL/L — SIGNIFICANT CHANGE UP (ref 135–146)
SODIUM SERPL-SCNC: 136 MMOL/L — SIGNIFICANT CHANGE UP (ref 135–146)
WBC # BLD: 10.24 K/UL — SIGNIFICANT CHANGE UP (ref 4.8–10.8)
WBC # FLD AUTO: 10.24 K/UL — SIGNIFICANT CHANGE UP (ref 4.8–10.8)

## 2019-02-21 RX ORDER — ACETAMINOPHEN 500 MG
650 TABLET ORAL ONCE
Qty: 0 | Refills: 0 | Status: COMPLETED | OUTPATIENT
Start: 2019-02-21 | End: 2019-02-21

## 2019-02-21 RX ORDER — AZATHIOPRINE 100 MG/1
1 TABLET ORAL
Qty: 0 | Refills: 2 | COMMUNITY

## 2019-02-21 RX ORDER — AZATHIOPRINE 100 MG/1
100 TABLET ORAL DAILY
Qty: 0 | Refills: 0 | Status: DISCONTINUED | OUTPATIENT
Start: 2019-02-21 | End: 2019-03-02

## 2019-02-21 RX ADMIN — MYCOPHENOLATE MOFETIL 1500 MILLIGRAM(S): 250 CAPSULE ORAL at 01:17

## 2019-02-21 RX ADMIN — AZATHIOPRINE 100 MILLIGRAM(S): 100 TABLET ORAL at 06:53

## 2019-02-21 RX ADMIN — SODIUM CHLORIDE 250 MILLILITER(S): 9 INJECTION INTRAMUSCULAR; INTRAVENOUS; SUBCUTANEOUS at 10:14

## 2019-02-21 RX ADMIN — MORPHINE SULFATE 2 MILLIGRAM(S): 50 CAPSULE, EXTENDED RELEASE ORAL at 03:54

## 2019-02-21 RX ADMIN — MYCOPHENOLATE MOFETIL 1500 MILLIGRAM(S): 250 CAPSULE ORAL at 05:44

## 2019-02-21 RX ADMIN — HEPARIN SODIUM 5000 UNIT(S): 5000 INJECTION INTRAVENOUS; SUBCUTANEOUS at 21:59

## 2019-02-21 RX ADMIN — MYCOPHENOLATE MOFETIL 1500 MILLIGRAM(S): 250 CAPSULE ORAL at 18:27

## 2019-02-21 RX ADMIN — MORPHINE SULFATE 2 MILLIGRAM(S): 50 CAPSULE, EXTENDED RELEASE ORAL at 18:33

## 2019-02-21 RX ADMIN — MORPHINE SULFATE 2 MILLIGRAM(S): 50 CAPSULE, EXTENDED RELEASE ORAL at 10:14

## 2019-02-21 RX ADMIN — HEPARIN SODIUM 5000 UNIT(S): 5000 INJECTION INTRAVENOUS; SUBCUTANEOUS at 00:16

## 2019-02-21 RX ADMIN — MORPHINE SULFATE 2 MILLIGRAM(S): 50 CAPSULE, EXTENDED RELEASE ORAL at 18:55

## 2019-02-21 RX ADMIN — HEPARIN SODIUM 5000 UNIT(S): 5000 INJECTION INTRAVENOUS; SUBCUTANEOUS at 14:05

## 2019-02-21 RX ADMIN — MORPHINE SULFATE 2 MILLIGRAM(S): 50 CAPSULE, EXTENDED RELEASE ORAL at 10:30

## 2019-02-21 RX ADMIN — HEPARIN SODIUM 5000 UNIT(S): 5000 INJECTION INTRAVENOUS; SUBCUTANEOUS at 05:44

## 2019-02-21 RX ADMIN — SODIUM CHLORIDE 250 MILLILITER(S): 9 INJECTION INTRAMUSCULAR; INTRAVENOUS; SUBCUTANEOUS at 00:17

## 2019-02-21 RX ADMIN — Medication 1 APPLICATION(S): at 01:18

## 2019-02-21 RX ADMIN — Medication 1 APPLICATION(S): at 21:59

## 2019-02-21 RX ADMIN — Medication 650 MILLIGRAM(S): at 23:37

## 2019-02-21 RX ADMIN — Medication 100 MILLIGRAM(S): at 05:44

## 2019-02-21 NOTE — CONSULT NOTE ADULT - SUBJECTIVE AND OBJECTIVE BOX
37yo AA female with PMHx of dermatomyositis, / BOOP was recently admitted in 12/ 2018 w/ worsening cough, though stable muscle disease.  Followed up with rheumatology as outpatient, and had tapering of prednisone w/ plan to initiate rituximab. Was down to 10mg daily, AZA taper and MMF at max. Was doing well, but developed worsening of her cough within a few weeks at home on the aforementioned regimen. Shortly after, felt increased muscle aches, pains and weakness. Had difficulty with raising arms, getting up frm chair/ bed. Also with recurrence of skin rash on chest and face.  Upon admission, received solumedrol 125mg IV w/ improvement. Denies fevers, chills, hemoptysis, weight loss.   social hx - no smoking, alcohol or illicit drug use, has 5 children    VS: HR 80  / 64  RR 18  O2 100% RA    PE: general: AAO x 3, laying comfortable in bed in NAD, talking in full sentences without supplemental oxygen  HEENT: moist mucuous membranes, no sores appreciated  Cardio: s1 s2+ RRR  Lungs: no r/r/g, no wheezing   Abd: soft, non-tender, non-distended  MSK: no joint synovitis, TTP over UEs     labs:   ESR 77  wbc 10.24 N 93.9  H/H 11.6/ 36.9  platelets 627      K 4.7  BUN/ CT 14/ 0.5  glucose 130  AST/ / 85  CK 11,832 --> 10, 293 --> 8, 816  (was down to 1, 669 in dec 2018)    chest xray Unchanged scattered bilateral interstitial opacities.

## 2019-02-21 NOTE — CONSULT NOTE ADULT - ASSESSMENT
37 yo AA female with hx of dermatomyositis w/ lung involvement / BOOP now admitted with flare. In the past, pt did not tolerate long term and high doses of prednisone 2/2 side effects, and plan was to initiate rituxan. Can start prednisone 40mg daily for now, with tapering by 10mg weekly until outpatient.   Cont steroid sparing agents and DS bactrim. Please call for any questions . 39 yo AA female with hx of dermatomyositis w/ lung involvement / BOOP now admitted with flare. In the past, pt did not tolerate long term and high doses of prednisone 2/2 side effects, and plan was to initiate rituxan. Can start prednisone 40mg daily for now, with tapering by 10mg weekly until outpatient.   Cont steroid sparing agents and DS bactrim. Please call for any questions .     Spoke to MAT [id number] for possibility of administering Rituxan as inpatient. needs to be authorized and discussed with pharmacy. To call me back.

## 2019-02-21 NOTE — PROGRESS NOTE ADULT - ASSESSMENT
38F with PMHx of dermatomyositis, cryptogenic organizing pneumonia/BOOP with recent admission in December for cough, discharged on MMP, AZA and prednisone presents for increasing muscle aches and weakness, found to have rhabdomyolysis/dermatomyositis flare.     Dermatomyositis flare with rhabdomyolysis: Currently on MMP, azathioprine, prednison 10mg at home; received solumedrol 125mg in ED and morphine, pain improved  - Aggressive IV hydration NS 250cc/hr  - Rheum consult recommended Prednisone 40mg daily then decrease by 10 each week  - Needs to be started on   - Monitor BMP, CK  - Pain control  - Bactrim ppx MWF    Cough with hx of /BOOP: Afebrile, no consolidations on CXR (my read), no leukocytosis  - Consider pulmonary consult (Dr. Burris)  - Will be on steroids for above  - F/u official CXR   - PPI, fluticasone  - Per previous admission, patient needs HRCT, spirometry with bronchodilator response and sleep study    DVT ppx: Hepain SC  Diet: Normal  Dispo: From home 38F with PMHx of dermatomyositis, cryptogenic organizing pneumonia/BOOP with recent admission in December for cough, discharged on MMP, AZA and prednisone presents for increasing muscle aches and weakness, found to have rhabdomyolysis/dermatomyositis flare.     Dermatomyositis flare with rhabdomyolysis: Currently on MMP, azathioprine, prednison 10mg at home; received solumedrol 125mg in ED and morphine, pain improved  - Aggressive IV hydration NS 250cc/hr  - Rheum consult recommended Prednisone 40mg daily then decrease by 10 each week  - Needs to be started on Rituximab inpatient. paper work in file needs to be signed by Rheum  - Monitor BMP, CK q6hr  - Pain control  - Bactrim ppx MWF    Cough with hx of /BOOP: Afebrile, no consolidations on CXR (my read), no leukocytosis  - Will be on steroids for above  - F/u official CXR   - PPI, fluticasone  - Per previous admission, patient needs HRCT, spirometry with bronchodilator response and sleep study    DVT ppx: Hepain SC  Diet: Normal  Dispo: From home

## 2019-02-21 NOTE — PROGRESS NOTE ADULT - SUBJECTIVE AND OBJECTIVE BOX
SUBJECTIVE:    Patient is a 38y old Female who presents with a chief complaint of Rhambdomyolysis, dermatomyositis flare (21 Feb 2019 13:24)    Currently admitted to medicine with the primary diagnosis of Non-traumatic rhabdomyolysis     Today is hospital day 1d. This morning she is resting comfortably in bed and reports no new issues or overnight events.     PAST MEDICAL & SURGICAL HISTORY  BOOP (bronchiolitis obliterans with organizing pneumonia)  Polymyositis  Hyperlipidemia  Cryptogenic organizing pneumonia  History of cervical cerclage  Ankle fracture    SOCIAL HISTORY:  Negative for smoking/alcohol/drug use.     Home Medications:  Home Medications:  azaTHIOprine 50 mg oral tablet: 2 tab(s) orally once a day (21 Feb 2019 05:13)  Bactrim  mg-160 mg oral tablet: 1 tab(s) orally Monday, Wednesday, and Friday (22 Dec 2018 01:02)  Cortizone-10 topical cream: Apply topically to affected area once a day, As Needed (22 Dec 2018 01:02)  predniSONE 10 mg oral tablet: 1 tab(s) orally once a day (22 Dec 2018 01:02)      ALLERGIES:  contrast media (iodine-based) (Unknown)  peanuts (Unknown)  shellfish (Unknown)    MEDICATIONS:  STANDING MEDICATIONS  azaTHIOprine 100 milliGRAM(s) Oral daily  heparin  Injectable 5000 Unit(s) SubCutaneous every 8 hours  mycophenolate mofetil 1500 milliGRAM(s) Oral two times a day  predniSONE   Tablet 40 milliGRAM(s) Oral daily  sodium chloride 0.9%. 1000 milliLiter(s) IV Continuous <Continuous>  triamcinolone 0.1% Cream 1 Application(s) Topical every 12 hours  trimethoprim  160 mG/sulfamethoxazole 800 mG 1 Tablet(s) Oral <User Schedule>    PRN MEDICATIONS  ALBUTerol    90 MICROgram(s) HFA Inhaler 2 Puff(s) Inhalation every 6 hours PRN  benzonatate 100 milliGRAM(s) Oral three times a day PRN  hydrocortisone 1% Cream 1 Application(s) Topical daily PRN  morphine  - Injectable 2 milliGRAM(s) IV Push every 4 hours PRN    VITALS:   Vital Signs Last 24 Hrs  T(C): 36.7 (21 Feb 2019 08:02), Max: 37.1 (20 Feb 2019 16:21)  T(F): 98 (21 Feb 2019 08:02), Max: 98.7 (20 Feb 2019 16:21)  HR: 89 (21 Feb 2019 08:02) (89 - 100)  BP: 121/64 (21 Feb 2019 08:02) (111/59 - 131/60)  BP(mean): --  RR: 18 (21 Feb 2019 08:02) (18 - 18)  SpO2: 98% (21 Feb 2019 08:02) (95% - 99%)  CAPILLARY BLOOD GLUCOSE          LABS:                        11.6   10.24 )-----------( 627      ( 21 Feb 2019 08:12 )             36.9     02-21    136  |  98  |  14  ----------------------------<  130<H>  4.7   |  20  |  <0.5<L>    Ca    8.7      21 Feb 2019 08:12  Phos  3.6     02-21  Mg     2.0     02-21    TPro  8.0  /  Alb  3.3<L>  /  TBili  <0.2  /  DBili  x   /  AST  166<H>  /  ALT  85<H>  /  AlkPhos  63  02-21    Lactate, Blood: 1.3 mmol/L (02-21-19 @ 08:12)  Creatine Kinase, Serum: 8816 U/L <H> (02-21-19 @ 08:12)  Sedimentation Rate, Erythrocyte: 77 mm/hr <H> (02-21-19 @ 08:12)  Creatine Kinase, Serum: 42775 U/L <H> (02-21-19 @ 01:03)  Creatine Kinase, Serum: 53117 U/L <H> (02-20-19 @ 18:00)    CARDIAC MARKERS ( 21 Feb 2019 08:12 )  x     / x     / 8816 U/L / x     / x      CARDIAC MARKERS ( 21 Feb 2019 01:03 )  x     / x     / 97973 U/L / x     / x      CARDIAC MARKERS ( 20 Feb 2019 18:00 )  x     / x     / 22256 U/L / x     / x          RADIOLOGY:    PHYSICAL EXAM:  GEN: No acute distress  LUNGS: Clear to auscultation bilaterally   HEART: S1/S2 present. RRR.   ABD: Soft, non-tender, non-distended. Bowel sounds present  EXT: NC/NC/NE/2+PP/FRANKLIN  NEURO: AAOX3

## 2019-02-22 LAB
ANION GAP SERPL CALC-SCNC: 14 MMOL/L — SIGNIFICANT CHANGE UP (ref 7–14)
BASOPHILS # BLD AUTO: 0.03 K/UL — SIGNIFICANT CHANGE UP (ref 0–0.2)
BASOPHILS NFR BLD AUTO: 0.3 % — SIGNIFICANT CHANGE UP (ref 0–1)
BUN SERPL-MCNC: 11 MG/DL — SIGNIFICANT CHANGE UP (ref 10–20)
CALCIUM SERPL-MCNC: 8.7 MG/DL — SIGNIFICANT CHANGE UP (ref 8.5–10.1)
CHLORIDE SERPL-SCNC: 103 MMOL/L — SIGNIFICANT CHANGE UP (ref 98–110)
CK SERPL-CCNC: 5374 U/L — HIGH (ref 0–225)
CK SERPL-CCNC: 5853 U/L — HIGH (ref 0–225)
CO2 SERPL-SCNC: 22 MMOL/L — SIGNIFICANT CHANGE UP (ref 17–32)
CREAT SERPL-MCNC: <0.5 MG/DL — LOW (ref 0.7–1.5)
EOSINOPHIL # BLD AUTO: 0.06 K/UL — SIGNIFICANT CHANGE UP (ref 0–0.7)
EOSINOPHIL NFR BLD AUTO: 0.6 % — SIGNIFICANT CHANGE UP (ref 0–8)
GLUCOSE SERPL-MCNC: 91 MG/DL — SIGNIFICANT CHANGE UP (ref 70–99)
HCT VFR BLD CALC: 32.9 % — LOW (ref 37–47)
HGB BLD-MCNC: 10.3 G/DL — LOW (ref 12–16)
IMM GRANULOCYTES NFR BLD AUTO: 0.7 % — HIGH (ref 0.1–0.3)
LYMPHOCYTES # BLD AUTO: 0.82 K/UL — LOW (ref 1.2–3.4)
LYMPHOCYTES # BLD AUTO: 8.6 % — LOW (ref 20.5–51.1)
MAGNESIUM SERPL-MCNC: 1.8 MG/DL — SIGNIFICANT CHANGE UP (ref 1.8–2.4)
MCHC RBC-ENTMCNC: 30.9 PG — SIGNIFICANT CHANGE UP (ref 27–31)
MCHC RBC-ENTMCNC: 31.3 G/DL — LOW (ref 32–37)
MCV RBC AUTO: 98.8 FL — SIGNIFICANT CHANGE UP (ref 81–99)
MONOCYTES # BLD AUTO: 0.63 K/UL — HIGH (ref 0.1–0.6)
MONOCYTES NFR BLD AUTO: 6.6 % — SIGNIFICANT CHANGE UP (ref 1.7–9.3)
NEUTROPHILS # BLD AUTO: 7.96 K/UL — HIGH (ref 1.4–6.5)
NEUTROPHILS NFR BLD AUTO: 83.2 % — HIGH (ref 42.2–75.2)
NRBC # BLD: 0 /100 WBCS — SIGNIFICANT CHANGE UP (ref 0–0)
PLATELET # BLD AUTO: 590 K/UL — HIGH (ref 130–400)
POTASSIUM SERPL-MCNC: 4.3 MMOL/L — SIGNIFICANT CHANGE UP (ref 3.5–5)
POTASSIUM SERPL-SCNC: 4.3 MMOL/L — SIGNIFICANT CHANGE UP (ref 3.5–5)
RBC # BLD: 3.33 M/UL — LOW (ref 4.2–5.4)
RBC # FLD: 15.7 % — HIGH (ref 11.5–14.5)
SODIUM SERPL-SCNC: 139 MMOL/L — SIGNIFICANT CHANGE UP (ref 135–146)
WBC # BLD: 9.57 K/UL — SIGNIFICANT CHANGE UP (ref 4.8–10.8)
WBC # FLD AUTO: 9.57 K/UL — SIGNIFICANT CHANGE UP (ref 4.8–10.8)

## 2019-02-22 RX ORDER — LANOLIN ALCOHOL/MO/W.PET/CERES
5 CREAM (GRAM) TOPICAL AT BEDTIME
Qty: 0 | Refills: 0 | Status: DISCONTINUED | OUTPATIENT
Start: 2019-02-22 | End: 2019-03-02

## 2019-02-22 RX ADMIN — Medication 40 MILLIGRAM(S): at 06:01

## 2019-02-22 RX ADMIN — SODIUM CHLORIDE 250 MILLILITER(S): 9 INJECTION INTRAMUSCULAR; INTRAVENOUS; SUBCUTANEOUS at 04:16

## 2019-02-22 RX ADMIN — MYCOPHENOLATE MOFETIL 1500 MILLIGRAM(S): 250 CAPSULE ORAL at 06:00

## 2019-02-22 RX ADMIN — HEPARIN SODIUM 5000 UNIT(S): 5000 INJECTION INTRAVENOUS; SUBCUTANEOUS at 06:00

## 2019-02-22 RX ADMIN — SODIUM CHLORIDE 250 MILLILITER(S): 9 INJECTION INTRAMUSCULAR; INTRAVENOUS; SUBCUTANEOUS at 08:05

## 2019-02-22 RX ADMIN — AZATHIOPRINE 100 MILLIGRAM(S): 100 TABLET ORAL at 11:42

## 2019-02-22 RX ADMIN — MORPHINE SULFATE 2 MILLIGRAM(S): 50 CAPSULE, EXTENDED RELEASE ORAL at 11:46

## 2019-02-22 RX ADMIN — SODIUM CHLORIDE 250 MILLILITER(S): 9 INJECTION INTRAMUSCULAR; INTRAVENOUS; SUBCUTANEOUS at 21:32

## 2019-02-22 RX ADMIN — Medication 1 TABLET(S): at 09:59

## 2019-02-22 RX ADMIN — Medication 1 APPLICATION(S): at 06:01

## 2019-02-22 RX ADMIN — Medication 1 APPLICATION(S): at 17:30

## 2019-02-22 RX ADMIN — MYCOPHENOLATE MOFETIL 1500 MILLIGRAM(S): 250 CAPSULE ORAL at 17:30

## 2019-02-22 RX ADMIN — SODIUM CHLORIDE 250 MILLILITER(S): 9 INJECTION INTRAMUSCULAR; INTRAVENOUS; SUBCUTANEOUS at 14:48

## 2019-02-22 RX ADMIN — HEPARIN SODIUM 5000 UNIT(S): 5000 INJECTION INTRAVENOUS; SUBCUTANEOUS at 14:48

## 2019-02-22 RX ADMIN — HEPARIN SODIUM 5000 UNIT(S): 5000 INJECTION INTRAVENOUS; SUBCUTANEOUS at 21:33

## 2019-02-22 RX ADMIN — Medication 5 MILLIGRAM(S): at 21:33

## 2019-02-22 NOTE — CONSULT NOTE ADULT - SUBJECTIVE AND OBJECTIVE BOX
HPI:  38F with PMHx of dermatomyositis, cryptogenic organizing pneumonia/BOOP with recent admission in December for cough, discharged on MMP, AZA and prednisone. She states by approximately 2 weeks after he discharge her cough had resolved and muscle aches were at her baseline. She was compliant with meds, and followed up with rheumatology 1/29. She began developing a mild cough 2 weeks, however her muscle aches and weakness were at baseine so she was advised to stop prednisone. Her cough progressed, with paroxysmal coughing ever few hours, and several days later she began developing progressive muscle aches and weakness. Additionally, she redeveloped her typical rash on chest and face, which is associated with a burning sensation and dryness. Prior to coming into the hospital her pain was 10/10 diffusely but mostly in shoulders, hips, forearms and hands, and she needed assistance to stand and was unable to raise arms above shoulder level. She denies fevers, chills, change in the color of her urine, dysuria, urinary frequency, hemoptysis, green colored sputum.      In ED, afebrile, , /60, satur[place] [address], , ALT 94, Cr at baseline, no leukocytosis, started on Solumedrol 125mg and high rate of NS. (20 Feb 2019 22:42)      PAST MEDICAL & SURGICAL HISTORY:  BOOP (bronchiolitis obliterans with organizing pneumonia)  Polymyositis  Hyperlipidemia  Cryptogenic organizing pneumonia  History of cervical cerclage  Ankle fracture      Hospital Course:  Has had dermatomyositis symtoms for a year with significant side effects on corticosteroids including weight gain, strange thoughts, lumps. To start on Rituxan for worsening. She walks with a walker, rollator.   TODAY'S SUBJECTIVE & REVIEW OF SYMPTOMS:     Constitutional WNL   Cardio WNL   Resp WNL   GI WNL  Heme WNL  Endo WNL  Skin WNL  MSK WNL  Neuro WNL  Cognitive WNL  Psych WNL      MEDICATIONS  (STANDING):  azaTHIOprine 100 milliGRAM(s) Oral daily  heparin  Injectable 5000 Unit(s) SubCutaneous every 8 hours  melatonin 5 milliGRAM(s) Oral at bedtime  mycophenolate mofetil 1500 milliGRAM(s) Oral two times a day  predniSONE   Tablet 40 milliGRAM(s) Oral daily  sodium chloride 0.9%. 1000 milliLiter(s) (250 mL/Hr) IV Continuous <Continuous>  triamcinolone 0.1% Cream 1 Application(s) Topical every 12 hours  trimethoprim  160 mG/sulfamethoxazole 800 mG 1 Tablet(s) Oral <User Schedule>    MEDICATIONS  (PRN):  ALBUTerol    90 MICROgram(s) HFA Inhaler 2 Puff(s) Inhalation every 6 hours PRN Shortness of Breath and/or Wheezing  benzonatate 100 milliGRAM(s) Oral three times a day PRN Cough  hydrocortisone 1% Cream 1 Application(s) Topical daily PRN Rash and/or Itching  morphine  - Injectable 2 milliGRAM(s) IV Push every 4 hours PRN Severe Pain (7 - 10)      FAMILY HISTORY:  Family history of fibromyalgia (Mother)  Family history of sarcoidosis (Father): Father, paternal grandmother and paternal aunt      Allergies    contrast media (iodine-based) (Unknown)  peanuts (Unknown)  shellfish (Unknown)    Intolerances        SOCIAL HISTORY:    [  ] Etoh  [  ] Smoking  [  ] Substance abuse     Home Environment:  [  ] Home Alone  [ x ] Lives with Family-13 1nd 11 year old children  [x  ] Home Health Aid- 3 hours, 6 days a week    Dwelling:  [ x ] Apartment  [  ] Private House  [  ] Adult Home  [  ] Skilled Nursing Facility      [  ] Short Term  [  ] Long Term  [  ] Stairs       Elevator [ x ]    FUNCTIONAL STATUS PTA: (Check all that apply)  Ambulation: [x   ]Independent    [  ] Dependent     [  ] Non-Ambulatory  Assistive Device: [  ] SA Cane  [  ]  Q Cane  [x  ] Walker  [  ]  Wheelchair  ADL : [ x ] Independent  [  ]  Dependent       Vital Signs Last 24 Hrs  T(C): 36 (22 Feb 2019 13:18), Max: 36.1 (22 Feb 2019 00:04)  T(F): 96.8 (22 Feb 2019 13:18), Max: 97 (22 Feb 2019 00:04)  HR: 88 (22 Feb 2019 13:18) (80 - 88)  BP: 132/63 (22 Feb 2019 13:18) (118/64 - 132/63)  BP(mean): --  RR: 18 (22 Feb 2019 13:18) (18 - 18)  SpO2: 94% (22 Feb 2019 08:11) (94% - 99%)      PHYSICAL EXAM: Alert & Oriented X3  GENERAL: NAD, well-groomed, well-developed  HEAD:  Atraumatic, Normocephalic  EYES: EOMI, PERRLA, conjunctiva and sclera clear  NECK: Supple, No JVD, Normal thyroid  CHEST/LUNG: Clear to percussion bilaterally; No rales, rhonchi, wheezing, or rubs  HEART: Regular rate and rhythm; No murmurs, rubs, or gallops  ABDOMEN: Soft, Nontender, Nondistended; Bowel sounds present  EXTREMITIES:  2+ Peripheral Pulses, No clubbing, cyanosis, or edema    NERVOUS SYSTEM:  Cranial Nerves 2-12 intact [  ] Abnormal  [  ]  ROM: WFL all extremities [  ]  Abnormal [  ]  Motor Strength: WFL all extremities  [  ]  Abnormal [x  ] bilateral shoulder foward flex 2/5, hand intrin 4+/5; bilateral hip flex 2/5; bilateral ankle DF 4+/5  Sensation: intact to light touch [  ] Abnormal [  ]  Reflexes: Symmetric [  ]  Abnormal [  ]    FUNCTIONAL STATUS:  Bed Mobility: Independent [  ]  Supervision [  ]  Needs Assistance [  ]  N/A [  ]  Transfers: Independent [  ]  Supervision [  ]  Needs Assistance [  ]  N/A [  ]   Ambulation: Independent [  ]  Supervision [  ]  Needs Assistance [  ]  N/A [  ]  ADL: Independent [  ] Requires Assistance [  ] N/A [  ]      LABS:                        10.3   9.57  )-----------( 590      ( 22 Feb 2019 07:59 )             32.9     02-22    139  |  103  |  11  ----------------------------<  91  4.3   |  22  |  <0.5<L>    Ca    8.7      22 Feb 2019 07:59  Phos  3.6     02-21  Mg     1.8     02-22    TPro  8.0  /  Alb  3.3<L>  /  TBili  <0.2  /  DBili  x   /  AST  166<H>  /  ALT  85<H>  /  AlkPhos  63  02-21          RADIOLOGY & ADDITIONAL STUDIES:    Assesment:

## 2019-02-22 NOTE — PROGRESS NOTE ADULT - ASSESSMENT
37yo F with Past Medical History Dermatomyositis (dx April 2018), cryptogenic organizing pneumonia/BOOP admitted in December for cough (dc on MMP, AZA and prednisone) readmitted for increasing muscle aches and weakness secondary to dermatomyositis flare.    Dermatomyositis exacerbation complicated by rhabdomyolysis: patient currently receiving MMP, Azathioprine 100mg q24, and Prednisone 40mg q24h as per Rheumatology recommendations.   For possible Rituxan infusion prior to discharge.  Continue aggressive IV hydration for rhabdomyolysis.  Continue to trend daily CK levels.  Renal function remains within normal limits.  Morphine PRN for pain  Hx of BOOP: continue Bactrim MWF.  No need for pulmonary consult given stable respiratory status.  GI/DVT prophylaxis

## 2019-02-22 NOTE — PROGRESS NOTE ADULT - ASSESSMENT
JESSI KING 38y Female  MRN#: [id number]   CODE STATUS: Full code      SUBJECTIVE    Patient is a 38y old Female who presents with a chief complaint of muscle pain, weakness, and violaceous rash (22 Feb 2019 16:31).  Currently admitted to medicine with the primary diagnosis of non-traumatic rhabdomyolysis due to dermatomyositis flare.    Interval History: Today is hospital day 2d. Overnight there were no events. Today she continues to complain of pain and weakness, but feels it is gradually improving. Rash resolving, no longer pruritic. Similar to previous flare episodes, which she states are infrequent.    HPI:   HPI:  38F with PMHx of dermatomyositis, cryptogenic organizing pneumonia/BOOP with recent admission in December for cough, discharged on MMP, AZA and prednisone. She states by approximately 2 weeks after he discharge her cough had resolved and muscle aches were at her baseline. She was compliant with meds, and followed up with rheumatology 1/29. She began developing a mild cough 2 weeks, however her muscle aches and weakness were at baseine so she was advised to stop prednisone. Her cough progressed, with paroxysmal coughing ever few hours, and several days later she began developing progressive muscle aches and weakness. Additionally, she redeveloped her typical rash on chest and face, which is associated with a burning sensation and dryness. Prior to coming into the hospital her pain was 10/10 diffusely but mostly in shoulders, hips, forearms and hands, and she needed assistance to stand and was unable to raise arms above shoulder level. She denies fevers, chills, change in the color of her urine, dysuria, urinary frequency, hemoptysis, green colored sputum.      In ED, afebrile, , /60, saturating [place] [address], , ALT 94, Cr at baseline, no leukocytosis, started on Solumedrol 125mg and high rate of NS. (20 Feb 2019 22:42)    Hospital Course:       PAST MEDICAL & SURGICAL HISTORY  BOOP (bronchiolitis obliterans with organizing pneumonia)  Polymyositis  Hyperlipidemia  Cryptogenic organizing pneumonia  History of cervical cerclage  Ankle fracture      ALLERGIES:  contrast media (iodine-based) (Unknown)  peanuts (Unknown)  shellfish (Unknown)      MEDICATIONS:  STANDING MEDICATIONS  azaTHIOprine 100 milliGRAM(s) Oral daily  heparin  Injectable 5000 Unit(s) SubCutaneous every 8 hours  melatonin 5 milliGRAM(s) Oral at bedtime  mycophenolate mofetil 1500 milliGRAM(s) Oral two times a day  predniSONE   Tablet 40 milliGRAM(s) Oral daily  sodium chloride 0.9%. 1000 milliLiter(s) IV Continuous <Continuous>  triamcinolone 0.1% Cream 1 Application(s) Topical every 12 hours  trimethoprim  160 mG/sulfamethoxazole 800 mG 1 Tablet(s) Oral <User Schedule>    PRN MEDICATIONS  ALBUTerol    90 MICROgram(s) HFA Inhaler 2 Puff(s) Inhalation every 6 hours PRN  benzonatate 100 milliGRAM(s) Oral three times a day PRN  hydrocortisone 1% Cream 1 Application(s) Topical daily PRN  morphine  - Injectable 2 milliGRAM(s) IV Push every 4 hours PRN        OBJECTIVE    VITAL SIGNS: Last 24 Hours  T(C): 36 (22 Feb 2019 13:18), Max: 36.1 (22 Feb 2019 00:04)  T(F): 96.8 (22 Feb 2019 13:18), Max: 97 (22 Feb 2019 00:04)  HR: 88 (22 Feb 2019 13:18) (80 - 88)  BP: 132/63 (22 Feb 2019 13:18) (118/64 - 132/63)  BP(mean): --  RR: 18 (22 Feb 2019 13:18) (18 - 18)  SpO2: 94% (22 Feb 2019 08:11) (94% - 99%)      PHYSICAL EXAM:    GENERAL: No acute distress. Mild darkening of bilateral cheeks (per pt typical appearance of resolving rash)  HEENT:  Atraumatic, Normocephalic. EOMI, PERRLA.  PULMONARY: Clear to auscultation bilaterally.  CARDIOVASCULAR: Regular rate and rhythm; No murmurs, rubs, or gallops.  SKIN: Hyperpigmentation of upper chest (per pt typical appearance of resolving rash). No gottron's papules.      LABS:                        10.3   9.57  )-----------( 590      ( 22 Feb 2019 07:59 )             32.9     02-22    139  |  103  |  11  ----------------------------<  91  4.3   |  22  |  <0.5<L>    Ca    8.7      22 Feb 2019 07:59  Phos  3.6     02-21  Mg     1.8     02-22    TPro  8.0  /  Alb  3.3<L>  /  TBili  <0.2  /  DBili  x   /  AST  166<H>  /  ALT  85<H>  /  AlkPhos  63  02-21      Creatine Kinase, Serum: 5374 U/L <H> (02-22-19 @ 07:59)  Creatine Kinase, Serum: 5853 U/L <H> (02-22-19 @ 00:31)      CARDIAC MARKERS ( 22 Feb 2019 07:59 )  x     / x     / 5374 U/L / x     / x      CARDIAC MARKERS ( 22 Feb 2019 00:31 )  x     / x     / 5853 U/L / x     / x      CARDIAC MARKERS ( 21 Feb 2019 16:52 )  x     / x     / 6788 U/L / x     / x      CARDIAC MARKERS ( 21 Feb 2019 08:12 )  x     / x     / 8816 U/L / x     / x      CARDIAC MARKERS ( 21 Feb 2019 01:03 )  x     / x     / 20650 U/L / x     / x      CARDIAC MARKERS ( 20 Feb 2019 18:00 )  x     / x     / 61600 U/L / x     / x          RADIOLOGY:    < from: Xray Chest 1 View- PORTABLE-Urgent (02.20.19 @ 17:48) >    Unchanged scattered bilateral interstitial opacities.    < end of copied text >        ASSESSMENT AND PLAN    38F with PMHx of dermatomyositis, cryptogenic organizing pneumonia/BOOP with recent admission in December for cough, discharged on MMP, AZA and prednisone presents for increasing muscle aches and weakness, found to have rhabdomyolysis/dermatomyositis flare.     Dermatomyositis flare with rhabdomyolysis:   - Currently on MMP, azathioprine, prednisone 10mg at home  - C/w aggressive IV hydration NS 250cc/hr  - Rheum consult recommended prednisone 40 mg daily. Decrease by 10 mg each week outpatient.  - Clinically improving; CK trending down.  - Per rheumatology start rituximab inpatient tomorrow - will sign necessary paperwork in morning  - Monitor BMP (for K and Cr), CK Q24H  - C/w morphine PRN for pain  - Bactrim ppx MWF    Cough with hx of /BOOP  - Afebrile, no consolidations on CXR, no leukocytosis  - Will be on steroids as above  - C/w PPI, fluticasone  - Per previous admission, patient needs HRCT, spirometry with bronchodilator response and sleep study      DVT ppx: Heparin SC  Diet: Regular diet  Dispo: Acute. From home.

## 2019-02-22 NOTE — CONSULT NOTE ADULT - ASSESSMENT
IMPRESSION: Rehab of dermatomyositis, a myopathy    PRECAUTIONS: [  ] Cardiac  [x  ] Respiratory  [  ] Seizures [  ] Contact Isolation  [  ] Droplet Isolation  [  ] Other    Weight Bearing Status:     RECOMMENDATION:    Out of Bed to Chair     DVT/Decubiti Prophylaxis    REHAB PLAN:     [ xx  ] Bedside P/T 3-5 times a week   [ xx  ]   Bedside O/T  2-3 times a week             [   ] No Rehab Therapy Indicated                   [   ]  Speech Therapy   Conditioning/ROM                                    ADL  Bed Mobility                                               Conditioning/ROM  Transfers                                                     Bed Mobility  Sitting /Standing Balance                         Transfers                                        Gait Training                                               Sitting/Standing Balance  Stair Training [   ]Applicable                    Home equipment Eval                                                                        Splinting  [   ] Only      GOALS:   ADL   [x   ]   Independent                    Transfers  [  x ] Independent                          Ambulation  [ x  ] Independent     [ x   ] With device                            [   ]  CG                                                         [   ]  CG                                                                  [   ] CG                            [    ] Min A                                                   [   ] Min A                                                              [   ] Min  A          DISCHARGE PLAN:   [   ]  Good candidate for Intensive Rehabilitation/Hospital based-[facility]A [facility]                                             Will tolerate 3hrs Intensive Rehab Daily                                       [    ]  Short Term Rehab in Skilled Nursing Facility                                       [    ]  Home with Outpatient or  services                                         [  xx  ]  Possible Candidate for Intensive Hospital based Rehab

## 2019-02-23 LAB
CK SERPL-CCNC: 5385 U/L — HIGH (ref 0–225)
HBV CORE IGM SER-ACNC: SIGNIFICANT CHANGE UP
HBV SURFACE AB SER-ACNC: SIGNIFICANT CHANGE UP
HBV SURFACE AG SER-ACNC: SIGNIFICANT CHANGE UP
HCT VFR BLD CALC: 35.4 % — LOW (ref 37–47)
HGB BLD-MCNC: 11 G/DL — LOW (ref 12–16)
MCHC RBC-ENTMCNC: 30.8 PG — SIGNIFICANT CHANGE UP (ref 27–31)
MCHC RBC-ENTMCNC: 31.1 G/DL — LOW (ref 32–37)
MCV RBC AUTO: 99.2 FL — HIGH (ref 81–99)
NRBC # BLD: 0 /100 WBCS — SIGNIFICANT CHANGE UP (ref 0–0)
PLATELET # BLD AUTO: 586 K/UL — HIGH (ref 130–400)
RBC # BLD: 3.57 M/UL — LOW (ref 4.2–5.4)
RBC # FLD: 15.8 % — HIGH (ref 11.5–14.5)
WBC # BLD: 10.68 K/UL — SIGNIFICANT CHANGE UP (ref 4.8–10.8)
WBC # FLD AUTO: 10.68 K/UL — SIGNIFICANT CHANGE UP (ref 4.8–10.8)

## 2019-02-23 RX ADMIN — Medication 40 MILLIGRAM(S): at 05:20

## 2019-02-23 RX ADMIN — SODIUM CHLORIDE 250 MILLILITER(S): 9 INJECTION INTRAMUSCULAR; INTRAVENOUS; SUBCUTANEOUS at 05:19

## 2019-02-23 RX ADMIN — Medication 1 APPLICATION(S): at 17:22

## 2019-02-23 RX ADMIN — Medication 5 MILLIGRAM(S): at 21:50

## 2019-02-23 RX ADMIN — MYCOPHENOLATE MOFETIL 1500 MILLIGRAM(S): 250 CAPSULE ORAL at 05:20

## 2019-02-23 RX ADMIN — HEPARIN SODIUM 5000 UNIT(S): 5000 INJECTION INTRAVENOUS; SUBCUTANEOUS at 21:50

## 2019-02-23 RX ADMIN — Medication 1 APPLICATION(S): at 05:20

## 2019-02-23 RX ADMIN — HEPARIN SODIUM 5000 UNIT(S): 5000 INJECTION INTRAVENOUS; SUBCUTANEOUS at 12:20

## 2019-02-23 RX ADMIN — HEPARIN SODIUM 5000 UNIT(S): 5000 INJECTION INTRAVENOUS; SUBCUTANEOUS at 05:19

## 2019-02-23 RX ADMIN — MYCOPHENOLATE MOFETIL 1500 MILLIGRAM(S): 250 CAPSULE ORAL at 17:22

## 2019-02-23 RX ADMIN — AZATHIOPRINE 100 MILLIGRAM(S): 100 TABLET ORAL at 12:19

## 2019-02-23 RX ADMIN — MORPHINE SULFATE 2 MILLIGRAM(S): 50 CAPSULE, EXTENDED RELEASE ORAL at 01:05

## 2019-02-23 RX ADMIN — SODIUM CHLORIDE 250 MILLILITER(S): 9 INJECTION INTRAMUSCULAR; INTRAVENOUS; SUBCUTANEOUS at 12:19

## 2019-02-23 RX ADMIN — SODIUM CHLORIDE 250 MILLILITER(S): 9 INJECTION INTRAMUSCULAR; INTRAVENOUS; SUBCUTANEOUS at 17:22

## 2019-02-23 NOTE — PROGRESS NOTE ADULT - SUBJECTIVE AND OBJECTIVE BOX
Patient seen this morning to discuss receiving Rituxan infusion for her myositis. Discussed risks and benefits with patient, obtained consent. Discussed case with heme/onc fellow, nurse and with pharmacist. Hepatitis C abs results have not been drawn and will need to be done STAT before the Rituxan can be given. Resident is aware.  Hep B staci core are all negative. Plan is to give Rituxan 1000mg IV today and then again in 2 weeks, will plan to done second infusion in our office as outpatient. Premeds tylenol and benadryl. Protocol in chart. In case of infusion reaction infusion is to be stopped give Benadryl and Solumedrol, have resident assess patient and heme onc fellow will be called if further instruction is needed.  He is aware.  FTK  [phone]

## 2019-02-23 NOTE — PROGRESS NOTE ADULT - ASSESSMENT
JESSI KING 38y Female  MRN#: [id number]   CODE STATUS: Full code      SUBJECTIVE    Patient is a 38y old Female who presents with a chief complaint of muscle pain, weakness, and violaceous rash (22 Feb 2019 16:31).  Currently admitted to medicine with the primary diagnosis of non-traumatic rhabdomyolysis due to dermatomyositis flare.    Interval History: Today is hospital day 3d. Overnight there were no events. Today she is doing much better: pain, weakness, and rash have markedly improved.    HPI:   HPI:  38F with PMHx of dermatomyositis, cryptogenic organizing pneumonia/BOOP with recent admission in December for cough, discharged on MMP, AZA and prednisone. She states by approximately 2 weeks after he discharge her cough had resolved and muscle aches were at her baseline. She was compliant with meds, and followed up with rheumatology 1/29. She began developing a mild cough 2 weeks, however her muscle aches and weakness were at baseine so she was advised to stop prednisone. Her cough progressed, with paroxysmal coughing ever few hours, and several days later she began developing progressive muscle aches and weakness. Additionally, she redeveloped her typical rash on chest and face, which is associated with a burning sensation and dryness. Prior to coming into the hospital her pain was 10/10 diffusely but mostly in shoulders, hips, forearms and hands, and she needed assistance to stand and was unable to raise arms above shoulder level. She denies fevers, chills, change in the color of her urine, dysuria, urinary frequency, hemoptysis, green colored sputum.      In ED, afebrile, , /60, satur[place] [address], , ALT 94, Cr at baseline, no leukocytosis, started on Solumedrol 125mg and high rate of NS. (20 Feb 2019 22:42)    Hospital Course:       PAST MEDICAL & SURGICAL HISTORY  BOOP (bronchiolitis obliterans with organizing pneumonia)  Polymyositis  Hyperlipidemia  Cryptogenic organizing pneumonia  History of cervical cerclage  Ankle fracture      ALLERGIES:  contrast media (iodine-based) (Unknown)  peanuts (Unknown)  shellfish (Unknown)      MEDICATIONS:  STANDING MEDICATIONS  azaTHIOprine 100 milliGRAM(s) Oral daily  heparin  Injectable 5000 Unit(s) SubCutaneous every 8 hours  melatonin 5 milliGRAM(s) Oral at bedtime  mycophenolate mofetil 1500 milliGRAM(s) Oral two times a day  predniSONE   Tablet 40 milliGRAM(s) Oral daily  sodium chloride 0.9%. 1000 milliLiter(s) IV Continuous <Continuous>  triamcinolone 0.1% Cream 1 Application(s) Topical every 12 hours  trimethoprim  160 mG/sulfamethoxazole 800 mG 1 Tablet(s) Oral <User Schedule>    PRN MEDICATIONS  ALBUTerol    90 MICROgram(s) HFA Inhaler 2 Puff(s) Inhalation every 6 hours PRN  benzonatate 100 milliGRAM(s) Oral three times a day PRN  hydrocortisone 1% Cream 1 Application(s) Topical daily PRN  morphine  - Injectable 2 milliGRAM(s) IV Push every 4 hours PRN        OBJECTIVE    VITAL SIGNS: Last 24 Hours  T(C): 36 (23 Feb 2019 05:16), Max: 36.6 (22 Feb 2019 21:00)  T(F): 96.8 (23 Feb 2019 05:16), Max: 97.8 (22 Feb 2019 21:00)  HR: 84 (23 Feb 2019 05:16) (84 - 94)  BP: 114/66 (23 Feb 2019 05:16) (111/72 - 114/66)  BP(mean): --  RR: 18 (23 Feb 2019 05:16) (18 - 18)  SpO2: 97% (23 Feb 2019 07:30) (97% - 97%)      PHYSICAL EXAM:    GENERAL: No acute distress. Mild darkening of bilateral cheeks (per pt typical appearance of resolving rash)  HEENT:  Atraumatic, Normocephalic. EOMI, PERRLA.  PULMONARY: Clear to auscultation bilaterally.  CARDIOVASCULAR: Regular rate and rhythm; No murmurs, rubs, or gallops.  SKIN: Decreased hyperpigmentation of upper chest (per pt typical appearance of resolving rash). No gottron's papules.      LABS:                        11.0   10.68 )-----------( 586      ( 23 Feb 2019 08:53 )             35.4     02-22    139  |  103  |  11  ----------------------------<  91  4.3   |  22  |  <0.5<L>    Ca    8.7      22 Feb 2019 07:59  Mg     1.8     02-22        Creatine Kinase, Serum: 5385 U/L <H> (02-23-19 @ 08:53)      CARDIAC MARKERS ( 23 Feb 2019 08:53 )  x     / x     / 5385 U/L / x     / x      CARDIAC MARKERS ( 22 Feb 2019 07:59 )  x     / x     / 5374 U/L / x     / x      CARDIAC MARKERS ( 22 Feb 2019 00:31 )  x     / x     / 5853 U/L / x     / x      CARDIAC MARKERS ( 21 Feb 2019 16:52 )  x     / x     / 6788 U/L / x     / x          RADIOLOGY:        ASSESSMENT AND PLAN    38F with PMHx of dermatomyositis, cryptogenic organizing pneumonia/BOOP with recent admission in December for cough, discharged on MMP, AZA and prednisone presents for increasing muscle aches and weakness, found to have rhabdomyolysis/dermatomyositis flare.     Dermatomyositis flare with rhabdomyolysis:   - On MMP, azathioprine, prednisone 10mg at home  - C/w IVF for now; discontinue tomorrow if CK continues to trend down  - Rheum consult recommended prednisone 40 mg daily. On discharge, decrease by 10 mg each week outpatient.  - Clinically improving; CK trending down.  - Rituximab today per rheumatology; paperwork in chart; will obtain hepatitis panel  - Monitor BMP (for K and Cr), CK Q24H  - C/w morphine PRN for pain  - Bactrim ppx MWF    Cough with hx of /BOOP  - Afebrile, no consolidations on CXR, no leukocytosis  - Will be on steroids as above  - C/w PPI, fluticasone  - Per previous admission, patient needs HRCT, spirometry with bronchodilator response and sleep study outpatient      DVT ppx: Heparin SC  Diet: Regular diet  Dispo: Acute. From home. Likely discharge tomorrow vs. Monday. JESIS KING 38y Female  MRN#: [id number]   CODE STATUS: Full code      SUBJECTIVE    Patient is a 38y old Female who presents with a chief complaint of muscle pain, weakness, and violaceous rash (22 Feb 2019 16:31).  Currently admitted to medicine with the primary diagnosis of non-traumatic rhabdomyolysis due to dermatomyositis flare.    Interval History: Today is hospital day 3d. Overnight there were no events. Today she is doing much better: pain, weakness, and rash have markedly improved.    HPI:   HPI:  38F with PMHx of dermatomyositis, cryptogenic organizing pneumonia/BOOP with recent admission in December for cough, discharged on MMP, AZA and prednisone. She states by approximately 2 weeks after he discharge her cough had resolved and muscle aches were at her baseline. She was compliant with meds, and followed up with rheumatology 1/29. She began developing a mild cough 2 weeks, however her muscle aches and weakness were at baseine so she was advised to stop prednisone. Her cough progressed, with paroxysmal coughing ever few hours, and several days later she began developing progressive muscle aches and weakness. Additionally, she redeveloped her typical rash on chest and face, which is associated with a burning sensation and dryness. Prior to coming into the hospital her pain was 10/10 diffusely but mostly in shoulders, hips, forearms and hands, and she needed assistance to stand and was unable to raise arms above shoulder level. She denies fevers, chills, change in the color of her urine, dysuria, urinary frequency, hemoptysis, green colored sputum.      In ED, afebrile, , /60, [place] [address], , ALT 94, Cr at baseline, no leukocytosis, started on Solumedrol 125mg and high rate of NS. (20 Feb 2019 22:42)           PAST MEDICAL & SURGICAL HISTORY  BOOP (bronchiolitis obliterans with organizing pneumonia)  Polymyositis  Hyperlipidemia  Cryptogenic organizing pneumonia  History of cervical cerclage  Ankle fracture      ALLERGIES:  contrast media (iodine-based) (Unknown)  peanuts (Unknown)  shellfish (Unknown)      MEDICATIONS:  STANDING MEDICATIONS  azaTHIOprine 100 milliGRAM(s) Oral daily  heparin  Injectable 5000 Unit(s) SubCutaneous every 8 hours  melatonin 5 milliGRAM(s) Oral at bedtime  mycophenolate mofetil 1500 milliGRAM(s) Oral two times a day  predniSONE   Tablet 40 milliGRAM(s) Oral daily  sodium chloride 0.9%. 1000 milliLiter(s) IV Continuous <Continuous>  triamcinolone 0.1% Cream 1 Application(s) Topical every 12 hours  trimethoprim  160 mG/sulfamethoxazole 800 mG 1 Tablet(s) Oral <User Schedule>    PRN MEDICATIONS  ALBUTerol    90 MICROgram(s) HFA Inhaler 2 Puff(s) Inhalation every 6 hours PRN  benzonatate 100 milliGRAM(s) Oral three times a day PRN  hydrocortisone 1% Cream 1 Application(s) Topical daily PRN  morphine  - Injectable 2 milliGRAM(s) IV Push every 4 hours PRN        OBJECTIVE    VITAL SIGNS: Last 24 Hours  T(C): 36 (23 Feb 2019 05:16), Max: 36.6 (22 Feb 2019 21:00)  T(F): 96.8 (23 Feb 2019 05:16), Max: 97.8 (22 Feb 2019 21:00)  HR: 84 (23 Feb 2019 05:16) (84 - 94)  BP: 114/66 (23 Feb 2019 05:16) (111/72 - 114/66)  BP(mean): --  RR: 18 (23 Feb 2019 05:16) (18 - 18)  SpO2: 97% (23 Feb 2019 07:30) (97% - 97%)      PHYSICAL EXAM:    GENERAL: No acute distress. Mild darkening of bilateral cheeks (per pt typical appearance of resolving rash)  HEENT:  Atraumatic, Normocephalic. EOMI, PERRLA.  PULMONARY: Clear to auscultation bilaterally.  CARDIOVASCULAR: Regular rate and rhythm; No murmurs, rubs, or gallops.  ABDOMEN:Soft, BS+  CNS: non focal  SKIN: Decreased hyperpigmentation of upper chest (per pt typical appearance of resolving rash). No gottron's papules.      LABS:                        11.0   10.68 )-----------( 586      ( 23 Feb 2019 08:53 )             35.4     02-22    139  |  103  |  11  ----------------------------<  91  4.3   |  22  |  <0.5<L>    Ca    8.7      22 Feb 2019 07:59  Mg     1.8     02-22        Creatine Kinase, Serum: 5385 U/L <H> (02-23-19 @ 08:53)      CARDIAC MARKERS ( 23 Feb 2019 08:53 )  x     / x     / 5385 U/L / x     / x      CARDIAC MARKERS ( 22 Feb 2019 07:59 )  x     / x     / 5374 U/L / x     / x      CARDIAC MARKERS ( 22 Feb 2019 00:31 )  x     / x     / 5853 U/L / x     / x      CARDIAC MARKERS ( 21 Feb 2019 16:52 )  x     / x     / 6788 U/L / x     / x                  ASSESSMENT AND PLAN    38F with PMHx of dermatomyositis, cryptogenic organizing pneumonia/BOOP with recent admission in December for cough, discharged on MMP, AZA and prednisone presents for increasing muscle aches and weakness, found to have rhabdomyolysis/dermatomyositis flare.     Dermatomyositis flare with rhabdomyolysis:   - On MMP, azathioprine, prednisone 10mg at home  - C/w IVF for now; discontinue tomorrow if CK continues to trend down  - Rheum consult recommended prednisone 40 mg daily. On discharge, decrease by 10 mg each week outpatient.  - Clinically improving; CK trending down.  - Rituximab today per rheumatology; paperwork in chart; will obtain hepatitis panel  - Monitor BMP (for K and Cr), CK Q24H  - C/w morphine PRN for pain  - Bactrim ppx MWF    Cough with hx of /BOOP  - Afebrile, no consolidations on CXR, no leukocytosis  - Will be on steroids as above  - C/w PPI, fluticasone  - Per previous admission, patient needs HRCT, spirometry with bronchodilator response and sleep study outpatient      DVT ppx: Heparin SC  Diet: Regular diet  Dispo: Acute. From home.

## 2019-02-24 ENCOUNTER — TRANSCRIPTION ENCOUNTER (OUTPATIENT)
Age: 39
End: 2019-02-24

## 2019-02-24 LAB
ANION GAP SERPL CALC-SCNC: 8 MMOL/L — SIGNIFICANT CHANGE UP (ref 7–14)
BUN SERPL-MCNC: 7 MG/DL — LOW (ref 10–20)
CALCIUM SERPL-MCNC: 8.2 MG/DL — LOW (ref 8.5–10.1)
CHLORIDE SERPL-SCNC: 110 MMOL/L — SIGNIFICANT CHANGE UP (ref 98–110)
CK SERPL-CCNC: 5033 U/L — HIGH (ref 0–225)
CO2 SERPL-SCNC: 21 MMOL/L — SIGNIFICANT CHANGE UP (ref 17–32)
CREAT SERPL-MCNC: <0.5 MG/DL — LOW (ref 0.7–1.5)
GLUCOSE SERPL-MCNC: 93 MG/DL — SIGNIFICANT CHANGE UP (ref 70–99)
HCT VFR BLD CALC: 31.6 % — LOW (ref 37–47)
HCV AB S/CO SERPL IA: 0.18 S/CO — SIGNIFICANT CHANGE UP (ref 0–0.79)
HCV AB SERPL-IMP: SIGNIFICANT CHANGE UP
HGB BLD-MCNC: 9.9 G/DL — LOW (ref 12–16)
MCHC RBC-ENTMCNC: 31 PG — SIGNIFICANT CHANGE UP (ref 27–31)
MCHC RBC-ENTMCNC: 31.3 G/DL — LOW (ref 32–37)
MCV RBC AUTO: 99.1 FL — HIGH (ref 81–99)
NRBC # BLD: 0 /100 WBCS — SIGNIFICANT CHANGE UP (ref 0–0)
PLATELET # BLD AUTO: 506 K/UL — HIGH (ref 130–400)
POTASSIUM SERPL-MCNC: 3.9 MMOL/L — SIGNIFICANT CHANGE UP (ref 3.5–5)
POTASSIUM SERPL-SCNC: 3.9 MMOL/L — SIGNIFICANT CHANGE UP (ref 3.5–5)
RBC # BLD: 3.19 M/UL — LOW (ref 4.2–5.4)
RBC # FLD: 15.9 % — HIGH (ref 11.5–14.5)
SODIUM SERPL-SCNC: 139 MMOL/L — SIGNIFICANT CHANGE UP (ref 135–146)
WBC # BLD: 10.67 K/UL — SIGNIFICANT CHANGE UP (ref 4.8–10.8)
WBC # FLD AUTO: 10.67 K/UL — SIGNIFICANT CHANGE UP (ref 4.8–10.8)

## 2019-02-24 RX ORDER — ACETAMINOPHEN 500 MG
650 TABLET ORAL ONCE
Qty: 0 | Refills: 0 | Status: COMPLETED | OUTPATIENT
Start: 2019-02-24 | End: 2019-02-24

## 2019-02-24 RX ORDER — RITUXIMAB 10 MG/ML
1000 INJECTION, SOLUTION INTRAVENOUS ONCE
Qty: 0 | Refills: 0 | Status: COMPLETED | OUTPATIENT
Start: 2019-02-24 | End: 2019-02-24

## 2019-02-24 RX ORDER — MORPHINE SULFATE 50 MG/1
4 CAPSULE, EXTENDED RELEASE ORAL ONCE
Qty: 0 | Refills: 0 | Status: DISCONTINUED | OUTPATIENT
Start: 2019-02-24 | End: 2019-02-24

## 2019-02-24 RX ORDER — DIPHENHYDRAMINE HCL 50 MG
50 CAPSULE ORAL ONCE
Qty: 0 | Refills: 0 | Status: COMPLETED | OUTPATIENT
Start: 2019-02-24 | End: 2019-02-24

## 2019-02-24 RX ADMIN — HEPARIN SODIUM 5000 UNIT(S): 5000 INJECTION INTRAVENOUS; SUBCUTANEOUS at 13:35

## 2019-02-24 RX ADMIN — SODIUM CHLORIDE 250 MILLILITER(S): 9 INJECTION INTRAMUSCULAR; INTRAVENOUS; SUBCUTANEOUS at 21:44

## 2019-02-24 RX ADMIN — AZATHIOPRINE 100 MILLIGRAM(S): 100 TABLET ORAL at 11:26

## 2019-02-24 RX ADMIN — MYCOPHENOLATE MOFETIL 1500 MILLIGRAM(S): 250 CAPSULE ORAL at 05:30

## 2019-02-24 RX ADMIN — Medication 40 MILLIGRAM(S): at 05:30

## 2019-02-24 RX ADMIN — MORPHINE SULFATE 2 MILLIGRAM(S): 50 CAPSULE, EXTENDED RELEASE ORAL at 00:15

## 2019-02-24 RX ADMIN — MYCOPHENOLATE MOFETIL 1500 MILLIGRAM(S): 250 CAPSULE ORAL at 17:57

## 2019-02-24 RX ADMIN — SODIUM CHLORIDE 250 MILLILITER(S): 9 INJECTION INTRAMUSCULAR; INTRAVENOUS; SUBCUTANEOUS at 05:30

## 2019-02-24 RX ADMIN — Medication 1 APPLICATION(S): at 17:57

## 2019-02-24 RX ADMIN — SODIUM CHLORIDE 250 MILLILITER(S): 9 INJECTION INTRAMUSCULAR; INTRAVENOUS; SUBCUTANEOUS at 11:27

## 2019-02-24 RX ADMIN — MORPHINE SULFATE 4 MILLIGRAM(S): 50 CAPSULE, EXTENDED RELEASE ORAL at 04:30

## 2019-02-24 RX ADMIN — MORPHINE SULFATE 2 MILLIGRAM(S): 50 CAPSULE, EXTENDED RELEASE ORAL at 13:36

## 2019-02-24 RX ADMIN — HEPARIN SODIUM 5000 UNIT(S): 5000 INJECTION INTRAVENOUS; SUBCUTANEOUS at 05:30

## 2019-02-24 RX ADMIN — Medication 650 MILLIGRAM(S): at 13:35

## 2019-02-24 RX ADMIN — MORPHINE SULFATE 4 MILLIGRAM(S): 50 CAPSULE, EXTENDED RELEASE ORAL at 04:15

## 2019-02-24 RX ADMIN — Medication 50 MILLIGRAM(S): at 12:46

## 2019-02-24 RX ADMIN — MORPHINE SULFATE 2 MILLIGRAM(S): 50 CAPSULE, EXTENDED RELEASE ORAL at 11:55

## 2019-02-24 RX ADMIN — Medication 5 MILLIGRAM(S): at 21:44

## 2019-02-24 RX ADMIN — RITUXIMAB 166.67 MILLIGRAM(S): 10 INJECTION, SOLUTION INTRAVENOUS at 13:34

## 2019-02-24 RX ADMIN — Medication 1 APPLICATION(S): at 05:30

## 2019-02-24 RX ADMIN — Medication 650 MILLIGRAM(S): at 12:45

## 2019-02-24 RX ADMIN — MORPHINE SULFATE 2 MILLIGRAM(S): 50 CAPSULE, EXTENDED RELEASE ORAL at 23:47

## 2019-02-24 RX ADMIN — SODIUM CHLORIDE 250 MILLILITER(S): 9 INJECTION INTRAMUSCULAR; INTRAVENOUS; SUBCUTANEOUS at 00:15

## 2019-02-24 RX ADMIN — HEPARIN SODIUM 5000 UNIT(S): 5000 INJECTION INTRAVENOUS; SUBCUTANEOUS at 21:44

## 2019-02-24 NOTE — DISCHARGE NOTE ADULT - CARE PROVIDERS DIRECT ADDRESSES
,DirectAddress_Unknown ,DirectAddress_Unknown,[email or web address] ,DirectAddress_Unknown,[email or web address],DirectAddress_Unknown

## 2019-02-24 NOTE — PHARMACOTHERAPY INTERVENTION NOTE - COMMENTS
Rituxan was originally ordered by rheumatology Michelle Dao, but the order could not be entered in [facility] since the Dr did not get the proper training in [facility]   after speaking with pharmacy management , the covering resident Zafar Godinez he agreed to enter the order under his name as long as it is documented who was the original prescribing physician.

## 2019-02-24 NOTE — DISCHARGE NOTE ADULT - MEDICATION SUMMARY - MEDICATIONS TO STOP TAKING
I will STOP taking the medications listed below when I get home from the hospital:    Levaquin 750 mg oral tablet  -- 1 tab(s) by mouth once a day   -- Avoid prolonged or excessive exposure to direct and/or artificial sunlight while taking this medication.  Do not take dairy products, antacids, or iron preparations within one hour of this medication.  Finish all this medication unless otherwise directed by prescriber.  May cause drowsiness or dizziness.  Medication should be taken with plenty of water. I will STOP taking the medications listed below when I get home from the hospital:    predniSONE 10 mg oral tablet  -- 1 tab(s) by mouth once a day    Levaquin 750 mg oral tablet  -- 1 tab(s) by mouth once a day   -- Avoid prolonged or excessive exposure to direct and/or artificial sunlight while taking this medication.  Do not take dairy products, antacids, or iron preparations within one hour of this medication.  Finish all this medication unless otherwise directed by prescriber.  May cause drowsiness or dizziness.  Medication should be taken with plenty of water.

## 2019-02-24 NOTE — PROGRESS NOTE ADULT - ASSESSMENT
Dermatomyositis flare with rhabdomyolysis:   - MMP, azathioprine, prednisone 10mg at home  - Rituximab today   - likely D/C day after infusion  - Bactrim ppx MWF    R knee pain for 3 months  - Xray here, if not obvious deformity, PMD f/u for PT vs MRI.   patient needs HRCT, spirometry with bronchodilator response and sleep study outpatient

## 2019-02-24 NOTE — DISCHARGE NOTE ADULT - HOSPITAL COURSE
38F with PMHx of dermatomyositis, cryptogenic organizing pneumonia/BOOP with recent admission in December for cough, discharged on MMP, AZA and prednisone presents for increasing muscle aches and weakness, found to have rhabdomyolysis/dermatomyositis flare.     Dermatomyositis flare with rhabdomyolysis:   - On MMP, azathioprine, prednisone 10mg at home  - CK and pain, weakness improved on IVF & prednisone 40 mg QD  - Per rheum, decrease prednisone by 10 mg each week outpatient.  - Received first infusion of rituximab inpatient. F/u outpatient for further infusions.  - Bactrim ppx MWF    Cough with hx of /BOOP  - Afebrile, no consolidations on CXR, no leukocytosis  - Steroids as above  - C/w PPI, fluticasone  - Patient needs HRCT, spirometry with bronchodilator response and sleep study outpatient      Dispo: Home. Follow up with rheumatology. 38F with PMHx of dermatomyositis, cryptogenic organizing pneumonia/BOOP with recent admission in December for cough, discharged on MMP, AZA and prednisone presents for increasing muscle aches and weakness, found to have rhabdomyolysis/dermatomyositis flare.     Dermatomyositis flare with rhabdomyolysis:   - On MMP, azathioprine  - CK and pain, weakness improved on IVF & prednisone 40 mg QD  - Per rheum, decrease prednisone by 10 mg each week outpatient.  - Received first infusion of rituximab inpatient. F/u outpatient for further infusions.  - Bactrim ppx MWF    Cough with hx of /BOOP  - Afebrile, no consolidations on CXR, no leukocytosis  - Steroids as above  - C/w PPI, fluticasone  - Patient needs HRCT, spirometry with bronchodilator response and sleep study outpatient      Dispo: Home. Follow up with rheumatology. 38F with PMHx of dermatomyositis, cryptogenic organizing pneumonia/BOOP with recent admission in December for cough, discharged on MMP, AZA and prednisone presents for increasing muscle aches and weakness, found to have rhabdomyolysis/dermatomyositis flare. Pt CK improved over admission from ~10k to ~5k on IV hydration and steroid therapy and was discharged in consultation with patient's rheumatologist (Dr. Rea) for f/u and continued treatment with Rituxan. Pt discharged with close follow-up. Pt agrees with discharge plan. 38F with PMHx of dermatomyositis, cryptogenic organizing pneumonia/BOOP with recent admission in December for cough, discharged on MMP, AZA and prednisone presents for increasing muscle aches and weakness, found to have rhabdomyolysis/dermatomyositis flare. Pt CK improved over admission from ~10k to ~5k on IV hydration and steroid therapy and was discharged in consultation with patient's rheumatologist (Dr. Rea) for f/u and continued treatment with Rituxan. Pt discharged with close follow-up and po hydration.  Pt agrees with discharge plan.

## 2019-02-24 NOTE — DISCHARGE NOTE ADULT - CARE PROVIDER_API CALL
Lucia Rea ()  Internal Medicine  [address] [facility] [place] [address]  Phone: [phone]  Fax: [phone]  Follow Up Time: Lucia Rea (DO)  Internal Medicine  [address] [place] [address]  Phone: [phone]  Fax: [phone]  Follow Up Time: 1 week    Wisam Jim (MD; CHAO)  Critical Care Medicine; Internal Medicine; Pulmonary Disease  [address] [place] [address]  Phone: [phone]  Fax: [phone]  Follow Up Time: 2 weeks Lucia Rea (DO)  Internal Medicine  [address] [place] [address]  Phone: [phone]  Fax: [phone]  Follow Up Time: 1 week    Wisam Jim (MD; CHAO)  Internal Medicine; Pulmonary Disease  [address] [place] [address]  Phone: [phone]  Fax: [phone]  Follow Up Time: 2 weeks    PMD,   Phone: (   )    -  Fax: (   )    -  Follow Up Time:

## 2019-02-24 NOTE — PROGRESS NOTE ADULT - SUBJECTIVE AND OBJECTIVE BOX
Pt seen and examined. pt undergoing rituxin infusion, denies pain or chills.    T(F): , Max: 96.7 (02-24-19 @ 13:43)  HR: 109 (02-24-19 @ 13:43) (80 - 109)  BP: 151/89 (02-24-19 @ 13:43)  RR: 18 (02-24-19 @ 13:43)  SpO2: 98% (02-24-19 @ 07:19)  General: No apparent distress  Cardiovascular: S1, S2  Gastrointestinal: Soft, Non-tender, Non-distended  Respiratory: Good air entry bilaterally  Musculoskeletal: Moves all extremities. R knee medial joint line tenderness.  Lymphatic: No edema  Neurologic: No gross motor deficit  Dermatologic: Skin dry                          9.9    10.67 )-----------( 506      ( 24 Feb 2019 07:49 )             31.6     02-24    139  |  110  |  7<L>  ----------------------------<  93  3.9   |  21  |  <0.5<L>    Ca    8.2<L>      24 Feb 2019 07:49

## 2019-02-24 NOTE — DISCHARGE NOTE ADULT - MEDICATION SUMMARY - MEDICATIONS TO TAKE
I will START or STAY ON the medications listed below when I get home from the hospital:    predniSONE 10 mg oral tablet  -- 3 tab(s) by mouth once a day until 3/7/19  -- Indication: For Dermatomyositis    predniSONE 10 mg oral tablet  -- 2 tab(s) by mouth once a day from 3/8/19 to 3/15/19  -- Indication: For Dermatomyositis    predniSONE 10 mg oral tablet  -- 1 tab(s) by mouth once a day starting from 3/16/19  -- Indication: For Dermatomyositis    benzonatate 100 mg oral capsule  -- 1 cap(s) by mouth 3 times a day   -- May cause drowsiness.  Alcohol may intensify this effect.  Use care when operating dangerous machinery.  Swallow whole.  Do not crush.    -- Indication: For Cough    Ventolin HFA 90 mcg/inh inhalation aerosol  -- 2 puff(s) inhaled 4 times a day   -- For inhalation only.  It is very important that you take or use this exactly as directed.  Do not skip doses or discontinue unless directed by your doctor.  Obtain medical advice before taking any non-prescription drugs as some may affect the action of this medication.  Shake well before use.    -- Indication: For BOOP    Cortizone-10 topical cream  -- Apply on skin to affected area once a day, As Needed  -- Indication: For Dermatomyositis    lidocaine 5% topical film  -- Apply on skin to affected area once a day   -- Indication: For Knee pain, right anterior    mycophenolate mofetil 500 mg oral tablet  -- 3 tab(s) by mouth 2 times a day  -- Indication: For Dermatomyositis    azaTHIOprine 50 mg oral tablet  -- 2 tab(s) by mouth once a day  -- Indication: For Dermatomyositis    pantoprazole 40 mg oral delayed release tablet  -- 1 tab(s) by mouth once a day (before a meal)  -- Indication: For GERD prophylaxis    Bactrim  mg-160 mg oral tablet  -- 1 tab(s) by mouth Monday, Wednesday, and Friday  -- Indication: For Prophylaxis I will START or STAY ON the medications listed below when I get home from the hospital:    predniSONE 20 mg oral tablet  -- 1 tab(s) by mouth once a day  -- Indication: For Dermatomyositis    benzonatate 100 mg oral capsule  -- 1 cap(s) by mouth 3 times a day   -- May cause drowsiness.  Alcohol may intensify this effect.  Use care when operating dangerous machinery.  Swallow whole.  Do not crush.    -- Indication: For Cough    Ventolin HFA 90 mcg/inh inhalation aerosol  -- 2 puff(s) inhaled 4 times a day   -- For inhalation only.  It is very important that you take or use this exactly as directed.  Do not skip doses or discontinue unless directed by your doctor.  Obtain medical advice before taking any non-prescription drugs as some may affect the action of this medication.  Shake well before use.    -- Indication: For BOOP    Cortizone-10 topical cream  -- Apply on skin to affected area once a day, As Needed  -- Indication: For Dermatomyositis    lidocaine 5% topical film  -- Apply on skin to affected area once a day   -- Indication: For Knee pain, right anterior    mycophenolate mofetil 500 mg oral tablet  -- 3 tab(s) by mouth 2 times a day  -- Indication: For Dermatomyositis    azaTHIOprine 50 mg oral tablet  -- 2 tab(s) by mouth once a day  -- Indication: For Dermatomyositis    pantoprazole 40 mg oral delayed release tablet  -- 1 tab(s) by mouth once a day (before a meal)  -- Indication: For GERD prophylaxis    Bactrim  mg-160 mg oral tablet  -- 1 tab(s) by mouth Monday, Wednesday, and Friday  -- Indication: For Prophylaxis I will START or STAY ON the medications listed below when I get home from the hospital:    predniSONE 20 mg oral tablet  -- 1 tab(s) by mouth once a day  -- Indication: For Dermatomyositis    oxycodone-acetaminophen 5 mg-325 mg oral tablet  -- 1 tab(s) by mouth every 6 hours, As Needed MDD:2 pills  -- Indication: For Knee pain, right anterior    benzonatate 100 mg oral capsule  -- 1 cap(s) by mouth 3 times a day   -- May cause drowsiness.  Alcohol may intensify this effect.  Use care when operating dangerous machinery.  Swallow whole.  Do not crush.    -- Indication: For Cough    Ventolin HFA 90 mcg/inh inhalation aerosol  -- 2 puff(s) inhaled 4 times a day   -- For inhalation only.  It is very important that you take or use this exactly as directed.  Do not skip doses or discontinue unless directed by your doctor.  Obtain medical advice before taking any non-prescription drugs as some may affect the action of this medication.  Shake well before use.    -- Indication: For BOOP    Cortizone-10 topical cream  -- Apply on skin to affected area once a day, As Needed  -- Indication: For Dermatomyositis    lidocaine 5% topical film  -- Apply on skin to affected area once a day   -- Indication: For Knee pain, right anterior    mycophenolate mofetil 500 mg oral tablet  -- 3 tab(s) by mouth 2 times a day  -- Indication: For Dermatomyositis    azaTHIOprine 50 mg oral tablet  -- 2 tab(s) by mouth once a day  -- Indication: For Dermatomyositis    pantoprazole 40 mg oral delayed release tablet  -- 1 tab(s) by mouth once a day (before a meal)  -- Indication: For GERD prophylaxis    Bactrim  mg-160 mg oral tablet  -- 1 tab(s) by mouth Monday, Wednesday, and Friday  -- Indication: For Prophylaxis

## 2019-02-24 NOTE — DISCHARGE NOTE ADULT - PATIENT PORTAL LINK FT
You can access the [facility] Patient Portal, offered by [facility], by registering with the following website: [email or web address]

## 2019-02-24 NOTE — DISCHARGE NOTE ADULT - PROVIDER TOKENS
PROVIDER:[TOKEN:[[id number]]] PROVIDER:[TOKEN:[[id number]],FOLLOWUP:[1 week]],PROVIDER:[TOKEN:[[id number]],FOLLOWUP:[2 weeks]] PROVIDER:[TOKEN:[[id number]],FOLLOWUP:[1 week]],PROVIDER:[TOKEN:[[id number]],FOLLOWUP:[2 weeks]],FREE:[LAST:[PMD],PHONE:[(   )    -],FAX:[(   )    -]]

## 2019-02-24 NOTE — DISCHARGE NOTE ADULT - PLAN OF CARE
Continue maintenance therapy Take all medications as prescribed, including your prednisone taper. Follow up with your rgeumatologist outpatient for further infusions of rituximab. Take all medications as prescribed, including your prednisone taper. Follow up with your rheumatologist outpatient for further infusions of rituximab. If you experience any worrying symptoms such as worsening muscle pain, falls, or difficulty breathing, please contact emergency personnel as soon as possible. Take all medications as prescribed, including your prednisone taper. Follow up with your rheumatologist outpatient for further infusions of rituximab. Please ensure a follow-up creatine kinase level is drawn. If you experience any worrying symptoms such as worsening muscle pain, falls, or difficulty breathing, please contact emergency personnel as soon as possible. Take all medications as prescribed. Drink water liberally to ensure proper hydration. Follow up with your rheumatologist outpatient for further infusions of rituximab. Continue taking prednisone at current dose until your rheumatology appointment. Please ensure a follow-up creatine kinase level is drawn. If you experience any worrying symptoms such as worsening muscle pain, falls, or difficulty breathing, please contact emergency personnel as soon as possible. Take all medications as prescribed. Drink water liberally to ensure proper hydration. Follow up with your rheumatologist outpatient for further infusions of rituximab. Continue taking prednisone at current dose until your rheumatology appointment. Please ensure a follow-up creatine kinase level is drawn. If you experience any worrying symptoms such as worsening muscle pain, falls, or difficulty breathing, please contact emergency personnel as soon as possible, do not stop Prednisone before you'll f/u with Rheumatology doctor resolution of symptoms take all meds as prescribed , wear knee support ( elastic), f/u with PMD after discharge

## 2019-02-24 NOTE — DISCHARGE NOTE ADULT - CARE PLAN
Principal Discharge DX:	Dermatomycosis  Goal:	Continue maintenance therapy  Assessment and plan of treatment:	Take all medications as prescribed, including your prednisone taper. Follow up with your rgeumatologist outpatient for further infusions of rituximab. Principal Discharge DX:	Dermatomycosis  Goal:	Continue maintenance therapy  Assessment and plan of treatment:	Take all medications as prescribed, including your prednisone taper. Follow up with your rheumatologist outpatient for further infusions of rituximab. If you experience any worrying symptoms such as worsening muscle pain, falls, or difficulty breathing, please contact emergency personnel as soon as possible. Principal Discharge DX:	Dermatomyositis  Goal:	Continue maintenance therapy  Assessment and plan of treatment:	Take all medications as prescribed, including your prednisone taper. Follow up with your rheumatologist outpatient for further infusions of rituximab. If you experience any worrying symptoms such as worsening muscle pain, falls, or difficulty breathing, please contact emergency personnel as soon as possible. Principal Discharge DX:	Dermatomyositis  Goal:	Continue maintenance therapy  Assessment and plan of treatment:	Take all medications as prescribed, including your prednisone taper. Follow up with your rheumatologist outpatient for further infusions of rituximab. Please ensure a follow-up creatine kinase level is drawn. If you experience any worrying symptoms such as worsening muscle pain, falls, or difficulty breathing, please contact emergency personnel as soon as possible. Principal Discharge DX:	Dermatomyositis  Goal:	Continue maintenance therapy  Assessment and plan of treatment:	Take all medications as prescribed. Drink water liberally to ensure proper hydration. Follow up with your rheumatologist outpatient for further infusions of rituximab. Continue taking prednisone at current dose until your rheumatology appointment. Please ensure a follow-up creatine kinase level is drawn. If you experience any worrying symptoms such as worsening muscle pain, falls, or difficulty breathing, please contact emergency personnel as soon as possible. Principal Discharge DX:	Dermatomyositis  Goal:	Continue maintenance therapy  Assessment and plan of treatment:	Take all medications as prescribed. Drink water liberally to ensure proper hydration. Follow up with your rheumatologist outpatient for further infusions of rituximab. Continue taking prednisone at current dose until your rheumatology appointment. Please ensure a follow-up creatine kinase level is drawn. If you experience any worrying symptoms such as worsening muscle pain, falls, or difficulty breathing, please contact emergency personnel as soon as possible, do not stop Prednisone before you'll f/u with Rheumatology doctor  Secondary Diagnosis:	Knee pain, right anterior  Goal:	resolution of symptoms  Assessment and plan of treatment:	take all meds as prescribed , wear knee support ( elastic), f/u with PMD after discharge

## 2019-02-24 NOTE — DISCHARGE NOTE ADULT - INSTRUCTIONS
Regular diet low sodium , low calorie diet, drink plenty of fluids at least one gallon a day ( preferably water) and f/u with your doctor

## 2019-02-25 DIAGNOSIS — M25.561 PAIN IN RIGHT KNEE: ICD-10-CM

## 2019-02-25 DIAGNOSIS — E66.9 OBESITY, UNSPECIFIED: ICD-10-CM

## 2019-02-25 DIAGNOSIS — M62.82 RHABDOMYOLYSIS: ICD-10-CM

## 2019-02-25 DIAGNOSIS — B36.9 SUPERFICIAL MYCOSIS, UNSPECIFIED: ICD-10-CM

## 2019-02-25 DIAGNOSIS — E78.5 HYPERLIPIDEMIA, UNSPECIFIED: ICD-10-CM

## 2019-02-25 LAB
ANION GAP SERPL CALC-SCNC: 13 MMOL/L — SIGNIFICANT CHANGE UP (ref 7–14)
BUN SERPL-MCNC: 7 MG/DL — LOW (ref 10–20)
CALCIUM SERPL-MCNC: 8.4 MG/DL — LOW (ref 8.5–10.1)
CHLORIDE SERPL-SCNC: 104 MMOL/L — SIGNIFICANT CHANGE UP (ref 98–110)
CK SERPL-CCNC: 5077 U/L — HIGH (ref 0–225)
CO2 SERPL-SCNC: 21 MMOL/L — SIGNIFICANT CHANGE UP (ref 17–32)
CREAT SERPL-MCNC: <0.5 MG/DL — LOW (ref 0.7–1.5)
GLUCOSE SERPL-MCNC: 69 MG/DL — LOW (ref 70–99)
HCT VFR BLD CALC: 34.2 % — LOW (ref 37–47)
HGB BLD-MCNC: 10.4 G/DL — LOW (ref 12–16)
MCHC RBC-ENTMCNC: 30.4 G/DL — LOW (ref 32–37)
MCHC RBC-ENTMCNC: 30.4 PG — SIGNIFICANT CHANGE UP (ref 27–31)
MCV RBC AUTO: 100 FL — HIGH (ref 81–99)
NRBC # BLD: 0 /100 WBCS — SIGNIFICANT CHANGE UP (ref 0–0)
PLATELET # BLD AUTO: 524 K/UL — HIGH (ref 130–400)
POTASSIUM SERPL-MCNC: 4 MMOL/L — SIGNIFICANT CHANGE UP (ref 3.5–5)
POTASSIUM SERPL-SCNC: 4 MMOL/L — SIGNIFICANT CHANGE UP (ref 3.5–5)
RBC # BLD: 3.42 M/UL — LOW (ref 4.2–5.4)
RBC # FLD: 16.1 % — HIGH (ref 11.5–14.5)
SODIUM SERPL-SCNC: 138 MMOL/L — SIGNIFICANT CHANGE UP (ref 135–146)
WBC # BLD: 9.81 K/UL — SIGNIFICANT CHANGE UP (ref 4.8–10.8)
WBC # FLD AUTO: 9.81 K/UL — SIGNIFICANT CHANGE UP (ref 4.8–10.8)

## 2019-02-25 RX ORDER — SODIUM CHLORIDE 9 MG/ML
1000 INJECTION, SOLUTION INTRAVENOUS
Qty: 0 | Refills: 0 | Status: DISCONTINUED | OUTPATIENT
Start: 2019-02-25 | End: 2019-02-27

## 2019-02-25 RX ORDER — SENNA PLUS 8.6 MG/1
2 TABLET ORAL AT BEDTIME
Qty: 0 | Refills: 0 | Status: DISCONTINUED | OUTPATIENT
Start: 2019-02-25 | End: 2019-03-02

## 2019-02-25 RX ORDER — OXYCODONE AND ACETAMINOPHEN 5; 325 MG/1; MG/1
1 TABLET ORAL ONCE
Qty: 0 | Refills: 0 | Status: DISCONTINUED | OUTPATIENT
Start: 2019-02-25 | End: 2019-02-25

## 2019-02-25 RX ORDER — DIPHENHYDRAMINE HCL 50 MG
25 CAPSULE ORAL ONCE
Qty: 0 | Refills: 0 | Status: DISCONTINUED | OUTPATIENT
Start: 2019-02-25 | End: 2019-03-02

## 2019-02-25 RX ORDER — DOCUSATE SODIUM 100 MG
100 CAPSULE ORAL THREE TIMES A DAY
Qty: 0 | Refills: 0 | Status: DISCONTINUED | OUTPATIENT
Start: 2019-02-25 | End: 2019-02-26

## 2019-02-25 RX ADMIN — MORPHINE SULFATE 2 MILLIGRAM(S): 50 CAPSULE, EXTENDED RELEASE ORAL at 00:05

## 2019-02-25 RX ADMIN — MORPHINE SULFATE 2 MILLIGRAM(S): 50 CAPSULE, EXTENDED RELEASE ORAL at 09:22

## 2019-02-25 RX ADMIN — HEPARIN SODIUM 5000 UNIT(S): 5000 INJECTION INTRAVENOUS; SUBCUTANEOUS at 21:11

## 2019-02-25 RX ADMIN — Medication 1 APPLICATION(S): at 06:08

## 2019-02-25 RX ADMIN — MORPHINE SULFATE 2 MILLIGRAM(S): 50 CAPSULE, EXTENDED RELEASE ORAL at 04:15

## 2019-02-25 RX ADMIN — HEPARIN SODIUM 5000 UNIT(S): 5000 INJECTION INTRAVENOUS; SUBCUTANEOUS at 06:07

## 2019-02-25 RX ADMIN — SODIUM CHLORIDE 250 MILLILITER(S): 9 INJECTION, SOLUTION INTRAVENOUS at 13:59

## 2019-02-25 RX ADMIN — MYCOPHENOLATE MOFETIL 1500 MILLIGRAM(S): 250 CAPSULE ORAL at 17:15

## 2019-02-25 RX ADMIN — Medication 40 MILLIGRAM(S): at 06:07

## 2019-02-25 RX ADMIN — MORPHINE SULFATE 2 MILLIGRAM(S): 50 CAPSULE, EXTENDED RELEASE ORAL at 03:59

## 2019-02-25 RX ADMIN — SODIUM CHLORIDE 250 MILLILITER(S): 9 INJECTION, SOLUTION INTRAVENOUS at 09:26

## 2019-02-25 RX ADMIN — AZATHIOPRINE 100 MILLIGRAM(S): 100 TABLET ORAL at 13:18

## 2019-02-25 RX ADMIN — MYCOPHENOLATE MOFETIL 1500 MILLIGRAM(S): 250 CAPSULE ORAL at 06:07

## 2019-02-25 RX ADMIN — OXYCODONE AND ACETAMINOPHEN 1 TABLET(S): 5; 325 TABLET ORAL at 23:54

## 2019-02-25 RX ADMIN — HEPARIN SODIUM 5000 UNIT(S): 5000 INJECTION INTRAVENOUS; SUBCUTANEOUS at 13:18

## 2019-02-25 RX ADMIN — Medication 1 TABLET(S): at 09:25

## 2019-02-25 RX ADMIN — Medication 5 MILLIGRAM(S): at 21:11

## 2019-02-25 RX ADMIN — Medication 1 APPLICATION(S): at 17:15

## 2019-02-25 NOTE — PROGRESS NOTE ADULT - ASSESSMENT
38F with PMHx of dermatomyositis, cryptogenic organizing pneumonia/BOOP with recent admission in December for cough, discharged on MMP, AZA and prednisone presents for increasing muscle aches and weakness, found to have rhabdomyolysis/dermatomyositis flare. While in the hospital pt was consulted by rheumatology received one dose of Rituximab on 2/24/19, she is on aggressive IV hydration with elevated CK level , which is consistently elevated for last 48 hours.    Dispo: anticipate discharge in 24 hours    #Progress Note Handoff  Pending (specify):  Consults_________, Tests________, Test Results_CK level ______, Other_________  Family discussion: no family available by the bedside   Disposition: Home_x __/SNF___/Other________/Unknown at this time________

## 2019-02-25 NOTE — PROGRESS NOTE ADULT - ASSESSMENT
38F with PMHx of dermatomyositis, cryptogenic organizing pneumonia/BOOP with recent admission in December for cough, discharged on MMP, AZA and prednisone presents for increasing muscle aches and weakness, found to have rhabdomyolysis/dermatomyositis flare.     Dermatomyositis flare with rhabdomyolysis:   - On MMP, azathioprine, prednisone 10mg at home  - C/w IVF for now; discontinue tomorrow if CK continues to trend down. Pt refused IVF overnight => reinforced importance of fluids, both oral and IV  - Rheum consult recommended prednisone 40 mg daily. On discharge, decrease by 10 mg each week outpatient.  - Clinically improving; CK trending down.  - Rituximab yesterday per rheumatology; paperwork in chart  - Monitor BMP (for K and Cr), CK Q24H  - C/w morphine PRN for pain  - Bactrim ppx MWF    Cough with hx of /BOOP  - Afebrile, no consolidations on CXR, no leukocytosis  - Will be on steroids as above  - C/w PPI, fluticasone  - Per previous admission, patient needs HRCT, spirometry with bronchodilator response and sleep study outpatient    Knee pain  - XR remarkable for small suprapatellar joint effusion   - not grossly inflamed, no erythema     DVT ppx: Heparin SC  Diet: Regular diet  Dispo: Acute. From home.

## 2019-02-25 NOTE — PROGRESS NOTE ADULT - PROBLEM SELECTOR PLAN 1
- on po Prednisone, will start taper on discharge  - received Rituximab on 2/24/19, as per  rheumatology, next dose is two weeks from now as an outpt   - pain meds PRN  - On MMP, azathioprine, prednisone 10mg at home  - f/u with Rheumatology after discharge   - c/w Bactrim prophylaxis ( pt has h/o BOOP)

## 2019-02-25 NOTE — PROGRESS NOTE ADULT - SUBJECTIVE AND OBJECTIVE BOX
CHIEF COMPLAINT:    Patient is a 38y old Female who presents with a chief complaint of Rhambdomyolysis, dermatomyositis flare (25 Feb 2019 09:49)    Currently admitted to medicine with the primary diagnosis of Dermatomycosis     Today is hospital day 5d. This morning she is resting comfortably in bed and reports no new issues or overnight events.     PAST MEDICAL & SURGICAL HISTORY  BOOP (bronchiolitis obliterans with organizing pneumonia)  Polymyositis  Hyperlipidemia  Cryptogenic organizing pneumonia  History of cervical cerclage  Ankle fracture      ALLERGIES:  contrast media (iodine-based) (Unknown)  peanuts (Unknown)  shellfish (Unknown)    MEDICATIONS:  STANDING MEDICATIONS  azaTHIOprine 100 milliGRAM(s) Oral daily  heparin  Injectable 5000 Unit(s) SubCutaneous every 8 hours  melatonin 5 milliGRAM(s) Oral at bedtime  mycophenolate mofetil 1500 milliGRAM(s) Oral two times a day  predniSONE   Tablet 40 milliGRAM(s) Oral daily  sodium chloride 0.45%. 1000 milliLiter(s) IV Continuous <Continuous>  triamcinolone 0.1% Cream 1 Application(s) Topical every 12 hours  trimethoprim  160 mG/sulfamethoxazole 800 mG 1 Tablet(s) Oral <User Schedule>    PRN MEDICATIONS  ALBUTerol    90 MICROgram(s) HFA Inhaler 2 Puff(s) Inhalation every 6 hours PRN  benzonatate 100 milliGRAM(s) Oral three times a day PRN  hydrocortisone 1% Cream 1 Application(s) Topical daily PRN  morphine  - Injectable 2 milliGRAM(s) IV Push every 4 hours PRN    VITALS:   T(F): 97.1  HR: 90  BP: 125/69  RR: 18  SpO2: 97%    LABS:                        10.4   9.81  )-----------( 524      ( 25 Feb 2019 07:33 )             34.2     02-25    138  |  104  |  7<L>  ----------------------------<  69<L>  4.0   |  21  |  <0.5<L>    Ca    8.4<L>      25 Feb 2019 07:33    Creatine Kinase, Serum: 5077 U/L <H> (02-25-19 @ 07:33)    CARDIAC MARKERS ( 25 Feb 2019 07:33 )  x     / x     / 5077 U/L / x     / x      CARDIAC MARKERS ( 24 Feb 2019 07:49 )  x     / x     / 5033 U/L / x     / x        RADIOLOGY:  < from: Xray Knee 1 or 2 Views, Right (02.24.19 @ 15:51) >  No acutely displaced fracture with mild subchondral sclerosis across the medial femorotibial compartment. Small suprapatellar joint effusion  < end of copied text >    < from: Xray Chest 1 View- PORTABLE-Urgent (02.20.19 @ 17:48) >  Unchanged scattered bilateral interstitial opacities.  < end of copied text >    PHYSICAL EXAM:  GENERAL: No acute distress.  HEENT:  Atraumatic, Normocephalic. EOMI, PERRLA.  PULMONARY: Clear to auscultation bilaterally.  CARDIOVASCULAR: Regular rate and rhythm; No murmurs, rubs, or gallops.  ABDOMEN:Soft, BS+  CNS: non focal  SKIN: Mild darkening of bilateral cheeks (per pt typical appearance of resolving rash)

## 2019-02-25 NOTE — PROGRESS NOTE ADULT - PROBLEM SELECTOR PLAN 2
- aggressive IV hydration , 1/2 NS at 250 cc/l  - po hydration ( pt was educated )    -f/u repeat CK level tonight and at AM

## 2019-02-25 NOTE — PROGRESS NOTE ADULT - SUBJECTIVE AND OBJECTIVE BOX
She started Rituxan yesterday.  OT to see.  She has proximal upper and lower extremity weakness.  She is doing sufficiently well to go home with home PT and home OT and HHA help tomorrow.  She refuses inpatient rehab. stay.  She has dermatomyositis, a myopathy.

## 2019-02-25 NOTE — PROGRESS NOTE ADULT - SUBJECTIVE AND OBJECTIVE BOX
37yo F with Past Medical History Dermatomyositis (dx April 2018), cryptogenic organizing pneumonia/BOOP admitted in December for cough (dc on MMP, AZA and prednisone) readmitted for increasing muscle aches and weakness secondary to dermatomyositis flare and rhabdomyolysis. While in the hospital pt was consulted by rheumatology received one dose of Rituximab on 2/24/19, she is on aggressive IV hydration with elevated CK level , which is consistently elevated for last 48 hours.  Today pt c/o right knee pain and difficulty ambulating, she is asking about discharge home.     PAST MEDICAL & SURGICAL HISTORY  BOOP (bronchiolitis obliterans with organizing pneumonia)  Polymyositis  Hyperlipidemia  Cryptogenic organizing pneumonia  History of cervical cerclage  Ankle fracture      ALLERGIES:  contrast media (iodine-based) (Unknown)  peanuts (Unknown)  shellfish (Unknown)    VITALS:   T(C): 36.2 (25 Feb 2019 05:57), Max: 36.2 (25 Feb 2019 05:57)  T(F): 97.1 (25 Feb 2019 05:57), Max: 97.1 (25 Feb 2019 05:57)  HR: 90 (25 Feb 2019 07:57) (86 - 109)  BP: 125/69 (25 Feb 2019 05:57) (125/69 - 151/89)  BP(mean): --  RR: 18 (25 Feb 2019 05:57) (18 - 18)  SpO2: 97% (25 Feb 2019 07:57) (97% - 97%)      PHYSICAL EXAM:  GENERAL: No acute distress, obese   HEENT:  Atraumatic, Normocephalic. EOMI, PERRLA.  PULMONARY: Clear to auscultation bilaterally.  CARDIOVASCULAR: Regular rate and rhythm; No murmurs, rubs, or gallops.  ABDOMEN: Soft, NT, ND  BS+  CNS: AAOx 3 , no focal neuro deficit   SKIN: Mild darkening of bilateral cheeks (per pt typical appearance of resolving rash)  EXTR: right knee tenderness on palpation with decreased ROM due to pain, no edema     LABS:                        10.4   9.81  )-----------( 524      ( 25 Feb 2019 07:33 )             34.2     02-25    138  |  104  |  7<L>  ----------------------------<  69<L>  4.0   |  21  |  <0.5<L>    Ca    8.4<L>      25 Feb 2019 07:33    Creatine Kinase, Serum: 5077 U/L <H> (02-25-19 @ 07:33)    CARDIAC MARKERS ( 25 Feb 2019 07:33 )  x     / x     / 5077 U/L / x     / x      CARDIAC MARKERS ( 24 Feb 2019 07:49 )  x     / x     / 5033 U/L / x     / x        RADIOLOGY:  < from: Xray Knee 1 or 2 Views, Right (02.24.19 @ 15:51) >  No acutely displaced fracture with mild subchondral sclerosis across the medial femorotibial compartment. Small suprapatellar joint effusion  < end of copied text >    < from: Xray Chest 1 View- PORTABLE-Urgent (02.20.19 @ 17:48) >  Unchanged scattered bilateral interstitial opacities.  < end of copied text >    MEDICATIONS  (STANDING):  azaTHIOprine 100 milliGRAM(s) Oral daily  heparin  Injectable 5000 Unit(s) SubCutaneous every 8 hours  melatonin 5 milliGRAM(s) Oral at bedtime  mycophenolate mofetil 1500 milliGRAM(s) Oral two times a day  predniSONE   Tablet 40 milliGRAM(s) Oral daily  sodium chloride 0.45%. 1000 milliLiter(s) (250 mL/Hr) IV Continuous <Continuous>  triamcinolone 0.1% Cream 1 Application(s) Topical every 12 hours  trimethoprim  160 mG/sulfamethoxazole 800 mG 1 Tablet(s) Oral <User Schedule>    MEDICATIONS  (PRN):  ALBUTerol    90 MICROgram(s) HFA Inhaler 2 Puff(s) Inhalation every 6 hours PRN Shortness of Breath and/or Wheezing  benzonatate 100 milliGRAM(s) Oral three times a day PRN Cough  hydrocortisone 1% Cream 1 Application(s) Topical daily PRN Rash and/or Itching  morphine  - Injectable 2 milliGRAM(s) IV Push every 4 hours PRN Severe Pain (7 - 10)

## 2019-02-26 DIAGNOSIS — D72.829 ELEVATED WHITE BLOOD CELL COUNT, UNSPECIFIED: ICD-10-CM

## 2019-02-26 LAB
ALBUMIN SERPL ELPH-MCNC: 3.3 G/DL — LOW (ref 3.5–5.2)
ALP SERPL-CCNC: 55 U/L — SIGNIFICANT CHANGE UP (ref 30–115)
ALT FLD-CCNC: 79 U/L — HIGH (ref 0–41)
ANION GAP SERPL CALC-SCNC: 15 MMOL/L — HIGH (ref 7–14)
ANION GAP SERPL CALC-SCNC: 17 MMOL/L — HIGH (ref 7–14)
AST SERPL-CCNC: 115 U/L — HIGH (ref 0–41)
BILIRUB SERPL-MCNC: <0.2 MG/DL — SIGNIFICANT CHANGE UP (ref 0.2–1.2)
BUN SERPL-MCNC: 10 MG/DL — SIGNIFICANT CHANGE UP (ref 10–20)
BUN SERPL-MCNC: 9 MG/DL — LOW (ref 10–20)
CALCIUM SERPL-MCNC: 8.9 MG/DL — SIGNIFICANT CHANGE UP (ref 8.5–10.1)
CALCIUM SERPL-MCNC: 9 MG/DL — SIGNIFICANT CHANGE UP (ref 8.5–10.1)
CHLORIDE SERPL-SCNC: 100 MMOL/L — SIGNIFICANT CHANGE UP (ref 98–110)
CHLORIDE SERPL-SCNC: 97 MMOL/L — LOW (ref 98–110)
CK SERPL-CCNC: 5334 U/L — HIGH (ref 0–225)
CK SERPL-CCNC: 5630 U/L — HIGH (ref 0–225)
CK SERPL-CCNC: 5680 U/L — HIGH (ref 0–225)
CO2 SERPL-SCNC: 22 MMOL/L — SIGNIFICANT CHANGE UP (ref 17–32)
CO2 SERPL-SCNC: 23 MMOL/L — SIGNIFICANT CHANGE UP (ref 17–32)
CREAT SERPL-MCNC: 0.5 MG/DL — LOW (ref 0.7–1.5)
CREAT SERPL-MCNC: <0.5 MG/DL — LOW (ref 0.7–1.5)
GLUCOSE SERPL-MCNC: 112 MG/DL — HIGH (ref 70–99)
GLUCOSE SERPL-MCNC: 118 MG/DL — HIGH (ref 70–99)
HCT VFR BLD CALC: 33.9 % — LOW (ref 37–47)
HCT VFR BLD CALC: 36.4 % — LOW (ref 37–47)
HGB BLD-MCNC: 10.8 G/DL — LOW (ref 12–16)
HGB BLD-MCNC: 11.5 G/DL — LOW (ref 12–16)
MCHC RBC-ENTMCNC: 30.6 PG — SIGNIFICANT CHANGE UP (ref 27–31)
MCHC RBC-ENTMCNC: 31.4 PG — HIGH (ref 27–31)
MCHC RBC-ENTMCNC: 31.6 G/DL — LOW (ref 32–37)
MCHC RBC-ENTMCNC: 31.9 G/DL — LOW (ref 32–37)
MCV RBC AUTO: 96.8 FL — SIGNIFICANT CHANGE UP (ref 81–99)
MCV RBC AUTO: 98.5 FL — SIGNIFICANT CHANGE UP (ref 81–99)
NRBC # BLD: 0 /100 WBCS — SIGNIFICANT CHANGE UP (ref 0–0)
NRBC # BLD: 0 /100 WBCS — SIGNIFICANT CHANGE UP (ref 0–0)
PLATELET # BLD AUTO: 560 K/UL — HIGH (ref 130–400)
PLATELET # BLD AUTO: 581 K/UL — HIGH (ref 130–400)
POTASSIUM SERPL-MCNC: 4.6 MMOL/L — SIGNIFICANT CHANGE UP (ref 3.5–5)
POTASSIUM SERPL-MCNC: 4.9 MMOL/L — SIGNIFICANT CHANGE UP (ref 3.5–5)
POTASSIUM SERPL-SCNC: 4.6 MMOL/L — SIGNIFICANT CHANGE UP (ref 3.5–5)
POTASSIUM SERPL-SCNC: 4.9 MMOL/L — SIGNIFICANT CHANGE UP (ref 3.5–5)
PROT SERPL-MCNC: 7.3 G/DL — SIGNIFICANT CHANGE UP (ref 6–8)
RBC # BLD: 3.44 M/UL — LOW (ref 4.2–5.4)
RBC # BLD: 3.76 M/UL — LOW (ref 4.2–5.4)
RBC # FLD: 16.2 % — HIGH (ref 11.5–14.5)
RBC # FLD: 16.3 % — HIGH (ref 11.5–14.5)
SODIUM SERPL-SCNC: 135 MMOL/L — SIGNIFICANT CHANGE UP (ref 135–146)
SODIUM SERPL-SCNC: 139 MMOL/L — SIGNIFICANT CHANGE UP (ref 135–146)
WBC # BLD: 11.95 K/UL — HIGH (ref 4.8–10.8)
WBC # BLD: 13.05 K/UL — HIGH (ref 4.8–10.8)
WBC # FLD AUTO: 11.95 K/UL — HIGH (ref 4.8–10.8)
WBC # FLD AUTO: 13.05 K/UL — HIGH (ref 4.8–10.8)

## 2019-02-26 RX ORDER — OXYCODONE AND ACETAMINOPHEN 5; 325 MG/1; MG/1
1 TABLET ORAL EVERY 4 HOURS
Qty: 0 | Refills: 0 | Status: DISCONTINUED | OUTPATIENT
Start: 2019-02-26 | End: 2019-03-02

## 2019-02-26 RX ORDER — PANTOPRAZOLE SODIUM 20 MG/1
40 TABLET, DELAYED RELEASE ORAL
Qty: 0 | Refills: 0 | Status: DISCONTINUED | OUTPATIENT
Start: 2019-02-26 | End: 2019-03-02

## 2019-02-26 RX ORDER — LIDOCAINE 4 G/100G
1 CREAM TOPICAL DAILY
Qty: 0 | Refills: 0 | Status: DISCONTINUED | OUTPATIENT
Start: 2019-02-26 | End: 2019-03-02

## 2019-02-26 RX ORDER — DOCUSATE SODIUM 100 MG
100 CAPSULE ORAL THREE TIMES A DAY
Qty: 0 | Refills: 0 | Status: DISCONTINUED | OUTPATIENT
Start: 2019-02-26 | End: 2019-03-02

## 2019-02-26 RX ORDER — ACETAMINOPHEN 500 MG
650 TABLET ORAL ONCE
Qty: 0 | Refills: 0 | Status: DISCONTINUED | OUTPATIENT
Start: 2019-02-26 | End: 2019-02-26

## 2019-02-26 RX ADMIN — Medication 100 MILLIGRAM(S): at 21:10

## 2019-02-26 RX ADMIN — OXYCODONE AND ACETAMINOPHEN 1 TABLET(S): 5; 325 TABLET ORAL at 19:43

## 2019-02-26 RX ADMIN — MYCOPHENOLATE MOFETIL 1500 MILLIGRAM(S): 250 CAPSULE ORAL at 05:05

## 2019-02-26 RX ADMIN — Medication 5 MILLIGRAM(S): at 21:10

## 2019-02-26 RX ADMIN — Medication 1 APPLICATION(S): at 18:47

## 2019-02-26 RX ADMIN — PANTOPRAZOLE SODIUM 40 MILLIGRAM(S): 20 TABLET, DELAYED RELEASE ORAL at 18:47

## 2019-02-26 RX ADMIN — HEPARIN SODIUM 5000 UNIT(S): 5000 INJECTION INTRAVENOUS; SUBCUTANEOUS at 05:05

## 2019-02-26 RX ADMIN — LIDOCAINE 1 PATCH: 4 CREAM TOPICAL at 19:49

## 2019-02-26 RX ADMIN — LIDOCAINE 1 PATCH: 4 CREAM TOPICAL at 23:50

## 2019-02-26 RX ADMIN — AZATHIOPRINE 100 MILLIGRAM(S): 100 TABLET ORAL at 11:22

## 2019-02-26 RX ADMIN — Medication 100 MILLIGRAM(S): at 13:36

## 2019-02-26 RX ADMIN — LIDOCAINE 1 PATCH: 4 CREAM TOPICAL at 11:22

## 2019-02-26 RX ADMIN — MYCOPHENOLATE MOFETIL 1500 MILLIGRAM(S): 250 CAPSULE ORAL at 18:47

## 2019-02-26 RX ADMIN — OXYCODONE AND ACETAMINOPHEN 1 TABLET(S): 5; 325 TABLET ORAL at 20:36

## 2019-02-26 RX ADMIN — HEPARIN SODIUM 5000 UNIT(S): 5000 INJECTION INTRAVENOUS; SUBCUTANEOUS at 21:10

## 2019-02-26 RX ADMIN — Medication 1 APPLICATION(S): at 05:05

## 2019-02-26 RX ADMIN — OXYCODONE AND ACETAMINOPHEN 1 TABLET(S): 5; 325 TABLET ORAL at 15:42

## 2019-02-26 RX ADMIN — Medication 40 MILLIGRAM(S): at 05:05

## 2019-02-26 RX ADMIN — HEPARIN SODIUM 5000 UNIT(S): 5000 INJECTION INTRAVENOUS; SUBCUTANEOUS at 13:35

## 2019-02-26 NOTE — PROGRESS NOTE ADULT - SUBJECTIVE AND OBJECTIVE BOX
37yo F with Past Medical History Dermatomyositis (dx April 2018), cryptogenic organizing pneumonia/BOOP admitted in December for cough (dc on MMP, AZA and prednisone) readmitted for increasing muscle aches and weakness secondary to dermatomyositis flare and rhabdomyolysis. While in the hospital pt was consulted by rheumatology received one dose of Rituximab on 2/24/19, she is on aggressive IV hydration with elevated CK level , which is consistently elevated , pt was refusing IV fluids for last 24 hours. She was extensively consulted regarding compliance, not stable for discharge with elevated CK level.   Today pt c/o right knee pain and frequent urination, asking about discharge, crying, wants to go home.     PAST MEDICAL & SURGICAL HISTORY  BOOP (bronchiolitis obliterans with organizing pneumonia)  Polymyositis  Hyperlipidemia  Cryptogenic organizing pneumonia  History of cervical cerclage  Ankle fracture      ALLERGIES:  contrast media (iodine-based) (Unknown)  peanuts (Unknown)  shellfish (Unknown)    VITALS:   T(C): 36.2 (26 Feb 2019 04:54), Max: 36.2 (26 Feb 2019 04:54)  T(F): 97.1 (26 Feb 2019 04:54), Max: 97.1 (26 Feb 2019 04:54)  HR: 83 (26 Feb 2019 07:25) (80 - 93)  BP: 98/51 (26 Feb 2019 04:54) (98/51 - 131/85)  RR: 18 (26 Feb 2019 04:54) (18 - 18)  SpO2: 97% (26 Feb 2019 07:25) (97% - 97%)      PHYSICAL EXAM:  GENERAL: No acute distress, obese   HEENT:  Atraumatic, Normocephalic. EOMI, PERRLA.  PULMONARY: Clear to auscultation bilaterally.  CARDIOVASCULAR: Regular rate and rhythm; No murmurs, rubs, or gallops.  ABDOMEN: Soft, NT, ND  BS+  CNS: AAOx 3 , no focal neuro deficit   SKIN: Mild darkening of bilateral cheeks (per pt typical appearance of resolving rash)  EXTR: right knee tenderness on palpation with decreased ROM due to pain, no edema     LABS:                                             10.8   13.05 )-----------( 560      ( 26 Feb 2019 08:50 )             33.9   02-26    139  |  100  |  9<L>  ----------------------------<  112<H>  4.6   |  22  |  0.5<L>    Ca    8.9      26 Feb 2019 08:50    Creatine Kinase, Serum: 5334 U/L (02.26.19 @ 08:50)  CARDIAC MARKERS ( 25 Feb 2019 07:33 )  x     / x     / 5077 U/L / x     / x      CARDIAC MARKERS ( 24 Feb 2019 07:49 )  x     / x     / 5033 U/L / x     / x        RADIOLOGY:  < from: Xray Knee 1 or 2 Views, Right (02.24.19 @ 15:51) >  No acutely displaced fracture with mild subchondral sclerosis across the medial femorotibial compartment. Small suprapatellar joint effusion  < end of copied text >    < from: Xray Chest 1 View- PORTABLE-Urgent (02.20.19 @ 17:48) >  Unchanged scattered bilateral interstitial opacities.  < end of copied text >    MEDICATIONS  (STANDING):  azaTHIOprine 100 milliGRAM(s) Oral daily  docusate sodium 100 milliGRAM(s) Oral three times a day  heparin  Injectable 5000 Unit(s) SubCutaneous every 8 hours  lidocaine   Patch 1 Patch Transdermal daily  melatonin 5 milliGRAM(s) Oral at bedtime  mycophenolate mofetil 1500 milliGRAM(s) Oral two times a day  predniSONE   Tablet 40 milliGRAM(s) Oral daily  sodium chloride 0.45%. 1000 milliLiter(s) (300 mL/Hr) IV Continuous <Continuous>  triamcinolone 0.1% Cream 1 Application(s) Topical every 12 hours  trimethoprim  160 mG/sulfamethoxazole 800 mG 1 Tablet(s) Oral <User Schedule>    MEDICATIONS  (PRN):  acetaminophen   Tablet .. 650 milliGRAM(s) Oral once PRN Moderate Pain (4 - 6)  ALBUTerol    90 MICROgram(s) HFA Inhaler 2 Puff(s) Inhalation every 6 hours PRN Shortness of Breath and/or Wheezing  benzonatate 100 milliGRAM(s) Oral three times a day PRN Cough  diphenhydrAMINE 25 milliGRAM(s) Oral once PRN Rash and/or Itching  hydrocortisone 1% Cream 1 Application(s) Topical daily PRN Rash and/or Itching  senna 2 Tablet(s) Oral at bedtime PRN Constipation

## 2019-02-26 NOTE — PROGRESS NOTE ADULT - PROBLEM SELECTOR PLAN 2
- aggressive IV hydration , 1/2 NS at 250 cc/h  - po hydration ( pt was educated )    -f/u repeat CK level tonight and at AM

## 2019-02-26 NOTE — PROGRESS NOTE ADULT - SUBJECTIVE AND OBJECTIVE BOX
CHIEF COMPLAINT:    Patient is a 38y old Female who presents with a chief complaint of Rhambdomyolysis, dermatomyositis flare (26 Feb 2019 12:05)    Currently admitted to medicine with the primary diagnosis of Dermatomycosis     Today is hospital day 6d. This morning she is resting comfortably in bed and reports no new issues or overnight events.     PAST MEDICAL & SURGICAL HISTORY  BOOP (bronchiolitis obliterans with organizing pneumonia)  Polymyositis  Hyperlipidemia  Cryptogenic organizing pneumonia  History of cervical cerclage  Ankle fracture      ALLERGIES:  contrast media (iodine-based) (Unknown)  peanuts (Unknown)  shellfish (Unknown)    MEDICATIONS:  STANDING MEDICATIONS  azaTHIOprine 100 milliGRAM(s) Oral daily  docusate sodium 100 milliGRAM(s) Oral three times a day  heparin  Injectable 5000 Unit(s) SubCutaneous every 8 hours  lidocaine   Patch 1 Patch Transdermal daily  melatonin 5 milliGRAM(s) Oral at bedtime  mycophenolate mofetil 1500 milliGRAM(s) Oral two times a day  predniSONE   Tablet 40 milliGRAM(s) Oral daily  sodium chloride 0.45%. 1000 milliLiter(s) IV Continuous <Continuous>  triamcinolone 0.1% Cream 1 Application(s) Topical every 12 hours  trimethoprim  160 mG/sulfamethoxazole 800 mG 1 Tablet(s) Oral <User Schedule>    PRN MEDICATIONS  ALBUTerol    90 MICROgram(s) HFA Inhaler 2 Puff(s) Inhalation every 6 hours PRN  benzonatate 100 milliGRAM(s) Oral three times a day PRN  diphenhydrAMINE 25 milliGRAM(s) Oral once PRN  hydrocortisone 1% Cream 1 Application(s) Topical daily PRN  oxyCODONE    5 mG/acetaminophen 325 mG 1 Tablet(s) Oral every 4 hours PRN  senna 2 Tablet(s) Oral at bedtime PRN    VITALS:   T(F): 97.4  HR: 86  BP: 150/77  RR: 18  SpO2: 97%    LABS:                        11.5   11.95 )-----------( 581      ( 26 Feb 2019 12:11 )             36.4     02-26    135  |  97<L>  |  10  ----------------------------<  118<H>  4.9   |  23  |  <0.5<L>    Ca    9.0      26 Feb 2019 12:11    TPro  7.3  /  Alb  3.3<L>  /  TBili  <0.2  /  DBili  x   /  AST  115<H>  /  ALT  79<H>  /  AlkPhos  55  02-26    Creatine Kinase, Serum: 5680 U/L <H> (02-26-19 @ 12:11)  Creatine Kinase, Serum: 5334 U/L <H> (02-26-19 @ 08:50)  Creatine Kinase, Serum: 5630 U/L <H> (02-25-19 @ 22:55)    CARDIAC MARKERS ( 26 Feb 2019 12:11 )  x     / x     / 5680 U/L / x     / x      CARDIAC MARKERS ( 26 Feb 2019 08:50 )  x     / x     / 5334 U/L / x     / x      CARDIAC MARKERS ( 25 Feb 2019 22:55 )  x     / x     / 5630 U/L / x     / x      CARDIAC MARKERS ( 25 Feb 2019 07:33 )  x     / x     / 5077 U/L / x     / x        RADIOLOGY:  < from: Xray Knee 1 or 2 Views, Right (02.24.19 @ 15:51) >  No acutely displaced fracture with mild subchondral sclerosis across the medial femorotibial compartment. Small suprapatellar joint effusion  < end of copied text >    < from: Xray Chest 1 View- PORTABLE-Urgent (02.20.19 @ 17:48) >  Unchanged scattered bilateral interstitial opacities.  < end of copied text >    PHYSICAL EXAM:  GENERAL: No acute distress.  HEENT:  Atraumatic, Normocephalic. EOMI, PERRLA.  PULMONARY: Clear to auscultation bilaterally.  CARDIOVASCULAR: Regular rate and rhythm; No murmurs, rubs, or gallops.  ABDOMEN:Soft, BS+  CNS: non focal  SKIN: Interval improvement in areas of hyperpigmentation

## 2019-02-26 NOTE — PROGRESS NOTE ADULT - PROBLEM SELECTOR PLAN 3
- XR remarkable for small suprapatellar joint effusion   - not grossly inflamed, no erythema   -  Lidoderm topical , Percocet PRN  - pain meds PRN , c/w Prednisone

## 2019-02-26 NOTE — PROGRESS NOTE ADULT - ASSESSMENT
38F with PMHx of dermatomyositis, cryptogenic organizing pneumonia/BOOP with recent admission in December for cough, discharged on MMP, AZA and prednisone presents for increasing muscle aches and weakness, found to have rhabdomyolysis/dermatomyositis flare.     Dermatomyositis flare with rhabdomyolysis:   - On MMP, azathioprine, prednisone 10mg at home  - Pt continuing to refuse IVF => reinforced importance of fluids, both oral and IV; CK increased to 5680; pt will try IVF  - Rheum consult recommended prednisone 40 mg daily. On discharge, decrease by 10 mg each week outpatient.  - received Rituximab on 2/24/19, as per  rheumatology, next dose is two weeks from now as an outpt  - Monitor BMP (for K and Cr), CK Q24H  - Bactrim ppx MWF    Cough with hx of /BOOP  - Afebrile, no consolidations on CXR, no leukocytosis  - Will be on steroids as above  - C/w PPI, fluticasone  - Per previous admission, patient needs HRCT, spirometry with bronchodilator response and sleep study outpatient    Knee pain  - XR remarkable for small suprapatellar joint effusion   - not grossly inflamed, no erythema     DVT ppx: Heparin SC  Diet: Regular diet  Dispo: Acute. From home.

## 2019-02-26 NOTE — PROGRESS NOTE ADULT - ASSESSMENT
38F with PMHx of dermatomyositis, cryptogenic organizing pneumonia/BOOP with recent admission in December for cough, discharged on MMP, AZA and prednisone presents for increasing muscle aches and weakness, found to have rhabdomyolysis/dermatomyositis flare. While in the hospital pt was consulted by rheumatology received one dose of Rituximab on 2/24/19, she is on aggressive IV hydration with elevated CK level , was refusing IV fluids for last 24 hours.  I spoke with pt today at length she agreed for IV hydration.    Dispo: anticipate discharge in 24 hours    #Progress Note Handoff  Pending (specify):  Consults_________, Tests________, Test Results_CK level ______, Other_________  Family discussion: no family available by the bedside   Disposition: Home_x __/SNF___/Other________/Unknown at this time________

## 2019-02-27 LAB
ANION GAP SERPL CALC-SCNC: 11 MMOL/L — SIGNIFICANT CHANGE UP (ref 7–14)
BUN SERPL-MCNC: 8 MG/DL — LOW (ref 10–20)
CALCIUM SERPL-MCNC: 8.6 MG/DL — SIGNIFICANT CHANGE UP (ref 8.5–10.1)
CHLORIDE SERPL-SCNC: 103 MMOL/L — SIGNIFICANT CHANGE UP (ref 98–110)
CK SERPL-CCNC: 4952 U/L — HIGH (ref 0–225)
CK SERPL-CCNC: 5324 U/L — HIGH (ref 0–225)
CK SERPL-CCNC: 6028 U/L — HIGH (ref 0–225)
CO2 SERPL-SCNC: 24 MMOL/L — SIGNIFICANT CHANGE UP (ref 17–32)
CREAT SERPL-MCNC: <0.5 MG/DL — LOW (ref 0.7–1.5)
GLUCOSE SERPL-MCNC: 85 MG/DL — SIGNIFICANT CHANGE UP (ref 70–99)
HCT VFR BLD CALC: 33.6 % — LOW (ref 37–47)
HGB BLD-MCNC: 10.4 G/DL — LOW (ref 12–16)
MCHC RBC-ENTMCNC: 31 G/DL — LOW (ref 32–37)
MCHC RBC-ENTMCNC: 31 PG — SIGNIFICANT CHANGE UP (ref 27–31)
MCV RBC AUTO: 100 FL — HIGH (ref 81–99)
NRBC # BLD: 0 /100 WBCS — SIGNIFICANT CHANGE UP (ref 0–0)
PLATELET # BLD AUTO: 516 K/UL — HIGH (ref 130–400)
POTASSIUM SERPL-MCNC: 4.1 MMOL/L — SIGNIFICANT CHANGE UP (ref 3.5–5)
POTASSIUM SERPL-SCNC: 4.1 MMOL/L — SIGNIFICANT CHANGE UP (ref 3.5–5)
RBC # BLD: 3.36 M/UL — LOW (ref 4.2–5.4)
RBC # FLD: 16.6 % — HIGH (ref 11.5–14.5)
SODIUM SERPL-SCNC: 138 MMOL/L — SIGNIFICANT CHANGE UP (ref 135–146)
WBC # BLD: 12.91 K/UL — HIGH (ref 4.8–10.8)
WBC # FLD AUTO: 12.91 K/UL — HIGH (ref 4.8–10.8)

## 2019-02-27 RX ORDER — CHLORHEXIDINE GLUCONATE 213 G/1000ML
1 SOLUTION TOPICAL
Qty: 0 | Refills: 0 | Status: DISCONTINUED | OUTPATIENT
Start: 2019-02-27 | End: 2019-03-02

## 2019-02-27 RX ORDER — AZTREONAM 2 G
1 VIAL (EA) INJECTION
Qty: 9 | Refills: 0
Start: 2019-02-27 | End: 2019-03-19

## 2019-02-27 RX ORDER — AZTREONAM 2 G
1 VIAL (EA) INJECTION
Qty: 0 | Refills: 0 | COMMUNITY

## 2019-02-27 RX ORDER — SODIUM CHLORIDE 9 MG/ML
1000 INJECTION, SOLUTION INTRAVENOUS
Qty: 0 | Refills: 0 | Status: DISCONTINUED | OUTPATIENT
Start: 2019-02-27 | End: 2019-02-28

## 2019-02-27 RX ORDER — LIDOCAINE 4 G/100G
1 CREAM TOPICAL
Qty: 14 | Refills: 0
Start: 2019-02-27 | End: 2019-03-12

## 2019-02-27 RX ORDER — SODIUM CHLORIDE 9 MG/ML
500 INJECTION, SOLUTION INTRAVENOUS
Qty: 0 | Refills: 0 | Status: COMPLETED | OUTPATIENT
Start: 2019-02-27 | End: 2019-02-27

## 2019-02-27 RX ORDER — PANTOPRAZOLE SODIUM 20 MG/1
1 TABLET, DELAYED RELEASE ORAL
Qty: 14 | Refills: 0
Start: 2019-02-27 | End: 2019-03-12

## 2019-02-27 RX ADMIN — SODIUM CHLORIDE 300 MILLILITER(S): 9 INJECTION, SOLUTION INTRAVENOUS at 05:08

## 2019-02-27 RX ADMIN — LIDOCAINE 1 PATCH: 4 CREAM TOPICAL at 11:24

## 2019-02-27 RX ADMIN — Medication 100 MILLIGRAM(S): at 05:09

## 2019-02-27 RX ADMIN — Medication 1 TABLET(S): at 08:37

## 2019-02-27 RX ADMIN — SODIUM CHLORIDE 500 MILLILITER(S): 9 INJECTION, SOLUTION INTRAVENOUS at 11:27

## 2019-02-27 RX ADMIN — LIDOCAINE 1 PATCH: 4 CREAM TOPICAL at 23:15

## 2019-02-27 RX ADMIN — SODIUM CHLORIDE 300 MILLILITER(S): 9 INJECTION, SOLUTION INTRAVENOUS at 15:53

## 2019-02-27 RX ADMIN — HEPARIN SODIUM 5000 UNIT(S): 5000 INJECTION INTRAVENOUS; SUBCUTANEOUS at 05:09

## 2019-02-27 RX ADMIN — Medication 1 APPLICATION(S): at 05:10

## 2019-02-27 RX ADMIN — OXYCODONE AND ACETAMINOPHEN 1 TABLET(S): 5; 325 TABLET ORAL at 14:57

## 2019-02-27 RX ADMIN — AZATHIOPRINE 100 MILLIGRAM(S): 100 TABLET ORAL at 11:24

## 2019-02-27 RX ADMIN — Medication 1 APPLICATION(S): at 17:10

## 2019-02-27 RX ADMIN — Medication 100 MILLIGRAM(S): at 21:16

## 2019-02-27 RX ADMIN — SODIUM CHLORIDE 300 MILLILITER(S): 9 INJECTION, SOLUTION INTRAVENOUS at 12:50

## 2019-02-27 RX ADMIN — MYCOPHENOLATE MOFETIL 1500 MILLIGRAM(S): 250 CAPSULE ORAL at 17:10

## 2019-02-27 RX ADMIN — HEPARIN SODIUM 5000 UNIT(S): 5000 INJECTION INTRAVENOUS; SUBCUTANEOUS at 21:16

## 2019-02-27 RX ADMIN — OXYCODONE AND ACETAMINOPHEN 1 TABLET(S): 5; 325 TABLET ORAL at 15:53

## 2019-02-27 RX ADMIN — Medication 100 MILLIGRAM(S): at 13:04

## 2019-02-27 RX ADMIN — Medication 5 MILLIGRAM(S): at 21:16

## 2019-02-27 RX ADMIN — OXYCODONE AND ACETAMINOPHEN 1 TABLET(S): 5; 325 TABLET ORAL at 06:37

## 2019-02-27 RX ADMIN — Medication 40 MILLIGRAM(S): at 05:09

## 2019-02-27 RX ADMIN — PANTOPRAZOLE SODIUM 40 MILLIGRAM(S): 20 TABLET, DELAYED RELEASE ORAL at 05:09

## 2019-02-27 RX ADMIN — MYCOPHENOLATE MOFETIL 1500 MILLIGRAM(S): 250 CAPSULE ORAL at 05:09

## 2019-02-27 RX ADMIN — LIDOCAINE 1 PATCH: 4 CREAM TOPICAL at 19:33

## 2019-02-27 RX ADMIN — HEPARIN SODIUM 5000 UNIT(S): 5000 INJECTION INTRAVENOUS; SUBCUTANEOUS at 13:04

## 2019-02-27 NOTE — DIETITIAN INITIAL EVALUATION ADULT. - OTHER INFO
Pt p/w increased cough, muscle aches and fever. Primary dx: nontraumatic rhabdo. Hospital course complicated by Dermatomyositis flare with rhabdomyolysis, rheum following. Aggressive IV hydration and monitor CK. Cough with hx of COPD/BOOP, Afebrile, no consolidations on CXR, no leukocytosis. Reason for assessment: LOS.

## 2019-02-27 NOTE — PHYSICAL THERAPY INITIAL EVALUATION ADULT - GAIT DEVIATIONS NOTED, PT EVAL
Increased lateral trunk sway, decreased b/l step length/height, mildly antalgic gait, intermittent b/l circumduction to advance LE when fatigued/decreased step length/decreased amador/decreased stride length/decreased weight-shifting ability

## 2019-02-27 NOTE — PHYSICAL THERAPY INITIAL EVALUATION ADULT - PLANNED THERAPY INTERVENTIONS, PT EVAL
strengthening/bed mobility training/gait training/balance training/transfer training/ROM/neuromuscular re-education

## 2019-02-27 NOTE — PROGRESS NOTE ADULT - ASSESSMENT
38F with PMHx of dermatomyositis, cryptogenic organizing pneumonia/BOOP with recent admission in December for cough, discharged on MMP, AZA and prednisone presents for increasing muscle aches and weakness, found to have rhabdomyolysis/dermatomyositis flare.     Dermatomyositis flare with rhabdomyolysis:   - On MMP, azathioprine, prednisone 10mg at home  - IVF only started at 10pm last night => reinforced importance of fluids, both oral and IV; CK decreasing;  - Rheum consult recommended prednisone 40 mg daily. On discharge, decrease by 10 mg each week outpatient.  - received Rituximab on 2/24/19, as per  rheumatology, next dose is two weeks later as outpt  - Monitor BMP (for K and Cr), CK Q24H  - Bactrim ppx MWF    Cough with hx of /BOOP  - Afebrile, no consolidations on CXR, no leukocytosis  - Will be on steroids as above  - C/w PPI, fluticasone  - Per previous admission, patient needs HRCT, spirometry with bronchodilator response and sleep study outpatient    Knee pain  - XR remarkable for small suprapatellar joint effusion   - not grossly inflamed, no erythema     DVT ppx: Heparin SC  Diet: Regular diet  Dispo: Acute. From home. Awaiting for CK to decrease further.

## 2019-02-27 NOTE — DIETITIAN INITIAL EVALUATION ADULT. - ENERGY NEEDS
estimated calorie needs = 5500-7581 kcal/day (25-30 kcal/kg IBW d/t BMI >30).  estimated protein needs = 60-71 g/day (1.1-1.3 g/kg IBW as IBW significantly < CBW).  estimated fluid needs = 1mL/kcal

## 2019-02-27 NOTE — PHYSICAL THERAPY INITIAL EVALUATION ADULT - PERTINENT HX OF CURRENT PROBLEM, REHAB EVAL
39yo F with Past Medical History Dermatomyositis (dx April 2018), cryptogenic organizing pneumonia/BOOP admitted in December for cough (dc on MMP, AZA and prednisone) readmitted for increasing muscle aches and weakness secondary to dermatomyositis flare and rhabdomyolysis

## 2019-02-27 NOTE — PHYSICAL THERAPY INITIAL EVALUATION ADULT - IMPAIRMENTS FOUND, PT EVAL
ergonomics and body mechanics/gait, locomotion, and balance/gross motor/muscle strength/aerobic capacity/endurance/fine motor

## 2019-02-27 NOTE — PROGRESS NOTE ADULT - ASSESSMENT
38F with PMHx of dermatomyositis, cryptogenic organizing pneumonia/BOOP with recent admission in December for cough, discharged on MMP, AZA and prednisone presents for increasing muscle aches and weakness, found to have rhabdomyolysis/dermatomyositis flare. While in the hospital pt was consulted by rheumatology received one dose of Rituximab on 2/24/19, she is on aggressive IV hydration with elevated CK level , pt was noncompliant with  IV hydration.  I spoke with pt today at length she agreed for IV hydration. Repeat CK level in still above 4000, will give one bolus 500 ml 1/5 NS and c/w high rate of IV fluids, repeat CK at 4PM, if repeat level is below 3000, will discharge home tonight.         #Progress Note Handoff  Pending (specify):  Consults_________, Tests________, Test Results_CK level _at 4 PM _____, Other_________  Family discussion: no family available by the bedside , pt is awake, alert and oroented   Disposition: Home_x __/SNF___/Other________/Unknown at this time________

## 2019-02-27 NOTE — PHYSICAL THERAPY INITIAL EVALUATION ADULT - GENERAL OBSERVATIONS, REHAB EVAL
1050 - 1118 Pt rec in b/s chair with +IV, in NAD. Pt c/o R knee pain, NSG and MD already aware as per pt. Pt agreeable to b/s PT.

## 2019-02-27 NOTE — PROGRESS NOTE ADULT - SUBJECTIVE AND OBJECTIVE BOX
CHIEF COMPLAINT:    Patient is a 38y old Female who presents with a chief complaint of Rhabdomyolysis, dermatomyositis flare (27 Feb 2019 10:41)    Currently admitted to medicine with the primary diagnosis of Dermatomycosis     Today is hospital day 7d. This morning she is resting comfortably in bed and reports no new issues or overnight events.     PAST MEDICAL & SURGICAL HISTORY  BOOP (bronchiolitis obliterans with organizing pneumonia)  Polymyositis  Hyperlipidemia  Cryptogenic organizing pneumonia  History of cervical cerclage  Ankle fracture      ALLERGIES:  contrast media (iodine-based) (Unknown)  peanuts (Unknown)  shellfish (Unknown)    MEDICATIONS:  STANDING MEDICATIONS  azaTHIOprine 100 milliGRAM(s) Oral daily  chlorhexidine 4% Liquid 1 Application(s) Topical <User Schedule>  docusate sodium 100 milliGRAM(s) Oral three times a day  heparin  Injectable 5000 Unit(s) SubCutaneous every 8 hours  lidocaine   Patch 1 Patch Transdermal daily  melatonin 5 milliGRAM(s) Oral at bedtime  mycophenolate mofetil 1500 milliGRAM(s) Oral two times a day  pantoprazole    Tablet 40 milliGRAM(s) Oral before breakfast  predniSONE   Tablet 40 milliGRAM(s) Oral daily  sodium chloride 0.45%. 1000 milliLiter(s) IV Continuous <Continuous>  triamcinolone 0.1% Cream 1 Application(s) Topical every 12 hours  trimethoprim  160 mG/sulfamethoxazole 800 mG 1 Tablet(s) Oral <User Schedule>    PRN MEDICATIONS  ALBUTerol    90 MICROgram(s) HFA Inhaler 2 Puff(s) Inhalation every 6 hours PRN  benzonatate 100 milliGRAM(s) Oral three times a day PRN  diphenhydrAMINE 25 milliGRAM(s) Oral once PRN  hydrocortisone 1% Cream 1 Application(s) Topical daily PRN  oxyCODONE    5 mG/acetaminophen 325 mG 1 Tablet(s) Oral every 4 hours PRN  senna 2 Tablet(s) Oral at bedtime PRN    VITALS:   T(F): 98  HR: 81  BP: 109/64  RR: 18  SpO2: 96%    LABS:                        10.4   12.91 )-----------( 516      ( 27 Feb 2019 07:25 )             33.6     02-27    138  |  103  |  8<L>  ----------------------------<  85  4.1   |  24  |  <0.5<L>    Ca    8.6      27 Feb 2019 07:25    TPro  7.3  /  Alb  3.3<L>  /  TBili  <0.2  /  DBili  x   /  AST  115<H>  /  ALT  79<H>  /  AlkPhos  55  02-26    Creatine Kinase, Serum: 4952 U/L <H> (02-27-19 @ 07:25)    CARDIAC MARKERS ( 27 Feb 2019 07:25 )  x     / x     / 4952 U/L / x     / x      CARDIAC MARKERS ( 26 Feb 2019 12:11 )  x     / x     / 5680 U/L / x     / x      CARDIAC MARKERS ( 26 Feb 2019 08:50 )  x     / x     / 5334 U/L / x     / x      CARDIAC MARKERS ( 25 Feb 2019 22:55 )  x     / x     / 5630 U/L / x     / x        RADIOLOGY:  < from: Xray Knee 1 or 2 Views, Right (02.24.19 @ 15:51) >  No acutely displaced fracture with mild subchondral sclerosis across the medial femorotibial compartment. Small suprapatellar joint effusion  < end of copied text >    < from: Xray Chest 1 View- PORTABLE-Urgent (02.20.19 @ 17:48) >  Unchanged scattered bilateral interstitial opacities.  < end of copied text >    PHYSICAL EXAM:  GENERAL: No acute distress.  HEENT:  Atraumatic, Normocephalic. EOMI, PERRLA.  PULMONARY: Clear to auscultation bilaterally.  CARDIOVASCULAR: Regular rate and rhythm; No murmurs, rubs, or gallops.  ABDOMEN:Soft, BS+  CNS: non focal

## 2019-02-27 NOTE — DIETITIAN INITIAL EVALUATION ADULT. - DIET TYPE
Pt with persistent % PO intake regular meals and snacks. denies changes in appetite and good tolerance. no further nutrition recommendations./regular

## 2019-02-27 NOTE — DIETITIAN INITIAL EVALUATION ADULT. - ORAL INTAKE PTA
Pt reports consistently adequate appetite and PO intake PTA. Confirms peanut and shellfish allergy. Denies use of nutrition supplements./good

## 2019-02-27 NOTE — PROGRESS NOTE ADULT - PROBLEM SELECTOR PLAN 1
- on po Prednisone, will start taper on discharge  - received Rituximab on 2/24/19, as per  rheumatology, next dose is two weeks from now as an outpt   - pain meds PRN  - On MMP, azathioprine,  pt was on prednisone 10mg at home  - f/u with Rheumatology after discharge   - c/w Bactrim prophylaxis ( pt has h/o BOOP)

## 2019-02-27 NOTE — PROGRESS NOTE ADULT - PROBLEM SELECTOR PLAN 2
- aggressive IV hydration , 1/2 NS at 300 cc/h, will give one bolus 500 ml today and repeat CK at 4 pm   - po hydration ( pt was educated )    - repeat CK is above 4000 today

## 2019-02-27 NOTE — PROGRESS NOTE ADULT - SUBJECTIVE AND OBJECTIVE BOX
37yo F with Past Medical History Dermatomyositis (dx April 2018), cryptogenic organizing pneumonia/BOOP admitted in December for cough (dc on MMP, AZA and prednisone) readmitted for increasing muscle aches and weakness secondary to dermatomyositis flare and rhabdomyolysis. While in the hospital pt was consulted by rheumatology received one dose of Rituximab on 2/24/19, she is on aggressive IV hydration with elevated CK level , which is consistently elevated , pt was noncompliant with  IV hydration. She was extensively consulted regarding compliance, will f/u repeat CK level and plan discharge in CK level is below 3000.  Today pt c/o  frequent urination, asking about discharge.    PAST MEDICAL & SURGICAL HISTORY  BOOP (bronchiolitis obliterans with organizing pneumonia)  Polymyositis  Hyperlipidemia  Cryptogenic organizing pneumonia  History of cervical cerclage  Ankle fracture      ALLERGIES:  contrast media (iodine-based) (Unknown)  peanuts (Unknown)  shellfish (Unknown)    VITALS:   T(C): 36.7 (27 Feb 2019 06:13), Max: 36.7 (27 Feb 2019 06:13)  T(F): 98 (27 Feb 2019 06:13), Max: 98 (27 Feb 2019 06:13)  HR: 81 (27 Feb 2019 06:13) (81 - 91)  BP: 109/64 (27 Feb 2019 06:13) (109/64 - 150/77)  BP(mean): --  RR: 18 (27 Feb 2019 06:13) (18 - 18)  SpO2: 96% (27 Feb 2019 07:30) (96% - 96%)      PHYSICAL EXAM:  GENERAL: No acute distress, obese   HEENT:  Atraumatic, Normocephalic. EOMI, PERRLA.  PULMONARY: Clear to auscultation bilaterally.  CARDIOVASCULAR: Regular rate and rhythm; No murmurs, rubs, or gallops.  ABDOMEN: Soft, NT, ND  BS+  CNS: AAOx 3 , no focal neuro deficit   SKIN: Mild darkening of bilateral cheeks (per pt typical appearance of resolving rash)  EXTR: right knee tenderness on palpation with decreased ROM due to pain, no edema     LABS:                                   10.4   12.91 )-----------( 516      ( 27 Feb 2019 07:25 )             33.6   02-27    138  |  103  |  8<L>  ----------------------------<  85  4.1   |  24  |  <0.5<L>    Ca    8.6      27 Feb 2019 07:25    TPro  7.3  /  Alb  3.3<L>  /  TBili  <0.2  /  DBili  x   /  AST  115<H>  /  ALT  79<H>  /  AlkPhos  55  02-26  Creatine Kinase, Serum: 4952 U/L (02.27.19 @ 07:25)        Creatine Kinase, Serum: 5334 U/L (02.26.19 @ 08:50)  CARDIAC MARKERS ( 25 Feb 2019 07:33 )  x     / x     / 5077 U/L / x     / x      CARDIAC MARKERS ( 24 Feb 2019 07:49 )  x     / x     / 5033 U/L / x     / x        RADIOLOGY:  < from: Xray Knee 1 or 2 Views, Right (02.24.19 @ 15:51) >  No acutely displaced fracture with mild subchondral sclerosis across the medial femorotibial compartment. Small suprapatellar joint effusion  < end of copied text >    < from: Xray Chest 1 View- PORTABLE-Urgent (02.20.19 @ 17:48) >  Unchanged scattered bilateral interstitial opacities.  < end of copied text >    MEDICATIONS  (STANDING):  azaTHIOprine 100 milliGRAM(s) Oral daily  chlorhexidine 4% Liquid 1 Application(s) Topical <User Schedule>  docusate sodium 100 milliGRAM(s) Oral three times a day  heparin  Injectable 5000 Unit(s) SubCutaneous every 8 hours  lidocaine   Patch 1 Patch Transdermal daily  melatonin 5 milliGRAM(s) Oral at bedtime  mycophenolate mofetil 1500 milliGRAM(s) Oral two times a day  pantoprazole    Tablet 40 milliGRAM(s) Oral before breakfast  predniSONE   Tablet 40 milliGRAM(s) Oral daily  sodium chloride 0.45%. 1000 milliLiter(s) (300 mL/Hr) IV Continuous <Continuous>  triamcinolone 0.1% Cream 1 Application(s) Topical every 12 hours  trimethoprim  160 mG/sulfamethoxazole 800 mG 1 Tablet(s) Oral <User Schedule>    MEDICATIONS  (PRN):  ALBUTerol    90 MICROgram(s) HFA Inhaler 2 Puff(s) Inhalation every 6 hours PRN Shortness of Breath and/or Wheezing  benzonatate 100 milliGRAM(s) Oral three times a day PRN Cough  diphenhydrAMINE 25 milliGRAM(s) Oral once PRN Rash and/or Itching  hydrocortisone 1% Cream 1 Application(s) Topical daily PRN Rash and/or Itching  oxyCODONE    5 mG/acetaminophen 325 mG 1 Tablet(s) Oral every 4 hours PRN Moderate Pain (4 - 6)  senna 2 Tablet(s) Oral at bedtime PRN Constipation

## 2019-02-28 DIAGNOSIS — M33.90 DERMATOPOLYMYOSITIS, UNSPECIFIED, ORGAN INVOLVEMENT UNSPECIFIED: ICD-10-CM

## 2019-02-28 DIAGNOSIS — E87.1 HYPO-OSMOLALITY AND HYPONATREMIA: ICD-10-CM

## 2019-02-28 LAB
ANION GAP SERPL CALC-SCNC: 2 MMOL/L — LOW (ref 7–14)
BUN SERPL-MCNC: 8 MG/DL — LOW (ref 10–20)
CALCIUM SERPL-MCNC: 8.8 MG/DL — SIGNIFICANT CHANGE UP (ref 8.5–10.1)
CHLORIDE SERPL-SCNC: 107 MMOL/L — SIGNIFICANT CHANGE UP (ref 98–110)
CK SERPL-CCNC: 5740 U/L — HIGH (ref 0–225)
CO2 SERPL-SCNC: 24 MMOL/L — SIGNIFICANT CHANGE UP (ref 17–32)
CREAT SERPL-MCNC: <0.5 MG/DL — LOW (ref 0.7–1.5)
GLUCOSE SERPL-MCNC: 99 MG/DL — SIGNIFICANT CHANGE UP (ref 70–99)
HCT VFR BLD CALC: 35.2 % — LOW (ref 37–47)
HGB BLD-MCNC: 11.1 G/DL — LOW (ref 12–16)
MCHC RBC-ENTMCNC: 31.3 PG — HIGH (ref 27–31)
MCHC RBC-ENTMCNC: 31.5 G/DL — LOW (ref 32–37)
MCV RBC AUTO: 99.2 FL — HIGH (ref 81–99)
NRBC # BLD: 0 /100 WBCS — SIGNIFICANT CHANGE UP (ref 0–0)
PLATELET # BLD AUTO: 523 K/UL — HIGH (ref 130–400)
POTASSIUM SERPL-MCNC: 4.5 MMOL/L — SIGNIFICANT CHANGE UP (ref 3.5–5)
POTASSIUM SERPL-SCNC: 4.5 MMOL/L — SIGNIFICANT CHANGE UP (ref 3.5–5)
RBC # BLD: 3.55 M/UL — LOW (ref 4.2–5.4)
RBC # FLD: 16.8 % — HIGH (ref 11.5–14.5)
SODIUM SERPL-SCNC: 133 MMOL/L — LOW (ref 135–146)
WBC # BLD: 12.45 K/UL — HIGH (ref 4.8–10.8)
WBC # FLD AUTO: 12.45 K/UL — HIGH (ref 4.8–10.8)

## 2019-02-28 PROCEDURE — 93970 EXTREMITY STUDY: CPT | Mod: 26

## 2019-02-28 RX ORDER — SODIUM CHLORIDE 9 MG/ML
1000 INJECTION, SOLUTION INTRAVENOUS
Qty: 0 | Refills: 0 | Status: DISCONTINUED | OUTPATIENT
Start: 2019-02-28 | End: 2019-03-02

## 2019-02-28 RX ADMIN — OXYCODONE AND ACETAMINOPHEN 1 TABLET(S): 5; 325 TABLET ORAL at 05:40

## 2019-02-28 RX ADMIN — Medication 100 MILLIGRAM(S): at 14:44

## 2019-02-28 RX ADMIN — HEPARIN SODIUM 5000 UNIT(S): 5000 INJECTION INTRAVENOUS; SUBCUTANEOUS at 05:05

## 2019-02-28 RX ADMIN — SODIUM CHLORIDE 150 MILLILITER(S): 9 INJECTION, SOLUTION INTRAVENOUS at 00:07

## 2019-02-28 RX ADMIN — OXYCODONE AND ACETAMINOPHEN 1 TABLET(S): 5; 325 TABLET ORAL at 21:25

## 2019-02-28 RX ADMIN — Medication 100 MILLIGRAM(S): at 05:06

## 2019-02-28 RX ADMIN — Medication 100 MILLIGRAM(S): at 21:25

## 2019-02-28 RX ADMIN — MYCOPHENOLATE MOFETIL 1500 MILLIGRAM(S): 250 CAPSULE ORAL at 05:06

## 2019-02-28 RX ADMIN — LIDOCAINE 1 PATCH: 4 CREAM TOPICAL at 20:17

## 2019-02-28 RX ADMIN — Medication 5 MILLIGRAM(S): at 21:25

## 2019-02-28 RX ADMIN — OXYCODONE AND ACETAMINOPHEN 1 TABLET(S): 5; 325 TABLET ORAL at 01:30

## 2019-02-28 RX ADMIN — LIDOCAINE 1 PATCH: 4 CREAM TOPICAL at 14:45

## 2019-02-28 RX ADMIN — Medication 1 APPLICATION(S): at 05:06

## 2019-02-28 RX ADMIN — Medication 40 MILLIGRAM(S): at 05:06

## 2019-02-28 RX ADMIN — SODIUM CHLORIDE 150 MILLILITER(S): 9 INJECTION, SOLUTION INTRAVENOUS at 05:43

## 2019-02-28 RX ADMIN — HEPARIN SODIUM 5000 UNIT(S): 5000 INJECTION INTRAVENOUS; SUBCUTANEOUS at 21:25

## 2019-02-28 RX ADMIN — AZATHIOPRINE 100 MILLIGRAM(S): 100 TABLET ORAL at 14:44

## 2019-02-28 RX ADMIN — MYCOPHENOLATE MOFETIL 1500 MILLIGRAM(S): 250 CAPSULE ORAL at 20:23

## 2019-02-28 RX ADMIN — OXYCODONE AND ACETAMINOPHEN 1 TABLET(S): 5; 325 TABLET ORAL at 22:38

## 2019-02-28 RX ADMIN — HEPARIN SODIUM 5000 UNIT(S): 5000 INJECTION INTRAVENOUS; SUBCUTANEOUS at 14:45

## 2019-02-28 RX ADMIN — Medication 1 APPLICATION(S): at 20:31

## 2019-02-28 RX ADMIN — PANTOPRAZOLE SODIUM 40 MILLIGRAM(S): 20 TABLET, DELAYED RELEASE ORAL at 05:06

## 2019-02-28 NOTE — PROGRESS NOTE ADULT - ASSESSMENT
38F with PMHx of dermatomyositis, cryptogenic organizing pneumonia/BOOP with recent admission in December for cough, discharged on MMP, AZA and prednisone presents for increasing muscle aches and weakness, found to have rhabdomyolysis/dermatomyositis flare.     Dermatomyositis flare with rhabdomyolysis:   - On MMP, azathioprine, prednisone 10mg at home  - on aggressive IV hydration  - CK stable, but still > 5000  - Rheum consult recommended prednisone 40 mg daily. On discharge, decrease by 10 mg each week outpatient.  - received Rituximab on 2/24/19, as per  rheumatology, next dose is two weeks later as outpt  - Monitor BMP (for K and Cr), CK Q24H  - taper prednisone as elevated prednisone could be secondary to prednisone myopathy  - Bactrim ppx MWF  - Reconsult rheumatology => continued elevation in CK due to dermatomyositis vs prednisone myopathy    Cough with hx of /BOOP  - Afebrile, no consolidations on CXR, no leukocytosis  - Will be on steroids as above  - C/w PPI, fluticasone  - Per previous admission, patient needs HRCT, spirometry with bronchodilator response and sleep study outpatient    Knee pain  - XR remarkable for small suprapatellar joint effusion   - not grossly inflamed, no erythema   - c/w lidoderm patch    DVT ppx: Heparin SC  Diet: Regular diet  Dispo: Acute. From home. Awaiting for CK to decrease further.

## 2019-02-28 NOTE — PROGRESS NOTE ADULT - SUBJECTIVE AND OBJECTIVE BOX
37yo F with Past Medical History Dermatomyositis (dx April 2018), cryptogenic organizing pneumonia/BOOP admitted in December for cough (dc on MMP, AZA and prednisone) readmitted for increasing muscle aches and weakness secondary to dermatomyositis flare and rhabdomyolysis. While in the hospital pt was consulted by rheumatology received one dose of Rituximab on 2/24/19, she is on aggressive IV hydration with elevated CK level , which is consistently elevated , pt was noncompliant with  IV hydration. She was extensively consulted regarding compliance,  repeat CK level is still elevated, will call rheumatology for follow up ( pt might have ongoing muscle breakdown due to dermatomyositis).   Today pt is c/o right knee pain, wants to go home.     PAST MEDICAL & SURGICAL HISTORY  BOOP (bronchiolitis obliterans with organizing pneumonia)  Polymyositis  Hyperlipidemia  Cryptogenic organizing pneumonia  History of cervical cerclage  Ankle fracture      ALLERGIES:  contrast media (iodine-based) (Unknown)  peanuts (Unknown)  shellfish (Unknown)    VITALS:   T(C): 36.9 (28 Feb 2019 04:42), Max: 36.9 (28 Feb 2019 04:42)  T(F): 98.4 (28 Feb 2019 04:42), Max: 98.4 (28 Feb 2019 04:42)  HR: 95 (28 Feb 2019 04:42) (90 - 95)  BP: 109/58 (28 Feb 2019 04:42) (109/58 - 131/87)  BP(mean): --  RR: 18 (28 Feb 2019 04:42) (18 - 18)    PHYSICAL EXAM:  GENERAL: No acute distress, obese   HEENT:  Atraumatic, Normocephalic. EOMI, PERRLA.  PULMONARY: Clear to auscultation bilaterally.  CARDIOVASCULAR: Regular rate and rhythm; No murmurs, rubs, or gallops.  ABDOMEN: Soft, NT, ND  BS+  CNS: AAOx 3 , no focal neuro deficit   SKIN: Mild darkening of bilateral cheeks (per pt typical appearance of resolving rash)  EXTR: right knee tenderness on palpation with decreased ROM due to pain, no edema     LABS:                                         11.1   12.45 )-----------( 523      ( 28 Feb 2019 08:50 )             35.2   02-28    133<L>  |  107  |  8<L>  ----------------------------<  99  4.5   |  24  |  <0.5<L>    Ca    8.8      28 Feb 2019 08:50    TPro  7.3  /  Alb  3.3<L>  /  TBili  <0.2  /  DBili  x   /  AST  115<H>  /  ALT  79<H>  /  AlkPhos  55  02-26    Creatine Kinase, Serum: 5740 U/L (02.28.19 @ 08:50)    RADIOLOGY:  < from: Xray Knee 1 or 2 Views, Right (02.24.19 @ 15:51) >  No acutely displaced fracture with mild subchondral sclerosis across the medial femorotibial compartment. Small suprapatellar joint effusion  < end of copied text >    < from: Xray Chest 1 View- PORTABLE-Urgent (02.20.19 @ 17:48) >  Unchanged scattered bilateral interstitial opacities.  < end of copied text >    MEDICATIONS  (STANDING):  azaTHIOprine 100 milliGRAM(s) Oral daily  chlorhexidine 4% Liquid 1 Application(s) Topical <User Schedule>  docusate sodium 100 milliGRAM(s) Oral three times a day  heparin  Injectable 5000 Unit(s) SubCutaneous every 8 hours  lidocaine   Patch 1 Patch Transdermal daily  melatonin 5 milliGRAM(s) Oral at bedtime  mycophenolate mofetil 1500 milliGRAM(s) Oral two times a day  pantoprazole    Tablet 40 milliGRAM(s) Oral before breakfast  predniSONE   Tablet 40 milliGRAM(s) Oral daily  sodium chloride 0.45%. 1000 milliLiter(s) (150 mL/Hr) IV Continuous <Continuous>  triamcinolone 0.1% Cream 1 Application(s) Topical every 12 hours  trimethoprim  160 mG/sulfamethoxazole 800 mG 1 Tablet(s) Oral <User Schedule>    MEDICATIONS  (PRN):  ALBUTerol    90 MICROgram(s) HFA Inhaler 2 Puff(s) Inhalation every 6 hours PRN Shortness of Breath and/or Wheezing  benzonatate 100 milliGRAM(s) Oral three times a day PRN Cough  diphenhydrAMINE 25 milliGRAM(s) Oral once PRN Rash and/or Itching  hydrocortisone 1% Cream 1 Application(s) Topical daily PRN Rash and/or Itching  oxyCODONE    5 mG/acetaminophen 325 mG 1 Tablet(s) Oral every 4 hours PRN Moderate Pain (4 - 6)  senna 2 Tablet(s) Oral at bedtime PRN Constipation

## 2019-02-28 NOTE — PROGRESS NOTE ADULT - PROBLEM SELECTOR PLAN 1
- taper steroids ( pt might have steroid induced myopathy? with elevated CK level for number of days)   - received Rituximab on 2/24/19, as per  rheumatology, next dose is two weeks from now as an outpt   - pain meds PRN  - On MMP, azathioprine,  pt was on prednisone 10mg at home  -  Rheumatology follow up   - c/w Bactrim prophylaxis ( pt has h/o BOOP)

## 2019-02-28 NOTE — PROGRESS NOTE ADULT - SUBJECTIVE AND OBJECTIVE BOX
CHIEF COMPLAINT:    Patient is a 38y old Female who presents with a chief complaint of Rhabdomyolysis, dermatomyositis flare (28 Feb 2019 10:31)    Currently admitted to medicine with the primary diagnosis of Dermatomyositis     Today is hospital day 8d. This morning she is resting comfortably in bed and reports no new issues or overnight events.     PAST MEDICAL & SURGICAL HISTORY  BOOP (bronchiolitis obliterans with organizing pneumonia)  Polymyositis  Hyperlipidemia  Cryptogenic organizing pneumonia  History of cervical cerclage  Ankle fracture    ALLERGIES:  contrast media (iodine-based) (Unknown)  peanuts (Unknown)  shellfish (Unknown)    MEDICATIONS:  STANDING MEDICATIONS  azaTHIOprine 100 milliGRAM(s) Oral daily  chlorhexidine 4% Liquid 1 Application(s) Topical <User Schedule>  docusate sodium 100 milliGRAM(s) Oral three times a day  heparin  Injectable 5000 Unit(s) SubCutaneous every 8 hours  lidocaine   Patch 1 Patch Transdermal daily  melatonin 5 milliGRAM(s) Oral at bedtime  mycophenolate mofetil 1500 milliGRAM(s) Oral two times a day  pantoprazole    Tablet 40 milliGRAM(s) Oral before breakfast  predniSONE   Tablet 20 milliGRAM(s) Oral daily  sodium chloride 0.45%. 1000 milliLiter(s) IV Continuous <Continuous>  triamcinolone 0.1% Cream 1 Application(s) Topical every 12 hours  trimethoprim  160 mG/sulfamethoxazole 800 mG 1 Tablet(s) Oral <User Schedule>    PRN MEDICATIONS  ALBUTerol    90 MICROgram(s) HFA Inhaler 2 Puff(s) Inhalation every 6 hours PRN  benzonatate 100 milliGRAM(s) Oral three times a day PRN  diphenhydrAMINE 25 milliGRAM(s) Oral once PRN  hydrocortisone 1% Cream 1 Application(s) Topical daily PRN  oxyCODONE    5 mG/acetaminophen 325 mG 1 Tablet(s) Oral every 4 hours PRN  senna 2 Tablet(s) Oral at bedtime PRN    VITALS:   T(F): 98.4  HR: 95  BP: 109/58  RR: 18  SpO2: --    LABS:                        11.1   12.45 )-----------( 523      ( 28 Feb 2019 08:50 )             35.2     02-28    133<L>  |  107  |  8<L>  ----------------------------<  99  4.5   |  24  |  <0.5<L>    Ca    8.8      28 Feb 2019 08:50    TPro  7.3  /  Alb  3.3<L>  /  TBili  <0.2  /  DBili  x   /  AST  115<H>  /  ALT  79<H>  /  AlkPhos  55  02-26    Creatine Kinase, Serum: 5740 U/L <H> (02-28-19 @ 08:50)  Creatine Kinase, Serum: 5324 U/L <H> (02-27-19 @ 19:00)    CARDIAC MARKERS ( 28 Feb 2019 08:50 )  x     / x     / 5740 U/L / x     / x      CARDIAC MARKERS ( 27 Feb 2019 19:00 )  x     / x     / 5324 U/L / x     / x      CARDIAC MARKERS ( 27 Feb 2019 11:08 )  x     / x     / 6028 U/L / x     / x      CARDIAC MARKERS ( 27 Feb 2019 07:25 )  x     / x     / 4952 U/L / x     / x      CARDIAC MARKERS ( 26 Feb 2019 12:11 )  x     / x     / 5680 U/L / x     / x        RADIOLOGY:  < from: Xray Knee 1 or 2 Views, Right (02.24.19 @ 15:51) >  No acutely displaced fracture with mild subchondral sclerosis across the medial femorotibial compartment. Small suprapatellar joint effusion  < end of copied text >    < from: Xray Chest 1 View- PORTABLE-Urgent (02.20.19 @ 17:48) >  Unchanged scattered bilateral interstitial opacities.  < end of copied text >    PHYSICAL EXAM:  GENERAL: No acute distress.  HEENT:  Atraumatic, Normocephalic. EOMI, PERRLA.  PULMONARY: Clear to auscultation bilaterally.  CARDIOVASCULAR: Regular rate and rhythm; No murmurs, rubs, or gallops.  ABDOMEN:Soft, BS+  CNS: non focal

## 2019-02-28 NOTE — PROGRESS NOTE ADULT - PROBLEM SELECTOR PLAN 2
- aggressive IV hydration , 1/2 NS at 150 cc/h with consistently elevated CK level   - taper Prednisone ( r/o steroid induced myopathy)   - rheumatology follow up while in the hospital - aggressive IV hydration , 1/2 NS at 150 cc/h with consistently elevated CK level   - taper Prednisone ( r/o steroid induced myopathy)   - rheumatology follow up while in the hospital  - put [place] in for comfort ( pt did not sleep for 5 days)   - she agreed for Lovenox injections to her abdomen, was getting Lovenox to her highs

## 2019-02-28 NOTE — PROGRESS NOTE ADULT - PROBLEM SELECTOR PLAN 3
- XR remarkable for small suprapatellar joint effusion   - not grossly inflamed, no erythema   -  Lidoderm topical , Percocet PRN  - pain meds PRN , taper  Prednisone

## 2019-02-28 NOTE — PROGRESS NOTE ADULT - ASSESSMENT
38F with PMHx of dermatomyositis, cryptogenic organizing pneumonia/BOOP with recent admission in December for cough, discharged on MMP, AZA and prednisone presents for increasing muscle aches and weakness, found to have rhabdomyolysis/dermatomyositis flare. While in the hospital pt was consulted by rheumatology received one dose of Rituximab on 2/24/19, she is on aggressive IV hydration with elevated CK level , pt was noncompliant with  IV hydration.  I spoke with pt today at length she agreed for IV hydration.   Repeat CK level in still above 5000, will call rheumatology for follow up ( pt might have ongoing muscle breakdown due to dermatomyositis).        #Progress Note Handoff  Pending (specify):  Consults___rheumatology______, Tests________, Test Results_CK level in AM ___, Other_________  Family discussion: no family available by the bedside , pt is awake, alert and oriented   Disposition: Home_x __/SNF___/Other________/Unknown at this time________ 38F with PMHx of dermatomyositis, cryptogenic organizing pneumonia/BOOP with recent admission in December for cough, discharged on MMP, AZA and prednisone presents for increasing muscle aches and weakness, found to have rhabdomyolysis/dermatomyositis flare. While in the hospital pt was consulted by rheumatology received one dose of Rituximab on 2/24/19, she is on aggressive IV hydration with elevated CK level , pt was noncompliant with  IV hydration.  I spoke with pt today at length she agreed for IV hydration.   Repeat CK level in still above 5000, will call rheumatology for follow up ( pt might have ongoing muscle breakdown due to dermatomyositis).  Hold discharge for now, put Crenshaw cath in for comfort, f/u repeat CK in AM and official rheumatology follow up.         #Progress Note Handoff  Pending (specify):  Consults___rheumatology______, Tests________, Test Results_CK level in AM ___, Other_________  Family discussion: no family available by the bedside , pt is awake, alert and oriented   Disposition: Home_x __/SNF___/Other________/Unknown at this time________

## 2019-03-01 LAB
ANION GAP SERPL CALC-SCNC: 14 MMOL/L — SIGNIFICANT CHANGE UP (ref 7–14)
BUN SERPL-MCNC: 10 MG/DL — SIGNIFICANT CHANGE UP (ref 10–20)
CALCIUM SERPL-MCNC: 8.6 MG/DL — SIGNIFICANT CHANGE UP (ref 8.5–10.1)
CHLORIDE SERPL-SCNC: 98 MMOL/L — SIGNIFICANT CHANGE UP (ref 98–110)
CK SERPL-CCNC: 5353 U/L — HIGH (ref 0–225)
CO2 SERPL-SCNC: 25 MMOL/L — SIGNIFICANT CHANGE UP (ref 17–32)
CREAT SERPL-MCNC: 0.5 MG/DL — LOW (ref 0.7–1.5)
GLUCOSE SERPL-MCNC: 104 MG/DL — HIGH (ref 70–99)
HCT VFR BLD CALC: 35.4 % — LOW (ref 37–47)
HGB BLD-MCNC: 11 G/DL — LOW (ref 12–16)
MCHC RBC-ENTMCNC: 30.7 PG — SIGNIFICANT CHANGE UP (ref 27–31)
MCHC RBC-ENTMCNC: 31.1 G/DL — LOW (ref 32–37)
MCV RBC AUTO: 98.9 FL — SIGNIFICANT CHANGE UP (ref 81–99)
NRBC # BLD: 0 /100 WBCS — SIGNIFICANT CHANGE UP (ref 0–0)
PLATELET # BLD AUTO: 516 K/UL — HIGH (ref 130–400)
POTASSIUM SERPL-MCNC: 5 MMOL/L — SIGNIFICANT CHANGE UP (ref 3.5–5)
POTASSIUM SERPL-SCNC: 5 MMOL/L — SIGNIFICANT CHANGE UP (ref 3.5–5)
RBC # BLD: 3.58 M/UL — LOW (ref 4.2–5.4)
RBC # FLD: 17 % — HIGH (ref 11.5–14.5)
SODIUM SERPL-SCNC: 137 MMOL/L — SIGNIFICANT CHANGE UP (ref 135–146)
WBC # BLD: 13.54 K/UL — HIGH (ref 4.8–10.8)
WBC # FLD AUTO: 13.54 K/UL — HIGH (ref 4.8–10.8)

## 2019-03-01 RX ORDER — OXYCODONE HYDROCHLORIDE 5 MG/1
5 TABLET ORAL ONCE
Qty: 0 | Refills: 0 | Status: DISCONTINUED | OUTPATIENT
Start: 2019-03-01 | End: 2019-03-01

## 2019-03-01 RX ADMIN — SODIUM CHLORIDE 150 MILLILITER(S): 9 INJECTION, SOLUTION INTRAVENOUS at 06:58

## 2019-03-01 RX ADMIN — LIDOCAINE 1 PATCH: 4 CREAM TOPICAL at 19:33

## 2019-03-01 RX ADMIN — MYCOPHENOLATE MOFETIL 1500 MILLIGRAM(S): 250 CAPSULE ORAL at 17:06

## 2019-03-01 RX ADMIN — OXYCODONE HYDROCHLORIDE 5 MILLIGRAM(S): 5 TABLET ORAL at 23:01

## 2019-03-01 RX ADMIN — HEPARIN SODIUM 5000 UNIT(S): 5000 INJECTION INTRAVENOUS; SUBCUTANEOUS at 21:37

## 2019-03-01 RX ADMIN — HEPARIN SODIUM 5000 UNIT(S): 5000 INJECTION INTRAVENOUS; SUBCUTANEOUS at 13:52

## 2019-03-01 RX ADMIN — Medication 100 MILLIGRAM(S): at 21:36

## 2019-03-01 RX ADMIN — Medication 20 MILLIGRAM(S): at 05:19

## 2019-03-01 RX ADMIN — LIDOCAINE 1 PATCH: 4 CREAM TOPICAL at 22:41

## 2019-03-01 RX ADMIN — AZATHIOPRINE 100 MILLIGRAM(S): 100 TABLET ORAL at 11:02

## 2019-03-01 RX ADMIN — OXYCODONE HYDROCHLORIDE 5 MILLIGRAM(S): 5 TABLET ORAL at 23:13

## 2019-03-01 RX ADMIN — Medication 1 APPLICATION(S): at 05:20

## 2019-03-01 RX ADMIN — MYCOPHENOLATE MOFETIL 1500 MILLIGRAM(S): 250 CAPSULE ORAL at 05:19

## 2019-03-01 RX ADMIN — HEPARIN SODIUM 5000 UNIT(S): 5000 INJECTION INTRAVENOUS; SUBCUTANEOUS at 05:20

## 2019-03-01 RX ADMIN — SODIUM CHLORIDE 150 MILLILITER(S): 9 INJECTION, SOLUTION INTRAVENOUS at 00:09

## 2019-03-01 RX ADMIN — Medication 100 MILLIGRAM(S): at 05:19

## 2019-03-01 RX ADMIN — OXYCODONE AND ACETAMINOPHEN 1 TABLET(S): 5; 325 TABLET ORAL at 20:54

## 2019-03-01 RX ADMIN — OXYCODONE AND ACETAMINOPHEN 1 TABLET(S): 5; 325 TABLET ORAL at 19:59

## 2019-03-01 RX ADMIN — Medication 1 TABLET(S): at 09:51

## 2019-03-01 RX ADMIN — Medication 40 MILLIGRAM(S): at 17:06

## 2019-03-01 RX ADMIN — LIDOCAINE 1 PATCH: 4 CREAM TOPICAL at 11:03

## 2019-03-01 RX ADMIN — SODIUM CHLORIDE 150 MILLILITER(S): 9 INJECTION, SOLUTION INTRAVENOUS at 20:16

## 2019-03-01 RX ADMIN — SODIUM CHLORIDE 150 MILLILITER(S): 9 INJECTION, SOLUTION INTRAVENOUS at 13:51

## 2019-03-01 RX ADMIN — PANTOPRAZOLE SODIUM 40 MILLIGRAM(S): 20 TABLET, DELAYED RELEASE ORAL at 05:19

## 2019-03-01 RX ADMIN — LIDOCAINE 1 PATCH: 4 CREAM TOPICAL at 02:06

## 2019-03-01 RX ADMIN — Medication 5 MILLIGRAM(S): at 21:36

## 2019-03-01 RX ADMIN — OXYCODONE AND ACETAMINOPHEN 1 TABLET(S): 5; 325 TABLET ORAL at 06:45

## 2019-03-01 RX ADMIN — Medication 100 MILLIGRAM(S): at 13:52

## 2019-03-01 RX ADMIN — Medication 1 APPLICATION(S): at 17:07

## 2019-03-01 RX ADMIN — OXYCODONE AND ACETAMINOPHEN 1 TABLET(S): 5; 325 TABLET ORAL at 05:19

## 2019-03-01 NOTE — PROGRESS NOTE ADULT - SUBJECTIVE AND OBJECTIVE BOX
39yo F with Past Medical History Dermatomyositis (dx April 2018), cryptogenic organizing pneumonia/BOOP admitted in December for cough (dc on MMP, AZA and prednisone) readmitted for increasing muscle aches and weakness secondary to dermatomyositis flare and rhabdomyolysis. While in the hospital pt was consulted by rheumatology received one dose of Rituximab on 2/24/19, she is on aggressive IV hydration with elevated CK level, will f/u CK level today and plan discharge if CK level is below 5000 ( case dw Rheumatology pts baseline CK level is around 2000 and case d/w nephrology attending)     PAST MEDICAL & SURGICAL HISTORY  BOOP (bronchiolitis obliterans with organizing pneumonia)  Polymyositis  Hyperlipidemia  Cryptogenic organizing pneumonia  History of cervical cerclage  Ankle fracture      ALLERGIES:  contrast media (iodine-based) (Unknown)  peanuts (Unknown)  shellfish (Unknown)    VITALS:   T(C): 36.6 (01 Mar 2019 05:34), Max: 36.8 (01 Mar 2019 00:18)  T(F): 97.9 (01 Mar 2019 05:34), Max: 98.2 (01 Mar 2019 00:18)  HR: 86 (01 Mar 2019 05:34) (86 - 94)  BP: 119/60 (01 Mar 2019 05:34) (116/67 - 129/85)  RR: 19 (01 Mar 2019 05:34) (18 - 19)  SpO2: 96% (01 Mar 2019 08:02) (96% - 97%)    PHYSICAL EXAM:  GENERAL: No acute distress, obese   HEENT:  Atraumatic, Normocephalic. EOMI, PERRLA.  PULMONARY: Clear to auscultation bilaterally.  CARDIOVASCULAR: Regular rate and rhythm; No murmurs, rubs, or gallops.  ABDOMEN: Soft, NT, ND  BS+  CNS: AAOx 3 , no focal neuro deficit   SKIN: Mild darkening of bilateral cheeks (per pt typical appearance of resolving rash)  EXTR: right knee tenderness on palpation with decreased ROM due to pain, no edema     LABS:                                    11.1   12.45 )-----------( 523      ( 28 Feb 2019 08:50 )             35.2   02-28    133<L>  |  107  |  8<L>  ----------------------------<  99  4.5   |  24  |  <0.5<L>    Ca    8.8      28 Feb 2019 08:50    Creatine Kinase, Serum: 5740 U/L (02.28.19 @ 08:50)    RADIOLOGY:  < from: Xray Knee 1 or 2 Views, Right (02.24.19 @ 15:51) >  No acutely displaced fracture with mild subchondral sclerosis across the medial femorotibial compartment. Small suprapatellar joint effusion  < end of copied text >    < from: Xray Chest 1 View- PORTABLE-Urgent (02.20.19 @ 17:48) >  Unchanged scattered bilateral interstitial opacities.  < end of copied text >    MEDICATIONS  (STANDING):  azaTHIOprine 100 milliGRAM(s) Oral daily  chlorhexidine 4% Liquid 1 Application(s) Topical <User Schedule>  docusate sodium 100 milliGRAM(s) Oral three times a day  heparin  Injectable 5000 Unit(s) SubCutaneous every 8 hours  lidocaine   Patch 1 Patch Transdermal daily  melatonin 5 milliGRAM(s) Oral at bedtime  mycophenolate mofetil 1500 milliGRAM(s) Oral two times a day  pantoprazole    Tablet 40 milliGRAM(s) Oral before breakfast  predniSONE   Tablet 20 milliGRAM(s) Oral daily  sodium chloride 0.45%. 1000 milliLiter(s) (150 mL/Hr) IV Continuous <Continuous>  triamcinolone 0.1% Cream 1 Application(s) Topical every 12 hours  trimethoprim  160 mG/sulfamethoxazole 800 mG 1 Tablet(s) Oral <User Schedule>    MEDICATIONS  (PRN):  ALBUTerol    90 MICROgram(s) HFA Inhaler 2 Puff(s) Inhalation every 6 hours PRN Shortness of Breath and/or Wheezing  benzonatate 100 milliGRAM(s) Oral three times a day PRN Cough  diphenhydrAMINE 25 milliGRAM(s) Oral once PRN Rash and/or Itching  hydrocortisone 1% Cream 1 Application(s) Topical daily PRN Rash and/or Itching  oxyCODONE    5 mG/acetaminophen 325 mG 1 Tablet(s) Oral every 4 hours PRN Moderate Pain (4 - 6)  senna 2 Tablet(s) Oral at bedtime PRN Constipation

## 2019-03-01 NOTE — PROGRESS NOTE ADULT - PROBLEM SELECTOR PLAN 1
- taper steroids ( pt might have steroid induced myopathy? with elevated CK level for number of days)   - received Rituximab on 2/24/19, as per  rheumatology, next dose is two weeks from now as an outpt   - pain meds PRN  - On MMP, azathioprine,  pt was on prednisone 10mg at home  -  Rheumatology follow up   - c/w Bactrim prophylaxis ( pt has h/o BOOP)  - f/u repeat CK level at 11 AM

## 2019-03-01 NOTE — PROGRESS NOTE ADULT - SUBJECTIVE AND OBJECTIVE BOX
CHIEF COMPLAINT:  Patient is a 38y old Female who presents with a chief complaint of Rhabdomyolysis, dermatomyositis flare (01 Mar 2019 11:35)    Currently admitted to medicine with the primary diagnosis of Dermatomyositis     Today is hospital day 9d. This morning she is resting comfortably in bed and reports no new issues or overnight events.     PAST MEDICAL & SURGICAL HISTORY  BOOP (bronchiolitis obliterans with organizing pneumonia)  Polymyositis  Hyperlipidemia  Cryptogenic organizing pneumonia  History of cervical cerclage  Ankle fracture    ALLERGIES:  contrast media (iodine-based) (Unknown)  peanuts (Unknown)  shellfish (Unknown)    MEDICATIONS:  STANDING MEDICATIONS  azaTHIOprine 100 milliGRAM(s) Oral daily  chlorhexidine 4% Liquid 1 Application(s) Topical <User Schedule>  docusate sodium 100 milliGRAM(s) Oral three times a day  heparin  Injectable 5000 Unit(s) SubCutaneous every 8 hours  lidocaine   Patch 1 Patch Transdermal daily  melatonin 5 milliGRAM(s) Oral at bedtime  mycophenolate mofetil 1500 milliGRAM(s) Oral two times a day  pantoprazole    Tablet 40 milliGRAM(s) Oral before breakfast  predniSONE   Tablet 20 milliGRAM(s) Oral daily  sodium chloride 0.45%. 1000 milliLiter(s) IV Continuous <Continuous>  triamcinolone 0.1% Cream 1 Application(s) Topical every 12 hours  trimethoprim  160 mG/sulfamethoxazole 800 mG 1 Tablet(s) Oral <User Schedule>    PRN MEDICATIONS  ALBUTerol    90 MICROgram(s) HFA Inhaler 2 Puff(s) Inhalation every 6 hours PRN  benzonatate 100 milliGRAM(s) Oral three times a day PRN  diphenhydrAMINE 25 milliGRAM(s) Oral once PRN  hydrocortisone 1% Cream 1 Application(s) Topical daily PRN  oxyCODONE    5 mG/acetaminophen 325 mG 1 Tablet(s) Oral every 4 hours PRN  senna 2 Tablet(s) Oral at bedtime PRN    VITALS:   T(F): 99  HR: 87  BP: 125/62  RR: 18  SpO2: 96%    LABS:             11.0   13.54 )-----------( 516      ( 01 Mar 2019 11:40 )             35.4     03-01  137  |  98  |  10  ----------------------------<  104<H>  5.0   |  25  |  0.5<L>    Ca    8.6      01 Mar 2019 11:40    Creatine Kinase, Serum: 5353 U/L <H> (03-01-19 @ 11:40)    CARDIAC MARKERS ( 01 Mar 2019 11:40 )  x     / x     / 5353 U/L / x     / x      CARDIAC MARKERS ( 28 Feb 2019 08:50 )  x     / x     / 5740 U/L / x     / x      CARDIAC MARKERS ( 27 Feb 2019 19:00 )  x     / x     / 5324 U/L / x     / x        RADIOLOGY:  < from: Xray Knee 1 or 2 Views, Right (02.24.19 @ 15:51) >  No acutely displaced fracture with mild subchondral sclerosis across the medial femorotibial compartment. Small suprapatellar joint effusion  < end of copied text >    < from: Xray Chest 1 View- PORTABLE-Urgent (02.20.19 @ 17:48) >  Unchanged scattered bilateral interstitial opacities.  < end of copied text >    PHYSICAL EXAM:  GENERAL: No acute distress.  HEENT:  Atraumatic, Normocephalic. EOMI, PERRLA.  PULMONARY: Clear to auscultation bilaterally.  CARDIOVASCULAR: Regular rate and rhythm; No murmurs, rubs, or gallops.  ABDOMEN:Soft, BS+  CNS: non focal

## 2019-03-01 NOTE — PROGRESS NOTE ADULT - ASSESSMENT
38F with PMHx of dermatomyositis, cryptogenic organizing pneumonia/BOOP with recent admission in December for cough, discharged on MMP, AZA and prednisone presents for increasing muscle aches and weakness, found to have rhabdomyolysis/dermatomyositis flare. While in the hospital pt was consulted by rheumatology received one dose of Rituximab on 2/24/19, she is on aggressive IV hydration with elevated CK level, will f/u repeat CK level today, will discharge home if CK level is below 5000.           #Progress Note Handoff  Pending (specify):  _CK level at 11 am   Family discussion: no family available by the bedside , pt is awake, alert and oriented   Disposition: Home, likely today

## 2019-03-01 NOTE — PROGRESS NOTE ADULT - ASSESSMENT
38F with PMHx of dermatomyositis, cryptogenic organizing pneumonia/BOOP with recent admission in December for cough, discharged on MMP, AZA and prednisone presents for increasing muscle aches and weakness, found to have rhabdomyolysis/dermatomyositis flare.     Dermatomyositis flare with rhabdomyolysis:   - On MMP, azathioprine, prednisone 10mg at home  - on aggressive IV hydration  - CK stable, but still > 5000  - received Rituximab on 2/24/19, as per  rheumatology, next dose is two weeks later as outpt  - Monitor BMP (for K and Cr), CK Q24H  - cw prednisone  - Bactrim ppx MWF  - Reconsult rheumatology => continued elevation in CK due to dermatomyositis vs prednisone myopathy    Cough with hx of /BOOP  - Afebrile, no consolidations on CXR, no leukocytosis  - Will be on steroids as above  - C/w PPI, fluticasone  - Per previous admission, patient needs HRCT, spirometry with bronchodilator response and sleep study outpatient    Knee pain  - XR remarkable for small suprapatellar joint effusion   - not grossly inflamed, no erythema   - c/w lidoderm patch    DVT ppx: Heparin SC  Diet: Regular diet  Dispo: Acute. From home. Awaiting for CK to decrease further.

## 2019-03-01 NOTE — CHART NOTE - NSCHARTNOTEFT_GEN_A_CORE
Case d/w rheumatology attending Dr Tran today, she advised to increase the dose of Prednisone to 60 mg Q 24 hours, will follow up official recommendations and monitor CK level in AM.

## 2019-03-01 NOTE — PROGRESS NOTE ADULT - PROBLEM SELECTOR PLAN 2
-  aggressive IV hydration , 1/2 NS at 150 cc/h with consistently elevated CK level   - taper Prednisone ( r/o steroid induced myopathy)   - rheumatology follow up while in the hospital  - humberto/jerry Crenshaw while in the hospital, will d/c prior discharge   - she agreed for Lovenox injections to her abdomen, was getting Lovenox to her highs

## 2019-03-02 VITALS
HEART RATE: 88 BPM | RESPIRATION RATE: 16 BRPM | DIASTOLIC BLOOD PRESSURE: 67 MMHG | SYSTOLIC BLOOD PRESSURE: 122 MMHG | TEMPERATURE: 99 F

## 2019-03-02 LAB
ANION GAP SERPL CALC-SCNC: 10 MMOL/L — SIGNIFICANT CHANGE UP (ref 7–14)
BASOPHILS # BLD AUTO: 0.02 K/UL — SIGNIFICANT CHANGE UP (ref 0–0.2)
BASOPHILS NFR BLD AUTO: 0.2 % — SIGNIFICANT CHANGE UP (ref 0–1)
BUN SERPL-MCNC: 10 MG/DL — SIGNIFICANT CHANGE UP (ref 10–20)
CALCIUM SERPL-MCNC: 9.1 MG/DL — SIGNIFICANT CHANGE UP (ref 8.5–10.1)
CHLORIDE SERPL-SCNC: 102 MMOL/L — SIGNIFICANT CHANGE UP (ref 98–110)
CK SERPL-CCNC: 4152 U/L — HIGH (ref 0–225)
CO2 SERPL-SCNC: 25 MMOL/L — SIGNIFICANT CHANGE UP (ref 17–32)
CREAT SERPL-MCNC: <0.5 MG/DL — LOW (ref 0.7–1.5)
EOSINOPHIL # BLD AUTO: 0.06 K/UL — SIGNIFICANT CHANGE UP (ref 0–0.7)
EOSINOPHIL NFR BLD AUTO: 0.5 % — SIGNIFICANT CHANGE UP (ref 0–8)
GLUCOSE SERPL-MCNC: 104 MG/DL — HIGH (ref 70–99)
HCT VFR BLD CALC: 34.2 % — LOW (ref 37–47)
HGB BLD-MCNC: 10.5 G/DL — LOW (ref 12–16)
IMM GRANULOCYTES NFR BLD AUTO: 1 % — HIGH (ref 0.1–0.3)
LYMPHOCYTES # BLD AUTO: 0.75 K/UL — LOW (ref 1.2–3.4)
LYMPHOCYTES # BLD AUTO: 5.9 % — LOW (ref 20.5–51.1)
MCHC RBC-ENTMCNC: 30.5 PG — SIGNIFICANT CHANGE UP (ref 27–31)
MCHC RBC-ENTMCNC: 30.7 G/DL — LOW (ref 32–37)
MCV RBC AUTO: 99.4 FL — HIGH (ref 81–99)
MONOCYTES # BLD AUTO: 0.4 K/UL — SIGNIFICANT CHANGE UP (ref 0.1–0.6)
MONOCYTES NFR BLD AUTO: 3.2 % — SIGNIFICANT CHANGE UP (ref 1.7–9.3)
NEUTROPHILS # BLD AUTO: 11.28 K/UL — HIGH (ref 1.4–6.5)
NEUTROPHILS NFR BLD AUTO: 89.2 % — HIGH (ref 42.2–75.2)
NRBC # BLD: 0 /100 WBCS — SIGNIFICANT CHANGE UP (ref 0–0)
PLATELET # BLD AUTO: 523 K/UL — HIGH (ref 130–400)
POTASSIUM SERPL-MCNC: 4.6 MMOL/L — SIGNIFICANT CHANGE UP (ref 3.5–5)
POTASSIUM SERPL-SCNC: 4.6 MMOL/L — SIGNIFICANT CHANGE UP (ref 3.5–5)
RBC # BLD: 3.44 M/UL — LOW (ref 4.2–5.4)
RBC # FLD: 16.9 % — HIGH (ref 11.5–14.5)
SODIUM SERPL-SCNC: 137 MMOL/L — SIGNIFICANT CHANGE UP (ref 135–146)
WBC # BLD: 12.63 K/UL — HIGH (ref 4.8–10.8)
WBC # FLD AUTO: 12.63 K/UL — HIGH (ref 4.8–10.8)

## 2019-03-02 RX ADMIN — SODIUM CHLORIDE 150 MILLILITER(S): 9 INJECTION, SOLUTION INTRAVENOUS at 09:23

## 2019-03-02 RX ADMIN — HEPARIN SODIUM 5000 UNIT(S): 5000 INJECTION INTRAVENOUS; SUBCUTANEOUS at 14:13

## 2019-03-02 RX ADMIN — PANTOPRAZOLE SODIUM 40 MILLIGRAM(S): 20 TABLET, DELAYED RELEASE ORAL at 05:16

## 2019-03-02 RX ADMIN — Medication 1 APPLICATION(S): at 05:21

## 2019-03-02 RX ADMIN — SODIUM CHLORIDE 150 MILLILITER(S): 9 INJECTION, SOLUTION INTRAVENOUS at 03:16

## 2019-03-02 RX ADMIN — OXYCODONE AND ACETAMINOPHEN 1 TABLET(S): 5; 325 TABLET ORAL at 06:09

## 2019-03-02 RX ADMIN — AZATHIOPRINE 100 MILLIGRAM(S): 100 TABLET ORAL at 11:01

## 2019-03-02 RX ADMIN — OXYCODONE AND ACETAMINOPHEN 1 TABLET(S): 5; 325 TABLET ORAL at 05:16

## 2019-03-02 RX ADMIN — Medication 100 MILLIGRAM(S): at 14:12

## 2019-03-02 RX ADMIN — Medication 100 MILLIGRAM(S): at 05:15

## 2019-03-02 RX ADMIN — MYCOPHENOLATE MOFETIL 1500 MILLIGRAM(S): 250 CAPSULE ORAL at 05:16

## 2019-03-02 RX ADMIN — LIDOCAINE 1 PATCH: 4 CREAM TOPICAL at 11:01

## 2019-03-02 RX ADMIN — HEPARIN SODIUM 5000 UNIT(S): 5000 INJECTION INTRAVENOUS; SUBCUTANEOUS at 05:15

## 2019-03-02 RX ADMIN — Medication 60 MILLIGRAM(S): at 05:49

## 2019-03-02 NOTE — PROGRESS NOTE ADULT - PROBLEM SELECTOR PLAN 1
- on Prednisone 60 mg Q 24 hours ( as per Rheumatology) , will f/u official recommendations   - received Rituximab on 2/24/19, as per  rheumatology, next dose rheumatology will administer at an outpt   - pain meds PRN  - On MMP, azathioprine,  pt was on prednisone 10mg at home  - c/w Bactrim prophylaxis ( pt has h/o BOOP) - on Prednisone 60 mg Q 24 hours ( as per Rheumatology)   - received Rituximab on 2/24/19, as per  rheumatology, next dose rheumatology will administer at an outpt   - pain meds PRN  - On MMP, azathioprine,  pt was on prednisone 10mg at home  - c/w Bactrim prophylaxis ( pt has h/o BOOP)

## 2019-03-02 NOTE — PROGRESS NOTE ADULT - PROBLEM SELECTOR PROBLEM 2
Non-traumatic rhabdomyolysis

## 2019-03-02 NOTE — PROGRESS NOTE ADULT - PROBLEM SELECTOR PLAN 3
- XR remarkable for small suprapatellar joint effusion   - not grossly inflamed, no erythema   -  Lidoderm topical , Percocet PRN  - pain meds PRN , taper  Prednisone - XR remarkable for small suprapatellar joint effusion   - not grossly inflamed, no erythema   -  Salonpas topical  , Percocet PRN  - pain meds PRN , taper  Prednisone

## 2019-03-02 NOTE — PROGRESS NOTE ADULT - REASON FOR ADMISSION
Rhambdomyolysis, dermatomyositis flare
Rhambdomyolysis, dermatomyositis flare
Rhabdomyolysis, dermatomyositis flare
Rhambdomyolysis, dermatomyositis flare
Rhabdomyolysis, dermatomyositis flare
Rhambdomyolysis, dermatomyositis flare
Rhabdomyolysis, dermatomyositis flare
Rhambdomyolysis, dermatomyositis flare
Rhabdomyolysis, dermatomyositis flare
Rhambdomyolysis, dermatomyositis flare

## 2019-03-02 NOTE — PROGRESS NOTE ADULT - ASSESSMENT
38F with PMHx of dermatomyositis, cryptogenic organizing pneumonia/BOOP with recent admission in December for cough, discharged on MMP, AZA and prednisone presents for increasing muscle aches and weakness, found to have rhabdomyolysis/dermatomyositis flare. While in the hospital pt was consulted by rheumatology received one dose of Rituximab on 2/24/19, she is on aggressive IV hydration with elevated CK level, on Prednisone 60 mg as per rheumatology, will f/u repeat CK level and plan discharge if CK is below 5000, will f/u rheumatology recommendations.       #Progress Note Handoff  Pending (specify):  _CK level ( still pending)   Family discussion: no family available by the bedside , pt is awake, alert and oriented   Disposition: Home, likely today 38F with PMHx of dermatomyositis, cryptogenic organizing pneumonia/BOOP with recent admission in December for cough, discharged on MMP, AZA and prednisone presents for increasing muscle aches and weakness, found to have rhabdomyolysis/dermatomyositis flare. While in the hospital pt was consulted by rheumatology received one dose of Rituximab on 2/24/19, she is on aggressive IV hydration with elevated CK level, on Prednisone 60 mg as per rheumatology, will f/u repeat CK level and plan discharge if CK is below 5000, will f/u rheumatology recommendations.       #Progress Note Handoff  Pending (specify):  _CK level 4152  Family discussion: no family available by the bedside , pt is awake, alert and oriented   Disposition: Pt is stable for discharge home with home care today.

## 2019-03-02 NOTE — PROGRESS NOTE ADULT - PROBLEM SELECTOR PROBLEM 3
Knee pain, right anterior

## 2019-03-02 NOTE — PROGRESS NOTE ADULT - PROBLEM SELECTOR PLAN 5
- low cholesterol diet

## 2019-03-02 NOTE — PROGRESS NOTE ADULT - SUBJECTIVE AND OBJECTIVE BOX
37yo F with Past Medical History Dermatomyositis (dx April 2018), cryptogenic organizing pneumonia/BOOP admitted in December for cough (dc on MMP, AZA and prednisone) readmitted for increasing muscle aches and weakness secondary to dermatomyositis flare and rhabdomyolysis. While in the hospital pt was consulted by rheumatology received one dose of Rituximab on 2/24/19, she is on aggressive IV hydration with elevated CK level, case d/w Rheumatology attending on 3/1/19 and she recommended to increase the Prednisone dose to 60 mg,  will f/u CK level today and plan discharge if CK level is below 5000.  Today pt is c/o weakness and right knee pain on ambulation.     PAST MEDICAL & SURGICAL HISTORY  BOOP (bronchiolitis obliterans with organizing pneumonia)  Polymyositis  Hyperlipidemia  Cryptogenic organizing pneumonia  History of cervical cerclage  Ankle fracture      ALLERGIES:  contrast media (iodine-based) (Unknown)  peanuts (Unknown)  shellfish (Unknown)    VITALS:   T(C): 36.4 (02 Mar 2019 05:18), Max: 37.2 (01 Mar 2019 13:13)  T(F): 97.6 (02 Mar 2019 05:18), Max: 99 (01 Mar 2019 13:13)  HR: 80 (02 Mar 2019 05:18) (80 - 87)  BP: 125/66 (02 Mar 2019 05:18) (125/62 - 129/74)  RR: 18 (02 Mar 2019 05:18) (18 - 18)  SpO2: 96% (02 Mar 2019 08:59) (96% - 96%)    PHYSICAL EXAM:  GENERAL: No acute distress, obese   HEENT:  Atraumatic, Normocephalic. EOMI, PERRLA.  PULMONARY: Clear to auscultation bilaterally.  CARDIOVASCULAR: Regular rate and rhythm; No murmurs, rubs, or gallops.  ABDOMEN: Soft, NT, ND  BS+  CNS: AAOx 3 , no focal neuro deficit   SKIN: ecchymoses noted on upper thighs and abdomen   EXTR: right knee tenderness on palpation with decreased ROM due to pain, no edema     LABS:                                      10.5   12.63 )-----------( 523      ( 02 Mar 2019 08:27 )             34.2   03-01    137  |  98  |  10  ----------------------------<  104<H>  5.0   |  25  |  0.5<L>    Ca    8.6      01 Mar 2019 11:40        Creatine Kinase, Serum: 5740 U/L (02.28.19 @ 08:50)    RADIOLOGY:  < from: Xray Knee 1 or 2 Views, Right (02.24.19 @ 15:51) >  No acutely displaced fracture with mild subchondral sclerosis across the medial femorotibial compartment. Small suprapatellar joint effusion  < end of copied text >    < from: Xray Chest 1 View- PORTABLE-Urgent (02.20.19 @ 17:48) >  Unchanged scattered bilateral interstitial opacities.  < end of copied text >    MEDICATIONS  (STANDING):  azaTHIOprine 100 milliGRAM(s) Oral daily  chlorhexidine 4% Liquid 1 Application(s) Topical <User Schedule>  docusate sodium 100 milliGRAM(s) Oral three times a day  heparin  Injectable 5000 Unit(s) SubCutaneous every 8 hours  lidocaine   Patch 1 Patch Transdermal daily  melatonin 5 milliGRAM(s) Oral at bedtime  mycophenolate mofetil 1500 milliGRAM(s) Oral two times a day  pantoprazole    Tablet 40 milliGRAM(s) Oral before breakfast  predniSONE   Tablet 60 milliGRAM(s) Oral daily  sodium chloride 0.45%. 1000 milliLiter(s) (150 mL/Hr) IV Continuous <Continuous>  triamcinolone 0.1% Cream 1 Application(s) Topical every 12 hours  trimethoprim  160 mG/sulfamethoxazole 800 mG 1 Tablet(s) Oral <User Schedule>    MEDICATIONS  (PRN):  ALBUTerol    90 MICROgram(s) HFA Inhaler 2 Puff(s) Inhalation every 6 hours PRN Shortness of Breath and/or Wheezing  benzonatate 100 milliGRAM(s) Oral three times a day PRN Cough  diphenhydrAMINE 25 milliGRAM(s) Oral once PRN Rash and/or Itching  hydrocortisone 1% Cream 1 Application(s) Topical daily PRN Rash and/or Itching  oxyCODONE    5 mG/acetaminophen 325 mG 1 Tablet(s) Oral every 4 hours PRN Moderate Pain (4 - 6)  senna 2 Tablet(s) Oral at bedtime PRN Constipation 37yo F with Past Medical History Dermatomyositis (dx April 2018), cryptogenic organizing pneumonia/BOOP admitted in December for cough (dc on MMP, AZA and prednisone) readmitted for increasing muscle aches and weakness secondary to dermatomyositis flare and rhabdomyolysis. While in the hospital pt was consulted by rheumatology received one dose of Rituximab on 2/24/19, she is on aggressive IV hydration with elevated CK level, case d/w Rheumatology attending on 3/1/19 and she recommended to increase the Prednisone dose to 60 mg,  will f/u CK level today and plan discharge if CK level is below 5000.  Today pt is c/o weakness and right knee pain on ambulation.     PAST MEDICAL & SURGICAL HISTORY  BOOP (bronchiolitis obliterans with organizing pneumonia)  Polymyositis  Hyperlipidemia  Cryptogenic organizing pneumonia  History of cervical cerclage  Ankle fracture      ALLERGIES:  contrast media (iodine-based) (Unknown)  peanuts (Unknown)  shellfish (Unknown)    VITALS:   T(C): 36.4 (02 Mar 2019 05:18), Max: 37.2 (01 Mar 2019 13:13)  T(F): 97.6 (02 Mar 2019 05:18), Max: 99 (01 Mar 2019 13:13)  HR: 80 (02 Mar 2019 05:18) (80 - 87)  BP: 125/66 (02 Mar 2019 05:18) (125/62 - 129/74)  RR: 18 (02 Mar 2019 05:18) (18 - 18)  SpO2: 96% (02 Mar 2019 08:59) (96% - 96%)    PHYSICAL EXAM:  GENERAL: No acute distress, obese   HEENT:  Atraumatic, Normocephalic. EOMI, PERRLA.  PULMONARY: Clear to auscultation bilaterally.  CARDIOVASCULAR: Regular rate and rhythm; No murmurs, rubs, or gallops.  ABDOMEN: Soft, NT, ND  BS+  CNS: AAOx 3 , no focal neuro deficit   SKIN: ecchymoses noted on upper thighs and abdomen   EXTR: right knee tenderness on palpation with decreased ROM due to pain, no edema     LABS:                                      10.5   12.63 )-----------( 523      ( 02 Mar 2019 08:27 )             34.2   03-01    137  |  98  |  10  ----------------------------<  104<H>  5.0   |  25  |  0.5<L>    Ca    8.6      01 Mar 2019 11:40    Creatine Kinase, Serum: 4152 U/L (03.02.19 @ 08:27)        Creatine Kinase, Serum: 5740 U/L (02.28.19 @ 08:50)    RADIOLOGY:  < from: Xray Knee 1 or 2 Views, Right (02.24.19 @ 15:51) >  No acutely displaced fracture with mild subchondral sclerosis across the medial femorotibial compartment. Small suprapatellar joint effusion  < end of copied text >    < from: Xray Chest 1 View- PORTABLE-Urgent (02.20.19 @ 17:48) >  Unchanged scattered bilateral interstitial opacities.  < end of copied text >    MEDICATIONS  (STANDING):  azaTHIOprine 100 milliGRAM(s) Oral daily  chlorhexidine 4% Liquid 1 Application(s) Topical <User Schedule>  docusate sodium 100 milliGRAM(s) Oral three times a day  heparin  Injectable 5000 Unit(s) SubCutaneous every 8 hours  lidocaine   Patch 1 Patch Transdermal daily  melatonin 5 milliGRAM(s) Oral at bedtime  mycophenolate mofetil 1500 milliGRAM(s) Oral two times a day  pantoprazole    Tablet 40 milliGRAM(s) Oral before breakfast  predniSONE   Tablet 60 milliGRAM(s) Oral daily  sodium chloride 0.45%. 1000 milliLiter(s) (150 mL/Hr) IV Continuous <Continuous>  triamcinolone 0.1% Cream 1 Application(s) Topical every 12 hours  trimethoprim  160 mG/sulfamethoxazole 800 mG 1 Tablet(s) Oral <User Schedule>    MEDICATIONS  (PRN):  ALBUTerol    90 MICROgram(s) HFA Inhaler 2 Puff(s) Inhalation every 6 hours PRN Shortness of Breath and/or Wheezing  benzonatate 100 milliGRAM(s) Oral three times a day PRN Cough  diphenhydrAMINE 25 milliGRAM(s) Oral once PRN Rash and/or Itching  hydrocortisone 1% Cream 1 Application(s) Topical daily PRN Rash and/or Itching  oxyCODONE    5 mG/acetaminophen 325 mG 1 Tablet(s) Oral every 4 hours PRN Moderate Pain (4 - 6)  senna 2 Tablet(s) Oral at bedtime PRN Constipation

## 2019-03-02 NOTE — PROGRESS NOTE ADULT - PROBLEM SELECTOR PLAN 2
-  aggressive IV hydration , 1/2 NS at 150 cc/h with consistently elevated CK level   - on  Prednisone 60 mg Q 24 hours   - rheumatology follow up while in the hospital  - farzana Crenshaw while in the hospital, will d/c prior discharge   - she agreed for Lovenox injections to her abdomen, was getting Lovenox to her highs -  aggressive IV hydration , 1/2 NS at 150 cc/h with consistently elevated CK level   - on  Prednisone 60 mg Q 24 hours   -  f/u with rheumatology after discharge    - c/w Flower while in the hospital, will d/c prior discharge   - she agreed for Lovenox injections to her abdomen, was getting Lovenox to her highs

## 2019-03-02 NOTE — PROGRESS NOTE ADULT - PROBLEM SELECTOR PLAN 6
- most likely due to steroids  - will start taper on discharge   - start Protonix for GI prophylaxis

## 2019-03-02 NOTE — PROGRESS NOTE ADULT - PROBLEM SELECTOR PLAN 7
- most likely hypervolemic due to aggressive IV hydration, on 1/2 NS now  - monitor Na level
- most likely hypervolemic due to aggressive IV hydration, on 1/2 NS now  - monitor Na level ( pending for today)
- resolved

## 2019-03-02 NOTE — PROGRESS NOTE ADULT - PROVIDER SPECIALTY LIST ADULT
Hospitalist
Internal Medicine
Physiatry
Rheumatology
Internal Medicine
Hospitalist

## 2019-03-02 NOTE — PROGRESS NOTE ADULT - PROBLEM SELECTOR PLAN 4
- encourage ambulation'  - low calorie diet

## 2019-03-07 DIAGNOSIS — M33.11 OTHER DERMATOMYOSITIS WITH RESPIRATORY INVOLVEMENT: ICD-10-CM
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
## 2019-03-07 DIAGNOSIS — R53.1 WEAKNESS: ICD-10-CM

## 2019-03-07 DIAGNOSIS — Z91.041 RADIOGRAPHIC DYE ALLERGY STATUS: ICD-10-CM

## 2019-03-07 DIAGNOSIS — E66.9 OBESITY, UNSPECIFIED: ICD-10-CM

## 2019-03-07 DIAGNOSIS — E78.5 HYPERLIPIDEMIA, UNSPECIFIED: ICD-10-CM

## 2019-03-07 DIAGNOSIS — E87.70 FLUID OVERLOAD, UNSPECIFIED: ICD-10-CM

## 2019-03-07 DIAGNOSIS — E87.1 HYPO-OSMOLALITY AND HYPONATREMIA: ICD-10-CM

## 2019-03-07 DIAGNOSIS — Z91.010 ALLERGY TO PEANUTS: ICD-10-CM

## 2019-03-07 DIAGNOSIS — T38.0X5A ADVERSE EFFECT OF GLUCOCORTICOIDS AND SYNTHETIC ANALOGUES, INITIAL ENCOUNTER: ICD-10-CM

## 2019-03-07 DIAGNOSIS — D72.829 ELEVATED WHITE BLOOD CELL COUNT, UNSPECIFIED: ICD-10-CM

## 2019-03-07 DIAGNOSIS — Z91.013 ALLERGY TO SEAFOOD: ICD-10-CM

## 2019-04-15 ENCOUNTER — APPOINTMENT (OUTPATIENT)
Dept: INTERNAL MEDICINE | Facility: CLINIC | Age: 39
End: 2019-04-15

## 2019-06-07 ENCOUNTER — APPOINTMENT (OUTPATIENT)
Dept: PULMONOLOGY | Facility: CLINIC | Age: 39
End: 2019-06-07

## 2019-09-14 NOTE — DISCHARGE NOTE ADULT - CARE PROVIDER_API CALL
Wisam Jim (MD; [facility]), Critical Care Medicine; Internal Medicine; Pulmonary Disease  [address] [place] [address]  Phone: [phone]  Fax: [phone]    Lucia Rea (), Internal Medicine  [address] [place] [address]  Phone: [phone]  Fax: [phone] Surgeon Performing Repair (Optional): Keith

## 2019-10-16 ENCOUNTER — OUTPATIENT (OUTPATIENT)
Dept: OUTPATIENT SERVICES | Facility: HOSPITAL | Age: 39
LOS: 1 days | Discharge: HOME | End: 2019-10-16

## 2019-10-16 DIAGNOSIS — D50.8 OTHER IRON DEFICIENCY ANEMIAS: ICD-10-CM

## 2019-10-16 DIAGNOSIS — M33.20 POLYMYOSITIS, ORGAN INVOLVEMENT UNSPECIFIED: ICD-10-CM

## 2019-10-16 DIAGNOSIS — D84.1 DEFECTS IN THE COMPLEMENT SYSTEM: ICD-10-CM

## 2019-10-16 DIAGNOSIS — S82.899A OTHER FRACTURE OF UNSPECIFIED LOWER LEG, INITIAL ENCOUNTER FOR CLOSED FRACTURE: Chronic | ICD-10-CM

## 2019-10-16 DIAGNOSIS — M32.10 SYSTEMIC LUPUS ERYTHEMATOSUS, ORGAN OR SYSTEM INVOLVEMENT UNSPECIFIED: ICD-10-CM

## 2019-10-16 DIAGNOSIS — Z98.890 OTHER SPECIFIED POSTPROCEDURAL STATES: Chronic | ICD-10-CM

## 2019-10-16 DIAGNOSIS — M60.9 MYOSITIS, UNSPECIFIED: ICD-10-CM

## 2019-10-16 DIAGNOSIS — M06.4 INFLAMMATORY POLYARTHROPATHY: ICD-10-CM

## 2019-10-16 DIAGNOSIS — R79.89 OTHER SPECIFIED ABNORMAL FINDINGS OF BLOOD CHEMISTRY: ICD-10-CM

## 2019-10-16 DIAGNOSIS — M34.81 SYSTEMIC SCLEROSIS WITH LUNG INVOLVEMENT: ICD-10-CM

## 2019-10-18 ENCOUNTER — INPATIENT (INPATIENT)
Facility: HOSPITAL | Age: 39
LOS: 3 days | Discharge: HOME | End: 2019-10-22
Attending: INTERNAL MEDICINE | Admitting: INTERNAL MEDICINE
Payer: MEDICAID

## 2019-10-18 VITALS
TEMPERATURE: 97 F | OXYGEN SATURATION: 98 % | DIASTOLIC BLOOD PRESSURE: 85 MMHG | HEART RATE: 115 BPM | SYSTOLIC BLOOD PRESSURE: 141 MMHG | RESPIRATION RATE: 20 BRPM

## 2019-10-18 DIAGNOSIS — J84.116 CRYPTOGENIC ORGANIZING PNEUMONIA: ICD-10-CM

## 2019-10-18 DIAGNOSIS — Z98.890 OTHER SPECIFIED POSTPROCEDURAL STATES: Chronic | ICD-10-CM

## 2019-10-18 DIAGNOSIS — R07.9 CHEST PAIN, UNSPECIFIED: ICD-10-CM

## 2019-10-18 DIAGNOSIS — Z91.010 ALLERGY TO PEANUTS: ICD-10-CM

## 2019-10-18 DIAGNOSIS — M33.13 OTHER DERMATOMYOSITIS WITHOUT MYOPATHY: ICD-10-CM

## 2019-10-18 DIAGNOSIS — Z91.041 RADIOGRAPHIC DYE ALLERGY STATUS: ICD-10-CM

## 2019-10-18 DIAGNOSIS — S82.899A OTHER FRACTURE OF UNSPECIFIED LOWER LEG, INITIAL ENCOUNTER FOR CLOSED FRACTURE: Chronic | ICD-10-CM

## 2019-10-18 DIAGNOSIS — E66.9 OBESITY, UNSPECIFIED: ICD-10-CM

## 2019-10-18 DIAGNOSIS — B37.3 CANDIDIASIS OF VULVA AND VAGINA: ICD-10-CM

## 2019-10-18 DIAGNOSIS — Z91.013 ALLERGY TO SEAFOOD: ICD-10-CM

## 2019-10-18 DIAGNOSIS — Z79.52 LONG TERM (CURRENT) USE OF SYSTEMIC STEROIDS: ICD-10-CM

## 2019-10-18 LAB
ALBUMIN SERPL ELPH-MCNC: 3.5 G/DL — SIGNIFICANT CHANGE UP (ref 3.5–5.2)
ALP SERPL-CCNC: 69 U/L — SIGNIFICANT CHANGE UP (ref 30–115)
ALT FLD-CCNC: 17 U/L — SIGNIFICANT CHANGE UP (ref 0–41)
ANION GAP SERPL CALC-SCNC: 11 MMOL/L — SIGNIFICANT CHANGE UP (ref 7–14)
AST SERPL-CCNC: 24 U/L — SIGNIFICANT CHANGE UP (ref 0–41)
BILIRUB SERPL-MCNC: <0.2 MG/DL — SIGNIFICANT CHANGE UP (ref 0.2–1.2)
BUN SERPL-MCNC: 10 MG/DL — SIGNIFICANT CHANGE UP (ref 10–20)
CALCIUM SERPL-MCNC: 9.1 MG/DL — SIGNIFICANT CHANGE UP (ref 8.5–10.1)
CHLORIDE SERPL-SCNC: 101 MMOL/L — SIGNIFICANT CHANGE UP (ref 98–110)
CK SERPL-CCNC: 559 U/L — HIGH (ref 0–225)
CO2 SERPL-SCNC: 23 MMOL/L — SIGNIFICANT CHANGE UP (ref 17–32)
CREAT SERPL-MCNC: 0.6 MG/DL — LOW (ref 0.7–1.5)
D DIMER BLD IA.RAPID-MCNC: 208 NG/ML DDU — SIGNIFICANT CHANGE UP (ref 0–230)
GLUCOSE SERPL-MCNC: 91 MG/DL — SIGNIFICANT CHANGE UP (ref 70–99)
HCT VFR BLD CALC: 39.1 % — SIGNIFICANT CHANGE UP (ref 37–47)
HGB BLD-MCNC: 12.4 G/DL — SIGNIFICANT CHANGE UP (ref 12–16)
MAGNESIUM SERPL-MCNC: 2.1 MG/DL — SIGNIFICANT CHANGE UP (ref 1.8–2.4)
MCHC RBC-ENTMCNC: 30.8 PG — SIGNIFICANT CHANGE UP (ref 27–31)
MCHC RBC-ENTMCNC: 31.7 G/DL — LOW (ref 32–37)
MCV RBC AUTO: 97 FL — SIGNIFICANT CHANGE UP (ref 81–99)
NRBC # BLD: 0 /100 WBCS — SIGNIFICANT CHANGE UP (ref 0–0)
PLATELET # BLD AUTO: 449 K/UL — HIGH (ref 130–400)
POTASSIUM SERPL-MCNC: 4.9 MMOL/L — SIGNIFICANT CHANGE UP (ref 3.5–5)
POTASSIUM SERPL-SCNC: 4.9 MMOL/L — SIGNIFICANT CHANGE UP (ref 3.5–5)
PROT SERPL-MCNC: 7.9 G/DL — SIGNIFICANT CHANGE UP (ref 6–8)
RBC # BLD: 4.03 M/UL — LOW (ref 4.2–5.4)
RBC # FLD: 13.3 % — SIGNIFICANT CHANGE UP (ref 11.5–14.5)
SODIUM SERPL-SCNC: 135 MMOL/L — SIGNIFICANT CHANGE UP (ref 135–146)
TROPONIN T SERPL-MCNC: 0.07 NG/ML — CRITICAL HIGH
WBC # BLD: 11.12 K/UL — HIGH (ref 4.8–10.8)
WBC # FLD AUTO: 11.12 K/UL — HIGH (ref 4.8–10.8)

## 2019-10-18 PROCEDURE — 71250 CT THORAX DX C-: CPT | Mod: 26

## 2019-10-18 PROCEDURE — 99285 EMERGENCY DEPT VISIT HI MDM: CPT

## 2019-10-18 PROCEDURE — 71046 X-RAY EXAM CHEST 2 VIEWS: CPT | Mod: 26

## 2019-10-18 RX ORDER — IPRATROPIUM/ALBUTEROL SULFATE 18-103MCG
3 AEROSOL WITH ADAPTER (GRAM) INHALATION ONCE
Refills: 0 | Status: COMPLETED | OUTPATIENT
Start: 2019-10-18 | End: 2019-10-18

## 2019-10-18 RX ORDER — AZATHIOPRINE 100 MG/1
2 TABLET ORAL
Qty: 0 | Refills: 2 | DISCHARGE

## 2019-10-18 RX ORDER — AZATHIOPRINE 100 MG/1
50 TABLET ORAL DAILY
Refills: 0 | Status: DISCONTINUED | OUTPATIENT
Start: 2019-10-18 | End: 2019-10-22

## 2019-10-18 RX ORDER — AZITHROMYCIN 500 MG/1
500 TABLET, FILM COATED ORAL EVERY 24 HOURS
Refills: 0 | Status: DISCONTINUED | OUTPATIENT
Start: 2019-10-18 | End: 2019-10-22

## 2019-10-18 RX ORDER — CHLORHEXIDINE GLUCONATE 213 G/1000ML
1 SOLUTION TOPICAL
Refills: 0 | Status: DISCONTINUED | OUTPATIENT
Start: 2019-10-18 | End: 2019-10-22

## 2019-10-18 RX ORDER — MYCOPHENOLATE MOFETIL 250 MG/1
500 CAPSULE ORAL
Refills: 0 | Status: DISCONTINUED | OUTPATIENT
Start: 2019-10-18 | End: 2019-10-22

## 2019-10-18 RX ORDER — PANTOPRAZOLE SODIUM 20 MG/1
40 TABLET, DELAYED RELEASE ORAL
Refills: 0 | Status: DISCONTINUED | OUTPATIENT
Start: 2019-10-18 | End: 2019-10-22

## 2019-10-18 RX ORDER — ALBUTEROL 90 UG/1
2 AEROSOL, METERED ORAL EVERY 6 HOURS
Refills: 0 | Status: DISCONTINUED | OUTPATIENT
Start: 2019-10-18 | End: 2019-10-22

## 2019-10-18 RX ORDER — MENTHOL AND CAPSAICIN .0375; 5 G/100G; G/100G
1 PATCH TOPICAL
Qty: 0 | Refills: 0 | DISCHARGE

## 2019-10-18 RX ORDER — ENOXAPARIN SODIUM 100 MG/ML
40 INJECTION SUBCUTANEOUS DAILY
Refills: 0 | Status: DISCONTINUED | OUTPATIENT
Start: 2019-10-18 | End: 2019-10-22

## 2019-10-18 RX ORDER — ASPIRIN/CALCIUM CARB/MAGNESIUM 324 MG
325 TABLET ORAL ONCE
Refills: 0 | Status: COMPLETED | OUTPATIENT
Start: 2019-10-18 | End: 2019-10-18

## 2019-10-18 RX ORDER — HYDROCORTISONE 1 %
1 OINTMENT (GRAM) TOPICAL
Qty: 0 | Refills: 0 | DISCHARGE

## 2019-10-18 RX ORDER — MAGNESIUM SULFATE 500 MG/ML
2 VIAL (ML) INJECTION ONCE
Refills: 0 | Status: COMPLETED | OUTPATIENT
Start: 2019-10-18 | End: 2019-10-18

## 2019-10-18 RX ADMIN — Medication 125 MILLIGRAM(S): at 12:17

## 2019-10-18 RX ADMIN — Medication 3 MILLILITER(S): at 14:18

## 2019-10-18 RX ADMIN — Medication 325 MILLIGRAM(S): at 16:45

## 2019-10-18 RX ADMIN — Medication 3 MILLILITER(S): at 12:17

## 2019-10-18 RX ADMIN — Medication 110 MILLIGRAM(S): at 20:52

## 2019-10-18 RX ADMIN — Medication 50 GRAM(S): at 12:17

## 2019-10-18 NOTE — ED PROVIDER NOTE - PROGRESS NOTE DETAILS
ATTENDING NOTE: I personally evaluated the patient. I reviewed the Resident’s note (as assigned above), and agree with the findings and plan except as documented in my note.   40 y/o F, on several immunosuppressant medications, with PMH of dermatomyositis now presents to ED c/o productive cough, with clear sputum and L sided CP x 3 days associated with SOB. No fever, LE swelling. No hemoptysis.   On exam: PT in NAD. Cardiac- S1S2. Lungs- CTAB, with slight rhonchi on L. Abdomen soft NTND. Extremities- No LE edema. Neuro- grossly intact.  Impression: evaluate for pneumonia with CXR and bloodwork. pt receiving treatments Discussed with patient labs, discussed need for CT chest non cont. pt understands and agrees with plan received sign out from Dr. Dietz pending ct scan and re-evaluation

## 2019-10-18 NOTE — ED PROVIDER NOTE - RESPIRATORY, MLM
Breath sounds clear and equal bilaterally, no rales, no rhonchi, no wheezing, mild accessory muscle use.

## 2019-10-18 NOTE — ED PROVIDER NOTE - CARE PLAN
Principal Discharge DX:	Chest pain  Secondary Diagnosis:	Elevated troponin  Secondary Diagnosis:	Shortness of breath  Secondary Diagnosis:	Cryptogenic organizing pneumonia

## 2019-10-18 NOTE — H&P ADULT - ASSESSMENT
68 y/o F with past medical history of dermatomyositis, Cryptogenic organizing pneumonia 2 years ago (on Azathioprine and prednisone taper) presenting for 3 day hx of dyspnea and productive cough.    #Cryptogenic organizing pneumonia w/ likely recurrence  - Pt hemodybnamically and clinically stable. Breathing comfortably on RA.  - Will start macrolide therapy (Azithromycin 500mg q24hr)  - Start prednisone at 80mg QD  - Sputum cultures. Blood cultures  - Pulm eval    #Dermatomyositis - stable  - C/w Azathioprine 50mg QD and Mycophenloate 500mg q12hr    Diet: Regular  Dvt Ppx: Lovenox SQ   Activity: out of bed to chair     #Dispo: Medical Floor    #Code Status: Full Code 68 y/o F with past medical history of dermatomyositis, Cryptogenic organizing pneumonia 2 years ago (on Azathioprine and prednisone taper) presenting for 3 day hx of dyspnea and productive cough.    #Cryptogenic organizing pneumonia w/ likely recurrence  -Patient underwent a bronchoscopy in 2017 which revealed 65% segs and 25% lymphocytes. No evidence of infection was seen. CT guided lung biopsy revealed Histopathology consistent with Organizing pneumonia.   - Patient has history of mold exposure, no history of bird exposure, no pets.  - Pt hemodynamically and clinically stable. Breathing comfortably on RA.  - Will start macrolide therapy (Azithromycin 500mg q24hr)  - Start prednisone at 80mg QD  - Sputum cultures. Blood cultures  - Pulm eval    #Dermatomyositis - stable  - C/w Azathioprine 50mg QD and Mycophenloate 500mg q12hr    Diet: Regular  Dvt Ppx: Lovenox SQ   Activity: out of bed to chair     #Dispo: Medical Floor    #Code Status: Full Code

## 2019-10-18 NOTE — H&P ADULT - HISTORY OF PRESENT ILLNESS
70 y/o F with pst medical history of dermatomyositis, Cryptogenic organizing pneumonia 2 years ago (on Azathioprine and prednisone taper) presenting for 3 day hx of dyspnea and productive cough.    Pt reports a history of  Cryptogenic organizing pneumonia, and has been following up with Dr. Jim and has been on Azathioprine 50mg and Prednisone 10mg QD as part of her long taper. She reports intermittent coughs over that time period, but has been largely asymptomatic.  Three days ago she developed a persistent cough with clear sputum production associated with shortness of breath on ambulation. She also endorses substerna/l-sided intermittent, "squeezing" chest pain at rest lasting 1-2 mins without radiation.  She denies headaches, dizziness, fevers, chills, abdominal pain, myalgias, arthralgias, weakness, numbness or tingling.    In the ED, VS: /85  RR20 T 96.9 satting 98% on R. PT received Mag sulfate 2gm, doxycycline 100mg, solumderol 125mg, ASA 325mg.

## 2019-10-18 NOTE — ED ADULT NURSE REASSESSMENT NOTE - NS ED NURSE REASSESS COMMENT FT1
Pt reassessed A/O times 4 Vs stable report filling slight better breathing  better after the respiratory treatment is given comfort provide assistance is given ,on going nursing observation.

## 2019-10-18 NOTE — H&P ADULT - NSICDXFAMILYHX_GEN_ALL_CORE_FT
FAMILY HISTORY:  Father  Still living? Unknown  Family history of sarcoidosis, Age at diagnosis: Age Unknown    Mother  Still living? Unknown  Family history of fibromyalgia, Age at diagnosis: Age Unknown

## 2019-10-18 NOTE — H&P ADULT - NSHPLABSRESULTS_GEN_ALL_CORE
12.4   11.12 )-----------( 449      ( 18 Oct 2019 11:59 )             39.1     10-18    135  |  101  |  10  ----------------------------<  91  4.9   |  23  |  0.6<L>    Ca    9.1      18 Oct 2019 11:59  Mg     2.1     10-18    TPro  7.9  /  Alb  3.5  /  TBili  <0.2  /  DBili  x   /  AST  24  /  ALT  17  /  AlkPhos  69  10-18      CARDIAC MARKERS ( 18 Oct 2019 15:20 )  x     / x     / 559 U/L / x     / x      CARDIAC MARKERS ( 18 Oct 2019 11:59 )  x     / 0.07 ng/mL / x     / x     / x          RADIOLOGY:    < from: Xray Chest 2 Views PA/Lat (10.18.19 @ 14:19) >      Impression:      Bilateral interstitial opacities. No pleural effusion or pneumothorax.    < end of copied text >        < from: CT Chest No Cont (10.18.19 @ 16:04) >    IMPRESSION:    Bilateral lower lobe predominant peripherally distributed patchy airspace   opacities, not significantly changed from prior exams. Differential   includes cryptogenic organizing pneumonia.     Mild dilatation of the main pulmonary artery, consistent with mild   pulmonary arterial hypertension.     < end of copied text >

## 2019-10-18 NOTE — ED PROVIDER NOTE - OBJECTIVE STATEMENT
38 yo F with hx of dermatomyositis, organized pneumonia, presents for evaluation of shortness of breath, ongoing for 3 days, associated with non productive cough, and non radiating left sided chest pain intermittent described as tightness. No fever, no chills, no headache, no nausea, no vomiting, no back pain, no abdominal pain, no recent travel, no recent abx use. Pt states that the chest pain began this morning and it comes and goes lasting about 1 min. No exacerbating or alleviating factors. She reports that she is on prednisone daily and has been for the past two years. Pt was given ASA by EMS. No other symptoms reported

## 2019-10-18 NOTE — H&P ADULT - ATTENDING COMMENTS
39F with PMH of Dermatomyositis (Chronic Azathioprine and Mycophenolate), Obesity, Cryptogenic Organizing PNA (2yrs ago) on chronic Prednisone 10mg po qd being tapered for many months who presents with increasing dyspnea and clear productive cough since 3days ago associated with chest tightness. Denies URI or sick contacts. Pt with history of 3 family members with Sarcoidosis including her Father.     Vital Signs Last 24 Hrs  T(C): 35.7 (19 Oct 2019 13:21), Max: 36.9 (18 Oct 2019 20:19)  T(F): 96.2 (19 Oct 2019 13:21), Max: 98.5 (18 Oct 2019 20:19)  HR: 81 (19 Oct 2019 13:21) (81 - 94)  BP: 120/75 (19 Oct 2019 13:21) (120/75 - 146/84)  RR: 18 (19 Oct 2019 13:21) (18 - 18)  SpO2: 95% (18 Oct 2019 20:19) (95% - 95%)    PHYSICAL EXAM:  GENERAL: NAD, Obesity  HEAD:  Atraumatic, Normocephalic  EYES: EOMI, PERRLA, conjunctiva and sclera clear  NECK: Supple, No JVD  CHEST/LUNG: Decreased BS bibasilar   HEART: Regular rate and rhythm; No murmurs, rubs, or gallops  ABDOMEN: Soft, Nontender, Nondistended; Bowel sounds present  EXTREMITIES:  2+ Peripheral Pulses, No clubbing, cyanosis, or edema  PSYCH: AAOx3  NEUROLOGY: non-focal  SKIN: No rashes or lesions    Labs: reviewed  Tele: NSR    CT Chest No contrast:   IMPRESSION:  Bilateral lower lobe predominant peripherally distributed patchy airspace   opacities, not significantly changed from prior exams. Differential   includes cryptogenic organizing pneumonia.   Mild dilatation of the main pulmonary artery, consistent with mild   pulmonary arterial hypertension.       Dx: Dyspnea / Chest Pain - Rule out PNA / Suspecting  Exarcerbation / Doubt Cardiac Angina    Plan:   Telemetry x 24hrs  Check TSH / BNP / TTE   Check Urine Legionella and Strep Pneumo Ag  Check Duplex lower extremities r/o DVTs   Pulmonary consultation  Hold off on Rheumatology consult at this time  Resume Azathioprine, Myocophenolate Mofetil   Increase Prednisone to 60mg po qd  c/w Azithromycin 500mg IV qd  Oxygen and Nebs prn    Activity as tolerated    GI / DVT proph    Will follow

## 2019-10-18 NOTE — ED PROVIDER NOTE - ATTENDING CONTRIBUTION TO CARE
ATTENDING NOTE: I personally evaluated the patient. I reviewed the Resident’s note (as assigned above), and agree with the findings and plan except as documented in my note.   38 y/o F, on several immunosuppressant medications, with PMH of dermatomyositis now presents to ED c/o productive cough, with clear sputum and L sided CP x 3 days associated with SOB. No fever, LE swelling. No hemoptysis.   On exam: PT in NAD. Cardiac- S1S2. Lungs- CTAB, with slight rhonchi on L. Abdomen soft NTND. Extremities- No LE edema. Neuro- grossly intact.  Impression: evaluate for pneumonia with CXR and bloodwork.

## 2019-10-18 NOTE — H&P ADULT - NSHPPHYSICALEXAM_GEN_ALL_CORE
Vital Signs Last 24 Hrs  T(C): 36.9 (18 Oct 2019 20:19), Max: 36.9 (18 Oct 2019 20:19)  T(F): 98.5 (18 Oct 2019 20:19), Max: 98.5 (18 Oct 2019 20:19)  HR: 94 (18 Oct 2019 20:19) (88 - 115)  BP: 133/81 (18 Oct 2019 20:19) (113/67 - 141/85)  BP(mean): --  RR: 18 (18 Oct 2019 20:19) (16 - 20)  SpO2: 95% (18 Oct 2019 20:19) (95% - 99%)    PHYSICAL EXAM:    General: Not in distress. Well-developed, speaks in full sentences. AAOx3  HEENT: Atraumatic, normocephalic. Moist mucus membranes. PERRLA.  Cardio: Regular rate and rhythm. S1, S2 appreciated. no murmurs.  Pulm: Bilateral breath sounds. No wheezing, or rhonchi  Abdomen: Soft, non-tender, non-distended. Normoactive bowel sounds.  Extremities: No cyanosis or edema bilaterally. No calf tenderness to palpation.  Neuro: No focal deficits. Motor 5/5 throughout. Sensation intact

## 2019-10-19 LAB
ALBUMIN SERPL ELPH-MCNC: 3.2 G/DL — LOW (ref 3.5–5.2)
ALP SERPL-CCNC: 63 U/L — SIGNIFICANT CHANGE UP (ref 30–115)
ALT FLD-CCNC: 14 U/L — SIGNIFICANT CHANGE UP (ref 0–41)
ANION GAP SERPL CALC-SCNC: 9 MMOL/L — SIGNIFICANT CHANGE UP (ref 7–14)
AST SERPL-CCNC: 14 U/L — SIGNIFICANT CHANGE UP (ref 0–41)
BILIRUB SERPL-MCNC: <0.2 MG/DL — SIGNIFICANT CHANGE UP (ref 0.2–1.2)
BUN SERPL-MCNC: 11 MG/DL — SIGNIFICANT CHANGE UP (ref 10–20)
CALCIUM SERPL-MCNC: 9.1 MG/DL — SIGNIFICANT CHANGE UP (ref 8.5–10.1)
CHLORIDE SERPL-SCNC: 105 MMOL/L — SIGNIFICANT CHANGE UP (ref 98–110)
CO2 SERPL-SCNC: 24 MMOL/L — SIGNIFICANT CHANGE UP (ref 17–32)
CREAT SERPL-MCNC: 0.5 MG/DL — LOW (ref 0.7–1.5)
GLUCOSE SERPL-MCNC: 119 MG/DL — HIGH (ref 70–99)
HCT VFR BLD CALC: 37.6 % — SIGNIFICANT CHANGE UP (ref 37–47)
HGB BLD-MCNC: 11.9 G/DL — LOW (ref 12–16)
MAGNESIUM SERPL-MCNC: 2.2 MG/DL — SIGNIFICANT CHANGE UP (ref 1.8–2.4)
MCHC RBC-ENTMCNC: 30.6 PG — SIGNIFICANT CHANGE UP (ref 27–31)
MCHC RBC-ENTMCNC: 31.6 G/DL — LOW (ref 32–37)
MCV RBC AUTO: 96.7 FL — SIGNIFICANT CHANGE UP (ref 81–99)
NRBC # BLD: 0 /100 WBCS — SIGNIFICANT CHANGE UP (ref 0–0)
PLATELET # BLD AUTO: 473 K/UL — HIGH (ref 130–400)
POTASSIUM SERPL-MCNC: 4.8 MMOL/L — SIGNIFICANT CHANGE UP (ref 3.5–5)
POTASSIUM SERPL-SCNC: 4.8 MMOL/L — SIGNIFICANT CHANGE UP (ref 3.5–5)
PROT SERPL-MCNC: 7.4 G/DL — SIGNIFICANT CHANGE UP (ref 6–8)
RBC # BLD: 3.89 M/UL — LOW (ref 4.2–5.4)
RBC # FLD: 13.2 % — SIGNIFICANT CHANGE UP (ref 11.5–14.5)
SODIUM SERPL-SCNC: 138 MMOL/L — SIGNIFICANT CHANGE UP (ref 135–146)
WBC # BLD: 12.25 K/UL — HIGH (ref 4.8–10.8)
WBC # FLD AUTO: 12.25 K/UL — HIGH (ref 4.8–10.8)

## 2019-10-19 PROCEDURE — 99223 1ST HOSP IP/OBS HIGH 75: CPT

## 2019-10-19 RX ORDER — GUAIFENESIN/DEXTROMETHORPHAN 600MG-30MG
10 TABLET, EXTENDED RELEASE 12 HR ORAL ONCE
Refills: 0 | Status: COMPLETED | OUTPATIENT
Start: 2019-10-19 | End: 2019-10-19

## 2019-10-19 RX ORDER — ACETAMINOPHEN 500 MG
650 TABLET ORAL EVERY 6 HOURS
Refills: 0 | Status: DISCONTINUED | OUTPATIENT
Start: 2019-10-19 | End: 2019-10-22

## 2019-10-19 RX ORDER — ACETAMINOPHEN 500 MG
650 TABLET ORAL EVERY 6 HOURS
Refills: 0 | Status: DISCONTINUED | OUTPATIENT
Start: 2019-10-19 | End: 2019-10-19

## 2019-10-19 RX ORDER — INFLUENZA VIRUS VACCINE 15; 15; 15; 15 UG/.5ML; UG/.5ML; UG/.5ML; UG/.5ML
0.5 SUSPENSION INTRAMUSCULAR ONCE
Refills: 0 | Status: DISCONTINUED | OUTPATIENT
Start: 2019-10-19 | End: 2019-10-22

## 2019-10-19 RX ORDER — GUAIFENESIN/DEXTROMETHORPHAN 600MG-30MG
20 TABLET, EXTENDED RELEASE 12 HR ORAL ONCE
Refills: 0 | Status: DISCONTINUED | OUTPATIENT
Start: 2019-10-19 | End: 2019-10-19

## 2019-10-19 RX ORDER — LANOLIN ALCOHOL/MO/W.PET/CERES
5 CREAM (GRAM) TOPICAL ONCE
Refills: 0 | Status: COMPLETED | OUTPATIENT
Start: 2019-10-19 | End: 2019-10-19

## 2019-10-19 RX ADMIN — Medication 650 MILLIGRAM(S): at 21:45

## 2019-10-19 RX ADMIN — Medication 650 MILLIGRAM(S): at 22:45

## 2019-10-19 RX ADMIN — ENOXAPARIN SODIUM 40 MILLIGRAM(S): 100 INJECTION SUBCUTANEOUS at 11:23

## 2019-10-19 RX ADMIN — MYCOPHENOLATE MOFETIL 500 MILLIGRAM(S): 250 CAPSULE ORAL at 06:53

## 2019-10-19 RX ADMIN — Medication 5 MILLIGRAM(S): at 03:38

## 2019-10-19 RX ADMIN — Medication 10 MILLILITER(S): at 03:38

## 2019-10-19 RX ADMIN — PANTOPRAZOLE SODIUM 40 MILLIGRAM(S): 20 TABLET, DELAYED RELEASE ORAL at 06:53

## 2019-10-19 RX ADMIN — AZATHIOPRINE 50 MILLIGRAM(S): 100 TABLET ORAL at 11:22

## 2019-10-19 RX ADMIN — Medication 500 MILLIGRAM(S): at 23:46

## 2019-10-19 RX ADMIN — AZITHROMYCIN 255 MILLIGRAM(S): 500 TABLET, FILM COATED ORAL at 06:53

## 2019-10-19 RX ADMIN — Medication 60 MILLIGRAM(S): at 12:22

## 2019-10-19 RX ADMIN — Medication 100 MILLIGRAM(S): at 20:41

## 2019-10-19 RX ADMIN — MYCOPHENOLATE MOFETIL 500 MILLIGRAM(S): 250 CAPSULE ORAL at 17:16

## 2019-10-19 NOTE — PROGRESS NOTE ADULT - SUBJECTIVE AND OBJECTIVE BOX
JESSI KING 39y Female  MRN#: [id number]   CODE STATUS: Full      SUBJECTIVE  70 y/o F with past medical history of dermatomyositis, Cryptogenic organizing pneumonia 2 years ago (on Azathioprine and prednisone taper) presenting for 3 day hx of dyspnea and productive cough.    OBJECTIVE  PAST MEDICAL & SURGICAL HISTORY  Dermatomyositis  Cryptogenic organizing pneumonia  History of cervical cerclage  Ankle fracture    ALLERGIES:  contrast media (iodine-based) (Unknown)  peanuts (Unknown)  shellfish (Unknown)    MEDICATIONS:  STANDING MEDICATIONS  azaTHIOprine 50 milliGRAM(s) Oral daily  azithromycin  IVPB 500 milliGRAM(s) IV Intermittent every 24 hours  chlorhexidine 4% Liquid 1 Application(s) Topical <User Schedule>  enoxaparin Injectable 40 milliGRAM(s) SubCutaneous daily  influenza   Vaccine 0.5 milliLiter(s) IntraMuscular once  mycophenolate mofetil 500 milliGRAM(s) Oral two times a day  pantoprazole    Tablet 40 milliGRAM(s) Oral before breakfast  predniSONE   Tablet 60 milliGRAM(s) Oral daily    PRN MEDICATIONS  ALBUTerol    90 MICROgram(s) HFA Inhaler 2 Puff(s) Inhalation every 6 hours PRN      VITAL SIGNS: Last 24 Hours  T(C): 36.4 (19 Oct 2019 05:47), Max: 36.9 (18 Oct 2019 20:19)  T(F): 97.5 (19 Oct 2019 05:47), Max: 98.5 (18 Oct 2019 20:19)  HR: 84 (19 Oct 2019 05:47) (84 - 115)  BP: 132/79 (19 Oct 2019 05:47) (113/67 - 146/84)  BP(mean): --  RR: 18 (19 Oct 2019 05:47) (16 - 20)  SpO2: 95% (18 Oct 2019 20:19) (95% - 99%)    LABS:                        11.9   12.25 )-----------( 473      ( 19 Oct 2019 06:40 )             37.6     10-19    138  |  105  |  11  ----------------------------<  119<H>  4.8   |  24  |  0.5<L>    Ca    9.1      19 Oct 2019 06:40  Mg     2.2     10-19    TPro  7.4  /  Alb  3.2<L>  /  TBili  <0.2  /  DBili  x   /  AST  14  /  ALT  14  /  AlkPhos  63  10-19          Creatine Kinase, Serum: 559 U/L <H> (10-18-19 @ 15:20)  Troponin T, Serum: 0.07 ng/mL <HH> (10-18-19 @ 11:59)      CARDIAC MARKERS ( 18 Oct 2019 15:20 )  x     / x     / 559 U/L / x     / x      CARDIAC MARKERS ( 18 Oct 2019 11:59 )  x     / 0.07 ng/mL / x     / x     / x          RADIOLOGY:      PHYSICAL EXAM:    GENERAL: NAD, well-developed, AAOx3  HEENT:  Atraumatic, Normocephalic. EOMI, PERRLA, conjunctiva and sclera clear, No JVD  PULMONARY: Clear to auscultation bilaterally; No wheeze  CARDIOVASCULAR: Regular rate and rhythm; No murmurs, rubs, or gallops  GASTROINTESTINAL: Soft, Nontender, Nondistended; Bowel sounds present  MUSCULOSKELETAL:  2+ Peripheral Pulses, No clubbing, cyanosis, or edema  NEUROLOGY: non-focal  SKIN: No rashes or lesions      ADMISSION SUMMARY  Patient is a 39y old Female who presents with a chief complaint of Currently admitted to medicine with the primary diagnosis of Chest pain  Hospital course has been complicated by _______.       ASSESSMENT & PLAN        #Cryptogenic organizing pneumonia w/ likely recurrence  -Patient underwent a bronchoscopy in 2017 which revealed 65% segs and 25% lymphocytes. No evidence of infection was seen. CT guided lung biopsy revealed Histopathology consistent with Organizing pneumonia.   - Patient has history of mold exposure, no history of bird exposure, no pets.  - Pt hemodynamically and clinically stable. Breathing comfortably on RA.  - Will start macrolide therapy (Azithromycin 500mg q24hr)  - Start prednisone at 80mg QD  - Sputum cultures. Blood cultures  - Pulm eval    #Dermatomyositis - stable  - C/w Azathioprine 50mg QD and Mycophenloate 500mg q12hr    Diet: Regular  Dvt Ppx: Lovenox SQ   Activity: out of bed to chair     #Dispo: Medical Floor    #Code Status: Full Code JESSI KING 39y Female  MRN#: [id number]   CODE STATUS: Full      SUBJECTIVE  68 y/o F with past medical history of dermatomyositis, Cryptogenic organizing pneumonia 2 years ago (on Azathioprine and prednisone taper) presenting for 3 day hx of dyspnea and productive cough.    OBJECTIVE  PAST MEDICAL & SURGICAL HISTORY  Dermatomyositis  Cryptogenic organizing pneumonia  History of cervical cerclage  Ankle fracture    ALLERGIES:  contrast media (iodine-based) (Unknown)  peanuts (Unknown)  shellfish (Unknown)    MEDICATIONS:  STANDING MEDICATIONS  azaTHIOprine 50 milliGRAM(s) Oral daily  azithromycin  IVPB 500 milliGRAM(s) IV Intermittent every 24 hours  chlorhexidine 4% Liquid 1 Application(s) Topical <User Schedule>  enoxaparin Injectable 40 milliGRAM(s) SubCutaneous daily  influenza   Vaccine 0.5 milliLiter(s) IntraMuscular once  mycophenolate mofetil 500 milliGRAM(s) Oral two times a day  pantoprazole    Tablet 40 milliGRAM(s) Oral before breakfast  predniSONE   Tablet 60 milliGRAM(s) Oral daily    PRN MEDICATIONS  ALBUTerol    90 MICROgram(s) HFA Inhaler 2 Puff(s) Inhalation every 6 hours PRN      VITAL SIGNS: Last 24 Hours  T(C): 36.4 (19 Oct 2019 05:47), Max: 36.9 (18 Oct 2019 20:19)  T(F): 97.5 (19 Oct 2019 05:47), Max: 98.5 (18 Oct 2019 20:19)  HR: 84 (19 Oct 2019 05:47) (84 - 115)  BP: 132/79 (19 Oct 2019 05:47) (113/67 - 146/84)  BP(mean): --  RR: 18 (19 Oct 2019 05:47) (16 - 20)  SpO2: 95% (18 Oct 2019 20:19) (95% - 99%)    LABS:                        11.9   12.25 )-----------( 473      ( 19 Oct 2019 06:40 )             37.6     10-19    138  |  105  |  11  ----------------------------<  119<H>  4.8   |  24  |  0.5<L>    Ca    9.1      19 Oct 2019 06:40  Mg     2.2     10-19    TPro  7.4  /  Alb  3.2<L>  /  TBili  <0.2  /  DBili  x   /  AST  14  /  ALT  14  /  AlkPhos  63  10-19          Creatine Kinase, Serum: 559 U/L <H> (10-18-19 @ 15:20)  Troponin T, Serum: 0.07 ng/mL <HH> (10-18-19 @ 11:59)      CARDIAC MARKERS ( 18 Oct 2019 15:20 )  x     / x     / 559 U/L / x     / x      CARDIAC MARKERS ( 18 Oct 2019 11:59 )  x     / 0.07 ng/mL / x     / x     / x        Physical Exam      	General: Not in distress. Well-developed, speaks in full sentences. AAOx3  	HEENT: Atraumatic, normocephalic. Moist mucus membranes. PERRLA.  	Cardio: Regular rate and rhythm. S1, S2 appreciated. no murmurs.  	Pulm: Bilateral breath sounds. No wheezing, or rhonchi  	Abdomen: Soft, non-tender, non-distended. Normoactive bowel sounds.  	Extremities: No cyanosis or edema bilaterally. No calf tenderness to palpation.  Neuro: No focal deficits. Motor 5/5 throughout. Sensation intact      ASSESSMENT & PLAN        #Cryptogenic organizing pneumonia w/ likely recurrence  - Pt hemodynamically and clinically stable. Breathing comfortably on RA.  -Azithromycin 500mg q24hr  - Start prednisone at 60 mg QD  - Sputum cultures. Blood cultures  - Pulm eval    #Dermatomyositis - stable  - C/w Azathioprine 50mg QD and Mycophenloate 500mg q12hr    Diet: Regular  Dvt Ppx: Lovenox SQ   Activity: out of bed to chair     #Dispo: Medical Floor    #Code Status: Full Code

## 2019-10-20 LAB
ALBUMIN SERPL ELPH-MCNC: 3.1 G/DL — LOW (ref 3.5–5.2)
ALP SERPL-CCNC: 60 U/L — SIGNIFICANT CHANGE UP (ref 30–115)
ALT FLD-CCNC: 15 U/L — SIGNIFICANT CHANGE UP (ref 0–41)
ANION GAP SERPL CALC-SCNC: 11 MMOL/L — SIGNIFICANT CHANGE UP (ref 7–14)
AST SERPL-CCNC: 20 U/L — SIGNIFICANT CHANGE UP (ref 0–41)
BASOPHILS # BLD AUTO: 0.02 K/UL — SIGNIFICANT CHANGE UP (ref 0–0.2)
BASOPHILS NFR BLD AUTO: 0.2 % — SIGNIFICANT CHANGE UP (ref 0–1)
BILIRUB SERPL-MCNC: <0.2 MG/DL — SIGNIFICANT CHANGE UP (ref 0.2–1.2)
BUN SERPL-MCNC: 17 MG/DL — SIGNIFICANT CHANGE UP (ref 10–20)
CALCIUM SERPL-MCNC: 8.9 MG/DL — SIGNIFICANT CHANGE UP (ref 8.5–10.1)
CHLORIDE SERPL-SCNC: 105 MMOL/L — SIGNIFICANT CHANGE UP (ref 98–110)
CO2 SERPL-SCNC: 23 MMOL/L — SIGNIFICANT CHANGE UP (ref 17–32)
CREAT SERPL-MCNC: 0.6 MG/DL — LOW (ref 0.7–1.5)
EOSINOPHIL # BLD AUTO: 0.08 K/UL — SIGNIFICANT CHANGE UP (ref 0–0.7)
EOSINOPHIL NFR BLD AUTO: 0.7 % — SIGNIFICANT CHANGE UP (ref 0–8)
GLUCOSE SERPL-MCNC: 105 MG/DL — HIGH (ref 70–99)
HCT VFR BLD CALC: 38.2 % — SIGNIFICANT CHANGE UP (ref 37–47)
HGB BLD-MCNC: 11.9 G/DL — LOW (ref 12–16)
IMM GRANULOCYTES NFR BLD AUTO: 0.7 % — HIGH (ref 0.1–0.3)
LYMPHOCYTES # BLD AUTO: 1.25 K/UL — SIGNIFICANT CHANGE UP (ref 1.2–3.4)
LYMPHOCYTES # BLD AUTO: 10.6 % — LOW (ref 20.5–51.1)
MCHC RBC-ENTMCNC: 30.4 PG — SIGNIFICANT CHANGE UP (ref 27–31)
MCHC RBC-ENTMCNC: 31.2 G/DL — LOW (ref 32–37)
MCV RBC AUTO: 97.4 FL — SIGNIFICANT CHANGE UP (ref 81–99)
MONOCYTES # BLD AUTO: 0.48 K/UL — SIGNIFICANT CHANGE UP (ref 0.1–0.6)
MONOCYTES NFR BLD AUTO: 4.1 % — SIGNIFICANT CHANGE UP (ref 1.7–9.3)
NEUTROPHILS # BLD AUTO: 9.91 K/UL — HIGH (ref 1.4–6.5)
NEUTROPHILS NFR BLD AUTO: 83.7 % — HIGH (ref 42.2–75.2)
NRBC # BLD: 0 /100 WBCS — SIGNIFICANT CHANGE UP (ref 0–0)
PLATELET # BLD AUTO: 472 K/UL — HIGH (ref 130–400)
POTASSIUM SERPL-MCNC: 4.3 MMOL/L — SIGNIFICANT CHANGE UP (ref 3.5–5)
POTASSIUM SERPL-SCNC: 4.3 MMOL/L — SIGNIFICANT CHANGE UP (ref 3.5–5)
PROT SERPL-MCNC: 7.4 G/DL — SIGNIFICANT CHANGE UP (ref 6–8)
RBC # BLD: 3.92 M/UL — LOW (ref 4.2–5.4)
RBC # FLD: 13.3 % — SIGNIFICANT CHANGE UP (ref 11.5–14.5)
SODIUM SERPL-SCNC: 139 MMOL/L — SIGNIFICANT CHANGE UP (ref 135–146)
WBC # BLD: 11.82 K/UL — HIGH (ref 4.8–10.8)
WBC # FLD AUTO: 11.82 K/UL — HIGH (ref 4.8–10.8)

## 2019-10-20 PROCEDURE — 99233 SBSQ HOSP IP/OBS HIGH 50: CPT

## 2019-10-20 RX ORDER — TRAMADOL HYDROCHLORIDE 50 MG/1
50 TABLET ORAL ONCE
Refills: 0 | Status: DISCONTINUED | OUTPATIENT
Start: 2019-10-20 | End: 2019-10-20

## 2019-10-20 RX ORDER — LANOLIN ALCOHOL/MO/W.PET/CERES
5 CREAM (GRAM) TOPICAL AT BEDTIME
Refills: 0 | Status: DISCONTINUED | OUTPATIENT
Start: 2019-10-20 | End: 2019-10-22

## 2019-10-20 RX ADMIN — PANTOPRAZOLE SODIUM 40 MILLIGRAM(S): 20 TABLET, DELAYED RELEASE ORAL at 07:00

## 2019-10-20 RX ADMIN — AZATHIOPRINE 50 MILLIGRAM(S): 100 TABLET ORAL at 12:35

## 2019-10-20 RX ADMIN — TRAMADOL HYDROCHLORIDE 50 MILLIGRAM(S): 50 TABLET ORAL at 18:15

## 2019-10-20 RX ADMIN — MYCOPHENOLATE MOFETIL 500 MILLIGRAM(S): 250 CAPSULE ORAL at 17:48

## 2019-10-20 RX ADMIN — Medication 500 MILLIGRAM(S): at 06:30

## 2019-10-20 RX ADMIN — TRAMADOL HYDROCHLORIDE 50 MILLIGRAM(S): 50 TABLET ORAL at 17:43

## 2019-10-20 RX ADMIN — Medication 500 MILLIGRAM(S): at 00:40

## 2019-10-20 RX ADMIN — Medication 60 MILLIGRAM(S): at 05:42

## 2019-10-20 RX ADMIN — ENOXAPARIN SODIUM 40 MILLIGRAM(S): 100 INJECTION SUBCUTANEOUS at 12:35

## 2019-10-20 RX ADMIN — MYCOPHENOLATE MOFETIL 500 MILLIGRAM(S): 250 CAPSULE ORAL at 05:42

## 2019-10-20 RX ADMIN — Medication 500 MILLIGRAM(S): at 05:42

## 2019-10-20 RX ADMIN — AZITHROMYCIN 255 MILLIGRAM(S): 500 TABLET, FILM COATED ORAL at 06:59

## 2019-10-20 NOTE — PROGRESS NOTE ADULT - SUBJECTIVE AND OBJECTIVE BOX
JESSI KING 39y Female  MRN#: [id number]   CODE STATUS: Full      SUBJECTIVE  68 y/o F with past medical history of dermatomyositis, Cryptogenic organizing pneumonia 2 years ago (on Azathioprine and prednisone taper) presenting for 3 day hx of dyspnea and productive cough.    OBJECTIVE  PAST MEDICAL & SURGICAL HISTORY  Dermatomyositis  Cryptogenic organizing pneumonia  History of cervical cerclage  Ankle fracture    ALLERGIES:  contrast media (iodine-based) (Unknown)  peanuts (Unknown)  shellfish (Unknown)    MEDICATIONS:  STANDING MEDICATIONS  azaTHIOprine 50 milliGRAM(s) Oral daily  azithromycin  IVPB 500 milliGRAM(s) IV Intermittent every 24 hours  chlorhexidine 4% Liquid 1 Application(s) Topical <User Schedule>  enoxaparin Injectable 40 milliGRAM(s) SubCutaneous daily  influenza   Vaccine 0.5 milliLiter(s) IntraMuscular once  mycophenolate mofetil 500 milliGRAM(s) Oral two times a day  pantoprazole    Tablet 40 milliGRAM(s) Oral before breakfast  predniSONE   Tablet 60 milliGRAM(s) Oral daily    PRN MEDICATIONS  ALBUTerol    90 MICROgram(s) HFA Inhaler 2 Puff(s) Inhalation every 6 hours PRN      VITAL SIGNS: Last 24 Hours    Vital Signs Last 24 Hrs  T(C): 35.9 (20 Oct 2019 06:27), Max: 36.6 (19 Oct 2019 20:40)  T(F): 96.7 (20 Oct 2019 06:27), Max: 97.9 (19 Oct 2019 20:40)  HR: 72 (20 Oct 2019 06:27) (72 - 89)  BP: 131/88 (20 Oct 2019 06:27) (117/71 - 131/88)  RR: 18 (20 Oct 2019 06:27) (18 - 18)  SpO2: -- 95% 2L NC    LABS:                        11.9   12.25 )-----------( 473      ( 19 Oct 2019 06:40 )             37.6     10-19    138  |  105  |  11  ----------------------------<  119<H>  4.8   |  24  |  0.5<L>    Ca    9.1      19 Oct 2019 06:40  Mg     2.2     10-19    TPro  7.4  /  Alb  3.2<L>  /  TBili  <0.2  /  DBili  x   /  AST  14  /  ALT  14  /  AlkPhos  63  10-19          Creatine Kinase, Serum: 559 U/L <H> (10-18-19 @ 15:20)  Troponin T, Serum: 0.07 ng/mL <HH> (10-18-19 @ 11:59)      CARDIAC MARKERS ( 18 Oct 2019 15:20 )  x     / x     / 559 U/L / x     / x      CARDIAC MARKERS ( 18 Oct 2019 11:59 )  x     / 0.07 ng/mL / x     / x     / x        Physical Exam      	General: Not in distress. Well-developed, speaks in full sentences. AAOx3  	HEENT: Atraumatic, normocephalic. Moist mucus membranes. PERRLA.  	Cardio: Regular rate and rhythm. S1, S2 appreciated. no murmurs.  	Pulm: Bilateral breath sounds. No wheezing, or rhonchi  	Abdomen: Soft, non-tender, non-distended. Normoactive bowel sounds.  	Extremities: No cyanosis or edema bilaterally. No calf tenderness to palpation.  Neuro: No focal deficits. Motor 5/5 throughout. Sensation intact      ASSESSMENT & PLAN    #Cryptogenic organizing pneumonia w/ likely recurrence  - Pt hemodynamically and clinically stable. Breathing comfortably on RA.  - Azithromycin 500mg q24hr  - Start prednisone at 60 mg QD  - Sputum cultures. Blood cultures  - Pulm eval pending     #Dermatomyositis - stable  - C/w Azathioprine 50mg QD and Mycophenloate 500mg q12hr    Diet: Regular  Dvt Ppx: Lovenox SQ   Activity: out of bed to chair     #Dispo: Medical Floor    #Code Status: Full Code    D/C telemetry    Dispo: Pulm to see for dispo / Rheumatologist call on Monday

## 2019-10-21 LAB
ALBUMIN SERPL ELPH-MCNC: 2.8 G/DL — LOW (ref 3.5–5.2)
ALP SERPL-CCNC: 56 U/L — SIGNIFICANT CHANGE UP (ref 30–115)
ALT FLD-CCNC: 39 U/L — SIGNIFICANT CHANGE UP (ref 0–41)
ANION GAP SERPL CALC-SCNC: 10 MMOL/L — SIGNIFICANT CHANGE UP (ref 7–14)
AST SERPL-CCNC: 34 U/L — SIGNIFICANT CHANGE UP (ref 0–41)
BASOPHILS # BLD AUTO: 0.05 K/UL — SIGNIFICANT CHANGE UP (ref 0–0.2)
BASOPHILS NFR BLD AUTO: 0.4 % — SIGNIFICANT CHANGE UP (ref 0–1)
BILIRUB SERPL-MCNC: <0.2 MG/DL — SIGNIFICANT CHANGE UP (ref 0.2–1.2)
BUN SERPL-MCNC: 19 MG/DL — SIGNIFICANT CHANGE UP (ref 10–20)
CALCIUM SERPL-MCNC: 8.6 MG/DL — SIGNIFICANT CHANGE UP (ref 8.5–10.1)
CHLORIDE SERPL-SCNC: 106 MMOL/L — SIGNIFICANT CHANGE UP (ref 98–110)
CO2 SERPL-SCNC: 24 MMOL/L — SIGNIFICANT CHANGE UP (ref 17–32)
CREAT SERPL-MCNC: 0.6 MG/DL — LOW (ref 0.7–1.5)
EOSINOPHIL # BLD AUTO: 0.27 K/UL — SIGNIFICANT CHANGE UP (ref 0–0.7)
EOSINOPHIL NFR BLD AUTO: 2.2 % — SIGNIFICANT CHANGE UP (ref 0–8)
GLUCOSE SERPL-MCNC: 90 MG/DL — SIGNIFICANT CHANGE UP (ref 70–99)
HCT VFR BLD CALC: 37.8 % — SIGNIFICANT CHANGE UP (ref 37–47)
HGB BLD-MCNC: 11.7 G/DL — LOW (ref 12–16)
IMM GRANULOCYTES NFR BLD AUTO: 0.8 % — HIGH (ref 0.1–0.3)
LYMPHOCYTES # BLD AUTO: 16.7 % — LOW (ref 20.5–51.1)
LYMPHOCYTES # BLD AUTO: 2.09 K/UL — SIGNIFICANT CHANGE UP (ref 1.2–3.4)
MCHC RBC-ENTMCNC: 30.2 PG — SIGNIFICANT CHANGE UP (ref 27–31)
MCHC RBC-ENTMCNC: 31 G/DL — LOW (ref 32–37)
MCV RBC AUTO: 97.4 FL — SIGNIFICANT CHANGE UP (ref 81–99)
MONOCYTES # BLD AUTO: 0.73 K/UL — HIGH (ref 0.1–0.6)
MONOCYTES NFR BLD AUTO: 5.8 % — SIGNIFICANT CHANGE UP (ref 1.7–9.3)
NEUTROPHILS # BLD AUTO: 9.3 K/UL — HIGH (ref 1.4–6.5)
NEUTROPHILS NFR BLD AUTO: 74.1 % — SIGNIFICANT CHANGE UP (ref 42.2–75.2)
NRBC # BLD: 0 /100 WBCS — SIGNIFICANT CHANGE UP (ref 0–0)
PLATELET # BLD AUTO: 461 K/UL — HIGH (ref 130–400)
POTASSIUM SERPL-MCNC: 4.3 MMOL/L — SIGNIFICANT CHANGE UP (ref 3.5–5)
POTASSIUM SERPL-SCNC: 4.3 MMOL/L — SIGNIFICANT CHANGE UP (ref 3.5–5)
PROT SERPL-MCNC: 6.7 G/DL — SIGNIFICANT CHANGE UP (ref 6–8)
RBC # BLD: 3.88 M/UL — LOW (ref 4.2–5.4)
RBC # FLD: 13.2 % — SIGNIFICANT CHANGE UP (ref 11.5–14.5)
SODIUM SERPL-SCNC: 140 MMOL/L — SIGNIFICANT CHANGE UP (ref 135–146)
WBC # BLD: 12.54 K/UL — HIGH (ref 4.8–10.8)
WBC # FLD AUTO: 12.54 K/UL — HIGH (ref 4.8–10.8)

## 2019-10-21 PROCEDURE — 99232 SBSQ HOSP IP/OBS MODERATE 35: CPT

## 2019-10-21 PROCEDURE — 93970 EXTREMITY STUDY: CPT | Mod: 26

## 2019-10-21 PROCEDURE — 71045 X-RAY EXAM CHEST 1 VIEW: CPT | Mod: 26

## 2019-10-21 RX ORDER — TRAMADOL HYDROCHLORIDE 50 MG/1
50 TABLET ORAL ONCE
Refills: 0 | Status: DISCONTINUED | OUTPATIENT
Start: 2019-10-21 | End: 2019-10-21

## 2019-10-21 RX ADMIN — PANTOPRAZOLE SODIUM 40 MILLIGRAM(S): 20 TABLET, DELAYED RELEASE ORAL at 06:24

## 2019-10-21 RX ADMIN — AZITHROMYCIN 255 MILLIGRAM(S): 500 TABLET, FILM COATED ORAL at 06:24

## 2019-10-21 RX ADMIN — Medication 5 MILLIGRAM(S): at 00:35

## 2019-10-21 RX ADMIN — TRAMADOL HYDROCHLORIDE 50 MILLIGRAM(S): 50 TABLET ORAL at 13:55

## 2019-10-21 RX ADMIN — MYCOPHENOLATE MOFETIL 500 MILLIGRAM(S): 250 CAPSULE ORAL at 06:24

## 2019-10-21 RX ADMIN — ENOXAPARIN SODIUM 40 MILLIGRAM(S): 100 INJECTION SUBCUTANEOUS at 11:30

## 2019-10-21 RX ADMIN — AZATHIOPRINE 50 MILLIGRAM(S): 100 TABLET ORAL at 11:30

## 2019-10-21 RX ADMIN — MYCOPHENOLATE MOFETIL 500 MILLIGRAM(S): 250 CAPSULE ORAL at 17:16

## 2019-10-21 RX ADMIN — Medication 60 MILLIGRAM(S): at 06:23

## 2019-10-21 RX ADMIN — Medication 5 MILLIGRAM(S): at 22:18

## 2019-10-21 RX ADMIN — TRAMADOL HYDROCHLORIDE 50 MILLIGRAM(S): 50 TABLET ORAL at 13:21

## 2019-10-21 NOTE — PROGRESS NOTE ADULT - ASSESSMENT
70 y/o F with past medical history of dermatomyositis, Cryptogenic organizing pneumonia 2 years ago (on Azathioprine and prednisone taper) presenting for 3 day hx of dyspnea and productive cough.    # Cryptogenic organizing pneumonia:  -Patient underwent a bronchoscopy in 2017 which revealed 65% segs and 25% lymphocytes. No evidence of infection was seen. CT guided lung biopsy revealed Histopathology consistent with Organizing pneumonia.   - Pt hemodynamically and clinically stable. Breathing comfortably on RA.  - c/w Azithromycin 500mg q24hr  - c/w prednisone at 60 mg QD  - Pulm eval is pending     # Dermatomyositis - stable - C/w Azathioprine 50mg QD and Mycophenolate 500mg q12hr    Diet: Regular  Dvt Ppx: Lovenox SQ   Activity: out of bed to chair   Dispo: Medical Floor  Code Status: Full Code 68 y/o F with past medical history of dermatomyositis, Cryptogenic organizing pneumonia 2 years ago (on Azathioprine and prednisone taper) presenting for 3 day hx of dyspnea and productive cough.    # Cryptogenic organizing pneumonia:  -Patient underwent a bronchoscopy in 2017 which revealed 65% segs and 25% lymphocytes. No evidence of infection was seen. CT guided lung biopsy revealed Histopathology consistent with Organizing pneumonia.   - Pt hemodynamically and clinically stable. Breathing comfortably on RA.  - c/w Azithromycin 500mg q24hr  - c/w prednisone at 60 mg QD  - Pulm eval is pending     # Dermatomyositis - stable - C/w Azathioprine 50mg QD and Mycophenolate 500mg q12hr    Diet: Regular  Dvt Ppx: Lovenox SQ   Activity: out of bed to chair   Dispo: Medical Floor  Code Status: Full Code    Attending Attestation    Pt has been seen and examined. Case and Plan discussed at rounds and currently awaiting for Pulmonary Attending Assessment.  Agree with above documentation as reviewed.  Dyspnea likely 2/2  vs Polymyositis with Pulmonary Involvement.   f/u Urine Leg/Strep Ag / No swab as no URI    Telemetry no events d/brianda    TTE:      Summary: Old Echo     1. LV Ejection Fraction by Jordan's Method with a biplane EF of 62 %.     2. Normal left ventricular size and wall thicknesses, with normal systolic function.     3. Spectral Doppler shows impaired relaxation pattern of left ventricular myocardial filling (Grade I diastolic dysfunction).     4. Mild tricuspid regurgitation.     5. Trace pulmonic valve regurgitation.     6. Estimated pulmonaryartery systolic pressure is 38.5 mmHg assuming a right atrial pressure of 8 mmHg, which is consistent with borderline pulmonary hypertension.     7. Small patent foramen ovale, with predominantly left to right shunting across the atrial septum.    Dispo: Upon Pulm eval

## 2019-10-21 NOTE — PROGRESS NOTE ADULT - SUBJECTIVE AND OBJECTIVE BOX
SUBJECTIVE:    Patient is a 39y old Female who presents with a chief complaint of dyspnea (20 Oct 2019 12:56)  Currently admitted to medicine with the primary diagnosis of Chest pain  Today is hospital day 3d. This morning she is resting comfortably in bed and reports no new issues or overnight events.     PAST MEDICAL & SURGICAL HISTORY  Dermatomyositis  Cryptogenic organizing pneumonia  History of cervical cerclage  Ankle fracture    SOCIAL HISTORY:  Negative for smoking/alcohol/drug use.     ALLERGIES:  contrast media (iodine-based) (Unknown)  peanuts (Unknown)  shellfish (Unknown)    MEDICATIONS:  STANDING MEDICATIONS  azaTHIOprine 50 milliGRAM(s) Oral daily  azithromycin  IVPB 500 milliGRAM(s) IV Intermittent every 24 hours  chlorhexidine 4% Liquid 1 Application(s) Topical <User Schedule>  enoxaparin Injectable 40 milliGRAM(s) SubCutaneous daily  influenza   Vaccine 0.5 milliLiter(s) IntraMuscular once  melatonin 5 milliGRAM(s) Oral at bedtime  mycophenolate mofetil 500 milliGRAM(s) Oral two times a day  pantoprazole    Tablet 40 milliGRAM(s) Oral before breakfast  predniSONE   Tablet 60 milliGRAM(s) Oral daily    PRN MEDICATIONS  acetaminophen   Tablet .. 650 milliGRAM(s) Oral every 6 hours PRN  ALBUTerol    90 MICROgram(s) HFA Inhaler 2 Puff(s) Inhalation every 6 hours PRN  guaiFENesin    Syrup 100 milliGRAM(s) Oral every 6 hours PRN    VITALS:   T(F): 96.8  HR: 83  BP: 109/80  RR: 20  SpO2: 99%    LABS:                        11.7   12.54 )-----------( 461      ( 21 Oct 2019 05:41 )             37.8     10-21    140  |  106  |  19  ----------------------------<  90  4.3   |  24  |  0.6<L>    Ca    8.6      21 Oct 2019 05:41    TPro  6.7  /  Alb  2.8<L>  /  TBili  <0.2  /  DBili  x   /  AST  34  /  ALT  39  /  AlkPhos  56  10-21      RADIOLOGY:    < from: CT Chest No Cont (10.18.19 @ 16:04) >  IMPRESSION:    Bilateral lower lobe predominant peripherally distributed patchy airspace   opacities, not significantly changed from prior exams. Differential   includes cryptogenic organizing pneumonia.     Mild dilatation of the main pulmonary artery, consistent with mild   pulmonary arterial hypertension.       < end of copied text >      PHYSICAL EXAM:  GEN: No acute distress  LUNGS: Clear to auscultation bilaterally   HEART: S1/S2 present. RRR.   ABD: Soft, non-tender, non-distended. Bowel sounds present  EXT: No ll edema   NEURO: AAOX3

## 2019-10-21 NOTE — CONSULT NOTE ADULT - ASSESSMENT
SOB and cough r/o infectious source  Hx of organizing pneumonia   Hx of dermatomyositis     Will discuss need for bronchoscopy and BAL, if infection ruled out would recommend re challenging patient with high dose steroids.   Send LDH  Send Nasal Swab r/o RVP SOB and cough r/o infectious source  Hx of organizing pneumonia   Hx of dermatomyositis     Will discuss need for bronchoscopy and BAL, if infection ruled out would recommend re challenging patient with high dose steroids.   Send LDH  Send Nasal Swab r/o RVP     Attending note to follow COUGH/ SOB ON EXERTION REPEAT CT REVIEWED ? VIRAL LIKE ILLNESS/ ON IMURAN 50 LAST 2 MONTH    - LE DOPPLER  - RHEUMATO EVAL  - PREDNISONE 40 MG TAPER SLOWLY  - ECHO  - POX ON AMBULATION  - NEED OP F/U/ PFT

## 2019-10-21 NOTE — CONSULT NOTE ADULT - SUBJECTIVE AND OBJECTIVE BOX
Patient is a 39y old  Female who presents with a chief complaint of dyspnea (20 Oct 2019 12:56)    HPI:  40 y/o F with pst medical history of dermatomyositis, Cryptogenic organizing pneumonia 2 years ago (on Azathioprine and prednisone taper) presenting for 3 day hx of dyspnea and productive cough.    Pt reports a history of  Cryptogenic organizing pneumonia, and has been following up with Dr. Jim and has been on Azathioprine 50mg and Prednisone 10mg QD as part of her long taper. She reports intermittent coughs over that time period, but has been largely asymptomatic. Three days ago she developed a persistent cough with clear sputum production associated with shortness of breath on ambulation. She also endorses substerna/l-sided intermittent, "squeezing" chest pain at rest lasting 1-2 mins without radiation. Since admission the chest pain resolved and she has felt better. She denies headaches, dizziness, fevers, chills, abdominal pain, myalgias, arthralgias, weakness, numbness or tingling.    In the ED, VS: /85  RR20 T 96.9 satting 98% on R. PT received Mag sulfate 2gm, doxycycline 100mg, solumderol 125mg, ASA 325mg. (18 Oct 2019 22:58)      PAST MEDICAL & SURGICAL HISTORY:  Dermatomyositis  Cryptogenic organizing pneumonia  History of cervical cerclage  Ankle fracture    SOCIAL HX:   Smoking    Neg                     ETOH   Neg                         Other: Neg    FAMILY HISTORY:  Family history of fibromyalgia (Mother)  Family history of sarcoidosis (Father): Father, paternal grandmother and paternal aunt  .  No cardiovascular or pulmonary family history     Review of System:  See HPI    Allergies / Intolerances  contrast media (iodine-based) (Unknown)  peanuts (Unknown)  shellfish (Unknown)    PHYSICAL EXAM  Vital Signs Last 24 Hrs  T(F): 96.8 (21 Oct 2019 05:48), Max: 97.4 (20 Oct 2019 13:24)  HR: 83 (21 Oct 2019 05:48) (83 - 98)  BP: 109/80 (21 Oct 2019 05:48) (109/80 - 120/72)  RR: 20 (21 Oct 2019 05:48) (18 - 20)  SpO2: 99% (20 Oct 2019 20:15) (99% - 99%)    General: In NAD  HEENT: AC             Lymphatic system: No cervical LN     Lungs: Cruz BS  Cardiovascular: Regular  Gastrointestinal: Soft.  + BS   Musculoskeletal: No Clubbing.  Full range of motion.. Moves all extremities  Skin: Warm.  Intact  Neurological: No motor or sensory deficit       LABS:                          11.7   12.54 )-----------( 461      ( 21 Oct 2019 05:41 )             37.8                                               10-21    140  |  106  |  19  ----------------------------<  90  4.3   |  24  |  0.6<L>    Ca    8.6      21 Oct 2019 05:41    TPro  6.7  /  Alb  2.8<L>  /  TBili  <0.2  /  DBili  x   /  AST  34  /  ALT  39  /  AlkPhos  56  10-21                                                                             LIVER FUNCTIONS - ( 21 Oct 2019 05:41 )  Alb: 2.8 g/dL / Pro: 6.7 g/dL / ALK PHOS: 56 U/L / ALT: 39 U/L / AST: 34 U/L / GGT: x                                              MEDICATIONS  (STANDING):  azaTHIOprine 50 milliGRAM(s) Oral daily  azithromycin  IVPB 500 milliGRAM(s) IV Intermittent every 24 hours  chlorhexidine 4% Liquid 1 Application(s) Topical <User Schedule>  enoxaparin Injectable 40 milliGRAM(s) SubCutaneous daily  influenza   Vaccine 0.5 milliLiter(s) IntraMuscular once  melatonin 5 milliGRAM(s) Oral at bedtime  mycophenolate mofetil 500 milliGRAM(s) Oral two times a day  pantoprazole    Tablet 40 milliGRAM(s) Oral before breakfast  predniSONE   Tablet 60 milliGRAM(s) Oral daily    MEDICATIONS  (PRN):  acetaminophen   Tablet .. 650 milliGRAM(s) Oral every 6 hours PRN Temp greater or equal to 38C (100.4F), Severe Pain (7 - 10)  ALBUTerol    90 MICROgram(s) HFA Inhaler 2 Puff(s) Inhalation every 6 hours PRN Shortness of Breath and/or Wheezing  guaiFENesin    Syrup 100 milliGRAM(s) Oral every 6 hours PRN Cough Patient is a 39y old  Female who presents with a chief complaint of dyspnea (20 Oct 2019 12:56)    HPI:  40 y/o F with pst medical history of dermatomyositis, Cryptogenic organizing pneumonia 2 years ago (on Azathioprine and prednisone taper) presenting for 3 day hx of dyspnea and productive cough.    Pt reports a history of  Cryptogenic organizing pneumonia, and has been following up with Dr. Jim and has been on Azathioprine 50mg and Prednisone 10mg QD as part of her long taper. She reports intermittent coughs over that time period, but has been largely asymptomatic. Three days ago she developed a persistent cough with clear sputum production associated with shortness of breath on ambulation. She also endorses substerna/l-sided intermittent, "squeezing" chest pain at rest lasting 1-2 mins without radiation. Since admission the chest pain resolved and she has felt better. She denies headaches, dizziness, fevers, chills, abdominal pain, myalgias, arthralgias, weakness, numbness or tingling.    In the ED, VS: /85  RR20 T 96.9 satting 98% on R. PT received Mag sulfate 2gm, doxycycline 100mg, solumderol 125mg, ASA 325mg. (18 Oct 2019 22:58)      PAST MEDICAL & SURGICAL HISTORY:  Dermatomyositis  Cryptogenic organizing pneumonia  History of cervical cerclage  Ankle fracture    SOCIAL HX:   Smoking    Neg                     ETOH   Neg                         Other: Neg    FAMILY HISTORY:  Family history of fibromyalgia (Mother)  Family history of sarcoidosis (Father): Father, paternal grandmother and paternal aunt  .  No cardiovascular or pulmonary family history     Review of System:  See HPI    Allergies / Intolerances  contrast media (iodine-based) (Unknown)  peanuts (Unknown)  shellfish (Unknown)    PHYSICAL EXAM  Vital Signs Last 24 Hrs  T(F): 96.8 (21 Oct 2019 05:48), Max: 97.4 (20 Oct 2019 13:24)  HR: 83 (21 Oct 2019 05:48) (83 - 98)  BP: 109/80 (21 Oct 2019 05:48) (109/80 - 120/72)  RR: 20 (21 Oct 2019 05:48) (18 - 20)  SpO2: 99% (20 Oct 2019 20:15) (99% - 99%)    General: In NAD  HEENT: AC             Lymphatic system: No cervical LN     Lungs: Cruz BS CTAB, no wheezing or crackles or rhonchi  Cardiovascular: Regular  Gastrointestinal: Soft.  + BS   Musculoskeletal: No Clubbing.  Full range of motion.. Moves all extremities  Skin: Warm.  Intact  Neurological: No motor or sensory deficit       LABS:                          11.7   12.54 )-----------( 461      ( 21 Oct 2019 05:41 )             37.8                                               10-21    140  |  106  |  19  ----------------------------<  90  4.3   |  24  |  0.6<L>    Ca    8.6      21 Oct 2019 05:41    TPro  6.7  /  Alb  2.8<L>  /  TBili  <0.2  /  DBili  x   /  AST  34  /  ALT  39  /  AlkPhos  56  10-21                                                                             LIVER FUNCTIONS - ( 21 Oct 2019 05:41 )  Alb: 2.8 g/dL / Pro: 6.7 g/dL / ALK PHOS: 56 U/L / ALT: 39 U/L / AST: 34 U/L / GGT: x                                              MEDICATIONS  (STANDING):  azaTHIOprine 50 milliGRAM(s) Oral daily  azithromycin  IVPB 500 milliGRAM(s) IV Intermittent every 24 hours  chlorhexidine 4% Liquid 1 Application(s) Topical <User Schedule>  enoxaparin Injectable 40 milliGRAM(s) SubCutaneous daily  influenza   Vaccine 0.5 milliLiter(s) IntraMuscular once  melatonin 5 milliGRAM(s) Oral at bedtime  mycophenolate mofetil 500 milliGRAM(s) Oral two times a day  pantoprazole    Tablet 40 milliGRAM(s) Oral before breakfast  predniSONE   Tablet 60 milliGRAM(s) Oral daily    MEDICATIONS  (PRN):  acetaminophen   Tablet .. 650 milliGRAM(s) Oral every 6 hours PRN Temp greater or equal to 38C (100.4F), Severe Pain (7 - 10)  ALBUTerol    90 MICROgram(s) HFA Inhaler 2 Puff(s) Inhalation every 6 hours PRN Shortness of Breath and/or Wheezing  guaiFENesin    Syrup 100 milliGRAM(s) Oral every 6 hours PRN Cough Patient is a 39y old  Female who presents with a chief complaint of dyspnea (20 Oct 2019 12:56)    HPI:  38 y/o F with pst medical history of dermatomyositis, organizing pneumonia 2 years ago (on Azathioprine and prednisone taper) presenting for 3 day hx of dyspnea and productive cough.    Pt reports a history of   organizing pneumonia, and has been following up with Dr. Jim and has been on Azathioprine 50mg and Prednisone 10mg QD as part of her long taper. She reports intermittent coughs over that time period, but has been largely asymptomatic. Three days ago she developed a persistent cough with clear sputum production associated with shortness of breath on ambulation. She also endorses substerna/l-sided intermittent, "squeezing" chest pain at rest lasting 1-2 mins without radiation. Since admission the chest pain resolved and she has felt better. She denies headaches, dizziness, fevers, chills, abdominal pain, myalgias, arthralgias, weakness, numbness or tingling.    In the ED, VS: /85  RR20 T 96.9 satting 98% on R. PT received Mag sulfate 2gm, doxycycline 100mg, solumderol 125mg, ASA 325mg. (18 Oct 2019 22:58). today feels much better, cough decreased      PAST MEDICAL & SURGICAL HISTORY:  Dermatomyositis  Cryptogenic organizing pneumonia  History of cervical cerclage  Ankle fracture    SOCIAL HX:   Smoking    Neg                     ETOH   Neg                         Other: Neg    FAMILY HISTORY:  Family history of fibromyalgia (Mother)  Family history of sarcoidosis (Father): Father, paternal grandmother and paternal aunt  .  No cardiovascular or pulmonary family history     Review of System:  See HPI    Allergies / Intolerances  contrast media (iodine-based) (Unknown)  peanuts (Unknown)  shellfish (Unknown)    PHYSICAL EXAM  Vital Signs Last 24 Hrs  T(F): 96.8 (21 Oct 2019 05:48), Max: 97.4 (20 Oct 2019 13:24)  HR: 83 (21 Oct 2019 05:48) (83 - 98)  BP: 109/80 (21 Oct 2019 05:48) (109/80 - 120/72)  RR: 20 (21 Oct 2019 05:48) (18 - 20)  SpO2: 99% (20 Oct 2019 20:15) (99% - 99%)    General: In NAD  HEENT: AC             Lymphatic system: No cervical LN     Lungs: Cruz BS CTAB, no wheezing or crackles or rhonchi  Cardiovascular: Regular  Gastrointestinal: Soft.  + BS   Musculoskeletal: No Clubbing.  Full range of motion.. Moves all extremities  Skin: Warm.  Intact  Neurological: No motor or sensory deficit       LABS:                          11.7   12.54 )-----------( 461      ( 21 Oct 2019 05:41 )             37.8                                               10-21    140  |  106  |  19  ----------------------------<  90  4.3   |  24  |  0.6<L>    Ca    8.6      21 Oct 2019 05:41    TPro  6.7  /  Alb  2.8<L>  /  TBili  <0.2  /  DBili  x   /  AST  34  /  ALT  39  /  AlkPhos  56  10-21                                                                             LIVER FUNCTIONS - ( 21 Oct 2019 05:41 )  Alb: 2.8 g/dL / Pro: 6.7 g/dL / ALK PHOS: 56 U/L / ALT: 39 U/L / AST: 34 U/L / GGT: x                                              MEDICATIONS  (STANDING):  azaTHIOprine 50 milliGRAM(s) Oral daily  azithromycin  IVPB 500 milliGRAM(s) IV Intermittent every 24 hours  chlorhexidine 4% Liquid 1 Application(s) Topical <User Schedule>  enoxaparin Injectable 40 milliGRAM(s) SubCutaneous daily  influenza   Vaccine 0.5 milliLiter(s) IntraMuscular once  melatonin 5 milliGRAM(s) Oral at bedtime  mycophenolate mofetil 500 milliGRAM(s) Oral two times a day  pantoprazole    Tablet 40 milliGRAM(s) Oral before breakfast  predniSONE   Tablet 60 milliGRAM(s) Oral daily    MEDICATIONS  (PRN):  acetaminophen   Tablet .. 650 milliGRAM(s) Oral every 6 hours PRN Temp greater or equal to 38C (100.4F), Severe Pain (7 - 10)  ALBUTerol    90 MICROgram(s) HFA Inhaler 2 Puff(s) Inhalation every 6 hours PRN Shortness of Breath and/or Wheezing  guaiFENesin    Syrup 100 milliGRAM(s) Oral every 6 hours PRN Cough

## 2019-10-21 NOTE — CONSULT NOTE ADULT - ATTENDING COMMENTS
AGREE WITH ABOVE, PATIENT SEEN AND EXAMINED, COUGH, ? FLARE UP OF CTD, BETTER ON 60 MG OF PREDNISONE, CALL RHEUMATO, DEC PREDNISONE TO 40, SEE FOR INCREASING IMURAN, OP F/UP, LE DOPPLER, ECHO, PFT

## 2019-10-22 ENCOUNTER — TRANSCRIPTION ENCOUNTER (OUTPATIENT)
Age: 39
End: 2019-10-22

## 2019-10-22 VITALS
TEMPERATURE: 97 F | RESPIRATION RATE: 18 BRPM | HEART RATE: 99 BPM | DIASTOLIC BLOOD PRESSURE: 82 MMHG | SYSTOLIC BLOOD PRESSURE: 125 MMHG

## 2019-10-22 LAB
ANION GAP SERPL CALC-SCNC: 11 MMOL/L — SIGNIFICANT CHANGE UP (ref 7–14)
BUN SERPL-MCNC: 15 MG/DL — SIGNIFICANT CHANGE UP (ref 10–20)
CALCIUM SERPL-MCNC: 8.3 MG/DL — LOW (ref 8.5–10.1)
CHLORIDE SERPL-SCNC: 102 MMOL/L — SIGNIFICANT CHANGE UP (ref 98–110)
CO2 SERPL-SCNC: 23 MMOL/L — SIGNIFICANT CHANGE UP (ref 17–32)
CREAT SERPL-MCNC: 0.6 MG/DL — LOW (ref 0.7–1.5)
GLUCOSE SERPL-MCNC: 94 MG/DL — SIGNIFICANT CHANGE UP (ref 70–99)
HCT VFR BLD CALC: 39.1 % — SIGNIFICANT CHANGE UP (ref 37–47)
HGB BLD-MCNC: 12.2 G/DL — SIGNIFICANT CHANGE UP (ref 12–16)
MAGNESIUM SERPL-MCNC: 1.8 MG/DL — SIGNIFICANT CHANGE UP (ref 1.8–2.4)
MCHC RBC-ENTMCNC: 30.6 PG — SIGNIFICANT CHANGE UP (ref 27–31)
MCHC RBC-ENTMCNC: 31.2 G/DL — LOW (ref 32–37)
MCV RBC AUTO: 98 FL — SIGNIFICANT CHANGE UP (ref 81–99)
NRBC # BLD: 0 /100 WBCS — SIGNIFICANT CHANGE UP (ref 0–0)
PLATELET # BLD AUTO: 470 K/UL — HIGH (ref 130–400)
POTASSIUM SERPL-MCNC: 3.8 MMOL/L — SIGNIFICANT CHANGE UP (ref 3.5–5)
POTASSIUM SERPL-SCNC: 3.8 MMOL/L — SIGNIFICANT CHANGE UP (ref 3.5–5)
RBC # BLD: 3.99 M/UL — LOW (ref 4.2–5.4)
RBC # FLD: 13.2 % — SIGNIFICANT CHANGE UP (ref 11.5–14.5)
SODIUM SERPL-SCNC: 136 MMOL/L — SIGNIFICANT CHANGE UP (ref 135–146)
WBC # BLD: 11.82 K/UL — HIGH (ref 4.8–10.8)
WBC # FLD AUTO: 11.82 K/UL — HIGH (ref 4.8–10.8)

## 2019-10-22 PROCEDURE — 99238 HOSP IP/OBS DSCHRG MGMT 30/<: CPT

## 2019-10-22 RX ORDER — AZATHIOPRINE 100 MG/1
1 TABLET ORAL
Qty: 30 | Refills: 2
Start: 2019-10-22 | End: 2020-01-19

## 2019-10-22 RX ORDER — FLUCONAZOLE 150 MG/1
1 TABLET ORAL
Qty: 1 | Refills: 0
Start: 2019-10-22 | End: 2019-10-22

## 2019-10-22 RX ORDER — FLUCONAZOLE 150 MG/1
200 TABLET ORAL ONCE
Refills: 0 | Status: COMPLETED | OUTPATIENT
Start: 2019-10-22 | End: 2019-10-22

## 2019-10-22 RX ORDER — BUDESONIDE AND FORMOTEROL FUMARATE DIHYDRATE 160; 4.5 UG/1; UG/1
2 AEROSOL RESPIRATORY (INHALATION)
Qty: 2 | Refills: 0
Start: 2019-10-22 | End: 2019-11-20

## 2019-10-22 RX ORDER — AZATHIOPRINE 100 MG/1
1 TABLET ORAL
Qty: 0 | Refills: 2 | DISCHARGE

## 2019-10-22 RX ORDER — ALBUTEROL 90 UG/1
2 AEROSOL, METERED ORAL
Qty: 1 | Refills: 0
Start: 2019-10-22 | End: 2019-11-20

## 2019-10-22 RX ADMIN — ENOXAPARIN SODIUM 40 MILLIGRAM(S): 100 INJECTION SUBCUTANEOUS at 11:15

## 2019-10-22 RX ADMIN — MYCOPHENOLATE MOFETIL 500 MILLIGRAM(S): 250 CAPSULE ORAL at 06:19

## 2019-10-22 RX ADMIN — Medication 40 MILLIGRAM(S): at 06:19

## 2019-10-22 RX ADMIN — AZATHIOPRINE 50 MILLIGRAM(S): 100 TABLET ORAL at 11:16

## 2019-10-22 RX ADMIN — PANTOPRAZOLE SODIUM 40 MILLIGRAM(S): 20 TABLET, DELAYED RELEASE ORAL at 06:19

## 2019-10-22 RX ADMIN — FLUCONAZOLE 200 MILLIGRAM(S): 150 TABLET ORAL at 15:32

## 2019-10-22 RX ADMIN — MYCOPHENOLATE MOFETIL 500 MILLIGRAM(S): 250 CAPSULE ORAL at 17:20

## 2019-10-22 NOTE — CHART NOTE - NSCHARTNOTEFT_GEN_A_CORE
<<<RESIDENT DISCHARGE NOTE>>>     JESSI KING  MRN-[id number]    VITAL SIGNS:  T(F): 97.2 (10-22-19 @ 06:03), Max: 97.7 (10-21-19 @ 20:33)  HR: 86 (10-22-19 @ 06:17)  BP: 128/68 (10-22-19 @ 06:17)  SpO2: --      PHYSICAL EXAMINATION:  GEN: No acute distress  LUNGS: Clear to auscultation bilaterally   HEART: S1/S2 present. RRR.   ABD: Soft, non-tender, non-distended. Bowel sounds present  EXT: No ll edema   NEURO: AAOX3      TEST RESULTS:                        12.2   11.82 )-----------( 470      ( 22 Oct 2019 06:38 )             39.1       10-22    136  |  102  |  15  ----------------------------<  94  3.8   |  23  |  0.6<L>    Ca    8.3<L>      22 Oct 2019 06:38  Mg     1.8     10-22    TPro  6.7  /  Alb  2.8<L>  /  TBili  <0.2  /  DBili  x   /  AST  34  /  ALT  39  /  AlkPhos  56  10-21      FINAL DISCHARGE INTERVIEW:  Resident(s) Present: (Name: Dr. Lorenzo Dixon)     DISCHARGE MEDICATION RECONCILIATION  reviewed with Attending (Name: Dr. Hernandez, Cellcept and imuran dose confirmed with Dr. Rea Rheumatology)    DISPOSITION: Home

## 2019-10-22 NOTE — DISCHARGE NOTE PROVIDER - PROVIDER TOKENS
PROVIDER:[TOKEN:[[id number]],FOLLOWUP:[1 week]],PROVIDER:[TOKEN:[[id number]],FOLLOWUP:[1-3 days]]

## 2019-10-22 NOTE — DISCHARGE NOTE PROVIDER - HOSPITAL COURSE
70 y/o F with pst medical history of dermatomyositis, Cryptogenic organizing pneumonia 2 years ago (on Azathioprine and prednisone taper) presenting for 3 day hx of dyspnea and productive cough.        pt was seen by pulmonary, discussed with rheumatology.    pt to is medically stable for discharge to follow up with both as outpatient. 68 y/o F with pst medical history of dermatomyositis, Cryptogenic organizing pneumonia 2 years ago (on Azathioprine and prednisone taper) presenting for 3 day hx of dyspnea and productive cough.        pt was seen by pulmonary, discussed with rheumatology and cellcept increased to 1500mg po BID     per Pulmonary slow Prednisone taper now 40mg po qd    Candida Vaginitis - Diflucan 150mg po x1 and Rx sent to Pharmacy incase needs for next few days given immunosuppression     Ambulatory sats above 95%    pt to is medically stable for discharge to follow up with Pulmonary and Rheumatology Services in the Clinic.         Vital Signs Last 24 Hrs    T(C): 36.6 (22 Oct 2019 13:34), Max: 36.6 (22 Oct 2019 13:34)    T(F): 97.8 (22 Oct 2019 13:34), Max: 97.8 (22 Oct 2019 13:34)    HR: 99 (22 Oct 2019 13:34) (75 - 99)    BP: 134/83 (22 Oct 2019 13:34) (96/52 - 134/83)    RR: 18 (22 Oct 2019 13:34) (18 - 18)            PHYSICAL EXAM:    GENERAL: NAD, well-developed    HEAD:  Atraumatic, Normocephalic    EYES: EOMI, PERRLA, conjunctiva and sclera clear    NECK: Supple, No JVD    CHEST/LUNG: Clear to auscultation bilaterally; No wheeze    HEART: Regular rate and rhythm; No murmurs, rubs, or gallops    ABDOMEN: Soft, Nontender, Nondistended; Bowel sounds present    EXTREMITIES:  2+ Peripheral Pulses, No clubbing, cyanosis, or edema    PSYCH: AAOx3    NEUROLOGY: non-focal    SKIN: No rashes or lesions                            12.2     11.82 )-----------( 470      ( 22 Oct 2019 06:38 )               39.1         10-22        136  |  102  |  15    ----------------------------<  94    3.8   |  23  |  0.6<L>        Ca    8.3<L>      22 Oct 2019 06:38    Mg     1.8     10-22        TPro  6.7  /  Alb  2.8<L>  /  TBili  <0.2  /  DBili  x   /  AST  34  /  ALT  39  /  AlkPhos  56  10-21

## 2019-10-22 NOTE — DISCHARGE NOTE PROVIDER - NSDCCPCAREPLAN_GEN_ALL_CORE_FT
PRINCIPAL DISCHARGE DIAGNOSIS  Diagnosis: Cryptogenic organizing pneumonia  Assessment and Plan of Treatment: take prednsione 40 mg daily  discussed with Dr. Rea, take cellcept 500 mg, 3 tabs every 12 hrs (total of 3gm / day)  keep the dose od azathioprine at 50 mg.  follow up with Dr. Rea and Diandra as outpatient. PRINCIPAL DISCHARGE DIAGNOSIS  Diagnosis: Cryptogenic organizing pneumonia  Assessment and Plan of Treatment: take prednsione 40 mg daily  discussed with Dr. Rea, take cellcept 500 mg, 3 tabs every 12 hrs (total of 3gm / day)  keep the same dose of azathioprine at 50 mg.  follow up with Dr. Rea and Diandra as outpatient.      SECONDARY DISCHARGE DIAGNOSES  Diagnosis: Vaginitis  Assessment and Plan of Treatment: Take Diflucan as instructed.  You will be given 1st dose now.  If in 3-5 days symptoms worsen take a dose of diflucan as instructed.  F/U with GYN Routine unless persistent symptoms

## 2019-10-22 NOTE — DISCHARGE NOTE PROVIDER - CARE PROVIDERS DIRECT ADDRESSES
[email or web address],DirectAddress_Unknown

## 2019-10-22 NOTE — DISCHARGE NOTE NURSING/CASE MANAGEMENT/SOCIAL WORK - PATIENT PORTAL LINK FT
You can access the FollowMyHealth Patient Portal offered by [facility] by registering at the following website: [email or web address]. By joining [employer]’s FollowMyHealth portal, you will also be able to view your health information using other applications (apps) compatible with our system.

## 2019-10-22 NOTE — DISCHARGE NOTE PROVIDER - CARE PROVIDER_API CALL
Wisam Jim (MD; CHAO)  Internal Medicine; Pulmonary Disease  [address] [place] [address]  Phone: [phone]  Fax: [phone]  Follow Up Time: 1 week    Lucia Rea ()  Internal Medicine  [address] [place] [address]  Phone: [phone]  Fax: [phone]  Follow Up Time: 1-3 days

## 2019-11-06 ENCOUNTER — INBOUND DOCUMENT (OUTPATIENT)
Age: 39
End: 2019-11-06

## 2019-11-13 PROBLEM — M33.90 DERMATOPOLYMYOSITIS, UNSPECIFIED, ORGAN INVOLVEMENT UNSPECIFIED: Chronic | Status: ACTIVE | Noted: 2019-10-18

## 2019-11-14 ENCOUNTER — APPOINTMENT (OUTPATIENT)
Dept: INTERNAL MEDICINE | Facility: CLINIC | Age: 39
End: 2019-11-14

## 2019-12-12 ENCOUNTER — INPATIENT (INPATIENT)
Facility: HOSPITAL | Age: 39
LOS: 4 days | Discharge: HOME | End: 2019-12-17
Attending: INTERNAL MEDICINE | Admitting: INTERNAL MEDICINE
Payer: MEDICAID

## 2019-12-12 VITALS
SYSTOLIC BLOOD PRESSURE: 132 MMHG | OXYGEN SATURATION: 98 % | WEIGHT: 259.93 LBS | HEIGHT: 64 IN | DIASTOLIC BLOOD PRESSURE: 82 MMHG | HEART RATE: 96 BPM | RESPIRATION RATE: 18 BRPM | TEMPERATURE: 97 F

## 2019-12-12 DIAGNOSIS — S82.899A OTHER FRACTURE OF UNSPECIFIED LOWER LEG, INITIAL ENCOUNTER FOR CLOSED FRACTURE: Chronic | ICD-10-CM

## 2019-12-12 DIAGNOSIS — Z98.890 OTHER SPECIFIED POSTPROCEDURAL STATES: Chronic | ICD-10-CM

## 2019-12-12 LAB
ALBUMIN SERPL ELPH-MCNC: 3.4 G/DL — LOW (ref 3.5–5.2)
ALP SERPL-CCNC: 67 U/L — SIGNIFICANT CHANGE UP (ref 30–115)
ALT FLD-CCNC: 11 U/L — SIGNIFICANT CHANGE UP (ref 0–41)
ANION GAP SERPL CALC-SCNC: 13 MMOL/L — SIGNIFICANT CHANGE UP (ref 7–14)
AST SERPL-CCNC: 18 U/L — SIGNIFICANT CHANGE UP (ref 0–41)
BASE EXCESS BLDV CALC-SCNC: 2.4 MMOL/L — HIGH (ref -2–2)
BASOPHILS # BLD AUTO: 0.05 K/UL — SIGNIFICANT CHANGE UP (ref 0–0.2)
BASOPHILS NFR BLD AUTO: 0.5 % — SIGNIFICANT CHANGE UP (ref 0–1)
BILIRUB SERPL-MCNC: <0.2 MG/DL — SIGNIFICANT CHANGE UP (ref 0.2–1.2)
BUN SERPL-MCNC: 9 MG/DL — LOW (ref 10–20)
CA-I SERPL-SCNC: 1.23 MMOL/L — SIGNIFICANT CHANGE UP (ref 1.12–1.3)
CALCIUM SERPL-MCNC: 9.3 MG/DL — SIGNIFICANT CHANGE UP (ref 8.5–10.1)
CHLORIDE SERPL-SCNC: 99 MMOL/L — SIGNIFICANT CHANGE UP (ref 98–110)
CK MB CFR SERPL CALC: 12.6 NG/ML — HIGH (ref 0.6–6.3)
CK SERPL-CCNC: 332 U/L — HIGH (ref 0–225)
CO2 SERPL-SCNC: 25 MMOL/L — SIGNIFICANT CHANGE UP (ref 17–32)
CREAT SERPL-MCNC: 0.7 MG/DL — SIGNIFICANT CHANGE UP (ref 0.7–1.5)
EOSINOPHIL # BLD AUTO: 0.19 K/UL — SIGNIFICANT CHANGE UP (ref 0–0.7)
EOSINOPHIL NFR BLD AUTO: 1.9 % — SIGNIFICANT CHANGE UP (ref 0–8)
ERYTHROCYTE [SEDIMENTATION RATE] IN BLOOD: 53 MM/HR — HIGH (ref 0–20)
GAS PNL BLDV: 141 MMOL/L — SIGNIFICANT CHANGE UP (ref 136–145)
GAS PNL BLDV: SIGNIFICANT CHANGE UP
GLUCOSE SERPL-MCNC: 78 MG/DL — SIGNIFICANT CHANGE UP (ref 70–99)
HCG SERPL QL: NEGATIVE — SIGNIFICANT CHANGE UP
HCO3 BLDV-SCNC: 29 MMOL/L — SIGNIFICANT CHANGE UP (ref 22–29)
HCT VFR BLD CALC: 38.2 % — SIGNIFICANT CHANGE UP (ref 37–47)
HCT VFR BLDA CALC: 38.7 % — SIGNIFICANT CHANGE UP (ref 34–44)
HGB BLD CALC-MCNC: 12.6 G/DL — LOW (ref 14–18)
HGB BLD-MCNC: 12 G/DL — SIGNIFICANT CHANGE UP (ref 12–16)
IMM GRANULOCYTES NFR BLD AUTO: 0.7 % — HIGH (ref 0.1–0.3)
LACTATE BLDV-MCNC: 0.8 MMOL/L — SIGNIFICANT CHANGE UP (ref 0.5–1.6)
LYMPHOCYTES # BLD AUTO: 0.83 K/UL — LOW (ref 1.2–3.4)
LYMPHOCYTES # BLD AUTO: 8.4 % — LOW (ref 20.5–51.1)
MCHC RBC-ENTMCNC: 30.6 PG — SIGNIFICANT CHANGE UP (ref 27–31)
MCHC RBC-ENTMCNC: 31.4 G/DL — LOW (ref 32–37)
MCV RBC AUTO: 97.4 FL — SIGNIFICANT CHANGE UP (ref 81–99)
MONOCYTES # BLD AUTO: 0.34 K/UL — SIGNIFICANT CHANGE UP (ref 0.1–0.6)
MONOCYTES NFR BLD AUTO: 3.4 % — SIGNIFICANT CHANGE UP (ref 1.7–9.3)
NEUTROPHILS # BLD AUTO: 8.41 K/UL — HIGH (ref 1.4–6.5)
NEUTROPHILS NFR BLD AUTO: 85.1 % — HIGH (ref 42.2–75.2)
NRBC # BLD: 0 /100 WBCS — SIGNIFICANT CHANGE UP (ref 0–0)
NT-PROBNP SERPL-SCNC: 45 PG/ML — SIGNIFICANT CHANGE UP (ref 0–300)
PCO2 BLDV: 52 MMHG — HIGH (ref 41–51)
PH BLDV: 7.36 — SIGNIFICANT CHANGE UP (ref 7.26–7.43)
PLATELET # BLD AUTO: 438 K/UL — HIGH (ref 130–400)
PO2 BLDV: 35 MMHG — SIGNIFICANT CHANGE UP (ref 20–40)
POTASSIUM BLDV-SCNC: 3.4 MMOL/L — SIGNIFICANT CHANGE UP (ref 3.3–5.6)
POTASSIUM SERPL-MCNC: 3.8 MMOL/L — SIGNIFICANT CHANGE UP (ref 3.5–5)
POTASSIUM SERPL-SCNC: 3.8 MMOL/L — SIGNIFICANT CHANGE UP (ref 3.5–5)
PROT SERPL-MCNC: 7.2 G/DL — SIGNIFICANT CHANGE UP (ref 6–8)
RBC # BLD: 3.92 M/UL — LOW (ref 4.2–5.4)
RBC # FLD: 13.7 % — SIGNIFICANT CHANGE UP (ref 11.5–14.5)
SAO2 % BLDV: 62 % — SIGNIFICANT CHANGE UP
SODIUM SERPL-SCNC: 137 MMOL/L — SIGNIFICANT CHANGE UP (ref 135–146)
TROPONIN T SERPL-MCNC: 0.09 NG/ML — CRITICAL HIGH
WBC # BLD: 9.89 K/UL — SIGNIFICANT CHANGE UP (ref 4.8–10.8)
WBC # FLD AUTO: 9.89 K/UL — SIGNIFICANT CHANGE UP (ref 4.8–10.8)

## 2019-12-12 PROCEDURE — 73562 X-RAY EXAM OF KNEE 3: CPT | Mod: 26,LT

## 2019-12-12 PROCEDURE — 71045 X-RAY EXAM CHEST 1 VIEW: CPT | Mod: 26

## 2019-12-12 PROCEDURE — 99223 1ST HOSP IP/OBS HIGH 75: CPT | Mod: AI

## 2019-12-12 PROCEDURE — 93010 ELECTROCARDIOGRAM REPORT: CPT

## 2019-12-12 PROCEDURE — 99285 EMERGENCY DEPT VISIT HI MDM: CPT

## 2019-12-12 RX ORDER — BUDESONIDE AND FORMOTEROL FUMARATE DIHYDRATE 160; 4.5 UG/1; UG/1
2 AEROSOL RESPIRATORY (INHALATION)
Refills: 0 | Status: DISCONTINUED | OUTPATIENT
Start: 2019-12-12 | End: 2019-12-17

## 2019-12-12 RX ORDER — SODIUM CHLORIDE 9 MG/ML
1000 INJECTION, SOLUTION INTRAVENOUS ONCE
Refills: 0 | Status: COMPLETED | OUTPATIENT
Start: 2019-12-12 | End: 2019-12-12

## 2019-12-12 RX ORDER — MYCOPHENOLATE MOFETIL 250 MG/1
1500 CAPSULE ORAL
Refills: 0 | Status: DISCONTINUED | OUTPATIENT
Start: 2019-12-12 | End: 2019-12-17

## 2019-12-12 RX ORDER — PANTOPRAZOLE SODIUM 20 MG/1
40 TABLET, DELAYED RELEASE ORAL
Refills: 0 | Status: DISCONTINUED | OUTPATIENT
Start: 2019-12-12 | End: 2019-12-17

## 2019-12-12 RX ORDER — AZATHIOPRINE 100 MG/1
150 TABLET ORAL DAILY
Refills: 0 | Status: DISCONTINUED | OUTPATIENT
Start: 2019-12-12 | End: 2019-12-17

## 2019-12-12 RX ORDER — ENOXAPARIN SODIUM 100 MG/ML
40 INJECTION SUBCUTANEOUS DAILY
Refills: 0 | Status: DISCONTINUED | OUTPATIENT
Start: 2019-12-12 | End: 2019-12-17

## 2019-12-12 RX ORDER — IPRATROPIUM/ALBUTEROL SULFATE 18-103MCG
3 AEROSOL WITH ADAPTER (GRAM) INHALATION EVERY 6 HOURS
Refills: 0 | Status: DISCONTINUED | OUTPATIENT
Start: 2019-12-12 | End: 2019-12-14

## 2019-12-12 RX ORDER — LIDOCAINE 4 G/100G
2 CREAM TOPICAL DAILY
Refills: 0 | Status: DISCONTINUED | OUTPATIENT
Start: 2019-12-12 | End: 2019-12-14

## 2019-12-12 RX ADMIN — SODIUM CHLORIDE 1000 MILLILITER(S): 9 INJECTION, SOLUTION INTRAVENOUS at 21:33

## 2019-12-12 NOTE — ED ADULT TRIAGE NOTE - CHIEF COMPLAINT QUOTE
pt sts has an autoimmune disorder that pw "muscle weaknesses, cp cough and dif breathing". pt feels symptoms now.

## 2019-12-12 NOTE — ED ADULT NURSE NOTE - NSIMPLEMENTINTERV_GEN_ALL_ED
Implemented All Universal Safety Interventions:  [place] to call system. Call bell, personal items and telephone within reach. Instruct patient to call for assistance. Room bathroom lighting operational. Non-slip footwear when patient is off stretcher. Physically safe environment: no spills, clutter or unnecessary equipment. Stretcher in lowest position, wheels locked, appropriate side rails in place.

## 2019-12-12 NOTE — H&P ADULT - ATTENDING COMMENTS
38 YO F with a PMH of dermatomyositis and Cryptogenic organizing pneumonia who presented to the hospital with a c/o diffuse muscle aches and weakness for the past 2 months but worse over the past x 1 week. Weakness is more located around hips and shoulders. As per pt, this is how she presents when her dermatomyositis flares up. Has received Rituxan infusions in Febuary/March of this year and was asymptomatic ever since. In the ED, the pt stated that she fell > 1 week ago due to weakness and hit her left knee. XR was NEG. Physical exam shows muscle weakness more located around B/L hips, shoulders and back. Finger  is still intact. Sensation is intact. No rashes currently. Obese. Left knee with TTP over the knee cap, otherwise NEG. Labs and radiology as above. Dermatomyositis flare. Pt's rheum consulted. Wants CRP, ESR, and aldolase. Will see pt in the AM. IV Steroids. Restart home meds. Elevate trops. Similar to her previous admission. No CP or EKG changes. Left knee pain. XR is NEG. PRN pain control. PT eval. HX of . Restart home meds. GI and DVT PPX. Rest as per above note.

## 2019-12-12 NOTE — ED PROVIDER NOTE - NS ED ROS FT
Review of Systems         Constitutional: (-) fever (-) chills (+) weakness       EENT: (-) visual changes (-) sore throat (-) congestion       Cardiovascular: (+) chest pain (-) syncope       Respiratory: (-) cough, (+) shortness of breath       Gastrointestinal: (-) abdominal pain (-) vomiting (-) diarrhea (-) nausea (-) constipation       Genitourinary: (-) dysuria       Musculoskeletal: (-) neck pain (-) back pain (+) joint pain       Integumentary: (-) rash       Neurological: (-) headache (-) altered mental status (-) dizziness (-) paresthesias       Psych: (-) psych history

## 2019-12-12 NOTE — ED PROVIDER NOTE - CLINICAL SUMMARY MEDICAL DECISION MAKING FREE TEXT BOX
pt with dermatomyositis flare up, rheum consulted, labs done, pt on steroids, admitted for further w/u, eval.

## 2019-12-12 NOTE — H&P ADULT - NSHPPHYSICALEXAM_GEN_ALL_CORE
GENERAL: NAD, lying in bed comfortably  CHEST/LUNG: Clear to auscultation bilaterally; No rales, rhonchi, wheezing, or rubs.   HEART: Regular rate and rhythm; No murmurs, rubs, or gallops  ABDOMEN: Bowel sounds present; Soft, Nontender, Nondistended.  EXTREMITIES:  2+ Peripheral Pulses, brisk capillary refill. No edema  NERVOUS SYSTEM:  Alert & Oriented X3, speech clear. No deficits   MSK: diffuse muscle aches with several tender points

## 2019-12-12 NOTE — H&P ADULT - HISTORY OF PRESENT ILLNESS
68 y/o F with pst medical history of dermatomyositis, Cryptogenic organizing pneumonia 2 years ago (on Azathioprine and prednisone taper) presenting for 3 day hx of dyspnea and productive cough.    Pt reports a history of  Cryptogenic organizing pneumonia, and has been following up with Dr. Jim and has been on Azathioprine 50mg and Prednisone 10mg QD as part of her long taper. She reports intermittent coughs over that time period, but has been largely asymptomatic.  Three days ago she developed a persistent cough with clear sputum production associated with shortness of breath on ambulation. She also endorses substerna/l-sided intermittent, "squeezing" chest pain at rest lasting 1-2 mins without radiation.  She denies headaches, dizziness, fevers, chills, abdominal pain, myalgias, arthralgias, weakness, numbness or tingling.    In the ED, VS: /85  RR20 T 96.9 satting 98% on R. PT received Mag sulfate 2gm, doxycycline 100mg, solumderol 125mg, ASA 325mg. 39 year old Female with past medical history of dermatomyositis and Cryptogenic organizing pneumonia presenting to the ED for evaluation of diffuse muscle aches. patient reports 2 weeks ago she started having diffuse muscle and joints aches, associated with increased chough and shortness of breath. patient also endorses a fall 1 week ago (no head trauma, no LOC) with resulting right knee pain and swelling. she also endorses small and big joints pain, morning stiffness. she denies fever, chills, sneezing, rhinorrhea, sore throat, chest pain, palpitations, change in bowel or urinary habits. patient was admitted in 10/2019 for same complain and was discharged for outpatient rheumatology follow up.    in the ED patient is hemodynamically stable.

## 2019-12-12 NOTE — ED PROVIDER NOTE - PROGRESS NOTE DETAILS
spoke with dr adrian, will see patient in AM. recs for crp/esr/aldoase spoke with mar regarding trop elevation, will admit to tele

## 2019-12-12 NOTE — ED PROVIDER NOTE - OBJECTIVE STATEMENT
39 year old female hx organized PNA, fibromyositis presenting with weakness/myalgias. Patient states she has been having diffuse muscle pains and weakness, worsening x 8 days. Pain described as sore, worse with palpation, palliation. PAtient on chronic steroids, mycophenolate, azathioprine, has not f/u with rheum Litvin in weeks. PAtient also admits to shortness of breath, worse with ambulation and midsternal chest pain, worsening. No cough/fevers/chills/nausea/vomiting/abd pain/diarrhea/dysuria/back pain. Patient fell 1 week ago onto her left knee no LOC/head trauma and has been ambulating since with pain. She does also note ecchymoses which come and go x 1 month in small spots around her body.

## 2019-12-12 NOTE — H&P ADULT - ASSESSMENT
# Cryptogenic organizing pneumonia:  -Patient underwent a bronchoscopy in 2017 which revealed 65% segs and 25% lymphocytes. No evidence of infection was seen. CT guided lung biopsy revealed Histopathology consistent with Organizing pneumonia.   - Pt hemodynamically and clinically stable. Breathing comfortably on RA.  - c/w Azithromycin 500mg q24hr  - c/w prednisone at 60 mg QD  - Pulm eval is pending     # Dermatomyositis - stable - C/w Azathioprine 50mg QD and Mycophenolate 500mg q12hr    Diet: Regular  Dvt Ppx: Lovenox SQ   Activity: out of bed to chair   Dispo: Medical Floor  Code Status: Full Code 39 year old Female with past medical history of dermatomyositis and Cryptogenic organizing pneumonia presenting to the ED for evaluation of diffuse muscle aches    # Dermatomyositis - Cryptogenic organizing pneumonia:  - Pt hemodynamically and clinically stable.   - will give solumedrol 100 mg one dose (patient takes prednisone 20 mg daily)  - c/w Azithromycin 150 mg q24hr, Cellcept 1500 mg q 12 hrs   - Rheumatology called by ED, follow up recommendations   - ESR 53, pending aldolase and CRP  - patient endorses chronic musculoskeletal pain, troponin 0.09, was 0.07 last admission, low suspicion for cardiac etiology, d/c tele    - Patient underwent a bronchoscopy in 2017 which revealed 65% segs and 25% lymphocytes. No evidence of infection was seen.   - CT guided lung biopsy revealed Histopathology consistent with Organizing pneumonia.     # history of mechanical fall with right sided knee pain:  - xray perform, follow up official read  - pain control  - will get PT/Physiatry     Diet: Regular  Dvt Ppx: Lovenox SQ   Activity: out of bed to chair   Dispo: Medical Floor  Code Status: Full Code 39 year old Female with past medical history of dermatomyositis and Cryptogenic organizing pneumonia presenting to the ED for evaluation of diffuse muscle aches    # Dermatomyositis - Cryptogenic organizing pneumonia:  - Pt hemodynamically and clinically stable.   - will give solumedrol 100 mg one dose (patient takes prednisone 20 mg daily)  - c/w Azithromycin 150 mg q24hr, Cellcept 1500 mg q 12 hrs   - Rheumatology called by ED, follow up recommendations   - ESR 53, pending aldolase and CRP  - patient endorses chronic musculoskeletal pain, troponin 0.09, was 0.07 last admission, low suspicion for cardiac etiology, d/c tele    - Patient underwent a bronchoscopy in 2017 which revealed 65% segs and 25% lymphocytes. No evidence of infection was seen.   - CT guided lung biopsy revealed Histopathology consistent with Organizing pneumonia.   - c/w Symbicort and Duoneb     # history of mechanical fall with left sided knee pain:  - xray perform, follow up official read  - pain control  - will get PT/Physiatry     Diet: Regular  Dvt Ppx: Lovenox SQ   Activity: out of bed to chair   Dispo: Medical Floor  Code Status: Full Code

## 2019-12-12 NOTE — H&P ADULT - NSHPLABSRESULTS_GEN_ALL_CORE
Complete Blood Count + Automated Diff (12.12.19 @ 20:23)    WBC Count: 9.89 K/uL    RBC Count: 3.92 M/uL    Hemoglobin: 12.0 g/dL    Hematocrit: 38.2 %    Mean Cell Volume: 97.4 fL    Mean Cell Hemoglobin: 30.6 pg    Mean Cell Hemoglobin Conc: 31.4 g/dL    Red Cell Distrib Width: 13.7 %    Platelet Count - Automated: 438 K/uL    Auto Neutrophil #: 8.41 K/uL    Auto Lymphocyte #: 0.83 K/uL    Auto Monocyte #: 0.34 K/uL    Auto Eosinophil #: 0.19 K/uL    Auto Basophil #: 0.05 K/uL    Auto Neutrophil %: 85.1: Differential percentages must be correlated with absolute numbers for  clinical significance. %    Auto Lymphocyte %: 8.4 %    Auto Monocyte %: 3.4 %    Auto Eosinophil %: 1.9 %    Auto Basophil %: 0.5 %    Auto Immature Granulocyte %: 0.7 %    Nucleated RBC: 0 /100 WBCs    Comprehensive Metabolic Panel (12.12.19 @ 20:23)    Sodium, Serum: 137 mmol/L    Potassium, Serum: 3.8 mmol/L    Chloride, Serum: 99 mmol/L    Carbon Dioxide, Serum: 25 mmol/L    Anion Gap, Serum: 13 mmol/L    Blood Urea Nitrogen, Serum: 9 mg/dL    Creatinine, Serum: 0.7 mg/dL    Glucose, Serum: 78 mg/dL    Calcium, Total Serum: 9.3 mg/dL    Protein Total, Serum: 7.2 g/dL    Albumin, Serum: 3.4 g/dL    Bilirubin Total, Serum: <0.2 mg/dL    Alkaline Phosphatase, Serum: 67 U/L    Aspartate Aminotransferase (AST/SGOT): 18 U/L    Alanine Aminotransferase (ALT/SGPT): 11 U/L    eGFR if Non : 108: Interpretative comment  The units for eGFR are mL/min/1.73M2 (normalized body surface area). The  eGFR is calculated from a serum creatinine using the CKD-EPI equation.  Other variables required for calculation are race, age and sex. Among  patients with chronic kidney disease (CKD), the eGFR is useful in  determining the stage of disease according to KDOQI CKD classification.  All eGFR results are reported numerically with the following  interpretation.          GFR                    With                 Without     (ml/min/1.73 m2)    Kidney Damage       Kidney Damage        >= 90                    Stage 1                     Normal        60-89                    Stage 2                     Decreased GFR        30-59     Stage 3                     Stage 3        15-29                    Stage 4                     Stage 4        < 15                      Stage 5                     Stage 5  Each stage of CKD assumes that the associated GFR level has been in  effect for at least 3 months. Determination of stages one and two (with  eGFR > 59 ml/min/m2) requires estimation of kidney damage for at least 3  months as defined by structural or functional abnormalities.  Limitations: All estimates of GFR will be less accurate for patients at  extremes of muscle mass (including but not limited to frail elderly,  critically ill, or cancer patients), those with unusual diets, and those  with conditions associated with reduced secretion or extrarenal  elimination of creatinine. The eGFR equation is not recommended for use  in patients with unstable creatinine levels. mL/min/1.73M2    eGFR if African American: 126 mL/min/1.73M2    Troponin T, Serum (12.12.19 @ 20:23)    Troponin T, Serum: 0.09: Critical value:    < from: Xray Chest 1 View-PORTABLE IMMEDIATE (12.12.19 @ 20:51) >    Impression:      Stable bilateral opacities.    < end of copied text >

## 2019-12-12 NOTE — ED ADULT NURSE NOTE - OBJECTIVE STATEMENT
Patient is a 40 yo female c/o diffuse muscle pains and weakness, worsening x 8 days. Pain described as sore, worse with palpation, palliation

## 2019-12-12 NOTE — ED PROVIDER NOTE - CARE PLAN
Principal Discharge DX:	SOB (shortness of breath)  Secondary Diagnosis:	Weakness  Secondary Diagnosis:	Troponin level elevated  Secondary Diagnosis:	Myalgia

## 2019-12-12 NOTE — ED PROVIDER NOTE - PHYSICAL EXAMINATION
Physical Exam    Vital Signs: I have reviewed the initial vital signs  Constitutional: well-nourished, appears stated age, no acute distress  EENT: Conjunctiva pink, Sclera clear, PERRLA, EOMI. Mucous membranes moist, no exudates or lesions noted, uvula midline.  Cardiovascular: S1 and S2 present, regular rate, regular rhythm. Well perfused extremities, no peripheral edema  Respiratory: unlabored respiratory effort, clear to auscultation bilaterally no wheezing, rales or rhonchi  Gastrointestinal: soft, non-tender abdomen. No guarding or rebound tenderness  Musculoskeletal: supple nontender neck, no midline tenderness, no joint pain. -cvat b/l. TTP in thighs, calves biceps, right chest wall  Integumentary: warm, dry, no rash  Neurologic: A & O x 3, CN II-XII grossly intact, all extremities’ motor and sensory functions grossly intact, 4/5 weakness in knee extension/hip flexion. 5/5 in UE b/l with  strength 5/5.   Psychiatric: appropriate mood, appropriate affect

## 2019-12-13 LAB
ANION GAP SERPL CALC-SCNC: 14 MMOL/L — SIGNIFICANT CHANGE UP (ref 7–14)
BASOPHILS # BLD AUTO: 0 K/UL — SIGNIFICANT CHANGE UP (ref 0–0.2)
BASOPHILS NFR BLD AUTO: 0 % — SIGNIFICANT CHANGE UP (ref 0–1)
BUN SERPL-MCNC: 10 MG/DL — SIGNIFICANT CHANGE UP (ref 10–20)
CALCIUM SERPL-MCNC: 9.4 MG/DL — SIGNIFICANT CHANGE UP (ref 8.5–10.1)
CHLORIDE SERPL-SCNC: 101 MMOL/L — SIGNIFICANT CHANGE UP (ref 98–110)
CK MB CFR SERPL CALC: 12.8 NG/ML — HIGH (ref 0.6–6.3)
CO2 SERPL-SCNC: 22 MMOL/L — SIGNIFICANT CHANGE UP (ref 17–32)
CREAT SERPL-MCNC: 0.6 MG/DL — LOW (ref 0.7–1.5)
CRP SERPL-MCNC: 1.33 MG/DL — HIGH (ref 0–0.4)
EOSINOPHIL # BLD AUTO: 0 K/UL — SIGNIFICANT CHANGE UP (ref 0–0.7)
EOSINOPHIL NFR BLD AUTO: 0 % — SIGNIFICANT CHANGE UP (ref 0–8)
GIANT PLATELETS BLD QL SMEAR: PRESENT — SIGNIFICANT CHANGE UP
GLUCOSE BLDC GLUCOMTR-MCNC: 130 MG/DL — HIGH (ref 70–99)
GLUCOSE BLDC GLUCOMTR-MCNC: 225 MG/DL — HIGH (ref 70–99)
GLUCOSE SERPL-MCNC: 147 MG/DL — HIGH (ref 70–99)
HCT VFR BLD CALC: 39 % — SIGNIFICANT CHANGE UP (ref 37–47)
HGB BLD-MCNC: 12.5 G/DL — SIGNIFICANT CHANGE UP (ref 12–16)
LYMPHOCYTES # BLD AUTO: 0.27 K/UL — LOW (ref 1.2–3.4)
LYMPHOCYTES # BLD AUTO: 2.6 % — LOW (ref 20.5–51.1)
MAGNESIUM SERPL-MCNC: 1.9 MG/DL — SIGNIFICANT CHANGE UP (ref 1.8–2.4)
MANUAL SMEAR VERIFICATION: SIGNIFICANT CHANGE UP
MCHC RBC-ENTMCNC: 30.9 PG — SIGNIFICANT CHANGE UP (ref 27–31)
MCHC RBC-ENTMCNC: 32.1 G/DL — SIGNIFICANT CHANGE UP (ref 32–37)
MCV RBC AUTO: 96.3 FL — SIGNIFICANT CHANGE UP (ref 81–99)
MONOCYTES # BLD AUTO: 0 K/UL — LOW (ref 0.1–0.6)
MONOCYTES NFR BLD AUTO: 0 % — LOW (ref 1.7–9.3)
NEUTROPHILS # BLD AUTO: 9.9 K/UL — HIGH (ref 1.4–6.5)
NEUTROPHILS NFR BLD AUTO: 95.6 % — HIGH (ref 42.2–75.2)
NEUTS BAND # BLD: 0.9 % — SIGNIFICANT CHANGE UP (ref 0–6)
PLAT MORPH BLD: NORMAL — SIGNIFICANT CHANGE UP
PLATELET # BLD AUTO: 464 K/UL — HIGH (ref 130–400)
POTASSIUM SERPL-MCNC: 4.7 MMOL/L — SIGNIFICANT CHANGE UP (ref 3.5–5)
POTASSIUM SERPL-SCNC: 4.7 MMOL/L — SIGNIFICANT CHANGE UP (ref 3.5–5)
RBC # BLD: 4.05 M/UL — LOW (ref 4.2–5.4)
RBC # FLD: 13.7 % — SIGNIFICANT CHANGE UP (ref 11.5–14.5)
RBC BLD AUTO: NORMAL — SIGNIFICANT CHANGE UP
SODIUM SERPL-SCNC: 137 MMOL/L — SIGNIFICANT CHANGE UP (ref 135–146)
TOXIC GRANULES BLD QL SMEAR: PRESENT — SIGNIFICANT CHANGE UP
TROPONIN T SERPL-MCNC: 0.09 NG/ML — CRITICAL HIGH
VARIANT LYMPHS # BLD: 0.9 % — SIGNIFICANT CHANGE UP (ref 0–5)
WBC # BLD: 10.26 K/UL — SIGNIFICANT CHANGE UP (ref 4.8–10.8)
WBC # FLD AUTO: 10.26 K/UL — SIGNIFICANT CHANGE UP (ref 4.8–10.8)

## 2019-12-13 PROCEDURE — 99222 1ST HOSP IP/OBS MODERATE 55: CPT

## 2019-12-13 PROCEDURE — 93010 ELECTROCARDIOGRAM REPORT: CPT

## 2019-12-13 RX ORDER — LANOLIN ALCOHOL/MO/W.PET/CERES
5 CREAM (GRAM) TOPICAL AT BEDTIME
Refills: 0 | Status: DISCONTINUED | OUTPATIENT
Start: 2019-12-13 | End: 2019-12-17

## 2019-12-13 RX ORDER — OXYCODONE AND ACETAMINOPHEN 5; 325 MG/1; MG/1
1 TABLET ORAL EVERY 6 HOURS
Refills: 0 | Status: DISCONTINUED | OUTPATIENT
Start: 2019-12-13 | End: 2019-12-13

## 2019-12-13 RX ORDER — ACETAMINOPHEN 500 MG
650 TABLET ORAL ONCE
Refills: 0 | Status: COMPLETED | OUTPATIENT
Start: 2019-12-13 | End: 2019-12-13

## 2019-12-13 RX ORDER — IBUPROFEN 200 MG
400 TABLET ORAL
Refills: 0 | Status: DISCONTINUED | OUTPATIENT
Start: 2019-12-13 | End: 2019-12-17

## 2019-12-13 RX ADMIN — Medication 400 MILLIGRAM(S): at 22:14

## 2019-12-13 RX ADMIN — Medication 100 MILLIGRAM(S): at 01:20

## 2019-12-13 RX ADMIN — Medication 650 MILLIGRAM(S): at 07:26

## 2019-12-13 RX ADMIN — Medication 3 MILLILITER(S): at 08:06

## 2019-12-13 RX ADMIN — Medication 3 MILLILITER(S): at 16:16

## 2019-12-13 RX ADMIN — BUDESONIDE AND FORMOTEROL FUMARATE DIHYDRATE 2 PUFF(S): 160; 4.5 AEROSOL RESPIRATORY (INHALATION) at 08:05

## 2019-12-13 RX ADMIN — MYCOPHENOLATE MOFETIL 1500 MILLIGRAM(S): 250 CAPSULE ORAL at 17:42

## 2019-12-13 RX ADMIN — Medication 3 MILLILITER(S): at 01:20

## 2019-12-13 RX ADMIN — Medication 400 MILLIGRAM(S): at 23:07

## 2019-12-13 RX ADMIN — OXYCODONE AND ACETAMINOPHEN 1 TABLET(S): 5; 325 TABLET ORAL at 12:46

## 2019-12-13 RX ADMIN — MYCOPHENOLATE MOFETIL 1500 MILLIGRAM(S): 250 CAPSULE ORAL at 05:21

## 2019-12-13 RX ADMIN — AZATHIOPRINE 150 MILLIGRAM(S): 100 TABLET ORAL at 12:45

## 2019-12-13 RX ADMIN — ENOXAPARIN SODIUM 40 MILLIGRAM(S): 100 INJECTION SUBCUTANEOUS at 12:44

## 2019-12-13 RX ADMIN — LIDOCAINE 2 PATCH: 4 CREAM TOPICAL at 01:45

## 2019-12-13 RX ADMIN — PANTOPRAZOLE SODIUM 40 MILLIGRAM(S): 20 TABLET, DELAYED RELEASE ORAL at 08:05

## 2019-12-13 NOTE — OCCUPATIONAL THERAPY INITIAL EVALUATION ADULT - ADDITIONAL COMMENTS
Pt lives in elevator [facility] building with 15 y/o son. PTA has grab bar x2 ( tub and toilet), toilet riser, tub seat, Rollator, RW. Has HHA 5days/ week and friends/ family providing additional assistance PRN

## 2019-12-13 NOTE — CONSULT NOTE ADULT - ASSESSMENT
IMPRESSION: Rehab of   dermatomyositis, a myopathy    PRECAUTIONS: [  ] Cardiac  [ x ] Respiratory  [  ] Seizures [  ] Contact Isolation  [  ] Droplet Isolation  [  ] Other    Weight Bearing Status:     RECOMMENDATION:    Out of Bed to Chair     DVT/Decubiti Prophylaxis    REHAB PLAN:     [ x  ] Bedside P/T 3-5 times a week   [ x  ]   Bedside O/T  2-3 times a week             [   ] No Rehab Therapy Indicated                   [   ]  Speech Therapy   Conditioning/ROM                                    ADL  Bed Mobility                                               Conditioning/ROM  Transfers                                                     Bed Mobility  Sitting /Standing Balance                         Transfers                                        Gait Training                                               Sitting/Standing Balance  Stair Training [   ]Applicable                    Home equipment Eval                                                                        Splinting  [   ] Only      GOALS:   ADL   [ x  ]   Independent                    Transfers  [ x  ] Independent                          Ambulation  [ x  ] Independent     [  x  ] With device                            [   ]  CG                                                         [   ]  CG                                                                  [   ] CG                            [    ] Min A                                                   [   ] Min A                                                              [   ] Min  A          DISCHARGE PLAN:   [   ]  Good candidate for Intensive Rehabilitation/Hospital based-[facility]A [facility]                                             Will tolerate 3hrs Intensive Rehab Daily                                       [    ]  Short Term Rehab in Skilled Nursing Facility                                       [    ]  Home with Outpatient or VN services                                         [ xx   ]  Possible Candidate for Intensive Hospital based Rehab

## 2019-12-13 NOTE — PROGRESS NOTE ADULT - ASSESSMENT
39 year old Female with past medical history of dermatomyositis and Cryptogenic organizing pneumonia presenting to the ED for evaluation of diffuse muscle aches    # Dermatomyositis - Cryptogenic organizing pneumonia:  - Pt hemodynamically and clinically stable.   - will give solumedrol 100 mg one dose (patient takes prednisone 20 mg daily)  - c/w Azithromycin 150 mg q24hr, Cellcept 1500 mg q 12 hrs   - Rheumatology called by ED, follow up recommendations   - ESR 53, pending aldolase and CRP  - patient endorses chronic musculoskeletal pain, troponin 0.09, was 0.07 last admission, low suspicion for cardiac etiology, d/c tele    - Patient underwent a bronchoscopy in 2017 which revealed 65% segs and 25% lymphocytes. No evidence of infection was seen.   - CT guided lung biopsy revealed Histopathology consistent with Organizing pneumonia.   - c/w Symbicort and Duoneb     # history of mechanical fall with left sided knee pain:  - xray perform, follow up official read  - pain control  - will get PT/Physiatry     Diet: Regular  Dvt Ppx: Lovenox SQ   Activity: out of bed to chair   Dispo: Medical Floor  Code Status: Full Code 39 year old Female with past medical history of dermatomyositis and Cryptogenic organizing pneumonia presenting to the ED for evaluation of diffuse muscle aches    # Dermatomyositis - Cryptogenic organizing pneumonia:  - Pt hemodynamically and clinically stable.   - s/p solumedrol 100 mg one dose, c/w prednisone 80mg/ day.  (patient takes prednisone 20 mg daily)  - c/w Azithromycin 150 mg q24hr, Cellcept 1500 mg q 12 hrs   - Rheumatology cx- follow up recommendations   - ESR 53, pending aldolase and CRP   - Patient underwent a bronchoscopy in 2017 which revealed 65% segs and 25% lymphocytes. No evidence of infection was seen.   - CT guided lung biopsy revealed Histopathology consistent with Organizing pneumonia.   - c/w Symbicort and Duoneb     #) troponemia  - Chronically elevated, No chest pain, EKG non ischemic    # history of mechanical fall with left sided knee pain:  - f/u X ray knee  - pain control  - will get PT/Physiatry     Diet: Regular  Dvt Ppx: Lovenox SQ   Activity: out of bed to chair   Dispo: Medical Floor  Code Status: Full Code

## 2019-12-13 NOTE — CONSULT NOTE ADULT - ATTENDING COMMENTS
40 yo F with PMH dermatomyositis (classic skin findings at dx, Oksana-1 +,  by bx) who was admitted with generalized myalgias/weakness since early December. Recently hospitalized in October with SOB for which she was given increased steroid course. She's received AZA, MMF, RTX, and steroids for DM management. She had one course of RTX 1000 mg x 2 infusions starting 2/2019 which significantly controlled her symptoms and has stayed on  mg daily and MMF 1500 mg BID. She has required varying levels of steroids throughout her DM dx and has notable struggles with consistent outpatient follow up with her outpatient rheumatologist Dr. Rea. On admission her CK is the lowest it has ever been, her ESR is lower than when she had notable DM activity, her exam is not consistent with active DM flare. I spoke with the ED PA last night and recommended continuing home doses of AZA, MMF, prednisone. The patient received solumedrol 100 mg IV x 1 then prednisone 80 mg daily. I would recommend resuming home meds without steroid escalation. Her symptoms of myalgias and generalized weakness can also be reflective of mild steroid myopathy or viral illness - would limit steroid dosing as able. Given her mild troponin elevation recommend echo (EKG completed), consider cardiology evaluation if primary team decides it is warranted. DM patients are at risk for earlier CAD than their non-DM counterparts as well as myocarditis. Last echo 6/2018 unremarkable. The patient notes CP prior to admission related to cough (pulmonary status at baseline currently per patient), no current CV complaints. Agree with PT/OT evaluation/treatment. Stressed importance of outpatient follow up - I made the patient an appointment with Dr. Rea for 12/19 at 4:20 and gave her the information. Encouraged maintaining age-appropriate malignancy screenings. Please call with any additional questions/concerns.

## 2019-12-13 NOTE — ED ADULT NURSE REASSESSMENT NOTE - NS ED NURSE REASSESS COMMENT FT1
Patient requested sleeping aid melatonin 10 mg and pain med for left knee pain, covering physician notified (ex [id number])

## 2019-12-13 NOTE — PROGRESS NOTE ADULT - ATTENDING COMMENTS
Pt seen and examined independently. She states that her breathing is better today after steroids and nebs. Still c/o proximal muscle weakness of shoulders and legs. Has left knee pain - swelling is now improved.  Limited ROM of left knee actively. Has tenderness on left lateral side. No erythema noted.  Also c/o multiple intermittent ecchymoses that have been occurring x the past few weeks on various parts of her body - different from her usual rashes from dermatomyositis.   She was treated with rituxan in January and April and was well until the beginning of December when she developed these new complaints.  s/p solumedrol in the ED.  on prednisone 80mg daily  Awaiting rheum eval  f/u pending labs   check UA - states color has changed but denies dysuria  OT and PT evals  possible inpt rehab candidate per consult  Morbid obesity - weight reducing diet    I discussed the case with the resident and I reviewed his note.  Agree with the physical exam, assessment and plan with additions as above.    PROGRESS NOTE HANDOFF    Pending: rheum eval, PT/OT    Disposition: inpt rehab

## 2019-12-13 NOTE — OCCUPATIONAL THERAPY INITIAL EVALUATION ADULT - PLANNED THERAPY INTERVENTIONS, OT EVAL
balance training/joint mobilization/neuromuscular re-education/ROM/ADL retraining/bed mobility training/massage/strengthening/transfer training/stretching

## 2019-12-13 NOTE — CONSULT NOTE ADULT - ASSESSMENT
39 year old Female with past medical history of dermatomyositis and Cryptogenic organizing pneumonia presenting to the ED for evaluation of diffuse muscle aches. Patient reports 2 weeks ago she started having diffuse muscle and joints aches, associated with increased chough and shortness of breath.    1) Dermatomyositis Flare   Patient has received Solumedrol in the ED  Last dose of Rituximab March 2019  Patient sees Dr. Rea as Outpatient 39 year old Female with past medical history of dermatomyositis and Cryptogenic organizing pneumonia presenting to the ED for evaluation of diffuse muscle aches. Patient reports 2 weeks ago she started having diffuse muscle and joints aches, associated with increased chough and shortness of breath.    1) Generalized weakness and Myalgias Likely secondary to Prolonged High Dose Prednisone use.   Patient has received Solumedrol in the ED  Discontinue Solumedrol and Start patient on Po prednisone 20 MG daily this is her home dose.   Last dose of Rituximab March 2019  Patient sees Dr. Rea as Outpatient and is to follow once Discharged   Continue with all other home meds     2) Elevated Troponin   Follow trend   Check Transthoracic echo to rule out cardiac etiology.

## 2019-12-13 NOTE — CONSULT NOTE ADULT - SUBJECTIVE AND OBJECTIVE BOX
LENGTH OF HOSPITAL STAY:  1d    CHIEF COMPLAINT:Patient is a 39y old  Female who presents with a chief complaint of   HPI:HPI:  39 year old Female with past medical history of dermatomyositis and Cryptogenic organizing pneumonia presenting to the ED for evaluation of diffuse muscle aches. patient reports 2 weeks ago she started having diffuse muscle and joints aches, associated with increased chough and shortness of breath. patient also endorses a fall 1 week ago (no head trauma, no LOC) with resulting right knee pain and swelling. she also endorses small and big joints pain, morning stiffness. she denies fever, chills, sneezing, rhinorrhea, sore throat, chest pain, palpitations, change in bowel or urinary habits. patient was admitted in 10/2019 for same complain and was discharged for outpatient rheumatology follow up where she had received Rituximab.     in the ED patient is hemodynamically stable. (12 Dec 2019 22:59)    PMH & PSH  PAST MEDICAL & SURGICAL HISTORY:  Dermatomyositis  Cryptogenic organizing pneumonia  History of cervical cerclage  Ankle fracture    SOCIAL HISTORY: Negative    ALLERGIES: contrast media (iodine-based) (Unknown)  peanuts (Unknown)  shellfish (Unknown)    HOME MEDICATIONS  Home Medications:  azaTHIOprine 50 mg oral tablet: 3 tab(s) orally once a day (12 Dec 2019 23:37)  mycophenolate mofetil 500 mg oral tablet: 3 tab(s) orally 2 times a day (22 Oct 2019 10:28)  predniSONE 20 mg oral tablet: 1 tab(s) orally once a day (12 Dec 2019 23:37)    T(F): 97.2 (12-13-19 @ 08:18), Max: 97.3 (12-12-19 @ 19:15)  HR: 91 (12-13-19 @ 08:18)  BP: 117/71 (12-13-19 @ 08:18)  RR: 18 (12-13-19 @ 08:18)  SpO2: 96% (12-12-19 @ 21:21)  CAPILLARY BLOOD GLUCOSE      POCT Blood Glucose.: 130 mg/dL (13 Dec 2019 08:27)    MEDICATIONS  STANDING MEDICATIONS  albuterol/ipratropium for Nebulization 3 milliLiter(s) Nebulizer every 6 hours  azaTHIOprine 150 milliGRAM(s) Oral daily  budesonide  80 MICROgram(s)/formoterol 4.5 MICROgram(s) Inhaler 2 Puff(s) Inhalation two times a day  enoxaparin Injectable 40 milliGRAM(s) SubCutaneous daily  lidocaine   Patch 2 Patch Transdermal daily  mycophenolate mofetil 1500 milliGRAM(s) Oral two times a day  pantoprazole    Tablet 40 milliGRAM(s) Oral before breakfast  predniSONE   Tablet 80 milliGRAM(s) Oral daily    PRN MEDICATIONS    LABS:                        12.5   10.26 )-----------( 464      ( 13 Dec 2019 04:30 )             39.0              12-13    137  |  101  |  10  ----------------------------<  147<H>  4.7   |  22  |  0.6<L>    Ca    9.4      13 Dec 2019 04:30  Mg     1.9     12-13    TPro  7.2  /  Alb  3.4<L>  /  TBili  <0.2  /  DBili  x   /  AST  18  /  ALT  11  /  AlkPhos  67  12-12    LIVER FUNCTIONS - ( 12 Dec 2019 20:23 )  Alb: 3.4 g/dL / Pro: 7.2 g/dL / ALK PHOS: 67 U/L / ALT: 11 U/L / AST: 18 U/L / GGT: x                        CARDIAC MARKERS ( 13 Dec 2019 00:45 )  x     / 0.09 ng/mL / x     / x     / 12.8 ng/mL  CARDIAC MARKERS ( 12 Dec 2019 22:50 )  x     / x     / x     / x     / 12.6 ng/mL  CARDIAC MARKERS ( 12 Dec 2019 20:23 )  x     / 0.09 ng/mL / 332 U/L / x     / x                      PHYSICAL EXAM:  GENERAL: NAD, speaks in full sentences, no signs of respiratory distress  HEAD:  Atraumatic, Normocephalic  EYES: EOMI, PERRLA, conjunctiva and sclera clear  NECK: Supple, No JVD  CHEST/LUNG: Clear to auscultation bilaterally; No wheeze; No crackles; No accessory muscles used  HEART: Regular rate and rhythm; No murmurs;   ABDOMEN: Soft, Nontender, Nondistended; Bowel sounds present; No guarding  EXTREMITIES: Bilateral Upper and Lower extremity pain and Decreased strength due to pain.   PSYCH: AAOx3  NEUROLOGY: non-focal  SKIN: Patient states has had red raised ulcerations on her buttocks and genitalia that have now resolved they were very painful. She denies any form of sexual activity in over 3 years.

## 2019-12-13 NOTE — CONSULT NOTE ADULT - SUBJECTIVE AND OBJECTIVE BOX
HPI:  39 year old Female with past medical history of dermatomyositis and Cryptogenic organizing pneumonia presenting to the ED for evaluation of diffuse muscle aches. patient reports 2 weeks ago she started having diffuse muscle and joints aches, associated with increased chough and shortness of breath. patient also endorses a fall 1 week ago (no head trauma, no LOC) with resulting right knee pain and swelling. she also endorses small and big joints pain, morning stiffness. she denies fever, chills, sneezing, rhinorrhea, sore throat, chest pain, palpitations, change in bowel or urinary habits. patient was admitted in 10/2019 for same complain and was discharged for outpatient rheumatology follow up.    in the ED patient is hemodynamically stable. (12 Dec 2019 22:59)      PAST MEDICAL & SURGICAL HISTORY:  Dermatomyositis  Cryptogenic organizing pneumonia  History of cervical cerclage  Ankle fracture      Hospital Course:  She comes in with flare up of weakness. she has a history of dermatomyositis. Rituxan has been helpful for prior episodes. Rheum consult appreciated. She is on medications. She reports not requiring assistive dev 75 % of the time but at times requiring a rollator walker or a cane. Now she has worsened significantly. Prox LE and UE weakness is significant. She lives with her son.  TODAY'S SUBJECTIVE & REVIEW OF SYMPTOMS:     Constitutional WNL   Cardio WNL   Resp WNL   GI WNL  Heme WNL  Endo WNL  Skin WNL  MSK WNL  Neuro WNL  Cognitive WNL  Psych WNL      MEDICATIONS  (STANDING):  albuterol/ipratropium for Nebulization 3 milliLiter(s) Nebulizer every 6 hours  azaTHIOprine 150 milliGRAM(s) Oral daily  budesonide  80 MICROgram(s)/formoterol 4.5 MICROgram(s) Inhaler 2 Puff(s) Inhalation two times a day  enoxaparin Injectable 40 milliGRAM(s) SubCutaneous daily  lidocaine   Patch 2 Patch Transdermal daily  mycophenolate mofetil 1500 milliGRAM(s) Oral two times a day  pantoprazole    Tablet 40 milliGRAM(s) Oral before breakfast  predniSONE   Tablet 80 milliGRAM(s) Oral daily    MEDICATIONS  (PRN):      FAMILY HISTORY:  Family history of fibromyalgia (Mother)  Family history of sarcoidosis (Father): Father, paternal grandmother and paternal aunt      Allergies    contrast media (iodine-based) (Unknown)  peanuts (Unknown)  shellfish (Unknown)    Intolerances        SOCIAL HISTORY:    [  ] Etoh  [  ] Smoking  [  ] Substance abuse     Home Environment:  [  ] Home Alone  [x  ] Lives with Family-13 yo son  [ x ] Home Health Aid    Dwelling:  [  ] Apartment  [  ] Private House  [  ] Adult Home  [  ] Skilled Nursing Facility      [  ] Short Term  [  ] Long Term  [  ] Stairs       Elevator [  ]    FUNCTIONAL STATUS PTA: (Check all that apply)  Ambulation: [x   ]Independent    [  ] Dependent     [  ] Non-Ambulatory  Assistive Device: [ x ] SA Cane  [  ]  Q Cane  [ x ] Walker  [  ]  Wheelchair  ADL : [  ] Independent  [  ]  Dependent       Vital Signs Last 24 Hrs  T(C): 36.2 (13 Dec 2019 08:18), Max: 36.3 (12 Dec 2019 19:15)  T(F): 97.2 (13 Dec 2019 08:18), Max: 97.3 (12 Dec 2019 19:15)  HR: 91 (13 Dec 2019 08:18) (73 - 96)  BP: 117/71 (13 Dec 2019 08:18) (117/71 - 145/71)  BP(mean): --  RR: 18 (13 Dec 2019 08:18) (18 - 18)  SpO2: 96% (12 Dec 2019 21:21) (96% - 98%)      PHYSICAL EXAM: Alert & Oriented X3  GENERAL: NAD, well-groomed, well-developed  HEAD:  Atraumatic, Normocephalic  EYES: EOMI, PERRLA, conjunctiva and sclera clear  NECK: Supple, No JVD, Normal thyroid  CHEST/LUNG: Clear to percussion bilaterally; No rales, rhonchi, wheezing, or rubs  HEART: Regular rate and rhythm; No murmurs, rubs, or gallops  ABDOMEN: Soft, Nontender, Nondistended; Bowel sounds present  EXTREMITIES:  2+ Peripheral Pulses, No clubbing, cyanosis, or edema    NERVOUS SYSTEM:  Cranial Nerves 2-12 intact [  ] Abnormal  [  ]  ROM: WFL all extremities [  ]  Abnormal [  ]  Motor Strength: WFL all extremities  [  ]  Abnormal [x  ]bilat hip flex 2/5; bilat sh FF 2/5  Sensation: intact to light touch [  ] Abnormal [  ]  Reflexes: Symmetric [  ]  Abnormal [  ]    FUNCTIONAL STATUS:  Bed Mobility: Independent [  ]  Supervision [  ]  Needs Assistance [  ]  N/A [  ]  Transfers: Independent [  ]  Supervision [  ]  Needs Assistance [  ]  N/A [  ]   Ambulation: Independent [  ]  Supervision [  ]  Needs Assistance [  ]  N/A [  ]  ADL: Independent [  ] Requires Assistance [  ] N/A [  ]      LABS:                        12.5   10.26 )-----------( 464      ( 13 Dec 2019 04:30 )             39.0     12-13    137  |  101  |  10  ----------------------------<  147<H>  4.7   |  22  |  0.6<L>    Ca    9.4      13 Dec 2019 04:30  Mg     1.9     12-13    TPro  7.2  /  Alb  3.4<L>  /  TBili  <0.2  /  DBili  x   /  AST  18  /  ALT  11  /  AlkPhos  67  12-12          RADIOLOGY & ADDITIONAL STUDIES:    Assesment:

## 2019-12-13 NOTE — PROGRESS NOTE ADULT - SUBJECTIVE AND OBJECTIVE BOX
SUBJECTIVE:    Patient is a 39y old Female who presents with a chief complaint of Muscle Pains (13 Dec 2019 09:49)    Currently admitted to medicine with the primary diagnosis of SOB (shortness of breath)     Today is hospital day 1d. This morning she is resting comfortably in bed and reports no new issues or overnight events.     PAST MEDICAL & SURGICAL HISTORY  Dermatomyositis  Cryptogenic organizing pneumonia  History of cervical cerclage  Ankle fracture    SOCIAL HISTORY:  Negative for smoking/alcohol/drug use.     ALLERGIES:  contrast media (iodine-based) (Unknown)  peanuts (Unknown)  shellfish (Unknown)    MEDICATIONS:  STANDING MEDICATIONS  albuterol/ipratropium for Nebulization 3 milliLiter(s) Nebulizer every 6 hours  azaTHIOprine 150 milliGRAM(s) Oral daily  budesonide  80 MICROgram(s)/formoterol 4.5 MICROgram(s) Inhaler 2 Puff(s) Inhalation two times a day  enoxaparin Injectable 40 milliGRAM(s) SubCutaneous daily  lidocaine   Patch 2 Patch Transdermal daily  mycophenolate mofetil 1500 milliGRAM(s) Oral two times a day  pantoprazole    Tablet 40 milliGRAM(s) Oral before breakfast  predniSONE   Tablet 80 milliGRAM(s) Oral daily    PRN MEDICATIONS    VITALS:   T(F): 97.2  HR: 91  BP: 117/71  RR: 18  SpO2: 96%    LABS:                        12.5   10.26 )-----------( 464      ( 13 Dec 2019 04:30 )             39.0     12-13    137  |  101  |  10  ----------------------------<  147<H>  4.7   |  22  |  0.6<L>    Ca    9.4      13 Dec 2019 04:30  Mg     1.9     12-13    TPro  7.2  /  Alb  3.4<L>  /  TBili  <0.2  /  DBili  x   /  AST  18  /  ALT  11  /  AlkPhos  67  12-12          Troponin T, Serum: 0.09 ng/mL <HH> (12-13-19 @ 00:45)  Sedimentation Rate, Erythrocyte: 53 mm/Hr <H> (12-12-19 @ 22:50)  Troponin T, Serum: 0.09 ng/mL <HH> (12-12-19 @ 20:23)  Creatine Kinase, Serum: 332 U/L <H> (12-12-19 @ 20:23)      CARDIAC MARKERS ( 13 Dec 2019 00:45 )  x     / 0.09 ng/mL / x     / x     / 12.8 ng/mL  CARDIAC MARKERS ( 12 Dec 2019 22:50 )  x     / x     / x     / x     / 12.6 ng/mL  CARDIAC MARKERS ( 12 Dec 2019 20:23 )  x     / 0.09 ng/mL / 332 U/L / x     / x          RADIOLOGY:  < from: Xray Chest 1 View-PORTABLE IMMEDIATE (12.12.19 @ 20:51) >  Impression:      Stable bilateral opacities.    < end of copied text >    PHYSICAL EXAM:  GEN: No acute distress  LUNGS: Clear to auscultation bilaterally   HEART: S1/S2 present. RRR.   ABD: Soft, non-tender, non-distended. Bowel sounds present  EXT: NC/NC/NE/2+PP/FRANKLIN  NEURO: AAOX3 SUBJECTIVE:    Patient is a 39y old Female who presents with a chief complaint of Muscle Pains (13 Dec 2019 09:49)    Currently admitted to medicine with the primary diagnosis of SOB (shortness of breath)     Today is hospital day 1d. This morning she is resting comfortably in bed and reports no new issues or overnight events.   walking to washroom independently. c/o pain in knee post fall.    PAST MEDICAL & SURGICAL HISTORY  Dermatomyositis  Cryptogenic organizing pneumonia  History of cervical cerclage  Ankle fracture    SOCIAL HISTORY:  Negative for smoking/alcohol/drug use.     ALLERGIES:  contrast media (iodine-based) (Unknown)  peanuts (Unknown)  shellfish (Unknown)    MEDICATIONS:  STANDING MEDICATIONS  albuterol/ipratropium for Nebulization 3 milliLiter(s) Nebulizer every 6 hours  azaTHIOprine 150 milliGRAM(s) Oral daily  budesonide  80 MICROgram(s)/formoterol 4.5 MICROgram(s) Inhaler 2 Puff(s) Inhalation two times a day  enoxaparin Injectable 40 milliGRAM(s) SubCutaneous daily  lidocaine   Patch 2 Patch Transdermal daily  mycophenolate mofetil 1500 milliGRAM(s) Oral two times a day  pantoprazole    Tablet 40 milliGRAM(s) Oral before breakfast  predniSONE   Tablet 80 milliGRAM(s) Oral daily    PRN MEDICATIONS    VITALS:   T(F): 97.2  HR: 91  BP: 117/71  RR: 18  SpO2: 96%    LABS:                        12.5   10.26 )-----------( 464      ( 13 Dec 2019 04:30 )             39.0     12-13    137  |  101  |  10  ----------------------------<  147<H>  4.7   |  22  |  0.6<L>    Ca    9.4      13 Dec 2019 04:30  Mg     1.9     12-13    TPro  7.2  /  Alb  3.4<L>  /  TBili  <0.2  /  DBili  x   /  AST  18  /  ALT  11  /  AlkPhos  67  12-12          Troponin T, Serum: 0.09 ng/mL <HH> (12-13-19 @ 00:45)  Sedimentation Rate, Erythrocyte: 53 mm/Hr <H> (12-12-19 @ 22:50)  Troponin T, Serum: 0.09 ng/mL <HH> (12-12-19 @ 20:23)  Creatine Kinase, Serum: 332 U/L <H> (12-12-19 @ 20:23)      CARDIAC MARKERS ( 13 Dec 2019 00:45 )  x     / 0.09 ng/mL / x     / x     / 12.8 ng/mL  CARDIAC MARKERS ( 12 Dec 2019 22:50 )  x     / x     / x     / x     / 12.6 ng/mL  CARDIAC MARKERS ( 12 Dec 2019 20:23 )  x     / 0.09 ng/mL / 332 U/L / x     / x          RADIOLOGY:  < from: Xray Chest 1 View-PORTABLE IMMEDIATE (12.12.19 @ 20:51) >  Impression:      Stable bilateral opacities.    < end of copied text >    PHYSICAL EXAM:  GEN: No acute distress  LUNGS: Clear to auscultation bilaterally   HEART: S1/S2 present. RRR.   ABD: Soft, non-tender, non-distended. Bowel sounds present  EXT: NC/NC/NE/2+PP/FRANKLIN  NEURO: AAOX3 SUBJECTIVE:    Patient is a 39y old Female who presents with a chief complaint of Muscle Pains (13 Dec 2019 09:49)    Currently admitted to medicine with the primary diagnosis of SOB (shortness of breath)     Today is hospital day 1d. This morning she is resting comfortably in bed and reports no new issues or overnight events.   walking to washroom independently. c/o pain in knee post fall.    PAST MEDICAL & SURGICAL HISTORY  Dermatomyositis  Cryptogenic organizing pneumonia  History of cervical cerclage  Ankle fracture    SOCIAL HISTORY:  Negative for smoking    ALLERGIES:  contrast media (iodine-based) (Unknown)  peanuts (Unknown)  shellfish (Unknown)    MEDICATIONS:  STANDING MEDICATIONS  albuterol/ipratropium for Nebulization 3 milliLiter(s) Nebulizer every 6 hours  azaTHIOprine 150 milliGRAM(s) Oral daily  budesonide  80 MICROgram(s)/formoterol 4.5 MICROgram(s) Inhaler 2 Puff(s) Inhalation two times a day  enoxaparin Injectable 40 milliGRAM(s) SubCutaneous daily  lidocaine   Patch 2 Patch Transdermal daily  mycophenolate mofetil 1500 milliGRAM(s) Oral two times a day  pantoprazole    Tablet 40 milliGRAM(s) Oral before breakfast  predniSONE   Tablet 80 milliGRAM(s) Oral daily    PRN MEDICATIONS    VITALS:   T(F): 97.2  HR: 91  BP: 117/71  RR: 18  SpO2: 96%    LABS:                        12.5   10.26 )-----------( 464      ( 13 Dec 2019 04:30 )             39.0     12-13    137  |  101  |  10  ----------------------------<  147<H>  4.7   |  22  |  0.6<L>    Ca    9.4      13 Dec 2019 04:30  Mg     1.9     12-13    TPro  7.2  /  Alb  3.4<L>  /  TBili  <0.2  /  DBili  x   /  AST  18  /  ALT  11  /  AlkPhos  67  12-12          Troponin T, Serum: 0.09 ng/mL <HH> (12-13-19 @ 00:45)  Sedimentation Rate, Erythrocyte: 53 mm/Hr <H> (12-12-19 @ 22:50)  Troponin T, Serum: 0.09 ng/mL <HH> (12-12-19 @ 20:23)  Creatine Kinase, Serum: 332 U/L <H> (12-12-19 @ 20:23)      CARDIAC MARKERS ( 13 Dec 2019 00:45 )  x     / 0.09 ng/mL / x     / x     / 12.8 ng/mL  CARDIAC MARKERS ( 12 Dec 2019 22:50 )  x     / x     / x     / x     / 12.6 ng/mL  CARDIAC MARKERS ( 12 Dec 2019 20:23 )  x     / 0.09 ng/mL / 332 U/L / x     / x          RADIOLOGY:  < from: Xray Chest 1 View-PORTABLE IMMEDIATE (12.12.19 @ 20:51) >  Impression:      Stable bilateral opacities.    < end of copied text >    PHYSICAL EXAM:  GEN: No acute distress  LUNGS: Clear to auscultation bilaterally   HEART: S1/S2 present. RRR.   ABD: Soft, non-tender, non-distended. Bowel sounds present  EXT: NC/NC/NE/2+PP/FRANKLIN  NEURO: AAOX3

## 2019-12-14 LAB
ALBUMIN SERPL ELPH-MCNC: 3.2 G/DL — LOW (ref 3.5–5.2)
ALP SERPL-CCNC: 58 U/L — SIGNIFICANT CHANGE UP (ref 30–115)
ALT FLD-CCNC: 9 U/L — SIGNIFICANT CHANGE UP (ref 0–41)
ANION GAP SERPL CALC-SCNC: 15 MMOL/L — HIGH (ref 7–14)
APPEARANCE UR: ABNORMAL
AST SERPL-CCNC: 11 U/L — SIGNIFICANT CHANGE UP (ref 0–41)
BASOPHILS # BLD AUTO: 0.04 K/UL — SIGNIFICANT CHANGE UP (ref 0–0.2)
BASOPHILS NFR BLD AUTO: 0.4 % — SIGNIFICANT CHANGE UP (ref 0–1)
BILIRUB SERPL-MCNC: <0.2 MG/DL — SIGNIFICANT CHANGE UP (ref 0.2–1.2)
BILIRUB UR-MCNC: NEGATIVE — SIGNIFICANT CHANGE UP
BUN SERPL-MCNC: 12 MG/DL — SIGNIFICANT CHANGE UP (ref 10–20)
CALCIUM SERPL-MCNC: 9.1 MG/DL — SIGNIFICANT CHANGE UP (ref 8.5–10.1)
CHLORIDE SERPL-SCNC: 102 MMOL/L — SIGNIFICANT CHANGE UP (ref 98–110)
CO2 SERPL-SCNC: 23 MMOL/L — SIGNIFICANT CHANGE UP (ref 17–32)
COLOR SPEC: YELLOW — SIGNIFICANT CHANGE UP
CREAT SERPL-MCNC: 0.7 MG/DL — SIGNIFICANT CHANGE UP (ref 0.7–1.5)
DIFF PNL FLD: NEGATIVE — SIGNIFICANT CHANGE UP
EOSINOPHIL # BLD AUTO: 0.07 K/UL — SIGNIFICANT CHANGE UP (ref 0–0.7)
EOSINOPHIL NFR BLD AUTO: 0.7 % — SIGNIFICANT CHANGE UP (ref 0–8)
GLUCOSE BLDC GLUCOMTR-MCNC: 108 MG/DL — HIGH (ref 70–99)
GLUCOSE BLDC GLUCOMTR-MCNC: 88 MG/DL — SIGNIFICANT CHANGE UP (ref 70–99)
GLUCOSE BLDC GLUCOMTR-MCNC: 90 MG/DL — SIGNIFICANT CHANGE UP (ref 70–99)
GLUCOSE SERPL-MCNC: 119 MG/DL — HIGH (ref 70–99)
GLUCOSE UR QL: NEGATIVE — SIGNIFICANT CHANGE UP
HCT VFR BLD CALC: 35.9 % — LOW (ref 37–47)
HGB BLD-MCNC: 11.2 G/DL — LOW (ref 12–16)
IMM GRANULOCYTES NFR BLD AUTO: 0.7 % — HIGH (ref 0.1–0.3)
KETONES UR-MCNC: NEGATIVE — SIGNIFICANT CHANGE UP
LEUKOCYTE ESTERASE UR-ACNC: NEGATIVE — SIGNIFICANT CHANGE UP
LYMPHOCYTES # BLD AUTO: 1.01 K/UL — LOW (ref 1.2–3.4)
LYMPHOCYTES # BLD AUTO: 10.6 % — LOW (ref 20.5–51.1)
MCHC RBC-ENTMCNC: 30.4 PG — SIGNIFICANT CHANGE UP (ref 27–31)
MCHC RBC-ENTMCNC: 31.2 G/DL — LOW (ref 32–37)
MCV RBC AUTO: 97.6 FL — SIGNIFICANT CHANGE UP (ref 81–99)
MONOCYTES # BLD AUTO: 0.66 K/UL — HIGH (ref 0.1–0.6)
MONOCYTES NFR BLD AUTO: 6.9 % — SIGNIFICANT CHANGE UP (ref 1.7–9.3)
NEUTROPHILS # BLD AUTO: 7.68 K/UL — HIGH (ref 1.4–6.5)
NEUTROPHILS NFR BLD AUTO: 80.7 % — HIGH (ref 42.2–75.2)
NITRITE UR-MCNC: POSITIVE
NRBC # BLD: 0 /100 WBCS — SIGNIFICANT CHANGE UP (ref 0–0)
PH UR: 6.5 — SIGNIFICANT CHANGE UP (ref 5–8)
PLATELET # BLD AUTO: 431 K/UL — HIGH (ref 130–400)
POTASSIUM SERPL-MCNC: 3.8 MMOL/L — SIGNIFICANT CHANGE UP (ref 3.5–5)
POTASSIUM SERPL-SCNC: 3.8 MMOL/L — SIGNIFICANT CHANGE UP (ref 3.5–5)
PROT SERPL-MCNC: 6.7 G/DL — SIGNIFICANT CHANGE UP (ref 6–8)
PROT UR-MCNC: NEGATIVE — SIGNIFICANT CHANGE UP
RBC # BLD: 3.68 M/UL — LOW (ref 4.2–5.4)
RBC # FLD: 13.7 % — SIGNIFICANT CHANGE UP (ref 11.5–14.5)
SODIUM SERPL-SCNC: 140 MMOL/L — SIGNIFICANT CHANGE UP (ref 135–146)
SP GR SPEC: 1.02 — SIGNIFICANT CHANGE UP (ref 1.01–1.02)
UROBILINOGEN FLD QL: SIGNIFICANT CHANGE UP
WBC # BLD: 9.53 K/UL — SIGNIFICANT CHANGE UP (ref 4.8–10.8)
WBC # FLD AUTO: 9.53 K/UL — SIGNIFICANT CHANGE UP (ref 4.8–10.8)

## 2019-12-14 PROCEDURE — 93306 TTE W/DOPPLER COMPLETE: CPT | Mod: 26

## 2019-12-14 PROCEDURE — 99233 SBSQ HOSP IP/OBS HIGH 50: CPT

## 2019-12-14 RX ORDER — IPRATROPIUM/ALBUTEROL SULFATE 18-103MCG
3 AEROSOL WITH ADAPTER (GRAM) INHALATION EVERY 6 HOURS
Refills: 0 | Status: DISCONTINUED | OUTPATIENT
Start: 2019-12-14 | End: 2019-12-17

## 2019-12-14 RX ORDER — OXYCODONE AND ACETAMINOPHEN 5; 325 MG/1; MG/1
1 TABLET ORAL EVERY 6 HOURS
Refills: 0 | Status: DISCONTINUED | OUTPATIENT
Start: 2019-12-14 | End: 2019-12-17

## 2019-12-14 RX ORDER — NYSTATIN CREAM 100000 [USP'U]/G
1 CREAM TOPICAL THREE TIMES A DAY
Refills: 0 | Status: DISCONTINUED | OUTPATIENT
Start: 2019-12-14 | End: 2019-12-17

## 2019-12-14 RX ORDER — LIDOCAINE 4 G/100G
1 CREAM TOPICAL DAILY
Refills: 0 | Status: DISCONTINUED | OUTPATIENT
Start: 2019-12-14 | End: 2019-12-17

## 2019-12-14 RX ORDER — DIPHENHYDRAMINE HCL 50 MG
25 CAPSULE ORAL EVERY 4 HOURS
Refills: 0 | Status: DISCONTINUED | OUTPATIENT
Start: 2019-12-14 | End: 2019-12-17

## 2019-12-14 RX ADMIN — NYSTATIN CREAM 1 APPLICATION(S): 100000 CREAM TOPICAL at 22:22

## 2019-12-14 RX ADMIN — Medication 5 MILLIGRAM(S): at 22:22

## 2019-12-14 RX ADMIN — NYSTATIN CREAM 1 APPLICATION(S): 100000 CREAM TOPICAL at 15:16

## 2019-12-14 RX ADMIN — LIDOCAINE 1 PATCH: 4 CREAM TOPICAL at 15:18

## 2019-12-14 RX ADMIN — ENOXAPARIN SODIUM 40 MILLIGRAM(S): 100 INJECTION SUBCUTANEOUS at 12:07

## 2019-12-14 RX ADMIN — MYCOPHENOLATE MOFETIL 1500 MILLIGRAM(S): 250 CAPSULE ORAL at 17:20

## 2019-12-14 RX ADMIN — MYCOPHENOLATE MOFETIL 1500 MILLIGRAM(S): 250 CAPSULE ORAL at 05:46

## 2019-12-14 RX ADMIN — PANTOPRAZOLE SODIUM 40 MILLIGRAM(S): 20 TABLET, DELAYED RELEASE ORAL at 05:46

## 2019-12-14 RX ADMIN — OXYCODONE AND ACETAMINOPHEN 1 TABLET(S): 5; 325 TABLET ORAL at 22:23

## 2019-12-14 RX ADMIN — AZATHIOPRINE 150 MILLIGRAM(S): 100 TABLET ORAL at 12:06

## 2019-12-14 RX ADMIN — Medication 20 MILLIGRAM(S): at 05:45

## 2019-12-14 RX ADMIN — LIDOCAINE 1 PATCH: 4 CREAM TOPICAL at 18:43

## 2019-12-14 NOTE — PROGRESS NOTE ADULT - SUBJECTIVE AND OBJECTIVE BOX
SUBJECTIVE:    Patient is a 39y old Female who presents with a chief complaint of dermatomyositis (13 Dec 2019 10:33)    Currently admitted to medicine with the primary diagnosis of SOB (shortness of breath)     Today is hospital day 2d. This morning she is resting comfortably in bed and reports no new issues or overnight events.     PAST MEDICAL & SURGICAL HISTORY  Dermatomyositis  Cryptogenic organizing pneumonia  History of cervical cerclage  Ankle fracture    SOCIAL HISTORY:  Negative for smoking/alcohol/drug use.     ALLERGIES:  contrast media (iodine-based) (Unknown)  peanuts (Unknown)  shellfish (Unknown)    MEDICATIONS:  STANDING MEDICATIONS  albuterol/ipratropium for Nebulization 3 milliLiter(s) Nebulizer every 6 hours  azaTHIOprine 150 milliGRAM(s) Oral daily  budesonide  80 MICROgram(s)/formoterol 4.5 MICROgram(s) Inhaler 2 Puff(s) Inhalation two times a day  enoxaparin Injectable 40 milliGRAM(s) SubCutaneous daily  lidocaine   Patch 1 Patch Transdermal daily  melatonin 5 milliGRAM(s) Oral at bedtime  mycophenolate mofetil 1500 milliGRAM(s) Oral two times a day  nystatin Powder 1 Application(s) Topical three times a day  pantoprazole    Tablet 40 milliGRAM(s) Oral before breakfast  predniSONE   Tablet 20 milliGRAM(s) Oral daily    PRN MEDICATIONS  diphenhydrAMINE 25 milliGRAM(s) Oral every 4 hours PRN  ibuprofen  Tablet. 400 milliGRAM(s) Oral two times a day PRN    VITALS:   T(F): 98.2  HR: 99  BP: 140/78  RR: 18  SpO2: 97%    LABS:                        11.2   9.53  )-----------( 431      ( 14 Dec 2019 05:41 )             35.9     12-14    140  |  102  |  12  ----------------------------<  119<H>  3.8   |  23  |  0.7    Ca    9.1      14 Dec 2019 05:41  Mg     1.9     12-13    TPro  6.7  /  Alb  3.2<L>  /  TBili  <0.2  /  DBili  x   /  AST  11  /  ALT  9   /  AlkPhos  58  12-14              CARDIAC MARKERS ( 13 Dec 2019 00:45 )  x     / 0.09 ng/mL / x     / x     / 12.8 ng/mL  CARDIAC MARKERS ( 12 Dec 2019 22:50 )  x     / x     / x     / x     / 12.6 ng/mL  CARDIAC MARKERS ( 12 Dec 2019 20:23 )  x     / 0.09 ng/mL / 332 U/L / x     / x        PHYSICAL EXAM:  GENERAL: NAD, lying in bed comfortably  	CHEST/LUNG: Clear to auscultation bilaterally; No rales, rhonchi, wheezing, or rubs.   	HEART: Regular rate and rhythm; No murmurs, rubs, or gallops  	ABDOMEN: Bowel sounds present; Soft, Nontender, Nondistended.  	EXTREMITIES:  2+ Peripheral Pulses, brisk capillary refill. No edema  	NERVOUS SYSTEM:  Alert & Oriented X3, speech clear. No deficits   MSK: diffuse muscle aches with several tender points SUBJECTIVE:    Patient is a 39y old Female who presents with a chief complaint of dermatomyositis (13 Dec 2019 10:33)    Currently admitted to medicine with the primary diagnosis of SOB (shortness of breath)     Today is hospital day 2d. This morning she is resting comfortably in bed and reports no new issues or overnight events.     PAST MEDICAL & SURGICAL HISTORY  Dermatomyositis  Cryptogenic organizing pneumonia  History of cervical cerclage  Ankle fracture    SOCIAL HISTORY:  Negative for smoking/alcohol/drug use.     ALLERGIES:  contrast media (iodine-based) (Unknown)  peanuts (Unknown)  shellfish (Unknown)    MEDICATIONS:  STANDING MEDICATIONS  albuterol/ipratropium for Nebulization 3 milliLiter(s) Nebulizer every 6 hours  azaTHIOprine 150 milliGRAM(s) Oral daily  budesonide  80 MICROgram(s)/formoterol 4.5 MICROgram(s) Inhaler 2 Puff(s) Inhalation two times a day  enoxaparin Injectable 40 milliGRAM(s) SubCutaneous daily  lidocaine   Patch 1 Patch Transdermal daily  melatonin 5 milliGRAM(s) Oral at bedtime  mycophenolate mofetil 1500 milliGRAM(s) Oral two times a day  nystatin Powder 1 Application(s) Topical three times a day  pantoprazole    Tablet 40 milliGRAM(s) Oral before breakfast  predniSONE   Tablet 20 milliGRAM(s) Oral daily    PRN MEDICATIONS  diphenhydrAMINE 25 milliGRAM(s) Oral every 4 hours PRN  ibuprofen  Tablet. 400 milliGRAM(s) Oral two times a day PRN    VITALS:   T(F): 98.2  HR: 99  BP: 140/78  RR: 18  SpO2: 97%    LABS:                        11.2   9.53  )-----------( 431      ( 14 Dec 2019 05:41 )             35.9     12-14    140  |  102  |  12  ----------------------------<  119<H>  3.8   |  23  |  0.7    Ca    9.1      14 Dec 2019 05:41  Mg     1.9     12-13    TPro  6.7  /  Alb  3.2<L>  /  TBili  <0.2  /  DBili  x   /  AST  11  /  ALT  9   /  AlkPhos  58  12-1    CARDIAC MARKERS ( 13 Dec 2019 00:45 )  x     / 0.09 ng/mL / x     / x     / 12.8 ng/mL  CARDIAC MARKERS ( 12 Dec 2019 22:50 )  x     / x     / x     / x     / 12.6 ng/mL  CARDIAC MARKERS ( 12 Dec 2019 20:23 )  x     / 0.09 ng/mL / 332 U/L / x     / x        PHYSICAL EXAM:  GENERAL: NAD, lying in bed comfortably  	CHEST/LUNG: Clear to auscultation bilaterally; No rales, rhonchi, wheezing, or rubs.   	HEART: Regular rate and rhythm; No murmurs, rubs, or gallops  	ABDOMEN: Bowel sounds present; Soft, Nontender, Nondistended.  	EXTREMITIES:  2+ Peripheral Pulses, brisk capillary refill. No edema  	NERVOUS SYSTEM:  Alert & Oriented X3, speech clear. No deficits     MSK: diffuse muscle aches with several tender points

## 2019-12-14 NOTE — PROGRESS NOTE ADULT - ASSESSMENT
39 year old Female with past medical history of dermatomyositis and Cryptogenic organizing pneumonia presenting to the ED for evaluation of diffuse muscle aches    # Dermatomyositis - Cryptogenic organizing pneumonia:  - Pt hemodynamically and clinically stable.   - s/p solumedrol 100 mg one dose, c/w prednisone 80mg/ day.  (patient takes prednisone 20 mg daily)  - c/w Azithromycin 150 mg q24hr, Cellcept 1500 mg q 12 hrs   - Rheumatology rcs noted: patient to be d/brianda on PO prednisone 20 mg and f/up with Dr Rea on 12/19 at 4:20  - ESR 53, pending aldolase and CRP   - Patient underwent a bronchoscopy in 2017 which revealed 65% segs and 25% lymphocytes. No evidence of infection was seen.   - CT guided lung biopsy revealed Histopathology consistent with Organizing pneumonia.   - c/w Symbicort and Duoneb     #) troponemia  - Chronically elevated, No chest pain, EKG non ischemic    # history of mechanical fall with left sided knee pain:  - f/u X ray knee  - pain control  - will get PT/Physiatry     Diet: Regular  Dvt Ppx: Lovenox SQ   Activity: out of bed to chair   Dispo: Pending 4A placement  Code Status: Full Code 39 year old Female with past medical history of dermatomyositis and Cryptogenic organizing pneumonia presenting to the ED for evaluation of diffuse muscle aches    # Dermatomyositis - Cryptogenic organizing pneumonia:  - Pt hemodynamically and clinically stable.   - s/p solumedrol 100 mg one dose, c/w prednisone 80mg/ day.  (patient takes prednisone 20 mg daily)  - c/w Azithromycin 150 mg q24hr, Cellcept 1500 mg q 12 hrs   - Rheumatology rcs noted: patient to be d/brianda on PO prednisone 20 mg and f/up with Dr Rea on 12/19 at 4:20  - ESR 53, pending aldolase and CRP   - Patient underwent a bronchoscopy in 2017 which revealed 65% segs and 25% lymphocytes. No evidence of infection was seen.   - CT guided lung biopsy revealed Histopathology consistent with Organizing pneumonia.   - c/w Symbicort and Duoneb     #) troponemia  - Chronically elevated, No chest pain, EKG non ischemic    # history of mechanical fall with left sided knee pain:  - f/u X ray knee  - pain control  - will get PT/Physiatry     Diet: Regular  Dvt Ppx: Lovenox SQ   Activity: out of bed to chair   Dispo: Pending 4A placement  Code Status: Full Code    Attending Attestation    Pt has been seen and examined. Case and Plan discussed at rounds and agree with Resident documentation as corrected.   Overall pt acutely admitted for fall/weakness/dyspnea/MSKTenderness due to Dermatomyosities Exarcerbation.  Pt is s/p Rheumatology evaluation.   Pt is on immunosuppression meds.  Pt was treated with Solumedrol IV on admission and now on Oral Prednisone.  Pt still complains of MSK Tenderness and has ++trops/CK/MBI elevation.  Pls f/u TTE and at this time troponemia 2/2 Dermatomyosities Exarc but will follow.   Pt also c/w Recurrent Buttock Intertriginous Areas Ulcers which come and go for a long time and are pruritic and painful. Pt denies sexual activity for last 3years. She says same sores appear in her external genitalia and eventually resolves. She told her outside Drs but nothing was found. She has been treated for yeast infections many times and with resolution of intravaginal discharge but no changes in these sores on external genitalia or buttock intertrigious areas. At this time will order Nystatin Powder and Benadryl - Lesions look like superficial ulcers - Dermatolology/ID f/u in the office.     Otherwise agree with all other care per orders.     Will follow     Dispo: Home / declines Rehab / Says not ready for home and still feels sick and unable to move due to muscle pain/weakness

## 2019-12-15 LAB
ANION GAP SERPL CALC-SCNC: 15 MMOL/L — HIGH (ref 7–14)
BASOPHILS # BLD AUTO: 0.05 K/UL — SIGNIFICANT CHANGE UP (ref 0–0.2)
BASOPHILS NFR BLD AUTO: 0.6 % — SIGNIFICANT CHANGE UP (ref 0–1)
BUN SERPL-MCNC: 10 MG/DL — SIGNIFICANT CHANGE UP (ref 10–20)
CALCIUM SERPL-MCNC: 8.9 MG/DL — SIGNIFICANT CHANGE UP (ref 8.5–10.1)
CHLORIDE SERPL-SCNC: 102 MMOL/L — SIGNIFICANT CHANGE UP (ref 98–110)
CO2 SERPL-SCNC: 22 MMOL/L — SIGNIFICANT CHANGE UP (ref 17–32)
CREAT SERPL-MCNC: 0.6 MG/DL — LOW (ref 0.7–1.5)
EOSINOPHIL # BLD AUTO: 0.17 K/UL — SIGNIFICANT CHANGE UP (ref 0–0.7)
EOSINOPHIL NFR BLD AUTO: 2.2 % — SIGNIFICANT CHANGE UP (ref 0–8)
GLUCOSE BLDC GLUCOMTR-MCNC: 102 MG/DL — HIGH (ref 70–99)
GLUCOSE BLDC GLUCOMTR-MCNC: 109 MG/DL — HIGH (ref 70–99)
GLUCOSE BLDC GLUCOMTR-MCNC: 123 MG/DL — HIGH (ref 70–99)
GLUCOSE BLDC GLUCOMTR-MCNC: 98 MG/DL — SIGNIFICANT CHANGE UP (ref 70–99)
GLUCOSE SERPL-MCNC: 97 MG/DL — SIGNIFICANT CHANGE UP (ref 70–99)
HCT VFR BLD CALC: 37 % — SIGNIFICANT CHANGE UP (ref 37–47)
HGB BLD-MCNC: 11.7 G/DL — LOW (ref 12–16)
IMM GRANULOCYTES NFR BLD AUTO: 0.5 % — HIGH (ref 0.1–0.3)
LYMPHOCYTES # BLD AUTO: 1.15 K/UL — LOW (ref 1.2–3.4)
LYMPHOCYTES # BLD AUTO: 14.7 % — LOW (ref 20.5–51.1)
MCHC RBC-ENTMCNC: 31 PG — SIGNIFICANT CHANGE UP (ref 27–31)
MCHC RBC-ENTMCNC: 31.6 G/DL — LOW (ref 32–37)
MCV RBC AUTO: 98.1 FL — SIGNIFICANT CHANGE UP (ref 81–99)
MONOCYTES # BLD AUTO: 0.48 K/UL — SIGNIFICANT CHANGE UP (ref 0.1–0.6)
MONOCYTES NFR BLD AUTO: 6.1 % — SIGNIFICANT CHANGE UP (ref 1.7–9.3)
NEUTROPHILS # BLD AUTO: 5.92 K/UL — SIGNIFICANT CHANGE UP (ref 1.4–6.5)
NEUTROPHILS NFR BLD AUTO: 75.9 % — HIGH (ref 42.2–75.2)
NRBC # BLD: 0 /100 WBCS — SIGNIFICANT CHANGE UP (ref 0–0)
PLATELET # BLD AUTO: 417 K/UL — HIGH (ref 130–400)
POTASSIUM SERPL-MCNC: 4.1 MMOL/L — SIGNIFICANT CHANGE UP (ref 3.5–5)
POTASSIUM SERPL-SCNC: 4.1 MMOL/L — SIGNIFICANT CHANGE UP (ref 3.5–5)
RBC # BLD: 3.77 M/UL — LOW (ref 4.2–5.4)
RBC # FLD: 13.6 % — SIGNIFICANT CHANGE UP (ref 11.5–14.5)
SODIUM SERPL-SCNC: 139 MMOL/L — SIGNIFICANT CHANGE UP (ref 135–146)
WBC # BLD: 7.81 K/UL — SIGNIFICANT CHANGE UP (ref 4.8–10.8)
WBC # FLD AUTO: 7.81 K/UL — SIGNIFICANT CHANGE UP (ref 4.8–10.8)

## 2019-12-15 PROCEDURE — 99232 SBSQ HOSP IP/OBS MODERATE 35: CPT

## 2019-12-15 RX ORDER — CEFTRIAXONE 500 MG/1
1000 INJECTION, POWDER, FOR SOLUTION INTRAMUSCULAR; INTRAVENOUS EVERY 24 HOURS
Refills: 0 | Status: DISCONTINUED | OUTPATIENT
Start: 2019-12-15 | End: 2019-12-17

## 2019-12-15 RX ADMIN — NYSTATIN CREAM 1 APPLICATION(S): 100000 CREAM TOPICAL at 05:49

## 2019-12-15 RX ADMIN — LIDOCAINE 1 PATCH: 4 CREAM TOPICAL at 11:48

## 2019-12-15 RX ADMIN — MYCOPHENOLATE MOFETIL 1500 MILLIGRAM(S): 250 CAPSULE ORAL at 17:19

## 2019-12-15 RX ADMIN — OXYCODONE AND ACETAMINOPHEN 1 TABLET(S): 5; 325 TABLET ORAL at 23:44

## 2019-12-15 RX ADMIN — LIDOCAINE 1 PATCH: 4 CREAM TOPICAL at 22:56

## 2019-12-15 RX ADMIN — ENOXAPARIN SODIUM 40 MILLIGRAM(S): 100 INJECTION SUBCUTANEOUS at 11:48

## 2019-12-15 RX ADMIN — PANTOPRAZOLE SODIUM 40 MILLIGRAM(S): 20 TABLET, DELAYED RELEASE ORAL at 08:33

## 2019-12-15 RX ADMIN — BUDESONIDE AND FORMOTEROL FUMARATE DIHYDRATE 2 PUFF(S): 160; 4.5 AEROSOL RESPIRATORY (INHALATION) at 08:33

## 2019-12-15 RX ADMIN — MYCOPHENOLATE MOFETIL 1500 MILLIGRAM(S): 250 CAPSULE ORAL at 05:49

## 2019-12-15 RX ADMIN — LIDOCAINE 1 PATCH: 4 CREAM TOPICAL at 18:01

## 2019-12-15 RX ADMIN — Medication 5 MILLIGRAM(S): at 22:55

## 2019-12-15 RX ADMIN — LIDOCAINE 1 PATCH: 4 CREAM TOPICAL at 05:46

## 2019-12-15 RX ADMIN — CEFTRIAXONE 100 MILLIGRAM(S): 500 INJECTION, POWDER, FOR SOLUTION INTRAMUSCULAR; INTRAVENOUS at 17:19

## 2019-12-15 RX ADMIN — NYSTATIN CREAM 1 APPLICATION(S): 100000 CREAM TOPICAL at 11:51

## 2019-12-15 RX ADMIN — AZATHIOPRINE 150 MILLIGRAM(S): 100 TABLET ORAL at 11:48

## 2019-12-15 RX ADMIN — Medication 20 MILLIGRAM(S): at 05:49

## 2019-12-15 RX ADMIN — OXYCODONE AND ACETAMINOPHEN 1 TABLET(S): 5; 325 TABLET ORAL at 00:07

## 2019-12-15 NOTE — PROGRESS NOTE ADULT - SUBJECTIVE AND OBJECTIVE BOX
SUBJECTIVE:    Patient is a 39y old Female who presents with a chief complaint of dermatomyositis (13 Dec 2019 10:33)    Currently admitted to medicine with the primary diagnosis of SOB (shortness of breath)     Today is hospital day 3d. Complains of muscle aches and smelly urine and buttock blisters     PAST MEDICAL & SURGICAL HISTORY  Dermatomyositis  Cryptogenic organizing pneumonia  History of cervical cerclage  Ankle fracture    SOCIAL HISTORY:  Negative for smoking/alcohol/drug use.     ALLERGIES:  contrast media (iodine-based) (Unknown)  peanuts (Unknown)  shellfish (Unknown)    MEDICATIONS:  MEDICATIONS  (STANDING):  azaTHIOprine 150 milliGRAM(s) Oral daily  budesonide  80 MICROgram(s)/formoterol 4.5 MICROgram(s) Inhaler 2 Puff(s) Inhalation two times a day  cefTRIAXone   IVPB 1000 milliGRAM(s) IV Intermittent every 24 hours  enoxaparin Injectable 40 milliGRAM(s) SubCutaneous daily  lidocaine   Patch 1 Patch Transdermal daily  melatonin 5 milliGRAM(s) Oral at bedtime  mycophenolate mofetil 1500 milliGRAM(s) Oral two times a day  nystatin Powder 1 Application(s) Topical three times a day  pantoprazole    Tablet 40 milliGRAM(s) Oral before breakfast  predniSONE   Tablet 20 milliGRAM(s) Oral daily    MEDICATIONS  (PRN):  albuterol/ipratropium for Nebulization 3 milliLiter(s) Nebulizer every 6 hours PRN Bronchospasm  diphenhydrAMINE 25 milliGRAM(s) Oral every 4 hours PRN Rash and/or Itching  ibuprofen  Tablet. 400 milliGRAM(s) Oral two times a day PRN Moderate Pain (4 - 6)  oxycodone    5 mG/acetaminophen 325 mG 1 Tablet(s) Oral every 6 hours PRN Severe Pain (7 - 10)      VITALS:   Vital Signs Last 24 Hrs  T(C): 36.2 (15 Dec 2019 13:43), Max: 36.2 (15 Dec 2019 13:43)  T(F): 97.1 (15 Dec 2019 13:43), Max: 97.1 (15 Dec 2019 13:43)  HR: 89 (15 Dec 2019 13:43) (79 - 95)  BP: 114/62 (15 Dec 2019 13:43) (114/62 - 120/75)  RR: 18 (15 Dec 2019 13:43) (18 - 18)  SpO2: 96% (15 Dec 2019 10:27) (96% - 96%)      PHYSICAL EXAM:  GENERAL: NAD, lying in bed comfortably  CHEST/LUNG: Clear to auscultation bilaterally; No rales, rhonchi, wheezing, or rubs.   HEART: Regular rate and rhythm; No murmurs, rubs, or gallops  ABDOMEN: Bowel sounds present; Soft, Nontender, Nondistended.  EXTREMITIES:  2+ Peripheral Pulses, brisk capillary refill. No edema  NERVOUS SYSTEM:  Alert & Oriented X3, speech clear. No deficits   MSK: diffuse muscle aches with several tender points      LABS:                        11.7   7.81  )-----------( 417      ( 15 Dec 2019 06:38 )             37.0     12-15    139  |  102  |  10  ----------------------------<  97  4.1   |  22  |  0.6<L>    Ca    8.9      15 Dec 2019 06:38    TPro  6.7  /  Alb  3.2<L>  /  TBili  <0.2  /  DBili  x   /  AST  11  /  ALT  9   /  AlkPhos  58  12-14    CARDIAC MARKERS ( 13 Dec 2019 00:45 )  x     / 0.09 ng/mL / x     / x     / 12.8 ng/mL  CARDIAC MARKERS ( 12 Dec 2019 22:50 )  x     / x     / x     / x     / 12.6 ng/mL  CARDIAC MARKERS ( 12 Dec 2019 20:23 )  x     / 0.09 ng/mL / 332 U/L / x     / x

## 2019-12-15 NOTE — PROGRESS NOTE ADULT - ASSESSMENT
39 year old Female with past medical history of dermatomyositis and Cryptogenic organizing pneumonia presenting to the ED for evaluation of diffuse muscle aches    # Dermatomyositis - Cryptogenic organizing pneumonia:  - Pt hemodynamically and clinically stable.   - s/p solumedrol 100 mg one dose, c/w prednisone 80mg/ day.  (patient takes prednisone 20 mg daily)  - c/w Azithromycin 150 mg q24hr, Cellcept 1500 mg q 12 hrs   - Rheumatology rcs noted: patient to be d/brianda on PO prednisone 20 mg and f/up with Dr Rea on 12/19 at 4:20  - ESR 53, pending aldolase and CRP   - Patient underwent a bronchoscopy in 2017 which revealed 65% segs and 25% lymphocytes. No evidence of infection was seen.   - CT guided lung biopsy revealed Histopathology consistent with Organizing pneumonia.   - c/w Symbicort and Duoneb     #) troponemia  - Chronically elevated, No chest pain, EKG non ischemic  - TTE on this admission: NL EF and report is normal   - no need for CV consultation at this time     # history of mechanical fall with left sided knee pain:  - f/u X ray knee  - pain control  - will get PT/Physiatry     Diet: Regular  Dvt Ppx: Lovenox SQ   Activity: out of bed to chair   Dispo: Pending 4A placement  Code Status: Full Code    Summary:     Overall pt acutely admitted for fall/weakness/dyspnea/MSKTenderness due to Dermatomyosities Exarcerbation.  Pt is s/p Rheumatology evaluation.   Pt is on immunosuppression meds.  Pt was treated with Solumedrol IV on admission and now on Oral Prednisone.  Pt still complains of MSK Tenderness and has ++trops/CK/MBI elevation.  TTE WNL.  Troponemia with elevated CPK 2/2 Dermatomyosities Exarc.   Foul Smelling Urine with + UA - Suspecting UTI - Send Cxs and start Ceftriaxone 1g IV q24h    Pt also c/w Recurrent Buttock Intertriginous Areas Ulcers which come and go for a long time and are pruritic and painful. Pt denies sexual activity for last 3years. She says same sores appear in her external genitalia and eventually resolves. She told her outside Drs but nothing was found. She has been treated for yeast infections many times and with resolution of intravaginal discharge but no changes in these sores on external genitalia or buttock intertriginous areas. At this time will order Nystatin Powder and Benadryl - Lesions look like superficial ulcers.     Will follow     Dispo: Acute / UTI / f/u Ucxs on IV Ceftriaxone

## 2019-12-16 LAB
ALDOLASE SERPL-CCNC: 8 U/L — SIGNIFICANT CHANGE UP (ref 3.3–10.3)
ANION GAP SERPL CALC-SCNC: 12 MMOL/L — SIGNIFICANT CHANGE UP (ref 7–14)
BASOPHILS # BLD AUTO: 0.05 K/UL — SIGNIFICANT CHANGE UP (ref 0–0.2)
BASOPHILS NFR BLD AUTO: 0.6 % — SIGNIFICANT CHANGE UP (ref 0–1)
BUN SERPL-MCNC: 15 MG/DL — SIGNIFICANT CHANGE UP (ref 10–20)
CALCIUM SERPL-MCNC: 8.6 MG/DL — SIGNIFICANT CHANGE UP (ref 8.5–10.1)
CHLORIDE SERPL-SCNC: 102 MMOL/L — SIGNIFICANT CHANGE UP (ref 98–110)
CO2 SERPL-SCNC: 25 MMOL/L — SIGNIFICANT CHANGE UP (ref 17–32)
CREAT SERPL-MCNC: 0.6 MG/DL — LOW (ref 0.7–1.5)
EOSINOPHIL # BLD AUTO: 0.18 K/UL — SIGNIFICANT CHANGE UP (ref 0–0.7)
EOSINOPHIL NFR BLD AUTO: 2.2 % — SIGNIFICANT CHANGE UP (ref 0–8)
GLUCOSE BLDC GLUCOMTR-MCNC: 101 MG/DL — HIGH (ref 70–99)
GLUCOSE BLDC GLUCOMTR-MCNC: 111 MG/DL — HIGH (ref 70–99)
GLUCOSE BLDC GLUCOMTR-MCNC: 93 MG/DL — SIGNIFICANT CHANGE UP (ref 70–99)
GLUCOSE BLDC GLUCOMTR-MCNC: 99 MG/DL — SIGNIFICANT CHANGE UP (ref 70–99)
GLUCOSE SERPL-MCNC: 95 MG/DL — SIGNIFICANT CHANGE UP (ref 70–99)
HCT VFR BLD CALC: 38.7 % — SIGNIFICANT CHANGE UP (ref 37–47)
HGB BLD-MCNC: 11.9 G/DL — LOW (ref 12–16)
IMM GRANULOCYTES NFR BLD AUTO: 0.8 % — HIGH (ref 0.1–0.3)
LYMPHOCYTES # BLD AUTO: 1.19 K/UL — LOW (ref 1.2–3.4)
LYMPHOCYTES # BLD AUTO: 14.4 % — LOW (ref 20.5–51.1)
MCHC RBC-ENTMCNC: 30.7 G/DL — LOW (ref 32–37)
MCHC RBC-ENTMCNC: 30.7 PG — SIGNIFICANT CHANGE UP (ref 27–31)
MCV RBC AUTO: 99.7 FL — HIGH (ref 81–99)
MONOCYTES # BLD AUTO: 0.54 K/UL — SIGNIFICANT CHANGE UP (ref 0.1–0.6)
MONOCYTES NFR BLD AUTO: 6.5 % — SIGNIFICANT CHANGE UP (ref 1.7–9.3)
NEUTROPHILS # BLD AUTO: 6.25 K/UL — SIGNIFICANT CHANGE UP (ref 1.4–6.5)
NEUTROPHILS NFR BLD AUTO: 75.5 % — HIGH (ref 42.2–75.2)
NRBC # BLD: 0 /100 WBCS — SIGNIFICANT CHANGE UP (ref 0–0)
PLATELET # BLD AUTO: 418 K/UL — HIGH (ref 130–400)
POTASSIUM SERPL-MCNC: 4.3 MMOL/L — SIGNIFICANT CHANGE UP (ref 3.5–5)
POTASSIUM SERPL-SCNC: 4.3 MMOL/L — SIGNIFICANT CHANGE UP (ref 3.5–5)
RBC # BLD: 3.88 M/UL — LOW (ref 4.2–5.4)
RBC # FLD: 13.6 % — SIGNIFICANT CHANGE UP (ref 11.5–14.5)
SODIUM SERPL-SCNC: 139 MMOL/L — SIGNIFICANT CHANGE UP (ref 135–146)
WBC # BLD: 8.28 K/UL — SIGNIFICANT CHANGE UP (ref 4.8–10.8)
WBC # FLD AUTO: 8.28 K/UL — SIGNIFICANT CHANGE UP (ref 4.8–10.8)

## 2019-12-16 PROCEDURE — 99232 SBSQ HOSP IP/OBS MODERATE 35: CPT

## 2019-12-16 RX ADMIN — ENOXAPARIN SODIUM 40 MILLIGRAM(S): 100 INJECTION SUBCUTANEOUS at 11:56

## 2019-12-16 RX ADMIN — MYCOPHENOLATE MOFETIL 1500 MILLIGRAM(S): 250 CAPSULE ORAL at 05:50

## 2019-12-16 RX ADMIN — BUDESONIDE AND FORMOTEROL FUMARATE DIHYDRATE 2 PUFF(S): 160; 4.5 AEROSOL RESPIRATORY (INHALATION) at 21:00

## 2019-12-16 RX ADMIN — OXYCODONE AND ACETAMINOPHEN 1 TABLET(S): 5; 325 TABLET ORAL at 05:49

## 2019-12-16 RX ADMIN — AZATHIOPRINE 150 MILLIGRAM(S): 100 TABLET ORAL at 11:56

## 2019-12-16 RX ADMIN — PANTOPRAZOLE SODIUM 40 MILLIGRAM(S): 20 TABLET, DELAYED RELEASE ORAL at 05:49

## 2019-12-16 RX ADMIN — OXYCODONE AND ACETAMINOPHEN 1 TABLET(S): 5; 325 TABLET ORAL at 06:50

## 2019-12-16 RX ADMIN — MYCOPHENOLATE MOFETIL 1500 MILLIGRAM(S): 250 CAPSULE ORAL at 17:14

## 2019-12-16 RX ADMIN — OXYCODONE AND ACETAMINOPHEN 1 TABLET(S): 5; 325 TABLET ORAL at 00:40

## 2019-12-16 RX ADMIN — Medication 20 MILLIGRAM(S): at 05:49

## 2019-12-16 RX ADMIN — CEFTRIAXONE 100 MILLIGRAM(S): 500 INJECTION, POWDER, FOR SOLUTION INTRAMUSCULAR; INTRAVENOUS at 17:13

## 2019-12-16 RX ADMIN — BUDESONIDE AND FORMOTEROL FUMARATE DIHYDRATE 2 PUFF(S): 160; 4.5 AEROSOL RESPIRATORY (INHALATION) at 11:56

## 2019-12-16 RX ADMIN — NYSTATIN CREAM 1 APPLICATION(S): 100000 CREAM TOPICAL at 05:49

## 2019-12-16 NOTE — PHYSICAL THERAPY INITIAL EVALUATION ADULT - TINETTI GAIT TEST, REHAB EVAL
As per Tinetti test, pt is a moderate risk for falls, benefits from RW/Rollator for safety. Pt verbalized awareness.

## 2019-12-16 NOTE — PHYSICAL THERAPY INITIAL EVALUATION ADULT - THERAPY FREQUENCY, PT EVAL
Pt d/c from b/s PT, can cont amb with NSG as needed. Pt educated to notify staff should she have any concerns regarding mobility. Pt verbalized understanding.

## 2019-12-16 NOTE — PHYSICAL THERAPY INITIAL EVALUATION ADULT - GENERAL OBSERVATIONS, REHAB EVAL
Pt encountered coming out of bathroom by herself and ambulating on unit without AD. Pt benefits from supervision as she does hold onto walls as needed due to b/l LE discomfort and mild weakness as per pt. Pt did upper body and lower body dressing independently after using bathroom,.

## 2019-12-16 NOTE — PHYSICAL THERAPY INITIAL EVALUATION ADULT - GAIT DEVIATIONS NOTED, PT EVAL
Increased lateral trunk sway, pt hold onto wall as needed for support, pt reports she feels much stronger since admission and anticipating d/c home with no concerns.

## 2019-12-16 NOTE — PROGRESS NOTE ADULT - SUBJECTIVE AND OBJECTIVE BOX
SUBJECTIVE:    Patient is a 39y old Female who presents with a chief complaint of dermatomyositis (13 Dec 2019 10:33)    Currently admitted to medicine with the primary diagnosis of SOB (shortness of breath)     Today is hospital day 4d. Complains of muscle aches and smelly urine and buttock blisters     PAST MEDICAL & SURGICAL HISTORY  Dermatomyositis  Cryptogenic organizing pneumonia  History of cervical cerclage  Ankle fracture    SOCIAL HISTORY:  Negative for smoking/alcohol/drug use.     ALLERGIES:  contrast media (iodine-based) (Unknown)  peanuts (Unknown)  shellfish (Unknown)    MEDICATIONS:  MEDICATIONS  (STANDING):  azaTHIOprine 150 milliGRAM(s) Oral daily  budesonide  80 MICROgram(s)/formoterol 4.5 MICROgram(s) Inhaler 2 Puff(s) Inhalation two times a day  cefTRIAXone   IVPB 1000 milliGRAM(s) IV Intermittent every 24 hours  enoxaparin Injectable 40 milliGRAM(s) SubCutaneous daily  lidocaine   Patch 1 Patch Transdermal daily  melatonin 5 milliGRAM(s) Oral at bedtime  mycophenolate mofetil 1500 milliGRAM(s) Oral two times a day  nystatin Powder 1 Application(s) Topical three times a day  pantoprazole    Tablet 40 milliGRAM(s) Oral before breakfast  predniSONE   Tablet 20 milliGRAM(s) Oral daily    MEDICATIONS  (PRN):  albuterol/ipratropium for Nebulization 3 milliLiter(s) Nebulizer every 6 hours PRN Bronchospasm  diphenhydrAMINE 25 milliGRAM(s) Oral every 4 hours PRN Rash and/or Itching  ibuprofen  Tablet. 400 milliGRAM(s) Oral two times a day PRN Moderate Pain (4 - 6)  oxycodone    5 mG/acetaminophen 325 mG 1 Tablet(s) Oral every 6 hours PRN Severe Pain (7 - 10)      VITALS:   Vital Signs Last 24 Hrs  T(C): 35.7 (16 Dec 2019 05:08), Max: 36.2 (15 Dec 2019 13:43)  T(F): 96.2 (16 Dec 2019 05:08), Max: 97.1 (15 Dec 2019 13:43)  HR: 61 (16 Dec 2019 05:08) (61 - 89)  BP: 118/82 (16 Dec 2019 05:08) (114/62 - 122/81)  RR: 18 (16 Dec 2019 05:08) (18 - 18)  SpO2: 98% (15 Dec 2019 19:59) (98% - 98%)      PHYSICAL EXAM:  GENERAL: NAD, lying in bed comfortably  CHEST/LUNG: Clear to auscultation bilaterally; No rales, rhonchi, wheezing, or rubs.   HEART: Regular rate and rhythm; No murmurs, rubs, or gallops  ABDOMEN: Bowel sounds present; Soft, Nontender, Nondistended.  EXTREMITIES:  2+ Peripheral Pulses, brisk capillary refill. No edema  NERVOUS SYSTEM:  Alert & Oriented X3, speech clear. No deficits   MSK: diffuse muscle aches with several tender points      LABS:                                   11.9   8.28  )-----------( 418      ( 16 Dec 2019 05:29 )             38.7     12-16    139  |  102  |  15  ----------------------------<  95  4.3   |  25  |  0.6<L>    Ca    8.6      16 Dec 2019 05:29        CARDIAC MARKERS ( 13 Dec 2019 00:45 )  x     / 0.09 ng/mL / x     / x     / 12.8 ng/mL  CARDIAC MARKERS ( 12 Dec 2019 22:50 )  x     / x     / x     / x     / 12.6 ng/mL  CARDIAC MARKERS ( 12 Dec 2019 20:23 )  x     / 0.09 ng/mL / 332 U/L / x     / x

## 2019-12-16 NOTE — PROGRESS NOTE ADULT - ASSESSMENT
39 year old Female with past medical history of dermatomyositis and Cryptogenic organizing pneumonia presenting to the ED for evaluation of diffuse muscle aches    # Dermatomyositis - Cryptogenic organizing pneumonia:  - Pt hemodynamically and clinically stable.   - s/p solumedrol 100 mg one dose, c/w prednisone 20 mg / day.  (patient takes prednisone 20 mg daily)  - c/w Azithromycin 150 mg q24hr, Cellcept 1500 mg q 12 hrs   - Rheumatology rcs noted: patient to be d/brianda on PO prednisone 20 mg and f/up with Dr Rea on 12/19 at 4:20  - ESR 53, pending aldolase and CRP   - Patient underwent a bronchoscopy in 2017 which revealed 65% segs and 25% lymphocytes. No evidence of infection was seen.   - CT guided lung biopsy revealed Histopathology consistent with Organizing pneumonia.   - c/w Symbicort and Duoneb     # UTI - Ceftriaxone 1g IV qd / f/u Ucxs     #) Troponemia  - Chronically elevated, No chest pain, EKG non ischemic  - TTE on this admission: NL EF and report is normal   - no need for CV consultation at this time     # history of mechanical fall with left sided knee pain:  - IMPRESSION: No acute osseous abnormality     Diet: Regular  Dvt Ppx: Lovenox SQ   Activity: out of bed to chair   Dispo: Home only per pt   Code Status: Full Code    Dispo: f/u Ucxs / Anticipate for d/c Tuesday am

## 2019-12-16 NOTE — PROGRESS NOTE ADULT - SUBJECTIVE AND OBJECTIVE BOX
JESSI KING 39y Female  MRN#: [id number]   CODE STATUS: FULL      SUBJECTIVE  Patient is a 39y old Female who presents with a chief complaint of weakness (16 Dec 2019 13:10)    Currently admitted to medicine with the primary diagnosis of SOB (shortness of breath)    Today is hospital day 4d, and this morning she is still complaining of mild pain in her left knee joint and generalized bodyaches. She still has urinary discomfort.     OBJECTIVE  PAST MEDICAL & SURGICAL HISTORY  Dermatomyositis  Cryptogenic organizing pneumonia  History of cervical cerclage  Ankle fracture    ALLERGIES:  contrast media (iodine-based) (Unknown)  peanuts (Unknown)  shellfish (Unknown)    MEDICATIONS:  STANDING MEDICATIONS  azaTHIOprine 150 milliGRAM(s) Oral daily  budesonide  80 MICROgram(s)/formoterol 4.5 MICROgram(s) Inhaler 2 Puff(s) Inhalation two times a day  cefTRIAXone   IVPB 1000 milliGRAM(s) IV Intermittent every 24 hours  enoxaparin Injectable 40 milliGRAM(s) SubCutaneous daily  lidocaine   Patch 1 Patch Transdermal daily  melatonin 5 milliGRAM(s) Oral at bedtime  mycophenolate mofetil 1500 milliGRAM(s) Oral two times a day  nystatin Powder 1 Application(s) Topical three times a day  pantoprazole    Tablet 40 milliGRAM(s) Oral before breakfast  predniSONE   Tablet 20 milliGRAM(s) Oral daily    PRN MEDICATIONS  albuterol/ipratropium for Nebulization 3 milliLiter(s) Nebulizer every 6 hours PRN  diphenhydrAMINE 25 milliGRAM(s) Oral every 4 hours PRN  ibuprofen  Tablet. 400 milliGRAM(s) Oral two times a day PRN  oxycodone    5 mG/acetaminophen 325 mG 1 Tablet(s) Oral every 6 hours PRN      VITAL SIGNS: Last 24 Hours  T(C): 36.3 (16 Dec 2019 13:20), Max: 36.3 (16 Dec 2019 13:20)  T(F): 97.4 (16 Dec 2019 13:20), Max: 97.4 (16 Dec 2019 13:20)  HR: 87 (16 Dec 2019 13:20) (61 - 89)  BP: 115/75 (16 Dec 2019 13:20) (114/62 - 122/81)  BP(mean): --  RR: 18 (16 Dec 2019 13:20) (18 - 18)  SpO2: 98% (15 Dec 2019 19:59) (98% - 98%)    LABS:                        11.9   8.28  )-----------( 418      ( 16 Dec 2019 05:29 )             38.7     12-16    139  |  102  |  15  ----------------------------<  95  4.3   |  25  |  0.6<L>    Ca    8.6      16 Dec 2019 05:29        Urinalysis Basic - ( 14 Dec 2019 17:23 )    Color: Yellow / Appearance: Slightly Turbid / S.021 / pH: x  Gluc: x / Ketone: Negative  / Bili: Negative / Urobili: <2 mg/dL   Blood: x / Protein: Negative / Nitrite: Positive   Leuk Esterase: Negative / RBC: 1 /HPF / WBC 2 /HPF   Sq Epi: x / Non Sq Epi: 9 /HPF / Bacteria: Many                RADIOLOGY:  < from: Xray Chest 1 View-PORTABLE IMMEDIATE (19 @ 20:51) >  Impression:      Stable bilateral opacities.    < end of copied text >  < from: Xray Knee 3 Views, Left (19 @ 20:50) >    IMPRESSION: No acute osseous abnormality    < end of copied text >      PHYSICAL EXAM:    GENERAL: NAD, well-developed, AAOx3  HEENT:  Atraumatic, Normocephalic. EOMI, PERRLA, conjunctiva and sclera clear, No JVD  PULMONARY: Clear to auscultation bilaterally; No wheeze  CARDIOVASCULAR: Regular rate and rhythm; No murmurs, rubs, or gallops  GASTROINTESTINAL: Soft, Nontender, Nondistended; Bowel sounds present  MUSCULOSKELETAL:  2+ Peripheral Pulses, No clubbing, cyanosis, or edema  NEUROLOGY: non-focal  SKIN: No rashes or lesions

## 2019-12-16 NOTE — PHYSICAL THERAPY INITIAL EVALUATION ADULT - SPECIFY REASON(S)
Pt can cont amb with NSG as needed. Pt reports she feels much stronger since admission and has been amb to bathroom frequently without AD.

## 2019-12-16 NOTE — PHYSICAL THERAPY INITIAL EVALUATION ADULT - PERTINENT HX OF CURRENT PROBLEM, REHAB EVAL
39 year old Female with past medical history of dermatomyositis and Cryptogenic organizing pneumonia presenting to the ED for evaluation of diffuse muscle aches. patient reports 2 weeks ago she started having diffuse muscle and joints aches, associated with increased chough and shortness of breath. patient also endorses a fall 1 week ago

## 2019-12-17 ENCOUNTER — TRANSCRIPTION ENCOUNTER (OUTPATIENT)
Age: 39
End: 2019-12-17

## 2019-12-17 VITALS
HEART RATE: 91 BPM | DIASTOLIC BLOOD PRESSURE: 64 MMHG | RESPIRATION RATE: 18 BRPM | SYSTOLIC BLOOD PRESSURE: 144 MMHG | TEMPERATURE: 96 F

## 2019-12-17 LAB
-  AMIKACIN: SIGNIFICANT CHANGE UP
-  AMPICILLIN/SULBACTAM: SIGNIFICANT CHANGE UP
-  AMPICILLIN: SIGNIFICANT CHANGE UP
-  AZTREONAM: SIGNIFICANT CHANGE UP
-  CEFAZOLIN: SIGNIFICANT CHANGE UP
-  CEFEPIME: SIGNIFICANT CHANGE UP
-  CEFOXITIN: SIGNIFICANT CHANGE UP
-  CEFTRIAXONE: SIGNIFICANT CHANGE UP
-  CIPROFLOXACIN: SIGNIFICANT CHANGE UP
-  GENTAMICIN: SIGNIFICANT CHANGE UP
-  IMIPENEM: SIGNIFICANT CHANGE UP
-  LEVOFLOXACIN: SIGNIFICANT CHANGE UP
-  MEROPENEM: SIGNIFICANT CHANGE UP
-  NITROFURANTOIN: SIGNIFICANT CHANGE UP
-  PIPERACILLIN/TAZOBACTAM: SIGNIFICANT CHANGE UP
-  TIGECYCLINE: SIGNIFICANT CHANGE UP
-  TOBRAMYCIN: SIGNIFICANT CHANGE UP
-  TRIMETHOPRIM/SULFAMETHOXAZOLE: SIGNIFICANT CHANGE UP
ALBUMIN SERPL ELPH-MCNC: 2.9 G/DL — LOW (ref 3.5–5.2)
ALP SERPL-CCNC: 55 U/L — SIGNIFICANT CHANGE UP (ref 30–115)
ALT FLD-CCNC: 10 U/L — SIGNIFICANT CHANGE UP (ref 0–41)
ANION GAP SERPL CALC-SCNC: 13 MMOL/L — SIGNIFICANT CHANGE UP (ref 7–14)
AST SERPL-CCNC: 11 U/L — SIGNIFICANT CHANGE UP (ref 0–41)
BASOPHILS # BLD AUTO: 0.04 K/UL — SIGNIFICANT CHANGE UP (ref 0–0.2)
BASOPHILS NFR BLD AUTO: 0.4 % — SIGNIFICANT CHANGE UP (ref 0–1)
BILIRUB SERPL-MCNC: <0.2 MG/DL — SIGNIFICANT CHANGE UP (ref 0.2–1.2)
BUN SERPL-MCNC: 12 MG/DL — SIGNIFICANT CHANGE UP (ref 10–20)
CALCIUM SERPL-MCNC: 8.7 MG/DL — SIGNIFICANT CHANGE UP (ref 8.5–10.1)
CHLORIDE SERPL-SCNC: 102 MMOL/L — SIGNIFICANT CHANGE UP (ref 98–110)
CO2 SERPL-SCNC: 22 MMOL/L — SIGNIFICANT CHANGE UP (ref 17–32)
CREAT SERPL-MCNC: 0.7 MG/DL — SIGNIFICANT CHANGE UP (ref 0.7–1.5)
CULTURE RESULTS: SIGNIFICANT CHANGE UP
EOSINOPHIL # BLD AUTO: 0.25 K/UL — SIGNIFICANT CHANGE UP (ref 0–0.7)
EOSINOPHIL NFR BLD AUTO: 2.6 % — SIGNIFICANT CHANGE UP (ref 0–8)
GLUCOSE BLDC GLUCOMTR-MCNC: 102 MG/DL — HIGH (ref 70–99)
GLUCOSE BLDC GLUCOMTR-MCNC: 87 MG/DL — SIGNIFICANT CHANGE UP (ref 70–99)
GLUCOSE BLDC GLUCOMTR-MCNC: 92 MG/DL — SIGNIFICANT CHANGE UP (ref 70–99)
GLUCOSE SERPL-MCNC: 91 MG/DL — SIGNIFICANT CHANGE UP (ref 70–99)
HCT VFR BLD CALC: 39 % — SIGNIFICANT CHANGE UP (ref 37–47)
HGB BLD-MCNC: 11.8 G/DL — LOW (ref 12–16)
IMM GRANULOCYTES NFR BLD AUTO: 0.9 % — HIGH (ref 0.1–0.3)
LYMPHOCYTES # BLD AUTO: 1.27 K/UL — SIGNIFICANT CHANGE UP (ref 1.2–3.4)
LYMPHOCYTES # BLD AUTO: 13 % — LOW (ref 20.5–51.1)
MAGNESIUM SERPL-MCNC: 2.1 MG/DL — SIGNIFICANT CHANGE UP (ref 1.8–2.4)
MCHC RBC-ENTMCNC: 30.1 PG — SIGNIFICANT CHANGE UP (ref 27–31)
MCHC RBC-ENTMCNC: 30.3 G/DL — LOW (ref 32–37)
MCV RBC AUTO: 99.5 FL — HIGH (ref 81–99)
METHOD TYPE: SIGNIFICANT CHANGE UP
MONOCYTES # BLD AUTO: 0.5 K/UL — SIGNIFICANT CHANGE UP (ref 0.1–0.6)
MONOCYTES NFR BLD AUTO: 5.1 % — SIGNIFICANT CHANGE UP (ref 1.7–9.3)
NEUTROPHILS # BLD AUTO: 7.65 K/UL — HIGH (ref 1.4–6.5)
NEUTROPHILS NFR BLD AUTO: 78 % — HIGH (ref 42.2–75.2)
NRBC # BLD: 0 /100 WBCS — SIGNIFICANT CHANGE UP (ref 0–0)
ORGANISM # SPEC MICROSCOPIC CNT: SIGNIFICANT CHANGE UP
ORGANISM # SPEC MICROSCOPIC CNT: SIGNIFICANT CHANGE UP
PLATELET # BLD AUTO: 422 K/UL — HIGH (ref 130–400)
POTASSIUM SERPL-MCNC: 4.3 MMOL/L — SIGNIFICANT CHANGE UP (ref 3.5–5)
POTASSIUM SERPL-SCNC: 4.3 MMOL/L — SIGNIFICANT CHANGE UP (ref 3.5–5)
PROT SERPL-MCNC: 6.4 G/DL — SIGNIFICANT CHANGE UP (ref 6–8)
RBC # BLD: 3.92 M/UL — LOW (ref 4.2–5.4)
RBC # FLD: 13.6 % — SIGNIFICANT CHANGE UP (ref 11.5–14.5)
SODIUM SERPL-SCNC: 137 MMOL/L — SIGNIFICANT CHANGE UP (ref 135–146)
SPECIMEN SOURCE: SIGNIFICANT CHANGE UP
WBC # BLD: 9.8 K/UL — SIGNIFICANT CHANGE UP (ref 4.8–10.8)
WBC # FLD AUTO: 9.8 K/UL — SIGNIFICANT CHANGE UP (ref 4.8–10.8)

## 2019-12-17 PROCEDURE — 99239 HOSP IP/OBS DSCHRG MGMT >30: CPT

## 2019-12-17 RX ORDER — IBUPROFEN 200 MG
1 TABLET ORAL
Qty: 0 | Refills: 0 | DISCHARGE
Start: 2019-12-17

## 2019-12-17 RX ORDER — IPRATROPIUM/ALBUTEROL SULFATE 18-103MCG
3 AEROSOL WITH ADAPTER (GRAM) INHALATION
Qty: 360 | Refills: 0
Start: 2019-12-17 | End: 2020-01-15

## 2019-12-17 RX ORDER — DIPHENHYDRAMINE HCL 50 MG
1 CAPSULE ORAL
Qty: 0 | Refills: 0 | DISCHARGE
Start: 2019-12-17

## 2019-12-17 RX ORDER — FLUCONAZOLE 150 MG/1
1 TABLET ORAL
Qty: 2 | Refills: 0
Start: 2019-12-17 | End: 2019-12-18

## 2019-12-17 RX ORDER — CEFPODOXIME PROXETIL 100 MG
1 TABLET ORAL
Qty: 14 | Refills: 0
Start: 2019-12-17 | End: 2019-12-23

## 2019-12-17 RX ORDER — NYSTATIN CREAM 100000 [USP'U]/G
1 CREAM TOPICAL
Qty: 1 | Refills: 0
Start: 2019-12-17 | End: 2019-12-23

## 2019-12-17 RX ORDER — ALBUTEROL 90 UG/1
2 AEROSOL, METERED ORAL
Qty: 1 | Refills: 0
Start: 2019-12-17 | End: 2020-01-15

## 2019-12-17 RX ADMIN — ENOXAPARIN SODIUM 40 MILLIGRAM(S): 100 INJECTION SUBCUTANEOUS at 13:27

## 2019-12-17 RX ADMIN — BUDESONIDE AND FORMOTEROL FUMARATE DIHYDRATE 2 PUFF(S): 160; 4.5 AEROSOL RESPIRATORY (INHALATION) at 13:31

## 2019-12-17 RX ADMIN — PANTOPRAZOLE SODIUM 40 MILLIGRAM(S): 20 TABLET, DELAYED RELEASE ORAL at 06:44

## 2019-12-17 RX ADMIN — CEFTRIAXONE 100 MILLIGRAM(S): 500 INJECTION, POWDER, FOR SOLUTION INTRAMUSCULAR; INTRAVENOUS at 17:23

## 2019-12-17 RX ADMIN — NYSTATIN CREAM 1 APPLICATION(S): 100000 CREAM TOPICAL at 05:34

## 2019-12-17 RX ADMIN — OXYCODONE AND ACETAMINOPHEN 1 TABLET(S): 5; 325 TABLET ORAL at 01:00

## 2019-12-17 RX ADMIN — MYCOPHENOLATE MOFETIL 1500 MILLIGRAM(S): 250 CAPSULE ORAL at 05:35

## 2019-12-17 RX ADMIN — NYSTATIN CREAM 1 APPLICATION(S): 100000 CREAM TOPICAL at 13:28

## 2019-12-17 RX ADMIN — Medication 5 MILLIGRAM(S): at 00:13

## 2019-12-17 RX ADMIN — MYCOPHENOLATE MOFETIL 1500 MILLIGRAM(S): 250 CAPSULE ORAL at 17:24

## 2019-12-17 RX ADMIN — Medication 20 MILLIGRAM(S): at 06:44

## 2019-12-17 RX ADMIN — OXYCODONE AND ACETAMINOPHEN 1 TABLET(S): 5; 325 TABLET ORAL at 00:12

## 2019-12-17 RX ADMIN — AZATHIOPRINE 150 MILLIGRAM(S): 100 TABLET ORAL at 13:27

## 2019-12-17 NOTE — DISCHARGE NOTE PROVIDER - CARE PROVIDERS DIRECT ADDRESSES
[email or web address]

## 2019-12-17 NOTE — DISCHARGE NOTE PROVIDER - NSDCCPCAREPLAN_GEN_ALL_CORE_FT
PRINCIPAL DISCHARGE DIAGNOSIS  Diagnosis: Myalgia  Assessment and Plan of Treatment: Please continue taking your oral prednisone as prescribed and follow up with Dr. Rea on 12/19/19 at 4:30 for further management. Please continue taking your other medications as prescribed. If you develop sudden chest pain, shortness of breath, dizziness, lightheadedness, or other signs or symptoms that alarm you, please seek immediate medical attention.      SECONDARY DISCHARGE DIAGNOSES  Diagnosis: Pressure ulcer of buttock  Assessment and Plan of Treatment: Please take the nystatin powder and diflucan as directed and follow up with your primary outpatient physician.

## 2019-12-17 NOTE — DISCHARGE NOTE PROVIDER - HOSPITAL COURSE
The patient is a 39 year-old female with a past medical history of dermatomyositis and cryptogenic organizing pneumonia presenting to the ED for evaluation of diffuse muscle aches. The patient reports 2 weeks ago she started having diffuse muscle and joints aches, associated with increased chough and shortness of breath. The patient also endorses a fall 1 week prior to presentation (no head trauma, no LOC) with resulting right knee pain and swelling. She also endorses small and big joints pain, morning stiffness. She denies fever, chills, sneezing, rhinorrhea, sore throat, chest pain, palpitations, change in bowel or urinary habits. The patient was admitted in 10/2019 for the same  complaint and was discharged for outpatient rheumatology follow up. On presentation she was given Solumedrol for her myalgias and then transitioned to PO prednisone. She was advised to follow up on 12/19/19 at 4:20 with Dr. Rea. She was stable for discharge  to home 12/17/19 on PO antibiotics for a UTI and with nystatin and diflucan for a recurring ulcer on her buttocks. The patient is a 39 year-old female with a past medical history of dermatomyositis and cryptogenic organizing pneumonia presenting to the ED for evaluation of diffuse muscle aches. The patient reports 2 weeks ago she started having diffuse muscle and joints aches, associated with increased chough and shortness of breath. The patient also endorses a fall 1 week prior to presentation (no head trauma, no LOC) with resulting right knee pain and swelling. She also endorses small and big joints pain, morning stiffness. She denies fever, chills, sneezing, rhinorrhea, sore throat, chest pain, palpitations, change in bowel or urinary habits. The patient was admitted in 10/2019 for the same  complaint and was discharged for outpatient rheumatology follow up. On presentation she was given Solumedrol for her myalgias and then transitioned to PO prednisone. She was advised to follow up on 12/19/19 at 4:20 with Dr. Rea. She was stable for discharge  to home 12/17/19 on PO antibiotics for a UTI and with nystatin and diflucan for a recurring ulcer on her buttocks.            Attending Attestation        Pt seen and examined. Case and Plan discussed at rounds and with pt.    Agree with above documentation as corrected.     Pt is stable for d/c today to home. She declined any PT or CARLO    Acutely:    1. Dermatomyosities Exararc - Weakness and Myalgia - c/w Prednisone / Azithioprine / Mycophenylate. f/u Rheumatology    2. E.Coli UTI - Sensitivities pending per lab this afternoon - pls f/u to determine if Vantin is sensitive if so prescribe to complete 10days total of abx.    3. Troponemia / Elevated CK / CKMB - TTE WNL - all from Muscle Dz    4. Buttock Ulcers - Pruritis and Painful - Doubt STD etiology also pt says not sexually active for 3yrs. These recur and no blisters. Suspect Fungal Infection given severe immunosuppression with above meds. Rx Nystatin Powder TID and Benadryl prn.     5. Knee pain - xrays no acute fx - tylenol prn     6. Cryptogenic Organizing PNA - Chronic - f/u with Pulmonary. Resume all Respiratory meds and Rx NEBULIZER Machine.         Med rec done

## 2019-12-17 NOTE — DISCHARGE NOTE PROVIDER - CARE PROVIDER_API CALL
Lucia Rea ()  Internal Medicine  [address] [place] [address]  Phone: [phone]  Fax: [phone]  Established Patient  Scheduled Appointment: 12/19/2019 04:20 PM    Sari Espinoza)  Internal Medicine  [address] [facility] [address]  [place] [address]  Phone: [phone]  Fax: [phone]  Established Patient  Follow Up Time: 1 week

## 2019-12-17 NOTE — PROGRESS NOTE ADULT - ASSESSMENT
39 year old Female with past medical history of dermatomyositis and Cryptogenic organizing pneumonia presenting to the ED for evaluation of diffuse muscle aches.    # Dermatomyositis  - Pt hemodynamically and clinically stable.   - s/p solumedrol 100 mg one dose, now prednisone tapered to 20mg/day to continue upon discharge  - c/w Azathioprine 150 mg q24hr, Cellcept 1500 mg q 12 hrs   - Rheumatology rcs noted: patient to be d/brianda on PO prednisone 20 mg and f/up with Dr Rea on 12/19 at 4:20  - ESR 53, pending aldolase and CRP   - Patient underwent a bronchoscopy in 2017 which revealed 65% segs and 25% lymphocytes. No evidence of infection was seen.   - CT guided lung biopsy revealed Histopathology consistent with Organizing pneumonia.   - c/w Symbicort and Duoneb     # UTI   - + UA, c/w Rocephin 1gm q 24  - can switch to Vantin 200 mg PO BID x 7 more days upon d/c if cx sensitive    #) troponemia  - Chronically elevated, No chest pain, EKG non ischemic    # history of mechanical fall with left sided knee pain:  - X ray knee did not show acute abnormality.  - pain control  - PT/Physiatry eval appreciated possible 4-A candidate?     Diet: Regular  Dvt Ppx: Lovenox SQ   Activity: out of bed to chair   Dispo: discharge to home once urine cx sensitivities resulted (send rx for abx)  Code Status: Full Code

## 2019-12-17 NOTE — DISCHARGE NOTE PROVIDER - NSDCMRMEDTOKEN_GEN_ALL_CORE_FT
azaTHIOprine 50 mg oral tablet: 3 tab(s) orally once a day  diphenhydrAMINE 25 mg oral capsule: 1 cap(s) orally every 4 hours, As needed, Rash and/or Itching  ibuprofen 400 mg oral tablet: 1 tab(s) orally 2 times a day, As needed, Moderate Pain (4 - 6)  mycophenolate mofetil 500 mg oral tablet: 3 tab(s) orally 2 times a day  pantoprazole 40 mg oral delayed release tablet: 1 tab(s) orally once a day (before a meal)  predniSONE 20 mg oral tablet: 1 tab(s) orally once a day  Symbicort 80 mcg-4.5 mcg/inh inhalation aerosol: 2 puff(s) inhaled 2 times a day

## 2019-12-17 NOTE — DISCHARGE NOTE PROVIDER - PROVIDER TOKENS
PROVIDER:[[id number]],SCHEDULEDAPPT:[12/19/2019],SCHEDULEDAPPTTIME:[04:20 PM],ESTABLISHEDPATIENT:[T]],PROVIDER:[TOKEN:[[id number]],FOLLOWUP:[1 week],ESTABLISHEDPATIENT:[T]]

## 2019-12-17 NOTE — PROGRESS NOTE ADULT - SUBJECTIVE AND OBJECTIVE BOX
JESSI KING 39y Female  MRN#: [id number]       SUBJECTIVE  Patient is a 39y old Female who presents with a chief complaint of shortness of breath, troponinemia (17 Dec 2019 12:28)  Currently admitted to medicine with the primary diagnosis of dermatomyositis. In the AM the patient was resting comfortably. Agreeable for discharge.      OBJECTIVE  PAST MEDICAL & SURGICAL HISTORY  Dermatomyositis  Cryptogenic organizing pneumonia  History of cervical cerclage  Ankle fracture    ALLERGIES:  contrast media (iodine-based) (Unknown)  peanuts (Unknown)  shellfish (Unknown)    MEDICATIONS:  STANDING MEDICATIONS  azaTHIOprine 150 milliGRAM(s) Oral daily  budesonide  80 MICROgram(s)/formoterol 4.5 MICROgram(s) Inhaler 2 Puff(s) Inhalation two times a day  cefTRIAXone   IVPB 1000 milliGRAM(s) IV Intermittent every 24 hours  enoxaparin Injectable 40 milliGRAM(s) SubCutaneous daily  lidocaine   Patch 1 Patch Transdermal daily  melatonin 5 milliGRAM(s) Oral at bedtime  mycophenolate mofetil 1500 milliGRAM(s) Oral two times a day  nystatin Powder 1 Application(s) Topical three times a day  pantoprazole    Tablet 40 milliGRAM(s) Oral before breakfast  predniSONE   Tablet 20 milliGRAM(s) Oral daily    PRN MEDICATIONS  albuterol/ipratropium for Nebulization 3 milliLiter(s) Nebulizer every 6 hours PRN  diphenhydrAMINE 25 milliGRAM(s) Oral every 4 hours PRN  ibuprofen  Tablet. 400 milliGRAM(s) Oral two times a day PRN  oxycodone    5 mG/acetaminophen 325 mG 1 Tablet(s) Oral every 6 hours PRN      VITAL SIGNS: Last 24 Hours  T(C): 36.8 (17 Dec 2019 13:35), Max: 36.8 (17 Dec 2019 13:35)  T(F): 98.2 (17 Dec 2019 13:35), Max: 98.2 (17 Dec 2019 13:35)  HR: 88 (17 Dec 2019 13:35) (80 - 89)  BP: 139/60 (17 Dec 2019 13:35) (99/59 - 158/83)  BP(mean): --  RR: 18 (17 Dec 2019 13:35) (17 - 18)  SpO2: 98% (16 Dec 2019 19:52) (98% - 98%)    LABS:                        11.8   9.80  )-----------( 422      ( 17 Dec 2019 05:31 )             39.0     12-17    137  |  102  |  12  ----------------------------<  91  4.3   |  22  |  0.7    Ca    8.7      17 Dec 2019 05:31  Mg     2.1     12-17    TPro  6.4  /  Alb  2.9<L>  /  TBili  <0.2  /  DBili  x   /  AST  11  /  ALT  10  /  AlkPhos  55  12-17      Culture - Urine (collected 15 Dec 2019 11:10)  Source: .Urine Clean Catch (Midstream)  Final Report (17 Dec 2019 18:20):    >100,000 CFU/ml Klebsiella pneumoniae  Organism: Klebsiella pneumoniae (17 Dec 2019 18:20)  Organism: Klebsiella pneumoniae (17 Dec 2019 18:20)    PHYSICAL EXAM:    GENERAL: NAD, well-developed, obese habitus, AAOx3  HEENT:  Atraumatic, Normocephalic. EOMI, PERRLA, conjunctiva and sclera clear, No JVD  PULMONARY: Clear to auscultation bilaterally; No wheeze  CARDIOVASCULAR: Regular rate and rhythm; No murmurs, rubs, or gallops  GASTROINTESTINAL: Soft, Nontender, Nondistended; Bowel sounds present  MUSCULOSKELETAL: No clubbing, cyanosis, or edema  NEUROLOGY: non-focal  SKIN: No rashes or lesions

## 2019-12-17 NOTE — DISCHARGE NOTE NURSING/CASE MANAGEMENT/SOCIAL WORK - PATIENT PORTAL LINK FT
You can access the FollowMyHealth Patient Portal offered by [facility] by registering at the following website: [email or web address]. By joining [facility]’s FollowMyHealth portal, you will also be able to view your health information using other applications (apps) compatible with our system.

## 2019-12-20 DIAGNOSIS — M33.13 OTHER DERMATOMYOSITIS WITHOUT MYOPATHY: ICD-10-CM

## 2019-12-20 DIAGNOSIS — Z79.52 LONG TERM (CURRENT) USE OF SYSTEMIC STEROIDS: ICD-10-CM

## 2019-12-20 DIAGNOSIS — Z91.81 HISTORY OF FALLING: ICD-10-CM

## 2019-12-20 DIAGNOSIS — B36.9 SUPERFICIAL MYCOSIS, UNSPECIFIED: ICD-10-CM

## 2019-12-20 DIAGNOSIS — E66.01 MORBID (SEVERE) OBESITY DUE TO EXCESS CALORIES: ICD-10-CM

## 2019-12-20 DIAGNOSIS — R79.89 OTHER SPECIFIED ABNORMAL FINDINGS OF BLOOD CHEMISTRY: ICD-10-CM

## 2019-12-20 DIAGNOSIS — N39.0 URINARY TRACT INFECTION, SITE NOT SPECIFIED: ICD-10-CM

## 2019-12-20 DIAGNOSIS — Z91.041 RADIOGRAPHIC DYE ALLERGY STATUS: ICD-10-CM

## 2019-12-20 DIAGNOSIS — J84.116 CRYPTOGENIC ORGANIZING PNEUMONIA: ICD-10-CM

## 2019-12-20 DIAGNOSIS — L89.309 PRESSURE ULCER OF UNSPECIFIED BUTTOCK, UNSPECIFIED STAGE: ICD-10-CM

## 2019-12-20 DIAGNOSIS — Z91.013 ALLERGY TO SEAFOOD: ICD-10-CM

## 2019-12-20 DIAGNOSIS — M25.562 PAIN IN LEFT KNEE: ICD-10-CM

## 2019-12-20 DIAGNOSIS — R53.1 WEAKNESS: ICD-10-CM

## 2019-12-20 DIAGNOSIS — Z91.010 ALLERGY TO PEANUTS: ICD-10-CM

## 2020-01-21 ENCOUNTER — INPATIENT (INPATIENT)
Facility: HOSPITAL | Age: 40
LOS: 4 days | Discharge: HOME | End: 2020-01-26
Attending: HOSPITALIST | Admitting: HOSPITALIST
Payer: MEDICAID

## 2020-01-21 VITALS
HEART RATE: 107 BPM | RESPIRATION RATE: 19 BRPM | OXYGEN SATURATION: 99 % | TEMPERATURE: 98 F | SYSTOLIC BLOOD PRESSURE: 138 MMHG | DIASTOLIC BLOOD PRESSURE: 96 MMHG

## 2020-01-21 DIAGNOSIS — Z98.890 OTHER SPECIFIED POSTPROCEDURAL STATES: Chronic | ICD-10-CM

## 2020-01-21 DIAGNOSIS — S82.899A OTHER FRACTURE OF UNSPECIFIED LOWER LEG, INITIAL ENCOUNTER FOR CLOSED FRACTURE: Chronic | ICD-10-CM

## 2020-01-21 LAB
ALBUMIN SERPL ELPH-MCNC: 3.8 G/DL — SIGNIFICANT CHANGE UP (ref 3.5–5.2)
ALP SERPL-CCNC: 56 U/L — SIGNIFICANT CHANGE UP (ref 30–115)
ALT FLD-CCNC: 11 U/L — SIGNIFICANT CHANGE UP (ref 0–41)
ANION GAP SERPL CALC-SCNC: 10 MMOL/L — SIGNIFICANT CHANGE UP (ref 7–14)
AST SERPL-CCNC: 18 U/L — SIGNIFICANT CHANGE UP (ref 0–41)
BASOPHILS # BLD AUTO: 0.04 K/UL — SIGNIFICANT CHANGE UP (ref 0–0.2)
BASOPHILS NFR BLD AUTO: 0.5 % — SIGNIFICANT CHANGE UP (ref 0–1)
BILIRUB SERPL-MCNC: <0.2 MG/DL — SIGNIFICANT CHANGE UP (ref 0.2–1.2)
BUN SERPL-MCNC: 8 MG/DL — LOW (ref 10–20)
CALCIUM SERPL-MCNC: 9.1 MG/DL — SIGNIFICANT CHANGE UP (ref 8.5–10.1)
CHLORIDE SERPL-SCNC: 104 MMOL/L — SIGNIFICANT CHANGE UP (ref 98–110)
CK MB CFR SERPL CALC: 7.2 NG/ML — HIGH (ref 0.6–6.3)
CK SERPL-CCNC: 237 U/L — HIGH (ref 0–225)
CO2 SERPL-SCNC: 23 MMOL/L — SIGNIFICANT CHANGE UP (ref 17–32)
CREAT SERPL-MCNC: 0.7 MG/DL — SIGNIFICANT CHANGE UP (ref 0.7–1.5)
EOSINOPHIL # BLD AUTO: 0.11 K/UL — SIGNIFICANT CHANGE UP (ref 0–0.7)
EOSINOPHIL NFR BLD AUTO: 1.4 % — SIGNIFICANT CHANGE UP (ref 0–8)
GLUCOSE SERPL-MCNC: 93 MG/DL — SIGNIFICANT CHANGE UP (ref 70–99)
HCG SERPL QL: NEGATIVE — SIGNIFICANT CHANGE UP
HCT VFR BLD CALC: 40.2 % — SIGNIFICANT CHANGE UP (ref 37–47)
HGB BLD-MCNC: 12.8 G/DL — SIGNIFICANT CHANGE UP (ref 12–16)
IMM GRANULOCYTES NFR BLD AUTO: 0.5 % — HIGH (ref 0.1–0.3)
LDH SERPL L TO P-CCNC: 269 — HIGH (ref 50–242)
LYMPHOCYTES # BLD AUTO: 1 K/UL — LOW (ref 1.2–3.4)
LYMPHOCYTES # BLD AUTO: 12.8 % — LOW (ref 20.5–51.1)
MCHC RBC-ENTMCNC: 31.6 PG — HIGH (ref 27–31)
MCHC RBC-ENTMCNC: 31.8 G/DL — LOW (ref 32–37)
MCV RBC AUTO: 99.3 FL — HIGH (ref 81–99)
MONOCYTES # BLD AUTO: 0.52 K/UL — SIGNIFICANT CHANGE UP (ref 0.1–0.6)
MONOCYTES NFR BLD AUTO: 6.7 % — SIGNIFICANT CHANGE UP (ref 1.7–9.3)
NEUTROPHILS # BLD AUTO: 6.1 K/UL — SIGNIFICANT CHANGE UP (ref 1.4–6.5)
NEUTROPHILS NFR BLD AUTO: 78.1 % — HIGH (ref 42.2–75.2)
NRBC # BLD: 0 /100 WBCS — SIGNIFICANT CHANGE UP (ref 0–0)
NT-PROBNP SERPL-SCNC: 91 PG/ML — SIGNIFICANT CHANGE UP (ref 0–300)
PLATELET # BLD AUTO: 503 K/UL — HIGH (ref 130–400)
POTASSIUM SERPL-MCNC: 3.8 MMOL/L — SIGNIFICANT CHANGE UP (ref 3.5–5)
POTASSIUM SERPL-SCNC: 3.8 MMOL/L — SIGNIFICANT CHANGE UP (ref 3.5–5)
PROT SERPL-MCNC: 7.8 G/DL — SIGNIFICANT CHANGE UP (ref 6–8)
RBC # BLD: 4.05 M/UL — LOW (ref 4.2–5.4)
RBC # FLD: 13.9 % — SIGNIFICANT CHANGE UP (ref 11.5–14.5)
SODIUM SERPL-SCNC: 137 MMOL/L — SIGNIFICANT CHANGE UP (ref 135–146)
TROPONIN T SERPL-MCNC: 0.06 NG/ML — CRITICAL HIGH
WBC # BLD: 7.81 K/UL — SIGNIFICANT CHANGE UP (ref 4.8–10.8)
WBC # FLD AUTO: 7.81 K/UL — SIGNIFICANT CHANGE UP (ref 4.8–10.8)

## 2020-01-21 PROCEDURE — 73560 X-RAY EXAM OF KNEE 1 OR 2: CPT | Mod: 26,LT

## 2020-01-21 PROCEDURE — 99285 EMERGENCY DEPT VISIT HI MDM: CPT

## 2020-01-21 PROCEDURE — 71045 X-RAY EXAM CHEST 1 VIEW: CPT | Mod: 26

## 2020-01-21 PROCEDURE — 93010 ELECTROCARDIOGRAM REPORT: CPT

## 2020-01-21 RX ORDER — OXYCODONE AND ACETAMINOPHEN 5; 325 MG/1; MG/1
1 TABLET ORAL ONCE
Refills: 0 | Status: DISCONTINUED | OUTPATIENT
Start: 2020-01-21 | End: 2020-01-21

## 2020-01-21 RX ORDER — SODIUM CHLORIDE 9 MG/ML
2000 INJECTION INTRAMUSCULAR; INTRAVENOUS; SUBCUTANEOUS ONCE
Refills: 0 | Status: COMPLETED | OUTPATIENT
Start: 2020-01-21 | End: 2020-01-21

## 2020-01-21 RX ORDER — KETOROLAC TROMETHAMINE 30 MG/ML
30 SYRINGE (ML) INJECTION ONCE
Refills: 0 | Status: DISCONTINUED | OUTPATIENT
Start: 2020-01-21 | End: 2020-01-21

## 2020-01-21 RX ORDER — SODIUM CHLORIDE 9 MG/ML
3 INJECTION INTRAMUSCULAR; INTRAVENOUS; SUBCUTANEOUS EVERY 8 HOURS
Refills: 0 | Status: DISCONTINUED | OUTPATIENT
Start: 2020-01-21 | End: 2020-01-26

## 2020-01-21 RX ADMIN — Medication 30 MILLIGRAM(S): at 18:59

## 2020-01-21 RX ADMIN — OXYCODONE AND ACETAMINOPHEN 1 TABLET(S): 5; 325 TABLET ORAL at 18:59

## 2020-01-21 RX ADMIN — SODIUM CHLORIDE 3 MILLILITER(S): 9 INJECTION INTRAMUSCULAR; INTRAVENOUS; SUBCUTANEOUS at 22:58

## 2020-01-21 RX ADMIN — SODIUM CHLORIDE 2000 MILLILITER(S): 9 INJECTION INTRAMUSCULAR; INTRAVENOUS; SUBCUTANEOUS at 20:58

## 2020-01-21 NOTE — ED ADULT NURSE NOTE - NSIMPLEMENTINTERV_GEN_ALL_ED
Implemented All Universal Safety Interventions:  [place] to call system. Call bell, personal items and telephone within reach. Instruct patient to call for assistance. Room bathroom lighting operational. Non-slip footwear when patient is off stretcher. Physically safe environment: no spills, clutter or unnecessary equipment. Stretcher in lowest position, wheels locked, appropriate side rails in place.

## 2020-01-21 NOTE — ED PROVIDER NOTE - PHYSICAL EXAMINATION
VITAL SIGNS: I have reviewed nursing notes and confirm.  CONSTITUTIONAL: WDWN obese female in no acute distress.  SKIN: Skin exam is warm and dry, no acute rash. No redness or swelling  HEAD: Normocephalic; atraumatic.  EYES: PERRL, EOM intact; conjunctiva and sclera clear.  ENT: No nasal discharge; airway clear.   NECK: Supple; non tender.  CHEST +chest wall tenderness,   CARD: S1, S2 normal; no murmurs, gallops, or rubs. Regular rate and rhythm.  RESP: No wheezes, rales or rhonchi.  ABD: Normal bowel sounds; soft; non-distended; non-tender; no hepatosplenomegaly.  EXT: Normal ROM to right, +pain with flexion of the left knee with tenderness over patella with no deformity, crepitus or warmth. No clubbing, cyanosis or edema.  LYMPH: No acute cervical adenopathy.  NEURO: Alert, oriented. Grossly unremarkable. No focal deficits.  PSYCH: Cooperative, but anxious

## 2020-01-21 NOTE — ED PROVIDER NOTE - CARE PLAN
Principal Discharge DX:	Dermatomyositis with respiratory involvement  Secondary Diagnosis:	Bronchospasm  Secondary Diagnosis:	Dysuria Principal Discharge DX:	Dermatomyositis with respiratory involvement  Secondary Diagnosis:	Bronchospasm  Secondary Diagnosis:	Dysuria  Secondary Diagnosis:	Elevated troponin

## 2020-01-21 NOTE — ED PROVIDER NOTE - CLINICAL SUMMARY MEDICAL DECISION MAKING FREE TEXT BOX
38 yo female with dermatomyositis, fibrotic changes that are stable since last year based on xrays that she states is her crytogenic organizing pna, presenting for a flare of her dermatomyositis with respir symptoms (gets SOB/wheezing, cough). Pt states she also has foul smelling urine, when she can make urine, which lately is once a day. (renal function on labs is normal). Pt admitted for same in Dec 2019. Unsure what triggers this as she does take her meds as directed. Admit for nebs, steroids, and evaluation of dysuria with UA/UCX (likely UTI).

## 2020-01-21 NOTE — ED PROVIDER NOTE - OBJECTIVE STATEMENT
40 yo female with pmh of cerclages x 2, right ankle surgery with metal plate, tubes tied, lung d/o ('organized pna"), dermatomyositis presents to the ER for 2-3 days of SOB/wheezing/cough productive of clear or green phlegm/body aches/facial pain/dysuria. Pt states she feels her skin is tingling, has left knee pain from a fall yesterday, +sore throat, +foul smelling urine, and states she gained 50 pounds since August even though she is tapering from 80 to 10 mg of prednisone. Pt denies any N/V/D/abdomen pain/rash/neck pain/HA/GYN complaints. Pt did not get flu shot, and states she will refuse it if offered. No travel or sick contacts. She states she never leaves the house.     PMH as above   Meds: azathioprine 50 mg 3 tabs qd, Symbicort 160/4.5, Mycophenalate 500 mg 3 tabs bid, Prednisone 10 mg qd and denies any pain meds  SH: no smoking, no etoh  PMD Medical clinic at [address] [place] [address], Rheum Rona,  LMP 2 days ago, regular periods.

## 2020-01-21 NOTE — ED PROVIDER NOTE - PROGRESS NOTE DETAILS
pt still unable to give urine sample. Urinary straight cath done, and still no urine came out into tube, some small amount of pus to tip of catheter. +SOB and wheezing from walking down [address], given neb and steroid now. Given another L of IVF. Will need to admit for her SOB, with UA to be followed by medical team as she is unable to give a urine at present. Pt not in renal failure and states when she gets this "flare" she gets urine complaints and can only urinate once a day. Discussed xray with radiology resident on call about an hour ago. He is able to review the actual digital films from previous visits, whereas I can only see the films from today but read the reports from previous. Essentially, the films look unchanged today from previous and lung issues are not showing new changes. pt has history of elevated muscle enzymes, and elevated trop on multiple visits with no CP. Pt usually has trop of 0.09 based on her med records.

## 2020-01-22 LAB
ALBUMIN SERPL ELPH-MCNC: 3.7 G/DL — SIGNIFICANT CHANGE UP (ref 3.5–5.2)
ALP SERPL-CCNC: 55 U/L — SIGNIFICANT CHANGE UP (ref 30–115)
ALT FLD-CCNC: 11 U/L — SIGNIFICANT CHANGE UP (ref 0–41)
ANION GAP SERPL CALC-SCNC: 12 MMOL/L — SIGNIFICANT CHANGE UP (ref 7–14)
APPEARANCE UR: CLEAR — SIGNIFICANT CHANGE UP
AST SERPL-CCNC: 14 U/L — SIGNIFICANT CHANGE UP (ref 0–41)
BASOPHILS # BLD AUTO: 0.02 K/UL — SIGNIFICANT CHANGE UP (ref 0–0.2)
BASOPHILS NFR BLD AUTO: 0.2 % — SIGNIFICANT CHANGE UP (ref 0–1)
BILIRUB SERPL-MCNC: <0.2 MG/DL — SIGNIFICANT CHANGE UP (ref 0.2–1.2)
BILIRUB UR-MCNC: NEGATIVE — SIGNIFICANT CHANGE UP
BUN SERPL-MCNC: 9 MG/DL — LOW (ref 10–20)
CALCIUM SERPL-MCNC: 8.9 MG/DL — SIGNIFICANT CHANGE UP (ref 8.5–10.1)
CHLORIDE SERPL-SCNC: 106 MMOL/L — SIGNIFICANT CHANGE UP (ref 98–110)
CO2 SERPL-SCNC: 19 MMOL/L — SIGNIFICANT CHANGE UP (ref 17–32)
COLOR SPEC: SIGNIFICANT CHANGE UP
CREAT SERPL-MCNC: 0.7 MG/DL — SIGNIFICANT CHANGE UP (ref 0.7–1.5)
DIFF PNL FLD: NEGATIVE — SIGNIFICANT CHANGE UP
EOSINOPHIL # BLD AUTO: 0 K/UL — SIGNIFICANT CHANGE UP (ref 0–0.7)
EOSINOPHIL NFR BLD AUTO: 0 % — SIGNIFICANT CHANGE UP (ref 0–8)
ERYTHROCYTE [SEDIMENTATION RATE] IN BLOOD: 50 MM/HR — HIGH (ref 0–20)
GLUCOSE SERPL-MCNC: 157 MG/DL — HIGH (ref 70–99)
GLUCOSE UR QL: NEGATIVE — SIGNIFICANT CHANGE UP
HCT VFR BLD CALC: 38.9 % — SIGNIFICANT CHANGE UP (ref 37–47)
HGB BLD-MCNC: 12.4 G/DL — SIGNIFICANT CHANGE UP (ref 12–16)
IMM GRANULOCYTES NFR BLD AUTO: 0.5 % — HIGH (ref 0.1–0.3)
KETONES UR-MCNC: NEGATIVE — SIGNIFICANT CHANGE UP
LACTATE SERPL-SCNC: 2.3 MMOL/L — HIGH (ref 0.7–2)
LACTATE SERPL-SCNC: 3.7 MMOL/L — HIGH (ref 0.7–2)
LEUKOCYTE ESTERASE UR-ACNC: NEGATIVE — SIGNIFICANT CHANGE UP
LYMPHOCYTES # BLD AUTO: 0.38 K/UL — LOW (ref 1.2–3.4)
LYMPHOCYTES # BLD AUTO: 3.7 % — LOW (ref 20.5–51.1)
MCHC RBC-ENTMCNC: 31.4 PG — HIGH (ref 27–31)
MCHC RBC-ENTMCNC: 31.9 G/DL — LOW (ref 32–37)
MCV RBC AUTO: 98.5 FL — SIGNIFICANT CHANGE UP (ref 81–99)
MONOCYTES # BLD AUTO: 0.04 K/UL — LOW (ref 0.1–0.6)
MONOCYTES NFR BLD AUTO: 0.4 % — LOW (ref 1.7–9.3)
NEUTROPHILS # BLD AUTO: 9.87 K/UL — HIGH (ref 1.4–6.5)
NEUTROPHILS NFR BLD AUTO: 95.2 % — HIGH (ref 42.2–75.2)
NITRITE UR-MCNC: NEGATIVE — SIGNIFICANT CHANGE UP
NRBC # BLD: 0 /100 WBCS — SIGNIFICANT CHANGE UP (ref 0–0)
PH UR: 6 — SIGNIFICANT CHANGE UP (ref 5–8)
PLATELET # BLD AUTO: 519 K/UL — HIGH (ref 130–400)
POTASSIUM SERPL-MCNC: 4.5 MMOL/L — SIGNIFICANT CHANGE UP (ref 3.5–5)
POTASSIUM SERPL-SCNC: 4.5 MMOL/L — SIGNIFICANT CHANGE UP (ref 3.5–5)
PROCALCITONIN SERPL-MCNC: 0.03 NG/ML — SIGNIFICANT CHANGE UP (ref 0.02–0.1)
PROT SERPL-MCNC: 7.4 G/DL — SIGNIFICANT CHANGE UP (ref 6–8)
PROT UR-MCNC: NEGATIVE — SIGNIFICANT CHANGE UP
RAPID RVP RESULT: SIGNIFICANT CHANGE UP
RBC # BLD: 3.95 M/UL — LOW (ref 4.2–5.4)
RBC # FLD: 13.9 % — SIGNIFICANT CHANGE UP (ref 11.5–14.5)
SODIUM SERPL-SCNC: 137 MMOL/L — SIGNIFICANT CHANGE UP (ref 135–146)
SP GR SPEC: 1.01 — SIGNIFICANT CHANGE UP (ref 1.01–1.02)
TROPONIN T SERPL-MCNC: 0.01 NG/ML — SIGNIFICANT CHANGE UP
TROPONIN T SERPL-MCNC: 0.03 NG/ML — CRITICAL HIGH
UROBILINOGEN FLD QL: SIGNIFICANT CHANGE UP
WBC # BLD: 10.36 K/UL — SIGNIFICANT CHANGE UP (ref 4.8–10.8)
WBC # FLD AUTO: 10.36 K/UL — SIGNIFICANT CHANGE UP (ref 4.8–10.8)

## 2020-01-22 PROCEDURE — 71250 CT THORAX DX C-: CPT | Mod: 26

## 2020-01-22 PROCEDURE — 99223 1ST HOSP IP/OBS HIGH 75: CPT | Mod: AI

## 2020-01-22 RX ORDER — ENOXAPARIN SODIUM 100 MG/ML
40 INJECTION SUBCUTANEOUS DAILY
Refills: 0 | Status: DISCONTINUED | OUTPATIENT
Start: 2020-01-22 | End: 2020-01-26

## 2020-01-22 RX ORDER — BUDESONIDE AND FORMOTEROL FUMARATE DIHYDRATE 160; 4.5 UG/1; UG/1
2 AEROSOL RESPIRATORY (INHALATION)
Refills: 0 | Status: DISCONTINUED | OUTPATIENT
Start: 2020-01-22 | End: 2020-01-26

## 2020-01-22 RX ORDER — SODIUM CHLORIDE 9 MG/ML
1000 INJECTION INTRAMUSCULAR; INTRAVENOUS; SUBCUTANEOUS ONCE
Refills: 0 | Status: COMPLETED | OUTPATIENT
Start: 2020-01-22 | End: 2020-01-22

## 2020-01-22 RX ORDER — AZATHIOPRINE 100 MG/1
150 TABLET ORAL DAILY
Refills: 0 | Status: DISCONTINUED | OUTPATIENT
Start: 2020-01-22 | End: 2020-01-26

## 2020-01-22 RX ORDER — CEFTRIAXONE 500 MG/1
1000 INJECTION, POWDER, FOR SOLUTION INTRAMUSCULAR; INTRAVENOUS EVERY 24 HOURS
Refills: 0 | Status: COMPLETED | OUTPATIENT
Start: 2020-01-22 | End: 2020-01-26

## 2020-01-22 RX ORDER — CHLORHEXIDINE GLUCONATE 213 G/1000ML
1 SOLUTION TOPICAL
Refills: 0 | Status: DISCONTINUED | OUTPATIENT
Start: 2020-01-22 | End: 2020-01-26

## 2020-01-22 RX ORDER — LANOLIN ALCOHOL/MO/W.PET/CERES
10 CREAM (GRAM) TOPICAL AT BEDTIME
Refills: 0 | Status: DISCONTINUED | OUTPATIENT
Start: 2020-01-22 | End: 2020-01-26

## 2020-01-22 RX ORDER — IPRATROPIUM/ALBUTEROL SULFATE 18-103MCG
3 AEROSOL WITH ADAPTER (GRAM) INHALATION ONCE
Refills: 0 | Status: COMPLETED | OUTPATIENT
Start: 2020-01-22 | End: 2020-01-22

## 2020-01-22 RX ORDER — AZITHROMYCIN 500 MG/1
500 TABLET, FILM COATED ORAL EVERY 24 HOURS
Refills: 0 | Status: DISCONTINUED | OUTPATIENT
Start: 2020-01-22 | End: 2020-01-22

## 2020-01-22 RX ORDER — MYCOPHENOLATE MOFETIL 250 MG/1
1500 CAPSULE ORAL
Refills: 0 | Status: DISCONTINUED | OUTPATIENT
Start: 2020-01-22 | End: 2020-01-26

## 2020-01-22 RX ORDER — ACETAMINOPHEN 500 MG
650 TABLET ORAL ONCE
Refills: 0 | Status: COMPLETED | OUTPATIENT
Start: 2020-01-22 | End: 2020-01-22

## 2020-01-22 RX ADMIN — Medication 3 MILLILITER(S): at 00:26

## 2020-01-22 RX ADMIN — Medication 650 MILLIGRAM(S): at 22:33

## 2020-01-22 RX ADMIN — AZATHIOPRINE 150 MILLIGRAM(S): 100 TABLET ORAL at 11:34

## 2020-01-22 RX ADMIN — Medication 10 MILLIGRAM(S): at 22:32

## 2020-01-22 RX ADMIN — MYCOPHENOLATE MOFETIL 1500 MILLIGRAM(S): 250 CAPSULE ORAL at 17:48

## 2020-01-22 RX ADMIN — SODIUM CHLORIDE 3 MILLILITER(S): 9 INJECTION INTRAMUSCULAR; INTRAVENOUS; SUBCUTANEOUS at 05:53

## 2020-01-22 RX ADMIN — SODIUM CHLORIDE 1000 MILLILITER(S): 9 INJECTION INTRAMUSCULAR; INTRAVENOUS; SUBCUTANEOUS at 11:33

## 2020-01-22 RX ADMIN — Medication 125 MILLIGRAM(S): at 00:00

## 2020-01-22 RX ADMIN — Medication 1 TABLET(S): at 17:46

## 2020-01-22 RX ADMIN — SODIUM CHLORIDE 3 MILLILITER(S): 9 INJECTION INTRAMUSCULAR; INTRAVENOUS; SUBCUTANEOUS at 16:54

## 2020-01-22 RX ADMIN — CEFTRIAXONE 100 MILLIGRAM(S): 500 INJECTION, POWDER, FOR SOLUTION INTRAMUSCULAR; INTRAVENOUS at 05:52

## 2020-01-22 RX ADMIN — Medication 60 MILLIGRAM(S): at 17:49

## 2020-01-22 RX ADMIN — SODIUM CHLORIDE 1000 MILLILITER(S): 9 INJECTION INTRAMUSCULAR; INTRAVENOUS; SUBCUTANEOUS at 01:48

## 2020-01-22 RX ADMIN — Medication 60 MILLIGRAM(S): at 05:52

## 2020-01-22 RX ADMIN — MYCOPHENOLATE MOFETIL 1500 MILLIGRAM(S): 250 CAPSULE ORAL at 05:52

## 2020-01-22 RX ADMIN — SODIUM CHLORIDE 3 MILLILITER(S): 9 INJECTION INTRAMUSCULAR; INTRAVENOUS; SUBCUTANEOUS at 22:34

## 2020-01-22 NOTE — H&P ADULT - NSHPLABSRESULTS_GEN_ALL_CORE
12.8   7.81  )-----------( 503      ( 21 Jan 2020 18:58 )             40.2       01-21    137  |  104  |  8<L>  ----------------------------<  93  3.8   |  23  |  0.7    Ca    9.1      21 Jan 2020 18:58    TPro  7.8  /  Alb  3.8  /  TBili  <0.2  /  DBili  x   /  AST  18  /  ALT  11  /  AlkPhos  56  01-21                      Lactate Trend      CARDIAC MARKERS ( 21 Jan 2020 20:03 )  x     / x     / x     / x     / 7.2 ng/mL  CARDIAC MARKERS ( 21 Jan 2020 18:58 )  x     / 0.06 ng/mL / 237 U/L / x     / x            CAPILLARY BLOOD GLUCOSE

## 2020-01-22 NOTE — CONSULT NOTE ADULT - SUBJECTIVE AND OBJECTIVE BOX
Patient is a 39y old  Female who presents with a chief complaint of joints pain (2020 01:44)    HPI:  39 year old female with h/o dermatomyositis, Cryptogenic organizing PNA, and HLD presents to the ER for three days of cough.  patient has chronic generalized body aches, now she is presenting with worsening productive cough, associated with SOB with minimal exertion, left chest pain and sore throat. reports dysuria and foul smelling urine recently   Pt states she feels her skin is tingling, has left knee pain from a fall yesterday, and states she gained 50 pounds since August even though she is tapering from 80 to 10 mg of prednisone.   Of note, pt had recent admission with similar presentation for dermatomyositis flare.  Pt denies any Nausea, Vomiting, Diarrhea abdomen pain, rash, or neck stiffness.   Pt did not get flu shot,  No travel or sick contacts.   Her rheum (Rona) started her on a steroid and informed her that she would likely just receive her Rituximab infusion during this hospital stay (even though she is technically due in february).  follows up with Dr Rea, last visit was on 2019  In ED pt was hemodynamically stable CXR b/l opacities (2020 01:44)    PAST MEDICAL & SURGICAL HISTORY:  H/O dermatomyositis    SOCIAL HX:  never cigarette smoker    FAMILY HISTORY:  Father: sarcoidosis  Mother: fibromyalgia, RA    REVIEW OF SYSTEMS:    CONSTITUTIONAL:   no fever   no chills.  (+) weight gain     EYES:   no discharge,   no visual changes.    ENT:   Ears: no ear pain and no hearing problems.  Nose: no nasal congestion and no nasal drainage.  Mouth/Throat: no dysphagia,  no hoarseness and no throat pain.  Neck: no lumps, no pain, no stiffness and no swollen glands.    CARDIOVASCULAR:   (+) chest pain  no swelling  no palpitations  no syncope    RESPIRATORY:  (+) dyspnea on exertion  (+) productive cough  no wheezing    GASTROINTESTINAL:   no abdominal pain,   no constipation,   no diarrhea,   no vomiting.    GENITOURINARY:  (+) dysuria,   no hematuria.    MUSCULOSKELETAL:   (+) muscle pain    SKIN:   no jaundice,    no rashes.    NEURO:   no loss of consciousness,   no headache,   no weakness.    PSYCHIATRIC:   no known mental health issues    ALLERGIC/IMMUNOLOGIC:   No active allergic or immunologic issues    Allergies  iodine (Anaphylaxis)  shellfish (Anaphylaxis)    PHYSICAL EXAM  Vital Signs Last 24 Hrs  T(C): 36.9 (2020 08:12), Max: 36.9 (2020 08:12)  T(F): 98.4 (2020 08:12), Max: 98.4 (2020 08:12)  HR: 94 (2020 08:12) (84 - 107)  BP: 132/84 (2020 08:12) (112/71 - 138/96)  BP(mean): --  RR: 18 (2020 08:12) (18 - 19)  SpO2: 98% (2020 08:12) (98% - 100%)    CONSTITUTIONAL:  Well nourished.  NAD    ENT:   Airway patent,     EYES:   Clear bilaterally,   pupils equal,   round and reactive to light.    CARDIAC:   Normal rate,   regular rhythm.    no edema    CAROTID:   normal systolic impulse  no bruits    RESPIRATORY:   No wheezing  Nnormal chest expansion  Not tachypneic,  No use of accessory muscles    GASTROINTESTINAL:  Abdomen soft,   non-tender,   no guarding,   + BS    MUSCULOSKELETAL:   range of motion is not limited,  no clubbing, cyanosis    NEUROLOGICAL:   Alert and oriented   no motor deficits.  pertinent DTRs normal    SKIN:   Skin normal color for race,   No evidence of rash.    PSYCHIATRIC:   normal mood and affect.     HEME LYMPH:   No cervical  lymphadenopathy.  no inguinal lymphadenopathy    LABS:                          12.4   10.36 )-----------( 519      ( 2020 06:26 )             38.9                                                   137  |  106  |  9<L>  ----------------------------<  157<H>  4.5   |  19  |  0.7    Ca    8.9      2020 06:26    TPro  7.4  /  Alb  3.7  /  TBili  <0.2  /  DBili  x   /  AST  14  /  ALT  11  /  AlkPhos  55  -    Urinalysis Basic - ( 2020 02:44 )  Color: Light Yellow / Appearance: Clear / S.015 / pH: x  Gluc: x / Ketone: Negative  / Bili: Negative / Urobili: <2 mg/dL   Blood: x / Protein: Negative / Nitrite: Negative   Leuk Esterase: Negative / RBC: x / WBC x   Sq Epi: x / Non Sq Epi: x / Bacteria: x    CARDIAC MARKERS ( 2020 06:26 )  x     / 0.03 ng/mL / x     / x     / x      CARDIAC MARKERS ( 2020 20:03 )  x     / x     / x     / x     / 7.2 ng/mL  CARDIAC MARKERS ( 2020 18:58 )  x     / 0.06 ng/mL / 237 U/L / x     / x        LIVER FUNCTIONS - ( 2020 06:26 )  Alb: 3.7 g/dL / Pro: 7.4 g/dL / ALK PHOS: 55 U/L / ALT: 11 U/L / AST: 14 U/L / GGT: x           MEDICATIONS  (STANDING):  azaTHIOprine 150 milliGRAM(s) Oral daily  budesonide 160 MICROgram(s)/formoterol 4.5 MICROgram(s) Inhaler 2 Puff(s) Inhalation two times a day  cefTRIAXone   IVPB 1000 milliGRAM(s) IV Intermittent every 24 hours  chlorhexidine 4% Liquid 1 Application(s) Topical <User Schedule>  enoxaparin Injectable 40 milliGRAM(s) SubCutaneous daily  methylPREDNISolone sodium succinate Injectable 60 milliGRAM(s) IV Push two times a day  mycophenolate mofetil 1500 milliGRAM(s) Oral two times a day  sodium chloride 0.9% Bolus 1000 milliLiter(s) IV Bolus once  sodium chloride 0.9% lock flush 3 milliLiter(s) IV Push every 8 hours    MEDICATIONS  (PRN):  acetaminophen   Tablet .. 650 milliGRAM(s) Oral once PRN Mild Pain (1 - 3)    X-Rays reviewed:  < from: Xray Chest 1 View- PORTABLE-Urgent (20 @ 19:19) >  Impression:  Bilateral interstitial opacities, left greater than right.  < end of copied text > Patient is a 39y old  Female who presents with a chief complaint of joints pain (2020 01:44)    HPI:  39 year old female with h/o dermatomyositis, Cryptogenic organizing PNA, and HLD presents to the ER for three days of cough.  patient has chronic generalized body aches, now she is presenting with worsening productive cough, associated with SOB with minimal exertion, left chest pain and sore throat. reports dysuria and foul smelling urine recently   Pt states she feels her skin is tingling, has left knee pain from a fall yesterday, and states she gained 50 pounds since August even though she is tapering from 80 to 10 mg of prednisone.   Of note, pt had recent admission with similar presentation for dermatomyositis flare.  Pt denies any Nausea, Vomiting, Diarrhea abdomen pain, rash, or neck stiffness.   Pt did not get flu shot,  No travel or sick contacts.   Her rheum (Rona) started her on a steroid and informed her that she would likely just receive her Rituximab infusion during this hospital stay (even though she is technically due in february).  follows up with Dr Rea, last visit was on 2019  In ED pt was hemodynamically stable CXR b/l opacities (2020 01:44)    Additional History:  The patient reported to a 3-day history of a cough, dyspnea on exertion, and generalized fatigue.  She has experienced similar symptoms in the past; she was recently admitted to the hospital for a suspected dermatomyositis flare.  She denied having fever, chills, runny nose, or N/V/D.  She denied having sick contacts or recent travel.  She reported to receiving Rituximab twice last year (last in 2019) and was doing well until last December when she developed these symptoms.  Her rheumatologist had planned to resume Rituximab.    PAST MEDICAL & SURGICAL HISTORY:  H/O dermatomyositis    SOCIAL HX:  never cigarette smoker    FAMILY HISTORY:  Father: sarcoidosis  Mother: fibromyalgia, RA    REVIEW OF SYSTEMS:    CONSTITUTIONAL:   no fever   no chills.  (+) weight gain     EYES:   no discharge,   no visual changes.    ENT:   Ears: no ear pain and no hearing problems.  Nose: no nasal congestion and no nasal drainage.  Mouth/Throat: no dysphagia,  no hoarseness and no throat pain.  Neck: no lumps, no pain, no stiffness and no swollen glands.    CARDIOVASCULAR:   (+) chest pain  no swelling  no palpitations  no syncope    RESPIRATORY:  (+) dyspnea on exertion  (+) productive cough  no wheezing    GASTROINTESTINAL:   no abdominal pain,   no constipation,   no diarrhea,   no vomiting.    GENITOURINARY:  (+) dysuria,   no hematuria.    MUSCULOSKELETAL:   (+) muscle pain    SKIN:   no jaundice,    no rashes.    NEURO:   no loss of consciousness,   no headache,     PSYCHIATRIC:   no known mental health issues    ALLERGIC/IMMUNOLOGIC:   No active allergic or immunologic issues    Allergies  iodine (Anaphylaxis)  shellfish (Anaphylaxis)    PHYSICAL EXAM  Vital Signs Last 24 Hrs  T(C): 36.9 (2020 08:12), Max: 36.9 (2020 08:12)  T(F): 98.4 (2020 08:12), Max: 98.4 (2020 08:12)  HR: 94 (2020 08:12) (84 - 107)  BP: 132/84 (2020 08:12) (112/71 - 138/96)  BP(mean): --  RR: 18 (2020 08:12) (18 - 19)  SpO2: 98% (2020 08:12) (98% - 100%)    CONSTITUTIONAL:  Well nourished.  NAD    ENT:   Airway patent,     EYES:   Clear bilaterally,   pupils equal,   round and reactive to light.    CARDIAC:   Normal rate,   regular rhythm.    no edema    CAROTID:   normal systolic impulse  no bruits    RESPIRATORY:   No wheezing  Normal chest expansion  Not tachypneic,  No use of accessory muscles    GASTROINTESTINAL:  Abdomen soft,   non-tender,   no guarding,   + BS    MUSCULOSKELETAL:   range of motion is not limited,  no clubbing, cyanosis    NEUROLOGICAL:   Alert and oriented   no motor deficits.  pertinent DTRs normal    SKIN:   Skin normal color for race,   No evidence of rash.    PSYCHIATRIC:   normal mood and affect.     HEME LYMPH:   No cervical  lymphadenopathy.  no inguinal lymphadenopathy    LABS:                          12.4   10.36 )-----------( 519      ( 2020 06:26 )             38.9                                               -    137  |  106  |  9<L>  ----------------------------<  157<H>  4.5   |  19  |  0.7    Ca    8.9      2020 06:26    TPro  7.4  /  Alb  3.7  /  TBili  <0.2  /  DBili  x   /  AST  14  /  ALT  11  /  AlkPhos  55      Urinalysis Basic - ( 2020 02:44 )  Color: Light Yellow / Appearance: Clear / S.015 / pH: x  Gluc: x / Ketone: Negative  / Bili: Negative / Urobili: <2 mg/dL   Blood: x / Protein: Negative / Nitrite: Negative   Leuk Esterase: Negative / RBC: x / WBC x   Sq Epi: x / Non Sq Epi: x / Bacteria: x    CARDIAC MARKERS ( 2020 06:26 )  x     / 0.03 ng/mL / x     / x     / x      CARDIAC MARKERS ( 2020 20:03 )  x     / x     / x     / x     / 7.2 ng/mL  CARDIAC MARKERS ( 2020 18:58 )  x     / 0.06 ng/mL / 237 U/L / x     / x        LIVER FUNCTIONS - ( 2020 06:26 )  Alb: 3.7 g/dL / Pro: 7.4 g/dL / ALK PHOS: 55 U/L / ALT: 11 U/L / AST: 14 U/L / GGT: x           MEDICATIONS  (STANDING):  azaTHIOprine 150 milliGRAM(s) Oral daily  budesonide 160 MICROgram(s)/formoterol 4.5 MICROgram(s) Inhaler 2 Puff(s) Inhalation two times a day  cefTRIAXone   IVPB 1000 milliGRAM(s) IV Intermittent every 24 hours  chlorhexidine 4% Liquid 1 Application(s) Topical <User Schedule>  enoxaparin Injectable 40 milliGRAM(s) SubCutaneous daily  methylPREDNISolone sodium succinate Injectable 60 milliGRAM(s) IV Push two times a day  mycophenolate mofetil 1500 milliGRAM(s) Oral two times a day  sodium chloride 0.9% Bolus 1000 milliLiter(s) IV Bolus once  sodium chloride 0.9% lock flush 3 milliLiter(s) IV Push every 8 hours    MEDICATIONS  (PRN):  acetaminophen   Tablet .. 650 milliGRAM(s) Oral once PRN Mild Pain (1 - 3)    X-Rays reviewed:  < from: Xray Chest 1 View- PORTABLE-Urgent (20 @ 19:19) >  Impression:  Bilateral interstitial opacities, left greater than right.  < end of copied text > Patient is a 39y old  Female who presents with a chief complaint of joints pain (2020 01:44)    HPI:  39 year old female with h/o dermatomyositis, Cryptogenic organizing PNA, and HLD presents to the ER for three days of cough.  patient has chronic generalized body aches, now she is presenting with worsening productive cough, associated with SOB with minimal exertion, left chest pain and sore throat. reports dysuria and foul smelling urine recently   Pt states she feels her skin is tingling, has left knee pain from a fall yesterday, and states she gained 50 pounds since August even though she is tapering from 80 to 10 mg of prednisone.   Of note, pt had recent admission with similar presentation for dermatomyositis flare.  Pt denies any Nausea, Vomiting, Diarrhea abdomen pain, rash, or neck stiffness.   Pt did not get flu shot,  No travel or sick contacts.   Her rheum (Rona) started her on a steroid and informed her that she would likely just receive her Rituximab infusion during this hospital stay (even though she is technically due in february).  follows up with Dr Rea, last visit was on 2019  In ED pt was hemodynamically stable CXR b/l opacities (2020 01:44)    Additional History:  The patient reported to a 3-day history of a cough, dyspnea on exertion, and generalized fatigue.  She has experienced similar symptoms in the past; she was recently admitted to the hospital for a suspected dermatomyositis flare.  She denied having fever, chills, runny nose, or N/V/D.  She denied having sick contacts or recent travel.  She reported to receiving Rituximab twice last year (last in 2019) and was doing well until last December when she developed these symptoms.  Her rheumatologist had planned to resume Rituximab.    PAST MEDICAL & SURGICAL HISTORY:  H/O dermatomyositis    SOCIAL HX:  never cigarette smoker    FAMILY HISTORY:  Father: sarcoidosis  Mother: fibromyalgia, RA    REVIEW OF SYSTEMS:    CONSTITUTIONAL:   no fever   no chills.  (+) weight gain     EYES:   no discharge,   no visual changes.    ENT:   Ears: no ear pain and no hearing problems.  Nose: no nasal congestion and no nasal drainage.  Mouth/Throat: no dysphagia,  no hoarseness and no throat pain.  Neck: no lumps, no pain, no stiffness and no swollen glands.    CARDIOVASCULAR:   (+) chest pain  no swelling  no palpitations  no syncope    RESPIRATORY:  (+) dyspnea on exertion  (+) productive cough  no wheezing    GASTROINTESTINAL:   no abdominal pain,   no constipation,   no diarrhea,   no vomiting.    GENITOURINARY:  (+) dysuria,   no hematuria.    MUSCULOSKELETAL:   (+) muscle pain    SKIN:   no jaundice,    no rashes.    NEURO:   no loss of consciousness,   no headache,     PSYCHIATRIC:   no known mental health issues    ALLERGIC/IMMUNOLOGIC:   No active allergic or immunologic issues    Allergies  iodine (Anaphylaxis)  shellfish (Anaphylaxis)    PHYSICAL EXAM  Vital Signs Last 24 Hrs  T(C): 36.9 (2020 08:12), Max: 36.9 (2020 08:12)  T(F): 98.4 (2020 08:12), Max: 98.4 (2020 08:12)  HR: 94 (2020 08:12) (84 - 107)  BP: 132/84 (2020 08:12) (112/71 - 138/96)  BP(mean): --  RR: 18 (2020 08:12) (18 - 19)  SpO2: 98% (2020 08:12) (98% - 100%)    CONSTITUTIONAL:  Well nourished.  NAD    ENT:   Airway patent,     EYES:   Clear bilaterally,   pupils equal,   round and reactive to light.    CARDIAC:   Normal rate,   regular rhythm.    no edema    CAROTID:   normal systolic impulse  no bruits    RESPIRATORY:   No wheezing  Normal chest expansion  Not tachypneic,  No use of accessory muscles  mild b/l rhonchi   GASTROINTESTINAL:  Abdomen soft,   non-tender,   no guarding,   + BS    MUSCULOSKELETAL:   range of motion is not limited,  no clubbing, cyanosis    NEUROLOGICAL:   Alert and oriented   no motor deficits.  pertinent DTRs normal    SKIN:   Skin normal color for race,   No evidence of rash.    PSYCHIATRIC:   normal mood and affect.     HEME LYMPH:   No cervical  lymphadenopathy.  no inguinal lymphadenopathy    LABS:                          12.4   10.36 )-----------( 519      ( 2020 06:26 )             38.9                                                   137  |  106  |  9<L>  ----------------------------<  157<H>  4.5   |  19  |  0.7    Ca    8.9      2020 06:26    TPro  7.4  /  Alb  3.7  /  TBili  <0.2  /  DBili  x   /  AST  14  /  ALT  11  /  AlkPhos  55      Urinalysis Basic - ( 2020 02:44 )  Color: Light Yellow / Appearance: Clear / S.015 / pH: x  Gluc: x / Ketone: Negative  / Bili: Negative / Urobili: <2 mg/dL   Blood: x / Protein: Negative / Nitrite: Negative   Leuk Esterase: Negative / RBC: x / WBC x   Sq Epi: x / Non Sq Epi: x / Bacteria: x    CARDIAC MARKERS ( 2020 06:26 )  x     / 0.03 ng/mL / x     / x     / x      CARDIAC MARKERS ( 2020 20:03 )  x     / x     / x     / x     / 7.2 ng/mL  CARDIAC MARKERS ( 2020 18:58 )  x     / 0.06 ng/mL / 237 U/L / x     / x        LIVER FUNCTIONS - ( 2020 06:26 )  Alb: 3.7 g/dL / Pro: 7.4 g/dL / ALK PHOS: 55 U/L / ALT: 11 U/L / AST: 14 U/L / GGT: x           MEDICATIONS  (STANDING):  azaTHIOprine 150 milliGRAM(s) Oral daily  budesonide 160 MICROgram(s)/formoterol 4.5 MICROgram(s) Inhaler 2 Puff(s) Inhalation two times a day  cefTRIAXone   IVPB 1000 milliGRAM(s) IV Intermittent every 24 hours  chlorhexidine 4% Liquid 1 Application(s) Topical <User Schedule>  enoxaparin Injectable 40 milliGRAM(s) SubCutaneous daily  methylPREDNISolone sodium succinate Injectable 60 milliGRAM(s) IV Push two times a day  mycophenolate mofetil 1500 milliGRAM(s) Oral two times a day  sodium chloride 0.9% Bolus 1000 milliLiter(s) IV Bolus once  sodium chloride 0.9% lock flush 3 milliLiter(s) IV Push every 8 hours    MEDICATIONS  (PRN):  acetaminophen   Tablet .. 650 milliGRAM(s) Oral once PRN Mild Pain (1 - 3)    X-Rays reviewed:  < from: Xray Chest 1 View- PORTABLE-Urgent (20 @ 19:19) >  Impression:  Bilateral interstitial opacities, left greater than right.  < end of copied text >

## 2020-01-22 NOTE — H&P ADULT - NSHPPHYSICALEXAM_GEN_ALL_CORE
GENERAL: NAD, lying in bed comfortably  HEAD:  Atraumatic, Normocephalic  EYES: EOMI, PERRLA, conjunctiva and sclera clear  ENT: Moist mucous membranes  CHEST/LUNG: dec breath sounds bilateral   HEART: s1, s2  ABDOMEN: Soft, Nontender, Nondistended. No hepatomegally  EXTREMITIES:  No clubbing, cyanosis, or edema  NERVOUS SYSTEM:  Alert & Oriented X3, speech clear. No deficits   MSK: FROM all 4 extremities, full and equal strength  SKIN: No rashes or lesions

## 2020-01-22 NOTE — CONSULT NOTE ADULT - ATTENDING COMMENTS
patient seen and examined with resident case discussed agree above note   patient was here in dec 2019 different account number   cxr at that time b/l opacity which she has for more then 1 year may be worse on left this time   last couple of days has worsening weakness and SOB   she reported when she took Rituximab she felt much better for at least 6 months and since dec 2019 getting worse   continue solumedrol for now   follow rheumatology she was considering started rituximab  pan cx   ct scan no contrast for now   check NIF and VC patient seen and examined with resident case discussed agree above note   patient was here in dec 2019 different account number   cxr at that time b/l opacity which she has for more then 1 year may be worse on left this time   last couple of days has worsening weakness and SOB   she reported when she took Rituximab last year  she felt much better for at least 6 months and since dec 2019 getting worse   continue solumedrol for now   follow rheumatology she was considering started rituximab   pan cx need to r/o any underlying infection   ct scan no contrast for now   check NIF and VC

## 2020-01-22 NOTE — H&P ADULT - ATTENDING COMMENTS
**Pt has an alternate MRN ([id number]) and was recently admitted for same in December    38 YO F with a PMH of dermatomyositis, Cryptogenic organizing PNA, and HLD who presents to the hospital with a c/o generalized body aches for the past x 3 days. Associated with a rash and non-productive cough. Of note, pt had recent admission with similar presentation for dermatomyositis flare. Her rheum (Rona) started her on a steroid and informed her that she would likely just receive her Rituximab infusion during this hospital stay (even though she is technically due in february). Physical exam shows pt in NAD. VSS. CTA B/L with no W/C/R. RRR. Obese ABD that is soft and non-tender. LEs with TTP over the proximal large muscle groups. there is a mild diffuse macular rash present. Labs and radiology as above. Dermatomyositis flare. Rheum (Rona) consult. IV Steroids. ESR/CRP. Cough, viral URI? Doubt infectious process. See previous Chest X-Ray under MRN [id number]. Send Flu/RSV, procal. PRN anti-tussives. Elevated troponins. Again, this is actually her baseline. Please see previous chart. Doubt ACS. No tele monitoring. GI and DVT PPX. Rest as per above note.

## 2020-01-22 NOTE — CONSULT NOTE ADULT - ASSESSMENT
IMPRESSION:  Dermatomyositis  Cryptogenic organizing pneumonia    PLAN:  - continue steroids  - immunosuppressants as per rheumatology IMPRESSION:  Dermatomyositis  Cryptogenic organizing pneumonia    PLAN:  - continue steroids; decrease Solumedrol to 40 mg q12H  - obtain CT chest non-contrast  - check NIF and VC  - recommend rheumatology evaluation for possible initiation of Rituximab  - f/u with [facility] pulmonary clinic as scheduled  - case discussed with pulmonology team IMPRESSION:  Dermatomyositis  Cryptogenic organizing pneumonia    PLAN:  - continue steroids; decrease Solumedrol to 40 mg q12H  - obtain CT chest non-contrast  - check NIF and VC  - recommend rheumatology evaluation for possible initiation of Rituximab  - f/u with [facility] pulmonary clinic as scheduled  - case discussed with pulmonology team  see attending attestation note

## 2020-01-22 NOTE — H&P ADULT - HISTORY OF PRESENT ILLNESS
39 year old female with h/o dermatomyositis presents to the ER for 2-3 days of SOB/wheezing/cough productive of clear or green phlegm/body aches/facial pain/dysuria.   Pt states she feels her skin is tingling, has left knee pain from a fall yesterday, +sore throat, +foul smelling urine, and states she gained 50 pounds since August even though she is tapering from 80 to 10 mg of prednisone.     Pt denies any Nausea, Vomiting, Diarrhea abdomen pain, rash, neck stiffness.   Pt did not get flu shot,  No travel or sick contacts.   In ED pt was hemodynamically stable 39 year old female with h/o dermatomyositis, Cryptogenic organizing PNA, and HLD presents to the ER for three days of cough.  patient has chronic generalized body aches, now she is presenting with worsening productive cough, associated with SOB with minimal exertion, left chest pain and sore throat. reports dysuria and foul smelling urine recently   Pt states she feels her skin is tingling, has left knee pain from a fall yesterday, and states she gained 50 pounds since August even though she is tapering from 80 to 10 mg of prednisone.   Pt denies any Nausea, Vomiting, Diarrhea abdomen pain, rash, or neck stiffness.   Pt did not get flu shot,  No travel or sick contacts.   follows up with Dr Rea, last visit was on March 2019  In ED pt was hemodynamically stable CXR b/l opacities 39 year old female with h/o dermatomyositis, Cryptogenic organizing PNA, and HLD presents to the ER for three days of cough.  patient has chronic generalized body aches, now she is presenting with worsening productive cough, associated with SOB with minimal exertion, left chest pain and sore throat. reports dysuria and foul smelling urine recently   Pt states she feels her skin is tingling, has left knee pain from a fall yesterday, and states she gained 50 pounds since August even though she is tapering from 80 to 10 mg of prednisone.   Of note, pt had recent admission with similar presentation for dermatomyositis flare.  Pt denies any Nausea, Vomiting, Diarrhea abdomen pain, rash, or neck stiffness.   Pt did not get flu shot,  No travel or sick contacts.   Her rheum (Rona) started her on a steroid and informed her that she would likely just receive her Rituximab infusion during this hospital stay (even though she is technically due in february).  follows up with Dr Rea, last visit was on March 2019  In ED pt was hemodynamically stable CXR b/l opacities

## 2020-01-22 NOTE — H&P ADULT - ASSESSMENT
39 year old female with h/o dermatomyositis, Cryptogenic organizing PNA, and HLD presents to the ER for three days of cough.    # Worsening SOB and cough likely due to flare of  vs PNA or viral bronchitis.   - hemodynamically stable no signs of sepsis, not requiring oxygen support.   - CXR bilateral interstitial opacities, no previous CXR to compare.   - RVP   - c/w Symbicort  - Solumedrol 60 mg BID, hold prednisone   - pulmonary eval   - treat CAP with ceftriaxone and azithromycin, send procalcitonin  - blood and sputum cultures   - consider CT Chest     # Dysuria and foul smelling urine   - hemodynamically stable no signs of sepsis  - will send UA and U culture     # Dermatomyositis ( ALEX, AntiJo, Anti Ro positive )   - c/w Azathioprine and mycophenolate   - Dr Rea follow up   - IV steroids until further recommendations from rheum and pulm   -      # Troponemia with atypical chest pain   - no ischemic changes in EKG   - tren troponin   - telemonitoring for 24 hours     # DASH Diet   # FULL CODE  # DVT PPX 39 year old female with h/o dermatomyositis, Cryptogenic organizing PNA, and HLD presents to the ER for three days of cough.    # Worsening SOB and cough likely due to flare of   - hemodynamically stable no signs of sepsis, not requiring oxygen support.   - CXR bilateral interstitial opacities, See previous Chest X-Ray under MRN [id number]. unlikely PNA  - RVP   - c/w Symbicort  - Solumedrol 60 mg BID, hold prednisone   - pulmonary eval   - blood and sputum cultures   - consider CT Chest     # Flare of  Dermatomyositis ( ALEX, AntiJo, Anti Ro positive )   - c/w Azathioprine and mycophenolate   - Dr Rea follow up, RItuximab   - IV steroids until further recommendations from rheum and pulm   - ESR/ CRP   -      # Dysuria and foul smelling urine   - hemodynamically stable no signs of sepsis  - will send UA and U culture   - ceftriaxone    # Troponemia with atypical chest pain   - no ischemic changes in EKG   - tren troponin   - telemonitoring for 24 hours     # DASH Diet   # FULL CODE  # DVT PPX 39 year old female with h/o dermatomyositis, Cryptogenic organizing PNA, and HLD presents to the ER for three days of cough.    # Worsening SOB and cough likely due to flare ILD, H/P   # Flare of  Dermatomyositis ( ALEX, AntiJo, Anti Ro positive )   - hemodynamically stable no signs of sepsis, not requiring oxygen support.   - CXR bilateral interstitial opacities, See previous Chest X-Ray under MRN [id number]. unlikely PNA  - RVP   - c/w Symbicort  - Solumedrol 60 mg BID, hold prednisone   - pulmonary eval   - blood and sputum cultures   - c/w Azathioprine and mycophenolate, due for Rituximab in feb  - Dr Rea follow up,   - IV steroids until further recommendations from rheum  - ESR/ CRP   -      # Dysuria and foul smelling urine   - hemodynamically stable no signs of sepsis  - will send UA and U culture   - ceftriaxone    # Troponemia with atypical chest pain   - no ischemic changes in EKG   - tren troponin   - telemonitoring for 24 hours     # DASH Diet   # FULL CODE  # DVT PPX

## 2020-01-23 DIAGNOSIS — M33.91 DERMATOPOLYMYOSITIS, UNSPECIFIED WITH RESPIRATORY INVOLVEMENT: ICD-10-CM

## 2020-01-23 LAB
-  AMIKACIN: SIGNIFICANT CHANGE UP
-  AMPICILLIN/SULBACTAM: SIGNIFICANT CHANGE UP
-  AMPICILLIN: SIGNIFICANT CHANGE UP
-  AZTREONAM: SIGNIFICANT CHANGE UP
-  CEFAZOLIN: SIGNIFICANT CHANGE UP
-  CEFEPIME: SIGNIFICANT CHANGE UP
-  CEFOXITIN: SIGNIFICANT CHANGE UP
-  CEFTRIAXONE: SIGNIFICANT CHANGE UP
-  CIPROFLOXACIN: SIGNIFICANT CHANGE UP
-  GENTAMICIN: SIGNIFICANT CHANGE UP
-  IMIPENEM: SIGNIFICANT CHANGE UP
-  LEVOFLOXACIN: SIGNIFICANT CHANGE UP
-  MEROPENEM: SIGNIFICANT CHANGE UP
-  NITROFURANTOIN: SIGNIFICANT CHANGE UP
-  PIPERACILLIN/TAZOBACTAM: SIGNIFICANT CHANGE UP
-  TIGECYCLINE: SIGNIFICANT CHANGE UP
-  TOBRAMYCIN: SIGNIFICANT CHANGE UP
-  TRIMETHOPRIM/SULFAMETHOXAZOLE: SIGNIFICANT CHANGE UP
ANION GAP SERPL CALC-SCNC: 12 MMOL/L — SIGNIFICANT CHANGE UP (ref 7–14)
BASOPHILS # BLD AUTO: 0.02 K/UL — SIGNIFICANT CHANGE UP (ref 0–0.2)
BASOPHILS NFR BLD AUTO: 0.2 % — SIGNIFICANT CHANGE UP (ref 0–1)
BUN SERPL-MCNC: 10 MG/DL — SIGNIFICANT CHANGE UP (ref 10–20)
CALCIUM SERPL-MCNC: 9.5 MG/DL — SIGNIFICANT CHANGE UP (ref 8.5–10.1)
CHLORIDE SERPL-SCNC: 105 MMOL/L — SIGNIFICANT CHANGE UP (ref 98–110)
CO2 SERPL-SCNC: 20 MMOL/L — SIGNIFICANT CHANGE UP (ref 17–32)
CREAT SERPL-MCNC: 0.7 MG/DL — SIGNIFICANT CHANGE UP (ref 0.7–1.5)
CRP SERPL-MCNC: 0.3 MG/DL — SIGNIFICANT CHANGE UP (ref 0–0.4)
CULTURE RESULTS: SIGNIFICANT CHANGE UP
EOSINOPHIL # BLD AUTO: 0 K/UL — SIGNIFICANT CHANGE UP (ref 0–0.7)
EOSINOPHIL NFR BLD AUTO: 0 % — SIGNIFICANT CHANGE UP (ref 0–8)
GLUCOSE SERPL-MCNC: 135 MG/DL — HIGH (ref 70–99)
HCT VFR BLD CALC: 37.8 % — SIGNIFICANT CHANGE UP (ref 37–47)
HGB BLD-MCNC: 12.2 G/DL — SIGNIFICANT CHANGE UP (ref 12–16)
IMM GRANULOCYTES NFR BLD AUTO: 0.5 % — HIGH (ref 0.1–0.3)
LYMPHOCYTES # BLD AUTO: 0.58 K/UL — LOW (ref 1.2–3.4)
LYMPHOCYTES # BLD AUTO: 5.1 % — LOW (ref 20.5–51.1)
MAGNESIUM SERPL-MCNC: 2.1 MG/DL — SIGNIFICANT CHANGE UP (ref 1.8–2.4)
MCHC RBC-ENTMCNC: 31.7 PG — HIGH (ref 27–31)
MCHC RBC-ENTMCNC: 32.3 G/DL — SIGNIFICANT CHANGE UP (ref 32–37)
MCV RBC AUTO: 98.2 FL — SIGNIFICANT CHANGE UP (ref 81–99)
METHOD TYPE: SIGNIFICANT CHANGE UP
MONOCYTES # BLD AUTO: 0.16 K/UL — SIGNIFICANT CHANGE UP (ref 0.1–0.6)
MONOCYTES NFR BLD AUTO: 1.4 % — LOW (ref 1.7–9.3)
NEUTROPHILS # BLD AUTO: 10.51 K/UL — HIGH (ref 1.4–6.5)
NEUTROPHILS NFR BLD AUTO: 92.8 % — HIGH (ref 42.2–75.2)
NRBC # BLD: 0 /100 WBCS — SIGNIFICANT CHANGE UP (ref 0–0)
ORGANISM # SPEC MICROSCOPIC CNT: SIGNIFICANT CHANGE UP
ORGANISM # SPEC MICROSCOPIC CNT: SIGNIFICANT CHANGE UP
PLATELET # BLD AUTO: 547 K/UL — HIGH (ref 130–400)
POTASSIUM SERPL-MCNC: 4.5 MMOL/L — SIGNIFICANT CHANGE UP (ref 3.5–5)
POTASSIUM SERPL-SCNC: 4.5 MMOL/L — SIGNIFICANT CHANGE UP (ref 3.5–5)
RBC # BLD: 3.85 M/UL — LOW (ref 4.2–5.4)
RBC # FLD: 14 % — SIGNIFICANT CHANGE UP (ref 11.5–14.5)
SODIUM SERPL-SCNC: 137 MMOL/L — SIGNIFICANT CHANGE UP (ref 135–146)
SPECIMEN SOURCE: SIGNIFICANT CHANGE UP
WBC # BLD: 11.33 K/UL — HIGH (ref 4.8–10.8)
WBC # FLD AUTO: 11.33 K/UL — HIGH (ref 4.8–10.8)

## 2020-01-23 PROCEDURE — 99222 1ST HOSP IP/OBS MODERATE 55: CPT

## 2020-01-23 PROCEDURE — 99232 SBSQ HOSP IP/OBS MODERATE 35: CPT

## 2020-01-23 RX ADMIN — ENOXAPARIN SODIUM 40 MILLIGRAM(S): 100 INJECTION SUBCUTANEOUS at 11:07

## 2020-01-23 RX ADMIN — SODIUM CHLORIDE 3 MILLILITER(S): 9 INJECTION INTRAMUSCULAR; INTRAVENOUS; SUBCUTANEOUS at 21:09

## 2020-01-23 RX ADMIN — SODIUM CHLORIDE 3 MILLILITER(S): 9 INJECTION INTRAMUSCULAR; INTRAVENOUS; SUBCUTANEOUS at 13:22

## 2020-01-23 RX ADMIN — BUDESONIDE AND FORMOTEROL FUMARATE DIHYDRATE 2 PUFF(S): 160; 4.5 AEROSOL RESPIRATORY (INHALATION) at 11:07

## 2020-01-23 RX ADMIN — MYCOPHENOLATE MOFETIL 1500 MILLIGRAM(S): 250 CAPSULE ORAL at 17:01

## 2020-01-23 RX ADMIN — Medication 10 MILLIGRAM(S): at 22:25

## 2020-01-23 RX ADMIN — CEFTRIAXONE 100 MILLIGRAM(S): 500 INJECTION, POWDER, FOR SOLUTION INTRAMUSCULAR; INTRAVENOUS at 05:05

## 2020-01-23 RX ADMIN — MYCOPHENOLATE MOFETIL 1500 MILLIGRAM(S): 250 CAPSULE ORAL at 05:06

## 2020-01-23 RX ADMIN — Medication 40 MILLIGRAM(S): at 05:06

## 2020-01-23 RX ADMIN — SODIUM CHLORIDE 3 MILLILITER(S): 9 INJECTION INTRAMUSCULAR; INTRAVENOUS; SUBCUTANEOUS at 05:05

## 2020-01-23 RX ADMIN — AZATHIOPRINE 150 MILLIGRAM(S): 100 TABLET ORAL at 11:07

## 2020-01-23 NOTE — PROGRESS NOTE ADULT - ASSESSMENT
IMPRESSION:  Dermatomyositis  Cryptogenic organizing pneumonia    PLAN:  can switch to prednisone 60 mg and taper by 20 mg every 3rd day to her baseline steroids   follow rheumatology recommendation  follow with Dr Avery in 2 weeks  at [facility] clinic has  appointment   symbicort Q 12 hrs   proair as needed   fo now nebulizer Q 4 hrs   recall prn

## 2020-01-23 NOTE — CONSULT NOTE ADULT - ASSESSMENT
40 YO F with a PMH of dermatomyositis, Cryptogenic organizing PNA, and HLD who presents to the hospital with a c/o generalized body aches for the past x 3 days. Associated with a rash and non-productive cough. Of note, pt had recent admission with similar presentation for dermatomyositis flare. Her rheum (Rona) started her on a steroid and informed her that she would likely just receive her Rituximab infusion during this hospital stay (even though she is technically due in february). Endocrine was consulted for a possible dermatomyositis flare up.     #) Dermatomyositis  -on solumedrol 40mg IV q12h (started 01/22)  -taper steroids  -OP rheum f/u        *WILL DISCUSS WITH ATTENDING 38 YO F with a PMH of dermatomyositis, Cryptogenic organizing PNA, and HLD who presents to the hospital with a c/o generalized body aches for the past x 3 days. Associated with a rash and non-productive cough. Of note, pt had recent admission with similar presentation for dermatomyositis flare. Her rheum (Litvin) started her on a steroid and informed her that she would likely just receive her Rituximab infusion during this hospital stay (even though she is technically due in february). Endocrine was consulted for a possible dermatomyositis flare up.     #) Dermatomyositis  -on solumedrol 40mg IV q12h (started 01/22)  -taper steroids as pulm symptoms allow  -continue outpatientAZA/MMF  -consult hematology for assistance with rtx infusion

## 2020-01-23 NOTE — PROGRESS NOTE ADULT - SUBJECTIVE AND OBJECTIVE BOX
JESSI KING MRN-[id number]    Hospitalist Note  38yo F with Past Medical History Dermatomyositis, Cryptogenic organizing PNA, and hypercholesteremia admitted to [facility] for x3d history of cough associated with myalgias.     Overnight events/Updates: The patient complains of diffuse myalgias.  Rheumatology agreed to inpatient treatment with Rituxan.    Vital Signs Last 24 Hrs  T(C): 36.4 (23 Jan 2020 12:17), Max: 36.4 (23 Jan 2020 12:17)  T(F): 97.5 (23 Jan 2020 12:17), Max: 97.5 (23 Jan 2020 12:17)  HR: 80 (23 Jan 2020 12:17) (72 - 102)  BP: 124/75 (23 Jan 2020 12:17) (122/66 - 133/80)  BP(mean): --  RR: 18 (23 Jan 2020 12:17) (18 - 18)  SpO2: --    Physical Examination:  General: AAO x 3  HEENT: PERRLA, EOMI  CV= S1 & S2 appreciated  Lungs=CTA BL  Abdominal Examination= + BS, Soft, NT/ND  Extremity Examination= diffuse pain    ROS: No chest pain, no shortness of breath.  All other systems reviewed and are within normal limits except for the complaints in the HPI.    MEDICATIONS  (STANDING):  azaTHIOprine 150 milliGRAM(s) Oral daily  budesonide 160 MICROgram(s)/formoterol 4.5 MICROgram(s) Inhaler 2 Puff(s) Inhalation two times a day  cefTRIAXone   IVPB 1000 milliGRAM(s) IV Intermittent every 24 hours  chlorhexidine 4% Liquid 1 Application(s) Topical <User Schedule>  enoxaparin Injectable 40 milliGRAM(s) SubCutaneous daily  melatonin 10 milliGRAM(s) Oral at bedtime  mycophenolate mofetil 1500 milliGRAM(s) Oral two times a day  sodium chloride 0.9% lock flush 3 milliLiter(s) IV Push every 8 hours  trimethoprim  160 mG/sulfamethoxazole 800 mG 1 Tablet(s) Oral <User Schedule>    MEDICATIONS  (PRN):                            12.2   11.33 )-----------( 547      ( 23 Jan 2020 07:57 )             37.8     01-23    137  |  105  |  10  ----------------------------<  135<H>  4.5   |  20  |  0.7    Ca    9.5      23 Jan 2020 07:57  Mg     2.1     01-23    TPro  7.4  /  Alb  3.7  /  TBili  <0.2  /  DBili  x   /  AST  14  /  ALT  11  /  AlkPhos  55  01-22      Case discussed with housestaff & family  BARRY Antonio [id number]

## 2020-01-23 NOTE — PROGRESS NOTE ADULT - SUBJECTIVE AND OBJECTIVE BOX
Patient is a 39y old  Female who presents with a chief complaint of joints pain (23 Jan 2020 11:28)      INTERVAL HISTORY/overnight events  feel better   ct scan reviewed if you compare to previous ct scan patient has differnet medical record number stable to mild improve from 2018/2019        Vital Signs Last 24 Hrs  T(C): 36.4 (23 Jan 2020 12:17), Max: 36.4 (23 Jan 2020 12:17)  T(F): 97.5 (23 Jan 2020 12:17), Max: 97.5 (23 Jan 2020 12:17)  HR: 80 (23 Jan 2020 12:17) (72 - 102)  BP: 124/75 (23 Jan 2020 12:17) (122/66 - 133/80)  BP(mean): --  RR: 18 (23 Jan 2020 12:17) (18 - 18)  SpO2: --  Daily Height in cm: 162.56 (22 Jan 2020 18:37)    Daily   I&O's Summary      Physical Examination:    General: No acute distress.  Alert, oriented, interactive, nonfocal    HEENT: Pupils equal, reactive to light.  Symmetric.    PULM: mild wheeze     CVS: Regular rate and rhythm, no murmurs, rubs, or gallops    ABD: Soft, nondistended, nontender, normoactive bowel sounds, no masses    EXT: No edema, nontender    SKIN: Warm and well perfused, no rashes noted.    Neurology: no focal deficit     Musculoskeletal : Move all extremity         Lab Results:                        12.2   11.33 )-----------( 547      ( 23 Jan 2020 07:57 )             37.8     23 Jan 2020 07:57    137    |  105    |  10     ----------------------------<  135    4.5     |  20     |  0.7      Ca    9.5        23 Jan 2020 07:57  Mg     2.1       23 Jan 2020 07:57          CARDIAC MARKERS ( 22 Jan 2020 12:01 )  x     / 0.01 ng/mL / x     / x     / x      CARDIAC MARKERS ( 22 Jan 2020 06:26 )  x     / 0.03 ng/mL / x     / x     / x      CARDIAC MARKERS ( 21 Jan 2020 20:03 )  x     / x     / x     / x     / 7.2 ng/mL  CARDIAC MARKERS ( 21 Jan 2020 18:58 )  x     / 0.06 ng/mL / 237 U/L / x     / x            Microbiology    Culture - Blood (collected 22 Jan 2020 06:26)  Source: .Blood None  Preliminary Report (23 Jan 2020 14:01):    No growth to date.    Culture - Blood (collected 22 Jan 2020 06:26)  Source: .Blood None  Preliminary Report (23 Jan 2020 14:01):    No growth to date.    Culture - Urine (collected 21 Jan 2020 02:44)  Source: .Urine Clean Catch (Midstream)  Preliminary Report (23 Jan 2020 01:56):    >100,000 CFU/ml Gram Negative Rods        Medication:  MEDICATIONS  (STANDING):  azaTHIOprine 150 milliGRAM(s) Oral daily  budesonide 160 MICROgram(s)/formoterol 4.5 MICROgram(s) Inhaler 2 Puff(s) Inhalation two times a day  cefTRIAXone   IVPB 1000 milliGRAM(s) IV Intermittent every 24 hours  chlorhexidine 4% Liquid 1 Application(s) Topical <User Schedule>  enoxaparin Injectable 40 milliGRAM(s) SubCutaneous daily  melatonin 10 milliGRAM(s) Oral at bedtime  mycophenolate mofetil 1500 milliGRAM(s) Oral two times a day  sodium chloride 0.9% lock flush 3 milliLiter(s) IV Push every 8 hours  trimethoprim  160 mG/sulfamethoxazole 800 mG 1 Tablet(s) Oral <User Schedule>    MEDICATIONS  (PRN):        IMAGING STUDIES:

## 2020-01-23 NOTE — PROGRESS NOTE ADULT - ASSESSMENT
40yo F with Past Medical History Dermatomyositis, Cryptogenic organizing PNA, and hypercholesteremia admitted to [facility] for x3d history of cough associated with myalgias.     Dermatomyositis: Rheumatology recommendations were reviewed; continue Mycophenolate and AZA as directed.  Will arrange for treatment with methotrexate as inpatient.  Follow-up with Dr. Rea as outpatient  History of Cryptogenic PNA:  results from CT Chest (1/22/20) were reviewed.  Interstitial lung disease vs. cryptogenic pneumonia was noted.  Pulmonology follow-up was appreciated; initiate steroid taper.  Continue Symbicort 160mcg/4.5mcg INH q12h  Dysuria rule out acute cystitis: consider discontinuing abx if symptoms resolve  GI/DVT prophylaxis  PT/Rehab evaluation prior to discharge    #Progress Note Handoff    Pending:  Consults: PT/Rehab evaluation  Other: Rituxan infusion as inpatient    Future Disposition: Home 40yo F with Past Medical History Dermatomyositis, Cryptogenic organizing PNA, and hypercholesteremia admitted to [facility] for x3d history of cough associated with myalgias.     Dermatomyositis: Rheumatology recommendations were reviewed; continue Mycophenolate and AZA as directed.  Will arrange for treatment with Rituxan as inpatient.  Follow-up with Dr. Rea as outpatient  History of Cryptogenic PNA:  results from CT Chest (1/22/20) were reviewed.  Interstitial lung disease vs. cryptogenic pneumonia was noted.  Pulmonology follow-up was appreciated; initiate steroid taper.  Continue Symbicort 160mcg/4.5mcg INH q12h  Dysuria rule out acute cystitis: consider discontinuing abx if symptoms resolve  GI/DVT prophylaxis  PT/Rehab evaluation prior to discharge    #Progress Note Handoff    Pending:  Consults: PT/Rehab evaluation  Other: Rituxan infusion as inpatient    Future Disposition: Home

## 2020-01-23 NOTE — PROGRESS NOTE ADULT - SUBJECTIVE AND OBJECTIVE BOX
Hospital Day:  1d    Subjective:    Patient is a 39y old  Female who presents with a chief complaint of joints pain (2020 09:23)      Past Medical Hx:   H/O dermatomyositis    Past Sx:    Allergies:  iodine (Anaphylaxis)  shellfish (Anaphylaxis)    Current Meds:   Standng Meds:  azaTHIOprine 150 milliGRAM(s) Oral daily  budesonide 160 MICROgram(s)/formoterol 4.5 MICROgram(s) Inhaler 2 Puff(s) Inhalation two times a day  cefTRIAXone   IVPB 1000 milliGRAM(s) IV Intermittent every 24 hours  chlorhexidine 4% Liquid 1 Application(s) Topical <User Schedule>  enoxaparin Injectable 40 milliGRAM(s) SubCutaneous daily  melatonin 10 milliGRAM(s) Oral at bedtime  methylPREDNISolone sodium succinate Injectable 40 milliGRAM(s) IV Push every 12 hours  mycophenolate mofetil 1500 milliGRAM(s) Oral two times a day  sodium chloride 0.9% lock flush 3 milliLiter(s) IV Push every 8 hours  trimethoprim  160 mG/sulfamethoxazole 800 mG 1 Tablet(s) Oral <User Schedule>    PRN Meds:    HOME MEDICATIONS:  predniSONE 10 mg oral tablet: 1 tab(s) orally once a day      Vital Signs:   T(F): 96.1 (20 @ 04:47), Max: 96.1 (20 @ 18:37)  HR: 72 (20 @ 04:47) (72 - 102)  BP: 122/66 (20 @ 04:47) (122/66 - 133/80)  RR: 18 (20 @ 04:47) (18 - 18)  SpO2: --        Physical Exam:   GENERAL: NAD  HEENT: NCAT  CHEST/LUNG: CTAB  HEART: Regular rate and rhythm; s1 s2 appreciated, No murmurs, rubs, or gallops  ABDOMEN: Soft, Nontender, Nondistended; Bowel sounds present  EXTREMITIES: No LE edema b/l  NERVOUS SYSTEM:  Alert & Oriented X3        Labs:                         12.2   11.33 )-----------( 547      ( 2020 07:57 )             37.8     Neutophil% 92.8, Lymphocyte% 5.1, Monocyte% 1.4, Bands% 0.5 20 @ 07:57    2020 07:57    137    |  105    |  10     ----------------------------<  135    4.5     |  20     |  0.7      Ca    9.5        2020 07:57  Mg     2.1       2020 07:57    TPro  7.4    /  Alb  3.7    /  TBili  <0.2   /  DBili  x      /  AST  14     /  ALT  11     /  AlkPhos  55     2020 06:26            Serum Pro-Brain Natriuretic Peptide: 91 pg/mL (20 @ 18:58)    Troponin 0.01, CKMB --, CK -- 20 @ 12:01  Troponin 0.03, CKMB --, CK -- 20 @ 06:26  Troponin --, CKMB 7.2, CK -- 20 @ 20:03  Troponin 0.06, CKMB --,  20 @ 18:58    Urinalysis Basic - ( 2020 02:44 )    Color: Light Yellow / Appearance: Clear / S.015 / pH: x  Gluc: x / Ketone: Negative  / Bili: Negative / Urobili: <2 mg/dL   Blood: x / Protein: Negative / Nitrite: Negative   Leuk Esterase: Negative / RBC: x / WBC x   Sq Epi: x / Non Sq Epi: x / Bacteria: x    Radiology:   CT Chest No Cont (20 @ 22:05)  IMPRESSION:    Findings may be indicative of nonspecific interstitial lung disease versus organizing pneumonia. No bronchiectasis or honeycombing.    Cardiomegaly.    Assessment and Plan: Hospital Day:  1d    Subjective:    Patient is a 39y old  Female who presents with a chief complaint of joints pain (2020 09:23)    No overnight events. Seen and examined at bedside. The patient reported no acute complaints at time of interview. Stated no shortness of breath and feeling well today. Pending for Rheumatology consult. Remainder of review of systems otherwise negative.     Past Medical Hx:   H/O dermatomyositis    Past Sx:    Allergies:  iodine (Anaphylaxis)  shellfish (Anaphylaxis)    Current Meds:   Standng Meds:  azaTHIOprine 150 milliGRAM(s) Oral daily  budesonide 160 MICROgram(s)/formoterol 4.5 MICROgram(s) Inhaler 2 Puff(s) Inhalation two times a day  cefTRIAXone   IVPB 1000 milliGRAM(s) IV Intermittent every 24 hours  chlorhexidine 4% Liquid 1 Application(s) Topical <User Schedule>  enoxaparin Injectable 40 milliGRAM(s) SubCutaneous daily  melatonin 10 milliGRAM(s) Oral at bedtime  methylPREDNISolone sodium succinate Injectable 40 milliGRAM(s) IV Push every 12 hours  mycophenolate mofetil 1500 milliGRAM(s) Oral two times a day  sodium chloride 0.9% lock flush 3 milliLiter(s) IV Push every 8 hours  trimethoprim  160 mG/sulfamethoxazole 800 mG 1 Tablet(s) Oral <User Schedule>    PRN Meds:    HOME MEDICATIONS:  predniSONE 10 mg oral tablet: 1 tab(s) orally once a day      Vital Signs:   T(F): 96.1 (20 @ 04:47), Max: 96.1 (20 @ 18:37)  HR: 72 (20 @ 04:47) (72 - 102)  BP: 122/66 (20 @ 04:47) (122/66 - 133/80)  RR: 18 (20 @ 04:47) (18 - 18)  SpO2: --        Physical Exam:   GENERAL: NAD, obese female appearing stated age, pleasant and conversive  HEENT: NCAT, PERRLA, EOMI  CHEST/LUNG: Clear to auscultation bilaterally, no wheezing, rhonchi, rales  HEART: Regular rate and rhythm; s1 s2 appreciated, No murmurs, rubs, or gallops  ABDOMEN: Soft, Nontender, Nondistended; Bowel sounds present  EXTREMITIES: No LE edema b/l, Peripheral pulses 2+, No cyanosis, no clubbinb  NERVOUS SYSTEM:  Alert & Oriented X3, Non focal    Labs:                         12.2   11.33 )-----------( 547      ( 2020 07:57 )             37.8     Neutophil% 92.8, Lymphocyte% 5.1, Monocyte% 1.4, Bands% 0.5 20 @ 07:57    2020 07:57    137    |  105    |  10     ----------------------------<  135    4.5     |  20     |  0.7      Ca    9.5        2020 07:57  Mg     2.1       2020 07:57    TPro  7.4    /  Alb  3.7    /  TBili  <0.2   /  DBili  x      /  AST  14     /  ALT  11     /  AlkPhos  55     2020 06:26    Serum Pro-Brain Natriuretic Peptide: 91 pg/mL (20 @ 18:58)    Troponin 0.01, CKMB --, CK -- 20 @ 12:01  Troponin 0.03, CKMB --, CK -- 20 @ 06:26  Troponin --, CKMB 7.2, CK -- 20 @ 20:03  Troponin 0.06, CKMB --,  20 @ 18:58    Urinalysis Basic - ( 2020 02:44 )    Color: Light Yellow / Appearance: Clear / S.015 / pH: x  Gluc: x / Ketone: Negative  / Bili: Negative / Urobili: <2 mg/dL   Blood: x / Protein: Negative / Nitrite: Negative   Leuk Esterase: Negative / RBC: x / WBC x   Sq Epi: x / Non Sq Epi: x / Bacteria: x    Radiology:   CT Chest No Cont (20 @ 22:05)  IMPRESSION:    Findings may be indicative of nonspecific interstitial lung disease versus organizing pneumonia. No bronchiectasis or honeycombing.    Cardiomegaly.    Assessment and Plan:   This is a 39 year old female with PMHx of dermatomyositis, cryptogenic organizing pneumonia and HLD who presented nabila the ER for three days of coughing and chronic body aches     #Shortness of breath, coughing and body aches secondary to ILD/Dermatomyositis flare  - RVP negative  - CXR noted for bilateral opacities and noted from prior MRN to be overall unchanged   - CT non contrast noted nonspecific interstitial lung disease   - Taper Steroids; will convert to prednisone starting tomorrow  - Rheumatology consulted: awaiting recs and if planning on rituximab dose. Patient is due in February  - Urine culture noted for >100k CFU with a negative urinalysis   - ESR 50 and CRP 0.3 and   - Continue on Azathioprine and Mycophenolate     #Dysuria with foul smelling urine  - UA negative and Urine culture noted >100k CFU  - Continue on CTX for now    #Troponinemia and atypical chest pain (atypical)  - EKG negative  - troponin downtrended and now negative    Activity: As tolerated  Diet: DASH  DVT ppx: Lovenox   GI ppx: Protonix  Code Status: Full Code  DISPO: From home; pending rheumatology for rituximab Hospital Day:  1d    Subjective:    Patient is a 39y old  Female who presents with a chief complaint of joints pain (2020 09:23)    No overnight events. Seen and examined at bedside. The patient reported no acute complaints at time of interview. Stated no shortness of breath and feeling well today. Pending for Rheumatology consult. Remainder of review of systems otherwise negative.     Past Medical Hx:   H/O dermatomyositis    Past Sx:    Allergies:  iodine (Anaphylaxis)  shellfish (Anaphylaxis)    Current Meds:   Standng Meds:  azaTHIOprine 150 milliGRAM(s) Oral daily  budesonide 160 MICROgram(s)/formoterol 4.5 MICROgram(s) Inhaler 2 Puff(s) Inhalation two times a day  cefTRIAXone   IVPB 1000 milliGRAM(s) IV Intermittent every 24 hours  chlorhexidine 4% Liquid 1 Application(s) Topical <User Schedule>  enoxaparin Injectable 40 milliGRAM(s) SubCutaneous daily  melatonin 10 milliGRAM(s) Oral at bedtime  methylPREDNISolone sodium succinate Injectable 40 milliGRAM(s) IV Push every 12 hours  mycophenolate mofetil 1500 milliGRAM(s) Oral two times a day  sodium chloride 0.9% lock flush 3 milliLiter(s) IV Push every 8 hours  trimethoprim  160 mG/sulfamethoxazole 800 mG 1 Tablet(s) Oral <User Schedule>    PRN Meds:    HOME MEDICATIONS:  predniSONE 10 mg oral tablet: 1 tab(s) orally once a day      Vital Signs:   T(F): 96.1 (20 @ 04:47), Max: 96.1 (20 @ 18:37)  HR: 72 (20 @ 04:47) (72 - 102)  BP: 122/66 (20 @ 04:47) (122/66 - 133/80)  RR: 18 (20 @ 04:47) (18 - 18)  SpO2: --        Physical Exam:   GENERAL: NAD, obese female appearing stated age, pleasant and conversive  HEENT: NCAT, PERRLA, EOMI  CHEST/LUNG: Clear to auscultation bilaterally, no wheezing, rhonchi, rales  HEART: Regular rate and rhythm; s1 s2 appreciated, No murmurs, rubs, or gallops  ABDOMEN: Soft, Nontender, Nondistended; Bowel sounds present  EXTREMITIES: No LE edema b/l, Peripheral pulses 2+, No cyanosis, no clubbinb  NERVOUS SYSTEM:  Alert & Oriented X3, Non focal    Labs:                         12.2   11.33 )-----------( 547      ( 2020 07:57 )             37.8     Neutophil% 92.8, Lymphocyte% 5.1, Monocyte% 1.4, Bands% 0.5 20 @ 07:57    2020 07:57    137    |  105    |  10     ----------------------------<  135    4.5     |  20     |  0.7      Ca    9.5        2020 07:57  Mg     2.1       2020 07:57    TPro  7.4    /  Alb  3.7    /  TBili  <0.2   /  DBili  x      /  AST  14     /  ALT  11     /  AlkPhos  55     2020 06:26    Serum Pro-Brain Natriuretic Peptide: 91 pg/mL (20 @ 18:58)    Troponin 0.01, CKMB --, CK -- 20 @ 12:01  Troponin 0.03, CKMB --, CK -- 20 @ 06:26  Troponin --, CKMB 7.2, CK -- 20 @ 20:03  Troponin 0.06, CKMB --,  20 @ 18:58    Urinalysis Basic - ( 2020 02:44 )    Color: Light Yellow / Appearance: Clear / S.015 / pH: x  Gluc: x / Ketone: Negative  / Bili: Negative / Urobili: <2 mg/dL   Blood: x / Protein: Negative / Nitrite: Negative   Leuk Esterase: Negative / RBC: x / WBC x   Sq Epi: x / Non Sq Epi: x / Bacteria: x    Radiology:   CT Chest No Cont (20 @ 22:05)  IMPRESSION:    Findings may be indicative of nonspecific interstitial lung disease versus organizing pneumonia. No bronchiectasis or honeycombing.    Cardiomegaly.    Assessment and Plan:   This is a 39 year old female with PMHx of dermatomyositis, cryptogenic organizing pneumonia and HLD who presented nabila the ER for three days of coughing and chronic body aches     #Shortness of breath, coughing and body aches secondary to ILD/Dermatomyositis flare  - RVP negative  - CXR noted for bilateral opacities and noted from prior MRN to be overall unchanged   - CT non contrast noted nonspecific interstitial lung disease   - Taper Steroids; will convert to prednisone starting tomorrow  - Rheumatology consulted: advised to consult Heme/Onc for rituximab treatment  - Urine culture noted for >100k CFU with a negative urinalysis   - ESR 50 and CRP 0.3 and   - Continue on Azathioprine and Mycophenolate     #Dysuria with foul smelling urine  - UA negative and Urine culture noted >100k CFU  - Continue on CTX for now    #Troponinemia and atypical chest pain (atypical)  - EKG negative  - troponin downtrended and now negative    Activity: As tolerated  Diet: DASH  DVT ppx: Lovenox   GI ppx: Protonix  Code Status: Full Code  DISPO: From home; pending rheumatology for rituximab

## 2020-01-23 NOTE — CONSULT NOTE ADULT - ATTENDING COMMENTS
38 yo F with PMH dermatomyositis (classic skin findings at dx, Oksana-1 +,  by bx) who was admitted with generalized myalgias and progressive cough with associated exertional SOB. Recently hospitalized briefly in December 2019 (under different MRN) with myalgias not suspected to have DM flare at the time – did not follow up with her outpatient rheumatologist Dr. Rea I scheduled for her on 12/19 at 4:20PM (chronic non-compliance with outpatient follow up noted). She's received AZA, MMF, RTX, and steroids for DM management. She had one course of RTX 1000 mg x 2 infusions starting 2/2019 which significantly controlled her symptoms and has stayed on  mg daily and MMF 1500 mg BID. She has required varying levels of steroids throughout her DM dx. Her CK this admission is the lowest it has ever been, her ESR is lower than prior/CRP is normal, her exam/labs are not consistent with active DM flare from a cutaneous/myositis perspective, however her worsened pulmonary symptoms are unclear. No clear infectious process. Her RVP was negative, her CT chest did not have new findings by description (although in read not compared to prior likely because of different MRN). She has not had outpatient PFTs in some time. She is being trialed per pulmonology on high dose steroids - defer to them for steroids for pulmonary symptoms. When able to taper back to home dose prednisone 10 mg daily would do so. Discussed pt’s new hospitalization with her outpatient rheumatologist Dr. Rea who would like her to receive RTX while inpatient given challenges with outpatient follow up – discussed with primary team for hematology consultation and arrangement of RTX 1000 mg IV inpatient followed by 1000 mg as an outpatient in 2 weeks (follow RA protocol). May continue AZA, MMF at this time with plan for outpatient maintenance medication adjustment once RTX is given. Recommend PT/OT evaluation/treatment – I wonder if she’d benefit from pulm rehab since per history her pulmonary function limits her more than MSK limitations. Again stressed importance of outpatient follow up – already had outpatient follow up scheduled with Dr. Rea again next week. Should maintain age-appropriate malignancy screenings and immunizations. Please call with any additional questions/concerns.

## 2020-01-23 NOTE — CONSULT NOTE ADULT - SUBJECTIVE AND OBJECTIVE BOX
SUBJECTIVE:    Patient is a 39y old Female who presents with a chief complaint of joints pain (22 Jan 2020 11:34)    Currently admitted to medicine with the primary diagnosis of Dermatomyositis with respiratory involvement     Today is hospital day 1d. This morning she is resting comfortably in bed and reports no new issues or overnight events.     PAST MEDICAL & SURGICAL HISTORY  H/O dermatomyositis    SOCIAL HISTORY:  Negative for smoking/alcohol/drug use.     ALLERGIES:  iodine (Anaphylaxis)  shellfish (Anaphylaxis)    MEDICATIONS:  STANDING MEDICATIONS  azaTHIOprine 150 milliGRAM(s) Oral daily  budesonide 160 MICROgram(s)/formoterol 4.5 MICROgram(s) Inhaler 2 Puff(s) Inhalation two times a day  cefTRIAXone   IVPB 1000 milliGRAM(s) IV Intermittent every 24 hours  chlorhexidine 4% Liquid 1 Application(s) Topical <User Schedule>  enoxaparin Injectable 40 milliGRAM(s) SubCutaneous daily  melatonin 10 milliGRAM(s) Oral at bedtime  methylPREDNISolone sodium succinate Injectable 40 milliGRAM(s) IV Push every 12 hours  mycophenolate mofetil 1500 milliGRAM(s) Oral two times a day  sodium chloride 0.9% lock flush 3 milliLiter(s) IV Push every 8 hours  trimethoprim  160 mG/sulfamethoxazole 800 mG 1 Tablet(s) Oral <User Schedule>    PRN MEDICATIONS    VITALS:   T(F): 96.1  HR: 72  BP: 122/66  RR: 18  SpO2: --    LABS:                        12.2   11.33 )-----------( 547      ( 23 Jan 2020 07:57 )             37.8     01-23    137  |  105  |  10  ----------------------------<  135<H>  4.5   |  20  |  0.7    Ca    9.5      23 Jan 2020 07:57  Mg     2.1     01-23    TPro  7.4  /  Alb  3.7  /  TBili  <0.2  /  DBili  x   /  AST  14  /  ALT  11  /  AlkPhos  55  01-22          Troponin T, Serum: 0.01 ng/mL (01-22-20 @ 12:01)  Sedimentation Rate, Erythrocyte: 50 mm/Hr <H> (01-22-20 @ 12:01)  Lactate, Blood: 2.3 mmol/L <H> (01-22-20 @ 12:01)      Culture - Urine (collected 21 Jan 2020 02:44)  Source: .Urine Clean Catch (Midstream)  Preliminary Report (23 Jan 2020 01:56):    >100,000 CFU/ml Gram Negative Rods      CARDIAC MARKERS ( 22 Jan 2020 12:01 )  x     / 0.01 ng/mL / x     / x     / x      CARDIAC MARKERS ( 22 Jan 2020 06:26 )  x     / 0.03 ng/mL / x     / x     / x      CARDIAC MARKERS ( 21 Jan 2020 20:03 )  x     / x     / x     / x     / 7.2 ng/mL  CARDIAC MARKERS ( 21 Jan 2020 18:58 )  x     / 0.06 ng/mL / 237 U/L / x     / x          RADIOLOGY:    PHYSICAL EXAM:  GEN: No acute distress  LUNGS: Clear to auscultation bilaterally   HEART: S1/S2 present. RRR.   ABD: Soft, non-tender, non-distended. Bowel sounds present  EXT: NC/NC/NE/2+PP/FRANKLIN  NEURO: AAOX3 SUBJECTIVE:    Patient is a 39y old Female who presents with a chief complaint of joints pain (22 Jan 2020 11:34)    Currently admitted to medicine with the primary diagnosis of Dermatomyositis with respiratory involvement     Today is hospital day 1d. This morning she is resting comfortably in bed and reports no new issues or overnight events.     PAST MEDICAL & SURGICAL HISTORY  H/O dermatomyositis    SOCIAL HISTORY:  Negative for smoking/alcohol/drug use.     ALLERGIES:  iodine (Anaphylaxis)  shellfish (Anaphylaxis)    MEDICATIONS:  STANDING MEDICATIONS  azaTHIOprine 150 milliGRAM(s) Oral daily  budesonide 160 MICROgram(s)/formoterol 4.5 MICROgram(s) Inhaler 2 Puff(s) Inhalation two times a day  cefTRIAXone   IVPB 1000 milliGRAM(s) IV Intermittent every 24 hours  chlorhexidine 4% Liquid 1 Application(s) Topical <User Schedule>  enoxaparin Injectable 40 milliGRAM(s) SubCutaneous daily  melatonin 10 milliGRAM(s) Oral at bedtime  methylPREDNISolone sodium succinate Injectable 40 milliGRAM(s) IV Push every 12 hours  mycophenolate mofetil 1500 milliGRAM(s) Oral two times a day  sodium chloride 0.9% lock flush 3 milliLiter(s) IV Push every 8 hours  trimethoprim  160 mG/sulfamethoxazole 800 mG 1 Tablet(s) Oral <User Schedule>    PRN MEDICATIONS    VITALS:   T(F): 96.1  HR: 72  BP: 122/66  RR: 18  SpO2: --    LABS:                        12.2   11.33 )-----------( 547      ( 23 Jan 2020 07:57 )             37.8     01-23    137  |  105  |  10  ----------------------------<  135<H>  4.5   |  20  |  0.7    Ca    9.5      23 Jan 2020 07:57  Mg     2.1     01-23    TPro  7.4  /  Alb  3.7  /  TBili  <0.2  /  DBili  x   /  AST  14  /  ALT  11  /  AlkPhos  55  01-22          Troponin T, Serum: 0.01 ng/mL (01-22-20 @ 12:01)  Sedimentation Rate, Erythrocyte: 50 mm/Hr <H> (01-22-20 @ 12:01)  Lactate, Blood: 2.3 mmol/L <H> (01-22-20 @ 12:01)      Culture - Urine (collected 21 Jan 2020 02:44)  Source: .Urine Clean Catch (Midstream)  Preliminary Report (23 Jan 2020 01:56):    >100,000 CFU/ml Gram Negative Rods      CARDIAC MARKERS ( 22 Jan 2020 12:01 )  x     / 0.01 ng/mL / x     / x     / x      CARDIAC MARKERS ( 22 Jan 2020 06:26 )  x     / 0.03 ng/mL / x     / x     / x      CARDIAC MARKERS ( 21 Jan 2020 20:03 )  x     / x     / x     / x     / 7.2 ng/mL  CARDIAC MARKERS ( 21 Jan 2020 18:58 )  x     / 0.06 ng/mL / 237 U/L / x     / x          RADIOLOGY:    PHYSICAL EXAM:  GEN: No acute distress, appears fatigued  LUNGS: Mildly decreased at bilateral bases  HEART: S1/S2 present. RRR  ABD: Soft, non-tender, non-distended. Bowel sounds present  EXT: No active synovitis or joint effusions, no joint tenderness or deformities. No peripheral edema  NEURO: Strength testing somewhat limited on exam (unclear if related to effort vs ability in the setting of current fatigue) proximal BUE biceps/triceps/deltoid 4/5, proximal hip flexors 4/5, normal active flexion of cervical spine (able to easily lift head off bed)  SKIN: No current active cutaneous DM manifestations

## 2020-01-24 LAB
ANION GAP SERPL CALC-SCNC: 13 MMOL/L — SIGNIFICANT CHANGE UP (ref 7–14)
BASOPHILS # BLD AUTO: 0.04 K/UL — SIGNIFICANT CHANGE UP (ref 0–0.2)
BASOPHILS NFR BLD AUTO: 0.3 % — SIGNIFICANT CHANGE UP (ref 0–1)
BUN SERPL-MCNC: 16 MG/DL — SIGNIFICANT CHANGE UP (ref 10–20)
CALCIUM SERPL-MCNC: 8.9 MG/DL — SIGNIFICANT CHANGE UP (ref 8.5–10.1)
CHLORIDE SERPL-SCNC: 104 MMOL/L — SIGNIFICANT CHANGE UP (ref 98–110)
CO2 SERPL-SCNC: 22 MMOL/L — SIGNIFICANT CHANGE UP (ref 17–32)
CREAT SERPL-MCNC: 0.6 MG/DL — LOW (ref 0.7–1.5)
EOSINOPHIL # BLD AUTO: 0.09 K/UL — SIGNIFICANT CHANGE UP (ref 0–0.7)
EOSINOPHIL NFR BLD AUTO: 0.8 % — SIGNIFICANT CHANGE UP (ref 0–8)
GLUCOSE SERPL-MCNC: 127 MG/DL — HIGH (ref 70–99)
HCT VFR BLD CALC: 38 % — SIGNIFICANT CHANGE UP (ref 37–47)
HGB BLD-MCNC: 12.2 G/DL — SIGNIFICANT CHANGE UP (ref 12–16)
IMM GRANULOCYTES NFR BLD AUTO: 0.8 % — HIGH (ref 0.1–0.3)
LYMPHOCYTES # BLD AUTO: 0.86 K/UL — LOW (ref 1.2–3.4)
LYMPHOCYTES # BLD AUTO: 7.2 % — LOW (ref 20.5–51.1)
MAGNESIUM SERPL-MCNC: 1.8 MG/DL — SIGNIFICANT CHANGE UP (ref 1.8–2.4)
MCHC RBC-ENTMCNC: 31.9 PG — HIGH (ref 27–31)
MCHC RBC-ENTMCNC: 32.1 G/DL — SIGNIFICANT CHANGE UP (ref 32–37)
MCV RBC AUTO: 99.2 FL — HIGH (ref 81–99)
MONOCYTES # BLD AUTO: 0.64 K/UL — HIGH (ref 0.1–0.6)
MONOCYTES NFR BLD AUTO: 5.4 % — SIGNIFICANT CHANGE UP (ref 1.7–9.3)
NEUTROPHILS # BLD AUTO: 10.21 K/UL — HIGH (ref 1.4–6.5)
NEUTROPHILS NFR BLD AUTO: 85.5 % — HIGH (ref 42.2–75.2)
NRBC # BLD: 0 /100 WBCS — SIGNIFICANT CHANGE UP (ref 0–0)
PLATELET # BLD AUTO: 552 K/UL — HIGH (ref 130–400)
POTASSIUM SERPL-MCNC: 4.1 MMOL/L — SIGNIFICANT CHANGE UP (ref 3.5–5)
POTASSIUM SERPL-SCNC: 4.1 MMOL/L — SIGNIFICANT CHANGE UP (ref 3.5–5)
RBC # BLD: 3.83 M/UL — LOW (ref 4.2–5.4)
RBC # FLD: 14.1 % — SIGNIFICANT CHANGE UP (ref 11.5–14.5)
SODIUM SERPL-SCNC: 139 MMOL/L — SIGNIFICANT CHANGE UP (ref 135–146)
WBC # BLD: 11.93 K/UL — HIGH (ref 4.8–10.8)
WBC # FLD AUTO: 11.93 K/UL — HIGH (ref 4.8–10.8)

## 2020-01-24 PROCEDURE — 99232 SBSQ HOSP IP/OBS MODERATE 35: CPT

## 2020-01-24 RX ORDER — RITUXIMAB 10 MG/ML
1000 INJECTION, SOLUTION INTRAVENOUS ONCE
Refills: 0 | Status: DISCONTINUED | OUTPATIENT
Start: 2020-01-24 | End: 2020-01-24

## 2020-01-24 RX ADMIN — Medication 1 TABLET(S): at 11:16

## 2020-01-24 RX ADMIN — Medication 40 MILLIGRAM(S): at 06:06

## 2020-01-24 RX ADMIN — Medication 10 MILLIGRAM(S): at 23:26

## 2020-01-24 RX ADMIN — ENOXAPARIN SODIUM 40 MILLIGRAM(S): 100 INJECTION SUBCUTANEOUS at 11:16

## 2020-01-24 RX ADMIN — Medication 20 MILLIGRAM(S): at 14:52

## 2020-01-24 RX ADMIN — SODIUM CHLORIDE 3 MILLILITER(S): 9 INJECTION INTRAMUSCULAR; INTRAVENOUS; SUBCUTANEOUS at 20:59

## 2020-01-24 RX ADMIN — SODIUM CHLORIDE 3 MILLILITER(S): 9 INJECTION INTRAMUSCULAR; INTRAVENOUS; SUBCUTANEOUS at 14:52

## 2020-01-24 RX ADMIN — CEFTRIAXONE 100 MILLIGRAM(S): 500 INJECTION, POWDER, FOR SOLUTION INTRAMUSCULAR; INTRAVENOUS at 06:06

## 2020-01-24 RX ADMIN — AZATHIOPRINE 150 MILLIGRAM(S): 100 TABLET ORAL at 11:16

## 2020-01-24 RX ADMIN — MYCOPHENOLATE MOFETIL 1500 MILLIGRAM(S): 250 CAPSULE ORAL at 17:06

## 2020-01-24 RX ADMIN — MYCOPHENOLATE MOFETIL 1500 MILLIGRAM(S): 250 CAPSULE ORAL at 06:06

## 2020-01-24 RX ADMIN — BUDESONIDE AND FORMOTEROL FUMARATE DIHYDRATE 2 PUFF(S): 160; 4.5 AEROSOL RESPIRATORY (INHALATION) at 07:42

## 2020-01-24 RX ADMIN — SODIUM CHLORIDE 3 MILLILITER(S): 9 INJECTION INTRAMUSCULAR; INTRAVENOUS; SUBCUTANEOUS at 06:07

## 2020-01-24 RX ADMIN — BUDESONIDE AND FORMOTEROL FUMARATE DIHYDRATE 2 PUFF(S): 160; 4.5 AEROSOL RESPIRATORY (INHALATION) at 19:49

## 2020-01-24 NOTE — PROGRESS NOTE ADULT - ASSESSMENT
40yo F with Past Medical History Dermatomyositis, Cryptogenic organizing PNA, and hypercholesteremia admitted to [facility] for x3d history of cough associated with myalgias.     Dermatomyositis: Rheumatology recommendations were reviewed; continue Mycophenolate and AZA as directed.  Please recall Dr. Bradford to approve Rituxan infusion as inpatient.  Follow-up with Dr. Rea as outpatient  History of Cryptogenic PNA:  results from CT Chest (1/22/20) were reviewed.  Interstitial lung disease vs. cryptogenic pneumonia was noted.  Continue Prednisone taper as per Pulmonology recommendations. Continue Symbicort 160mcg/4.5mcg INH q12h  Dysuria rule out acute cystitis: consider discontinuing abx if symptoms resolve  GI/DVT prophylaxis    #Progress Note Handoff    Pending:  Consults: PT/Rehab evaluation  Other: please arrange for Rituxan infusion    Future Disposition: Home

## 2020-01-24 NOTE — PROGRESS NOTE ADULT - ASSESSMENT
This is a 39 year old female with PMHx of dermatomyositis, cryptogenic organizing pneumonia and HLD who presented to the ER for three days of coughing and chronic body aches     # Cryptogenic organizing pneumonia vs. ILD  - RVP negative  - CXR noted for bilateral opacities and noted from prior MRN to be overall unchanged   - CT non contrast noted nonspecific interstitial lung disease  - taper steroids as per pulm  - continue symbicort q12h  - proair prn  - nebs q4h  - follow up with Dr. Godinez in 2 weeks (has appointment)    # Dermatomyositis  - ESR 50, CRP 0.3,   - Rheumatology recs appreciated  - Heme/Onc consulted for rituximab treatment as per rheum's recs: RTX 1000 mg IV   - patient should have additional 1000 mg as an outpatient in 2 weeks  - Continue on Azathioprine and Mycophenolate   - Outpatient follow up with Dr. Rea    # Dysuria  - UA negative and Urine culture noted >100k CFU  - On ceftriaxone     # Atypical Chest Pain  - EKG negative  - troponin downtrended and now negative    Activity: As tolerated  Diet: DASH  DVT ppx: Lovenox   GI ppx: Protonix  Code Status: Full Code  DISPO: From home; pending rheumatology for rituximab This is a 39 year old female with PMHx of dermatomyositis, cryptogenic organizing pneumonia and HLD who presented to the ER for three days of coughing and chronic body aches     # Cryptogenic organizing pneumonia vs. ILD  - RVP negative  - CXR noted for bilateral opacities and noted from prior MRN to be overall unchanged   - CT non contrast noted nonspecific interstitial lung disease  - taper steroids as per pulm  - continue symbicort q12h  - proair prn  - nebs q4h  - follow up with Dr. Godinez in 2 weeks (has appointment)    # Dermatomyositis  - ESR 50, CRP 0.3,   - Rheumatology recs appreciated  - Patient to received 1000mg rituximab this admission, likely tomorrow (1/25); will follow up with rheum  - patient should have additional 1000 mg as an outpatient in 2 weeks  - Continue on Azathioprine and Mycophenolate   - Outpatient follow up with Dr. Rea    # Dysuria  - improving  - UCx: klebsiella pneumoniae  - On ceftriaxone     # Atypical Chest Pain  - pain improved, associated with cough  - EKG negative  - troponin downtrended and now negative    Activity: As tolerated  Diet: DASH  DVT ppx: Lovenox   GI ppx: Protonix  Code Status: Full Code  DISPO: From home; pending rheumatology for rituximab

## 2020-01-24 NOTE — PROGRESS NOTE ADULT - SUBJECTIVE AND OBJECTIVE BOX
Hospital Day:  2d    Subjective:    Patient is a 39y old  Female who presents with a chief complaint of joints pain (2020 16:28)    There were no acute overnight events. The patient was seen and examined at the bedside. No complaints. Denies fever, chills, headache, dizziness, chest pain, palpitations, shortness of breath, abdominal pain, nausea, vomiting, diarrhea.    Past Medical Hx:   H/O dermatomyositis    Past Sx:    Allergies:  iodine (Anaphylaxis)  shellfish (Anaphylaxis)    Current Meds:   Standng Meds:  azaTHIOprine 150 milliGRAM(s) Oral daily  budesonide 160 MICROgram(s)/formoterol 4.5 MICROgram(s) Inhaler 2 Puff(s) Inhalation two times a day  cefTRIAXone   IVPB 1000 milliGRAM(s) IV Intermittent every 24 hours  chlorhexidine 4% Liquid 1 Application(s) Topical <User Schedule>  enoxaparin Injectable 40 milliGRAM(s) SubCutaneous daily  melatonin 10 milliGRAM(s) Oral at bedtime  mycophenolate mofetil 1500 milliGRAM(s) Oral two times a day  predniSONE   Tablet 40 milliGRAM(s) Oral daily  sodium chloride 0.9% lock flush 3 milliLiter(s) IV Push every 8 hours  trimethoprim  160 mG/sulfamethoxazole 800 mG 1 Tablet(s) Oral <User Schedule>    PRN Meds:    HOME MEDICATIONS:  predniSONE 10 mg oral tablet: 1 tab(s) orally once a day      Vital Signs:   T(F): 97.4 (20 @ 05:06), Max: 97.5 (20 @ 12:17)  HR: 74 (20 @ 05:06) (74 - 80)  BP: 114/75 (20 @ 05:06) (114/75 - 145/62)  RR: 18 (20 @ 05:06) (18 - 18)  SpO2: --        Physical Exam:   General: Patient resting comfortably in bed, NAD  HEENT: NCAT, conjunctiva clear, sclera white, PEERLA, EOMI, moist mucous membranes, normal orophaynx  Neck: No masses, no JVD, no cervical lymphadenopathy  Respiratory: good inspiratory effort; lungs clear to auscultation bilaterally  CV: Normal rate, regular rhythm, normal S1/S2, no rubs, gallops, murmurs  GI: abdomen soft, non-tender, non-distended, no masses, normal bowel sounds  Extremities: Well-perfused, no clubbing, no cyanosis, no lower extremity edema bilaterally  Skin: warm and dry  Neurology: AAOx3, mentating appropriately, follows commands, nonfocal    Labs:                         12.2   11.33 )-----------( 547      ( 2020 07:57 )             37.8     Neutophil% 92.8, Lymphocyte% 5.1, Monocyte% 1.4, Bands% 0.5 20 @ 07:57    2020 07:57    137    |  105    |  10     ----------------------------<  135    4.5     |  20     |  0.7      Ca    9.5        2020 07:57  Mg     2.1       2020 07:57              Serum Pro-Brain Natriuretic Peptide: 91 pg/mL (20 @ 18:58)    Troponin 0.01, CKMB --, CK -- 20 @ 12:01  Troponin 0.03, CKMB --, CK -- 20 @ 06:26  Troponin --, CKMB 7.2, CK -- 20 @ 20:03  Troponin 0.06, CKMB --,  20 @ 18:58        Urinalysis Basic - ( 2020 02:44 )    Color: Light Yellow / Appearance: Clear / S.015 / pH: x  Gluc: x / Ketone: Negative  / Bili: Negative / Urobili: <2 mg/dL   Blood: x / Protein: Negative / Nitrite: Negative   Leuk Esterase: Negative / RBC: x / WBC x   Sq Epi: x / Non Sq Epi: x / Bacteria: x          Culture - Blood (collected 20 @ 06:26)  Source: .Blood None  Preliminary Report (20 @ 14:01):    No growth to date.    Culture - Blood (collected 20 @ 06:26)  Source: .Blood None  Preliminary Report (20 @ 14:01):    No growth to date. Hospital Day:  2d    Subjective:    Patient is a 39y old  Female who presents with a chief complaint of joints pain (2020 16:28)    There were no acute overnight events. The patient was seen and examined at the bedside. Patient continues to complain of body pain and shortness of breath. Otherwise denies fever, chills, headache, dizziness, chest pain, palpitations, shortness of breath, abdominal pain, nausea, vomiting, diarrhea.    Past Medical Hx:   H/O dermatomyositis    Past Sx:    Allergies:  iodine (Anaphylaxis)  shellfish (Anaphylaxis)    Current Meds:   Standng Meds:  azaTHIOprine 150 milliGRAM(s) Oral daily  budesonide 160 MICROgram(s)/formoterol 4.5 MICROgram(s) Inhaler 2 Puff(s) Inhalation two times a day  cefTRIAXone   IVPB 1000 milliGRAM(s) IV Intermittent every 24 hours  chlorhexidine 4% Liquid 1 Application(s) Topical <User Schedule>  enoxaparin Injectable 40 milliGRAM(s) SubCutaneous daily  melatonin 10 milliGRAM(s) Oral at bedtime  mycophenolate mofetil 1500 milliGRAM(s) Oral two times a day  predniSONE   Tablet 40 milliGRAM(s) Oral daily  sodium chloride 0.9% lock flush 3 milliLiter(s) IV Push every 8 hours  trimethoprim  160 mG/sulfamethoxazole 800 mG 1 Tablet(s) Oral <User Schedule>    PRN Meds:    HOME MEDICATIONS:  predniSONE 10 mg oral tablet: 1 tab(s) orally once a day      Vital Signs:   T(F): 97.4 (20 @ 05:06), Max: 97.5 (20 @ 12:17)  HR: 74 (20 @ 05:06) (74 - 80)  BP: 114/75 (20 @ 05:06) (114/75 - 145/62)  RR: 18 (20 @ 05:06) (18 - 18)  SpO2: --        Physical Exam:   General: Patient resting in bed, NAD  HEENT: NCAT, conjunctiva clear, sclera white, PEERLA, EOMI, moist mucous membranes, normal orophaynx  Neck: No masses, no JVD, no cervical lymphadenopathy  Respiratory: clear to auscultation bilaterally  CV: Normal rate, regular rhythm, normal S1/S2, no rubs, gallops, murmurs  GI: abdomen soft, non-tender, non-distended, no masses, normal bowel sounds  Extremities: Well-perfused, no clubbing, no cyanosis, no lower extremity edema bilaterally  Skin: warm and dry  Neurology: AAOx3, mentating appropriately, follows commands, nonfocal    Labs:                         12.2   11.33 )-----------( 547      ( 2020 07:57 )             37.8     Neutophil% 92.8, Lymphocyte% 5.1, Monocyte% 1.4, Bands% 0.5 20 @ 07:57    2020 07:57    137    |  105    |  10     ----------------------------<  135    4.5     |  20     |  0.7      Ca    9.5        2020 07:57  Mg     2.1       2020 07:57              Serum Pro-Brain Natriuretic Peptide: 91 pg/mL (20 @ 18:58)    Troponin 0.01, CKMB --, CK -- 20 @ 12:01  Troponin 0.03, CKMB --, CK -- 20 @ 06:26  Troponin --, CKMB 7.2, CK -- 20 @ 20:03  Troponin 0.06, CKMB --,  20 @ 18:58        Urinalysis Basic - ( 2020 02:44 )    Color: Light Yellow / Appearance: Clear / S.015 / pH: x  Gluc: x / Ketone: Negative  / Bili: Negative / Urobili: <2 mg/dL   Blood: x / Protein: Negative / Nitrite: Negative   Leuk Esterase: Negative / RBC: x / WBC x   Sq Epi: x / Non Sq Epi: x / Bacteria: x          Culture - Blood (collected 20 @ 06:26)  Source: .Blood None  Preliminary Report (20 @ 14:01):    No growth to date.    Culture - Blood (collected 20 @ 06:26)  Source: .Blood None  Preliminary Report (20 @ 14:01):    No growth to date.

## 2020-01-24 NOTE — PROGRESS NOTE ADULT - SUBJECTIVE AND OBJECTIVE BOX
JESSI KING MRN-[id number]    Hospitalist Note  40yo F with Past Medical History Dermatomyositis, Cryptogenic organizing PNA, and hypercholesteremia admitted to [facility] for x3d history of cough associated with myalgias.     Overnight events/Updates: The patient complains of myalgias.  Rheumatology advised treatment with Rituxan.    Vital Signs Last 24 Hrs  T(C): 36.5 (24 Jan 2020 12:29), Max: 36.5 (24 Jan 2020 12:29)  T(F): 97.7 (24 Jan 2020 12:29), Max: 97.7 (24 Jan 2020 12:29)  HR: 82 (24 Jan 2020 12:29) (74 - 82)  BP: 121/63 (24 Jan 2020 12:29) (114/75 - 145/62)  BP(mean): --  RR: 18 (24 Jan 2020 12:29) (18 - 18)  SpO2: --    Physical Examination:  General: AAO x 3  HEENT: PERRLA, EOMI  CV= S1 & S2 appreciated  Lungs= CTA BL  Abdominal Examination= + BS, Soft, NT/ND  Extremity Examination= No C/C/E    ROS: No chest pain, no shortness of breath.  All other systems reviewed and are within normal limits except for the complaints in the HPI.    MEDICATIONS  (STANDING):  azaTHIOprine 150 milliGRAM(s) Oral daily  budesonide 160 MICROgram(s)/formoterol 4.5 MICROgram(s) Inhaler 2 Puff(s) Inhalation two times a day  cefTRIAXone   IVPB 1000 milliGRAM(s) IV Intermittent every 24 hours  chlorhexidine 4% Liquid 1 Application(s) Topical <User Schedule>  enoxaparin Injectable 40 milliGRAM(s) SubCutaneous daily  melatonin 10 milliGRAM(s) Oral at bedtime  mycophenolate mofetil 1500 milliGRAM(s) Oral two times a day  sodium chloride 0.9% lock flush 3 milliLiter(s) IV Push every 8 hours  trimethoprim  160 mG/sulfamethoxazole 800 mG 1 Tablet(s) Oral <User Schedule>    MEDICATIONS  (PRN):                            12.2   11.93 )-----------( 552      ( 24 Jan 2020 08:28 )             38.0     01-24    139  |  104  |  16  ----------------------------<  127<H>  4.1   |  22  |  0.6<L>    Ca    8.9      24 Jan 2020 08:28  Mg     1.8     01-24        Case discussed with housestaff & family  BARRY Antonio [id number]

## 2020-01-25 ENCOUNTER — TRANSCRIPTION ENCOUNTER (OUTPATIENT)
Age: 40
End: 2020-01-25

## 2020-01-25 LAB
ALBUMIN SERPL ELPH-MCNC: 3.4 G/DL — LOW (ref 3.5–5.2)
ALP SERPL-CCNC: 51 U/L — SIGNIFICANT CHANGE UP (ref 30–115)
ALT FLD-CCNC: 13 U/L — SIGNIFICANT CHANGE UP (ref 0–41)
ANION GAP SERPL CALC-SCNC: 14 MMOL/L — SIGNIFICANT CHANGE UP (ref 7–14)
AST SERPL-CCNC: 11 U/L — SIGNIFICANT CHANGE UP (ref 0–41)
BASOPHILS # BLD AUTO: 0.04 K/UL — SIGNIFICANT CHANGE UP (ref 0–0.2)
BASOPHILS NFR BLD AUTO: 0.3 % — SIGNIFICANT CHANGE UP (ref 0–1)
BILIRUB SERPL-MCNC: <0.2 MG/DL — SIGNIFICANT CHANGE UP (ref 0.2–1.2)
BUN SERPL-MCNC: 12 MG/DL — SIGNIFICANT CHANGE UP (ref 10–20)
CALCIUM SERPL-MCNC: 9 MG/DL — SIGNIFICANT CHANGE UP (ref 8.5–10.1)
CHLORIDE SERPL-SCNC: 101 MMOL/L — SIGNIFICANT CHANGE UP (ref 98–110)
CO2 SERPL-SCNC: 21 MMOL/L — SIGNIFICANT CHANGE UP (ref 17–32)
CREAT SERPL-MCNC: 0.7 MG/DL — SIGNIFICANT CHANGE UP (ref 0.7–1.5)
EOSINOPHIL # BLD AUTO: 0.1 K/UL — SIGNIFICANT CHANGE UP (ref 0–0.7)
EOSINOPHIL NFR BLD AUTO: 0.7 % — SIGNIFICANT CHANGE UP (ref 0–8)
GLUCOSE SERPL-MCNC: 120 MG/DL — HIGH (ref 70–99)
HCT VFR BLD CALC: 38.6 % — SIGNIFICANT CHANGE UP (ref 37–47)
HGB BLD-MCNC: 12.1 G/DL — SIGNIFICANT CHANGE UP (ref 12–16)
IMM GRANULOCYTES NFR BLD AUTO: 0.8 % — HIGH (ref 0.1–0.3)
LYMPHOCYTES # BLD AUTO: 0.98 K/UL — LOW (ref 1.2–3.4)
LYMPHOCYTES # BLD AUTO: 6.6 % — LOW (ref 20.5–51.1)
MCHC RBC-ENTMCNC: 30.5 PG — SIGNIFICANT CHANGE UP (ref 27–31)
MCHC RBC-ENTMCNC: 31.3 G/DL — LOW (ref 32–37)
MCV RBC AUTO: 97.2 FL — SIGNIFICANT CHANGE UP (ref 81–99)
MONOCYTES # BLD AUTO: 0.64 K/UL — HIGH (ref 0.1–0.6)
MONOCYTES NFR BLD AUTO: 4.3 % — SIGNIFICANT CHANGE UP (ref 1.7–9.3)
NEUTROPHILS # BLD AUTO: 12.87 K/UL — HIGH (ref 1.4–6.5)
NEUTROPHILS NFR BLD AUTO: 87.3 % — HIGH (ref 42.2–75.2)
NRBC # BLD: 0 /100 WBCS — SIGNIFICANT CHANGE UP (ref 0–0)
PLATELET # BLD AUTO: 567 K/UL — HIGH (ref 130–400)
POTASSIUM SERPL-MCNC: 4.3 MMOL/L — SIGNIFICANT CHANGE UP (ref 3.5–5)
POTASSIUM SERPL-SCNC: 4.3 MMOL/L — SIGNIFICANT CHANGE UP (ref 3.5–5)
PROT SERPL-MCNC: 7 G/DL — SIGNIFICANT CHANGE UP (ref 6–8)
RBC # BLD: 3.97 M/UL — LOW (ref 4.2–5.4)
RBC # FLD: 14.1 % — SIGNIFICANT CHANGE UP (ref 11.5–14.5)
SODIUM SERPL-SCNC: 136 MMOL/L — SIGNIFICANT CHANGE UP (ref 135–146)
WBC # BLD: 14.75 K/UL — HIGH (ref 4.8–10.8)
WBC # FLD AUTO: 14.75 K/UL — HIGH (ref 4.8–10.8)

## 2020-01-25 PROCEDURE — 99232 SBSQ HOSP IP/OBS MODERATE 35: CPT

## 2020-01-25 RX ORDER — RITUXIMAB 10 MG/ML
50 INJECTION, SOLUTION INTRAVENOUS
Qty: 1000 | Refills: 0 | Status: DISCONTINUED | OUTPATIENT
Start: 2020-01-25 | End: 2020-01-26

## 2020-01-25 RX ORDER — ACETAMINOPHEN 500 MG
650 TABLET ORAL ONCE
Refills: 0 | Status: COMPLETED | OUTPATIENT
Start: 2020-01-25 | End: 2020-01-25

## 2020-01-25 RX ORDER — ALBUTEROL 90 UG/1
2 AEROSOL, METERED ORAL
Qty: 15 | Refills: 0
Start: 2020-01-25

## 2020-01-25 RX ORDER — BUDESONIDE AND FORMOTEROL FUMARATE DIHYDRATE 160; 4.5 UG/1; UG/1
2 AEROSOL RESPIRATORY (INHALATION)
Qty: 15 | Refills: 0
Start: 2020-01-25

## 2020-01-25 RX ORDER — OXYCODONE AND ACETAMINOPHEN 5; 325 MG/1; MG/1
1 TABLET ORAL ONCE
Refills: 0 | Status: DISCONTINUED | OUTPATIENT
Start: 2020-01-25 | End: 2020-01-25

## 2020-01-25 RX ORDER — DIPHENHYDRAMINE HCL 50 MG
50 CAPSULE ORAL ONCE
Refills: 0 | Status: COMPLETED | OUTPATIENT
Start: 2020-01-25 | End: 2020-01-25

## 2020-01-25 RX ADMIN — BUDESONIDE AND FORMOTEROL FUMARATE DIHYDRATE 2 PUFF(S): 160; 4.5 AEROSOL RESPIRATORY (INHALATION) at 23:09

## 2020-01-25 RX ADMIN — SODIUM CHLORIDE 3 MILLILITER(S): 9 INJECTION INTRAMUSCULAR; INTRAVENOUS; SUBCUTANEOUS at 22:52

## 2020-01-25 RX ADMIN — ENOXAPARIN SODIUM 40 MILLIGRAM(S): 100 INJECTION SUBCUTANEOUS at 12:26

## 2020-01-25 RX ADMIN — OXYCODONE AND ACETAMINOPHEN 1 TABLET(S): 5; 325 TABLET ORAL at 23:10

## 2020-01-25 RX ADMIN — SODIUM CHLORIDE 3 MILLILITER(S): 9 INJECTION INTRAMUSCULAR; INTRAVENOUS; SUBCUTANEOUS at 05:23

## 2020-01-25 RX ADMIN — Medication 50 MILLIGRAM(S): at 10:38

## 2020-01-25 RX ADMIN — SODIUM CHLORIDE 3 MILLILITER(S): 9 INJECTION INTRAMUSCULAR; INTRAVENOUS; SUBCUTANEOUS at 13:21

## 2020-01-25 RX ADMIN — AZATHIOPRINE 150 MILLIGRAM(S): 100 TABLET ORAL at 12:26

## 2020-01-25 RX ADMIN — Medication 650 MILLIGRAM(S): at 11:08

## 2020-01-25 RX ADMIN — BUDESONIDE AND FORMOTEROL FUMARATE DIHYDRATE 2 PUFF(S): 160; 4.5 AEROSOL RESPIRATORY (INHALATION) at 10:39

## 2020-01-25 RX ADMIN — Medication 60 MILLIGRAM(S): at 05:26

## 2020-01-25 RX ADMIN — MYCOPHENOLATE MOFETIL 1500 MILLIGRAM(S): 250 CAPSULE ORAL at 05:26

## 2020-01-25 RX ADMIN — RITUXIMAB 50 MG/HR: 10 INJECTION, SOLUTION INTRAVENOUS at 11:35

## 2020-01-25 RX ADMIN — CEFTRIAXONE 100 MILLIGRAM(S): 500 INJECTION, POWDER, FOR SOLUTION INTRAMUSCULAR; INTRAVENOUS at 05:26

## 2020-01-25 RX ADMIN — MYCOPHENOLATE MOFETIL 1500 MILLIGRAM(S): 250 CAPSULE ORAL at 17:17

## 2020-01-25 RX ADMIN — OXYCODONE AND ACETAMINOPHEN 1 TABLET(S): 5; 325 TABLET ORAL at 23:40

## 2020-01-25 RX ADMIN — Medication 650 MILLIGRAM(S): at 10:38

## 2020-01-25 RX ADMIN — Medication 10 MILLIGRAM(S): at 23:10

## 2020-01-25 NOTE — DISCHARGE NOTE PROVIDER - NSDCCPCAREPLAN_GEN_ALL_CORE_FT
PRINCIPAL DISCHARGE DIAGNOSIS  Diagnosis: Dermatomyositis with respiratory involvement  Assessment and Plan of Treatment: You presented with shortness of breath likely secondary to your long-standing dermatomyositis. You were treated here with prednisone and bronchodilators to help alleviate symptoms. You also received a dose of Rituximab for more long-term symptom management. Please follow-up with Dr. Rea for next week (appointment has been made). You are also encouraged to follow-up with your PCP      SECONDARY DISCHARGE DIAGNOSES  Diagnosis: Bacterial UTI  Assessment and Plan of Treatment: You presented with a symptomatic urinary tract infection. You were treated here with antibiotics. Your symptoms have resolved, but you are encouraged to take ... more days of antibiotic for full resolution of infection. You are again encouraged to follow-up with your PCP. PRINCIPAL DISCHARGE DIAGNOSIS  Diagnosis: Dermatomyositis with respiratory involvement  Assessment and Plan of Treatment: You presented with shortness of breath likely secondary to your long-standing dermatomyositis. You were treated here with prednisone and bronchodilators to help alleviate symptoms. You also received a dose of Rituximab for more long-term symptom management. Please follow-up with Dr. Rea for next week (appointment has been made). You are also encouraged to follow-up with your PCP      SECONDARY DISCHARGE DIAGNOSES  Diagnosis: Bacterial UTI  Assessment and Plan of Treatment: You presented with a symptomatic urinary tract infection. You were treated here with antibiotics. Your symptoms have resolved and you will not need any outpatient antibiotics. You are again encouraged to follow-up with your PCP.

## 2020-01-25 NOTE — PHYSICAL THERAPY INITIAL EVALUATION ADULT - GENERAL OBSERVATIONS, REHAB EVAL
Pt seen 950-1000 for a total of 10 minutes. Pt encountered semifowlers in bed, no apparent distress, agreeable to physical therapy, left the same way. Pt reports she is safe to ambulate, as per chart pt is being D/C home with no needs. PT not indicated at this time.

## 2020-01-25 NOTE — DISCHARGE NOTE PROVIDER - CARE PROVIDER_API CALL
Sari Espinoza)  Internal Medicine  [address], [address]  [place] [address]  Phone: [phone]  Fax: [phone]  Follow Up Time: 1 week    Lucia Rea ()  Internal Medicine  [facility] [place] [address]  Phone: [phone]  Fax: [phone]  Follow Up Time: 1 week

## 2020-01-25 NOTE — DISCHARGE NOTE PROVIDER - NSDCMRMEDTOKEN_GEN_ALL_CORE_FT
predniSONE 10 mg oral tablet: 1 tab(s) orally once a day  predniSONE 10 mg oral tablet: Take 6 tablets tomorrow. Then 4 tablets for 3 days after that (end 1/29). Beginning 1/30 take 2 tablets for 3 days. On 2/2/2020 take 1 tablet each day thereafter. albuterol 90 mcg/inh inhalation aerosol: 2 puff(s) inhaled 4 times a day, As Needed  predniSONE 10 mg oral tablet: Take 6 tablets tomorrow. Then 4 tablets for 3 days after that (end 1/29). On 1/30 take 2 tablets for 3 days. On 2/2 take 1 tablet thereafter  Symbicort 160 mcg-4.5 mcg/inh inhalation aerosol: 2 puff(s) inhaled 2 times a day

## 2020-01-25 NOTE — DISCHARGE NOTE PROVIDER - PROVIDER TOKENS
PROVIDER:[TOKEN:[[id number]],FOLLOWUP:[1 week]],PROVIDER:[TOKEN:[[id number]],FOLLOWUP:[1 week]]

## 2020-01-25 NOTE — DISCHARGE NOTE PROVIDER - CARE PROVIDERS DIRECT ADDRESSES
,[email or web address]

## 2020-01-25 NOTE — PROGRESS NOTE ADULT - ASSESSMENT
40yo F with Past Medical History Dermatomyositis, Cryptogenic organizing PNA, and hypercholesteremia admitted to [facility] for x3d history of cough associated with myalgias.     Dermatomyositis: Rheumatology recommendations were reviewed; continue Mycophenolate and AZA as directed.  Rituxan infusion has been scheduled for this afrernoon.  Follow-up with Dr. Rea as outpatient.  The patient refused outpatient physical therapy  History of Cryptogenic PNA:  results from CT Chest (1/22/20) were reviewed.  Interstitial lung disease vs. cryptogenic pneumonia was noted.  Continue Prednisone taper as per Pulmonology recommendations. Continue Symbicort 160mcg/4.5mcg INH q12h  Dysuria rule out acute cystitis: discontinue abx upon discharge  GI/DVT prophylaxis    #Progress Note Handoff    Pending:  Other: please arrange for Rituxan infusion    Future Disposition: Anticipate discharge home in 24 hours; time spent = 35 minutes

## 2020-01-25 NOTE — PROGRESS NOTE ADULT - ASSESSMENT
This is a 39 year old female with PMHx of dermatomyositis, cryptogenic organizing pneumonia and HLD who presented to the ER for three days of coughing and chronic body aches     # Cryptogenic organizing pneumonia vs. ILD  - RVP negative  - CXR noted for bilateral opacities and noted from prior MRN to be overall unchanged   - CT non contrast noted nonspecific interstitial lung disease  - taper steroids as per pulm- sent to pharmacy (on 60 mg now for one more day, 20 mg taper every three days until on regular home dose of 10mg)   - continue symbicort q12h  - proair prn  - nebs q4h  - follow up with Dr. Godinez in 2 weeks (has appointment)    # Dermatomyositis  - ESR 50, CRP 0.3,   - Rheumatology recs appreciated  - Patient receiving 1000mg rituximab today  - As per rheumatology, patient should have additional 1000 mg as an outpatient in 2 weeks  - Continue on Azathioprine and Mycophenolate   - Outpatient follow up with Dr. Rea    # Dysuria  - resolved  - no need for antibiotics upon discharge    # Atypical Chest Pain  - pain improved, associated with cough  - EKG negative  - troponin downtrended and now negative    Activity: As tolerated  Diet: DASH  DVT ppx: Lovenox   GI ppx: Protonix  Code Status: Full Code  DISPO: Home when ready gerd precautions  in and out  ppi once a day  may need  upper gastrointestinal endoscopy if worsening symptoms  trach care  advance diet

## 2020-01-25 NOTE — PROGRESS NOTE ADULT - SUBJECTIVE AND OBJECTIVE BOX
SUBJECTIVE:    Patient is a 39y old  Female who presents with a chief complaint of joint pain (25 Jan 2020 12:29)    Currently admitted to medicine with primary diagnosis of dermatomyositis.     Today is hospital day 3d. Patient was seen and examined at bedside. This morning she is resting comfortably in bed and reports no new issues or overnight events. Is eager to receive rituximab as scheduled. Denies fever, chills, headache, dizziness, chest pain, palpitations, shortness of breath, abdominal pain, nausea, vomiting, diarrhea.    PAST MEDICAL & SURGICAL HISTORY  PAST MEDICAL & SURGICAL HISTORY:  H/O dermatomyositis    ALLERGIES:  iodine (Anaphylaxis)  shellfish (Anaphylaxis)    MEDICATIONS:  STANDING MEDICATIONS  azaTHIOprine 150 milliGRAM(s) Oral daily  budesonide 160 MICROgram(s)/formoterol 4.5 MICROgram(s) Inhaler 2 Puff(s) Inhalation two times a day  cefTRIAXone   IVPB 1000 milliGRAM(s) IV Intermittent every 24 hours  chlorhexidine 4% Liquid 1 Application(s) Topical <User Schedule>  enoxaparin Injectable 40 milliGRAM(s) SubCutaneous daily  melatonin 10 milliGRAM(s) Oral at bedtime  mycophenolate mofetil 1500 milliGRAM(s) Oral two times a day  predniSONE   Tablet 60 milliGRAM(s) Oral daily  riTUXimab Infusion (eMAR) 50 mG/Hr IV Continuous <Continuous>  sodium chloride 0.9% lock flush 3 milliLiter(s) IV Push every 8 hours  trimethoprim  160 mG/sulfamethoxazole 800 mG 1 Tablet(s) Oral <User Schedule>    PRN MEDICATIONS    VITALS:   T(F): 98.3  HR: 91  BP: 124/77  RR: 18  SpO2: 96%    LABS:                        12.1   14.75 )-----------( 567      ( 25 Jan 2020 08:03 )             38.6     01-25    136  |  101  |  12  ----------------------------<  120<H>  4.3   |  21  |  0.7    Ca    9.0      25 Jan 2020 08:03  Mg     1.8     01-24    TPro  7.0  /  Alb  3.4<L>  /  TBili  <0.2  /  DBili  x   /  AST  11  /  ALT  13  /  AlkPhos  51  01-25    PHYSICAL EXAM:  General: Patient resting in bed, NAD  HEENT: NCAT, conjunctiva clear, sclera white, PEERLA, EOMI, moist mucous membranes, normal orophaynx  Neck: No masses, no JVD, no cervical lymphadenopathy  Respiratory: clear to auscultation bilaterally  CV: Normal rate, regular rhythm, normal S1/S2, no rubs, gallops, murmurs  GI: abdomen soft, non-tender, non-distended, no masses, normal bowel sounds  Extremities: Well-perfused, no clubbing, no cyanosis, no lower extremity edema bilaterally  Skin: warm and dry  Neurology: AAOx3, mentating appropriately, follows commands, nonfocal

## 2020-01-25 NOTE — DISCHARGE NOTE PROVIDER - HOSPITAL COURSE
40 YO F with a PMH of dermatomyositis, Cryptogenic organizing PNA, and HLD who presents to the hospital with a c/o generalized body aches for the past x 3 days. Associated with a rash and non-productive cough. Of note, pt had recent admission with similar presentation for dermatomyositis flare. Her rheum (Rona) started her on a steroid and informed her that she should receive Rituximab infusion during this hospital stay. Patient was worked up for cryptogenic organizing pneumonia vs. ILD. RVP was negative, CXR unchanged from prior. Started steroid regimen - taper in place to standard home regimen (10mg prednisone QD at home). Patient symptomatically improved. Patient is to follow-up Dr. Godinez outpatient. Rheumatology consult noted- patient to receive rituximab here prior to discharge. UTI also noted - had dysuria on admission (resolved now). Converted ceftriaxone to po antibiotic (Vantin) for 4 more days. Vitals stable and can follow-up outpatient. 38 YO F with a PMH of dermatomyositis, Cryptogenic organizing PNA, and HLD who presents to the hospital with a c/o generalized body aches for the past x 3 days. Associated with a rash and non-productive cough. Of note, pt had recent admission with similar presentation for dermatomyositis flare. Her rheum (Litvin) started her on a steroid and informed her that she should receive Rituximab infusion during this hospital stay. Patient was worked up for cryptogenic organizing pneumonia vs. ILD. RVP was negative, CXR unchanged from prior. Started steroid regimen - taper in place to standard home regimen (10mg prednisone QD at home). Patient symptomatically improved. Patient is to follow-up Dr. Godinez outpatient. Rheumatology consult noted- patient to receive rituximab here prior to discharge. Hep B noted to be negative in 2/2019. Repeat hep B panel sent today. UTI also noted - had dysuria on admission (resolved now). Converted ceftriaxone to po antibiotic (Vantin) for 4 more days. Vitals stable and can follow-up outpatient. 38 YO F with a PMH of dermatomyositis, Cryptogenic organizing PNA, and HLD who presents to the hospital with a c/o generalized body aches for the past x 3 days. Associated with a rash and non-productive cough. Of note, pt had recent admission with similar presentation for dermatomyositis flare. Her rheumatologist (Rona) started her on a steroid and informed her that she should receive Rituximab infusion during this hospital stay. Patient was worked up for cryptogenic organizing pneumonia vs. ILD. RVP was negative, CXR unchanged from prior. Started steroid regimen - taper in place to standard home regimen (10mg prednisone QD at home). Patient symptomatically improved. Patient is to follow-up Dr. Godinez outpatient. Rheumatology consult noted- patient to receive rituximab here prior to discharge. Hep B noted to be negative in 2/2019. Repeat hep B panel sent today. UTI also noted - had dysuria on admission (resolved now). No need for outpatient antibiotic. Vitals stable and can follow-up outpatient. 38 YO F with a PMH of dermatomyositis, Cryptogenic organizing PNA, and HLD who presents to the hospital with a c/o generalized body aches for the past x 3 days. Associated with a rash and non-productive cough. Of note, pt had recent admission with similar presentation for dermatomyositis flare. Her rheumatologist (Rona) started her on a steroid and informed her that she should receive Rituximab infusion during this hospital stay. Patient was worked up for cryptogenic organizing pneumonia vs. ILD. RVP was negative, CXR unchanged from prior.   She was started on a steroid regimen - taper in place to standard home regimen (10mg prednisone QD at home). Patient symptomatically improved. Patient is to follow-up Dr. Godinez outpatient. Rheumatology recommended treatment with rituximab prior to discharge. Hep B noted to be negative in 2/2019.  The patient was transferred to  and received a Rituxan infusion (1/26/20).  In addition to her generalized weakness, the patient complained of dysuria on admission and was found to have a UA positive for acute cystitis.  Her symptoms resolved following treatment with Rocephin.   The patient was then cleared for discharge home.

## 2020-01-25 NOTE — PROGRESS NOTE ADULT - SUBJECTIVE AND OBJECTIVE BOX
JESSI KING MRN-[id number]    Hospitalist Note  40yo F with Past Medical History Dermatomyositis, Cryptogenic organizing PNA, and hypercholesteremia admitted to [facility] for x3d history of cough associated with myalgias.     Overnight events/Updates: The patient is scheduled to receive her Rituxan infusion this afternoon.    Vital Signs Last 24 Hrs  T(C): 36.2 (25 Jan 2020 11:20), Max: 36.2 (25 Jan 2020 05:20)  T(F): 97.2 (25 Jan 2020 11:20), Max: 97.2 (25 Jan 2020 05:20)  HR: 91 (25 Jan 2020 11:20) (69 - 91)  BP: 125/72 (25 Jan 2020 11:20) (118/82 - 125/72)  BP(mean): --  RR: 18 (25 Jan 2020 11:20) (18 - 18)  SpO2: 96% (25 Jan 2020 08:22) (96% - 96%)    Physical Examination:  General: AAO x 3  HEENT: PERRLA, EOMI  CV= S1 & S2 appreciated  Lungs= CTA BL  Abdominal Examination= Obese, + BS, Soft, NT/ND  Extremity Examination= No C/C/E; global weakness    ROS: No chest pain, no shortness of breath.  All other systems reviewed and are within normal limits except for the complaints in the HPI.    MEDICATIONS  (STANDING):  azaTHIOprine 150 milliGRAM(s) Oral daily  budesonide 160 MICROgram(s)/formoterol 4.5 MICROgram(s) Inhaler 2 Puff(s) Inhalation two times a day  cefTRIAXone   IVPB 1000 milliGRAM(s) IV Intermittent every 24 hours  chlorhexidine 4% Liquid 1 Application(s) Topical <User Schedule>  enoxaparin Injectable 40 milliGRAM(s) SubCutaneous daily  melatonin 10 milliGRAM(s) Oral at bedtime  mycophenolate mofetil 1500 milliGRAM(s) Oral two times a day  predniSONE   Tablet 60 milliGRAM(s) Oral daily  riTUXimab Infusion (eMAR) 50 mG/Hr (50 mL/Hr) IV Continuous <Continuous>  sodium chloride 0.9% lock flush 3 milliLiter(s) IV Push every 8 hours  trimethoprim  160 mG/sulfamethoxazole 800 mG 1 Tablet(s) Oral <User Schedule>    MEDICATIONS  (PRN):                            12.1   14.75 )-----------( 567      ( 25 Jan 2020 08:03 )             38.6     01-25    136  |  101  |  12  ----------------------------<  120<H>  4.3   |  21  |  0.7    Ca    9.0      25 Jan 2020 08:03  Mg     1.8     01-24    TPro  7.0  /  Alb  3.4<L>  /  TBili  <0.2  /  DBili  x   /  AST  11  /  ALT  13  /  AlkPhos  51  01-25      Case discussed with housestaff & family  BARRY Antonio [id number]

## 2020-01-26 ENCOUNTER — TRANSCRIPTION ENCOUNTER (OUTPATIENT)
Age: 40
End: 2020-01-26

## 2020-01-26 VITALS
SYSTOLIC BLOOD PRESSURE: 113 MMHG | RESPIRATION RATE: 18 BRPM | TEMPERATURE: 96 F | DIASTOLIC BLOOD PRESSURE: 74 MMHG | HEART RATE: 91 BPM

## 2020-01-26 LAB
HAV IGM SER-ACNC: SIGNIFICANT CHANGE UP
HBV CORE IGM SER-ACNC: SIGNIFICANT CHANGE UP
HBV SURFACE AG SER-ACNC: SIGNIFICANT CHANGE UP
HCV AB S/CO SERPL IA: 0.27 S/CO — SIGNIFICANT CHANGE UP (ref 0–0.99)
HCV AB SERPL-IMP: SIGNIFICANT CHANGE UP

## 2020-01-26 PROCEDURE — 99239 HOSP IP/OBS DSCHRG MGMT >30: CPT

## 2020-01-26 RX ADMIN — AZATHIOPRINE 150 MILLIGRAM(S): 100 TABLET ORAL at 11:27

## 2020-01-26 RX ADMIN — BUDESONIDE AND FORMOTEROL FUMARATE DIHYDRATE 2 PUFF(S): 160; 4.5 AEROSOL RESPIRATORY (INHALATION) at 08:56

## 2020-01-26 RX ADMIN — ENOXAPARIN SODIUM 40 MILLIGRAM(S): 100 INJECTION SUBCUTANEOUS at 11:27

## 2020-01-26 RX ADMIN — CEFTRIAXONE 100 MILLIGRAM(S): 500 INJECTION, POWDER, FOR SOLUTION INTRAMUSCULAR; INTRAVENOUS at 06:10

## 2020-01-26 RX ADMIN — MYCOPHENOLATE MOFETIL 1500 MILLIGRAM(S): 250 CAPSULE ORAL at 06:11

## 2020-01-26 RX ADMIN — Medication 60 MILLIGRAM(S): at 06:11

## 2020-01-26 RX ADMIN — SODIUM CHLORIDE 3 MILLILITER(S): 9 INJECTION INTRAMUSCULAR; INTRAVENOUS; SUBCUTANEOUS at 13:46

## 2020-01-26 RX ADMIN — SODIUM CHLORIDE 3 MILLILITER(S): 9 INJECTION INTRAMUSCULAR; INTRAVENOUS; SUBCUTANEOUS at 06:12

## 2020-01-26 NOTE — DISCHARGE NOTE NURSING/CASE MANAGEMENT/SOCIAL WORK - PATIENT PORTAL LINK FT
You can access the FollowMyHealth Patient Portal offered by [facility] by registering at the following website: [email or web address]. By joining [facility]’s FollowMyHealth portal, you will also be able to view your health information using other applications (apps) compatible with our system.

## 2020-01-26 NOTE — PROGRESS NOTE ADULT - ASSESSMENT
40yo F with Past Medical History Dermatomyositis, Cryptogenic organizing PNA, and hypercholesteremia admitted to [facility] for x3d history of cough associated with myalgias.     Dermatomyositis: Rheumatology recommendations were reviewed; continue Mycophenolate and AZA as directed.  Status post Rituxan infusion .  Follow-up with Dr. Rea as outpatient.  The patient refused outpatient physical therapy  History of Cryptogenic PNA:  results from CT Chest (1/22/20) were reviewed.  Interstitial lung disease vs. cryptogenic pneumonia was noted.  Continue Prednisone taper as per Pulmonology recommendations. Continue Symbicort 160mcg/4.5mcg INH q12h.  The patient may benefit from inhaled steroids as outpatient.  Insurance was not available for authorization of additional prescriptions, follow-up with pulmonary and PMD as outpatient.  Dysuria rule out acute cystitis: discontinue abx upon discharge  GI/DVT prophylaxis    #Progress Note Handoff    Pending:    Future Disposition: Discharge home; time spent = 35 minutes

## 2020-01-26 NOTE — PROGRESS NOTE ADULT - SUBJECTIVE AND OBJECTIVE BOX
JESSI KING MRN-[id number]    Hospitalist Note  38yo F with Past Medical History Dermatomyositis, Cryptogenic organizing PNA, and hypercholesteremia admitted to [facility] for x3d history of cough associated with myalgias.     Overnight events/Updates: status post Rituxan infusion.  No acute events over the past 24 hours.    Vital Signs Last 24 Hrs  T(C): 36.3 (26 Jan 2020 05:41), Max: 36.3 (26 Jan 2020 05:41)  T(F): 97.3 (26 Jan 2020 05:41), Max: 97.3 (26 Jan 2020 05:41)  HR: 73 (26 Jan 2020 05:41) (73 - 77)  BP: 115/72 (26 Jan 2020 05:41) (108/73 - 115/72)  BP(mean): --  RR: 18 (26 Jan 2020 05:41) (18 - 18)  SpO2: 100% (26 Jan 2020 07:56) (100% - 100%)    Physical Examination:  General: AAO x 3  HEENT: PERRLA, EOMI  CV= S1 & S2 appreciated  Lungs= CTA BL  Abdominal Examination= Obese,, + BS, Soft, NT/ND  Extremity Examination= No C/C/E    ROS: No chest pain, no shortness of breath.  All other systems reviewed and are within normal limits except for the complaints in the HPI.    MEDICATIONS  (STANDING):  azaTHIOprine 150 milliGRAM(s) Oral daily  budesonide 160 MICROgram(s)/formoterol 4.5 MICROgram(s) Inhaler 2 Puff(s) Inhalation two times a day  chlorhexidine 4% Liquid 1 Application(s) Topical <User Schedule>  enoxaparin Injectable 40 milliGRAM(s) SubCutaneous daily  melatonin 10 milliGRAM(s) Oral at bedtime  mycophenolate mofetil 1500 milliGRAM(s) Oral two times a day  predniSONE   Tablet   Oral   riTUXimab Infusion (eMAR) 50 mG/Hr (50 mL/Hr) IV Continuous <Continuous>  sodium chloride 0.9% lock flush 3 milliLiter(s) IV Push every 8 hours  trimethoprim  160 mG/sulfamethoxazole 800 mG 1 Tablet(s) Oral <User Schedule>    MEDICATIONS  (PRN):                            12.1   14.75 )-----------( 567      ( 25 Jan 2020 08:03 )             38.6     01-25    136  |  101  |  12  ----------------------------<  120<H>  4.3   |  21  |  0.7    Ca    9.0      25 Jan 2020 08:03    TPro  7.0  /  Alb  3.4<L>  /  TBili  <0.2  /  DBili  x   /  AST  11  /  ALT  13  /  AlkPhos  51  01-25      Case discussed with housestaff & family  BARRY Antonio 3949

## 2020-01-29 LAB
HBV E AB SER-ACNC: NEGATIVE — SIGNIFICANT CHANGE UP
HBV E AG SER-ACNC: NEGATIVE — SIGNIFICANT CHANGE UP

## 2020-01-29 RX ORDER — BUDESONIDE AND FORMOTEROL FUMARATE DIHYDRATE 160; 4.5 UG/1; UG/1
2 AEROSOL RESPIRATORY (INHALATION)
Qty: 0 | Refills: 0 | DISCHARGE

## 2020-01-29 RX ORDER — ALBUTEROL 90 UG/1
2 AEROSOL, METERED ORAL
Qty: 0 | Refills: 0 | DISCHARGE

## 2020-01-30 DIAGNOSIS — M33.91 DERMATOPOLYMYOSITIS, UNSPECIFIED WITH RESPIRATORY INVOLVEMENT: ICD-10-CM

## 2020-01-30 DIAGNOSIS — E78.5 HYPERLIPIDEMIA, UNSPECIFIED: ICD-10-CM

## 2020-01-30 DIAGNOSIS — N39.0 URINARY TRACT INFECTION, SITE NOT SPECIFIED: ICD-10-CM

## 2020-02-03 PROBLEM — Z87.2 PERSONAL HISTORY OF DISEASES OF THE SKIN AND SUBCUTANEOUS TISSUE: Chronic | Status: ACTIVE | Noted: 2020-01-22

## 2020-02-10 ENCOUNTER — APPOINTMENT (OUTPATIENT)
Dept: INFUSION THERAPY | Facility: CLINIC | Age: 40
End: 2020-02-10

## 2020-02-10 ENCOUNTER — OUTPATIENT (OUTPATIENT)
Dept: OUTPATIENT SERVICES | Facility: HOSPITAL | Age: 40
LOS: 1 days | Discharge: HOME | End: 2020-02-10

## 2020-02-10 ENCOUNTER — LABORATORY RESULT (OUTPATIENT)
Age: 40
End: 2020-02-10

## 2020-02-10 DIAGNOSIS — Z98.890 OTHER SPECIFIED POSTPROCEDURAL STATES: Chronic | ICD-10-CM

## 2020-02-10 DIAGNOSIS — S82.899A OTHER FRACTURE OF UNSPECIFIED LOWER LEG, INITIAL ENCOUNTER FOR CLOSED FRACTURE: Chronic | ICD-10-CM

## 2020-02-10 LAB
HCT VFR BLD CALC: 39.2 %
HGB BLD-MCNC: 12.6 G/DL
MCHC RBC-ENTMCNC: 31.2 PG
MCHC RBC-ENTMCNC: 32.1 G/DL
MCV RBC AUTO: 97 FL
PLATELET # BLD AUTO: 295 K/UL
PMV BLD: 10.3 FL
RBC # BLD: 4.04 M/UL
RBC # FLD: 15.1 %
WBC # FLD AUTO: 9.44 K/UL

## 2020-02-10 RX ORDER — DIPHENHYDRAMINE HCL 50 MG
50 CAPSULE ORAL ONCE
Refills: 0 | Status: COMPLETED | OUTPATIENT
Start: 2020-02-10 | End: 2020-02-10

## 2020-02-10 RX ORDER — RITUXIMAB 10 MG/ML
1000 INJECTION, SOLUTION INTRAVENOUS ONCE
Refills: 0 | Status: COMPLETED | OUTPATIENT
Start: 2020-02-10 | End: 2020-02-10

## 2020-02-10 RX ORDER — DEXAMETHASONE 0.5 MG/5ML
10 ELIXIR ORAL ONCE
Refills: 0 | Status: COMPLETED | OUTPATIENT
Start: 2020-02-10 | End: 2020-02-10

## 2020-02-10 RX ORDER — ACETAMINOPHEN 500 MG
650 TABLET ORAL ONCE
Refills: 0 | Status: COMPLETED | OUTPATIENT
Start: 2020-02-10 | End: 2020-02-10

## 2020-02-10 RX ADMIN — Medication 650 MILLIGRAM(S): at 11:42

## 2020-02-10 RX ADMIN — Medication 153 MILLIGRAM(S): at 11:42

## 2020-02-10 RX ADMIN — RITUXIMAB 166.67 MILLIGRAM(S): 10 INJECTION, SOLUTION INTRAVENOUS at 11:42

## 2020-02-28 ENCOUNTER — APPOINTMENT (OUTPATIENT)
Dept: PULMONOLOGY | Facility: CLINIC | Age: 40
End: 2020-02-28

## 2020-03-06 DIAGNOSIS — J84.89 OTHER SPECIFIED INTERSTITIAL PULMONARY DISEASES: ICD-10-CM

## 2020-03-10 ENCOUNTER — TRANSCRIPTION ENCOUNTER (OUTPATIENT)
Age: 40
End: 2020-03-10

## 2020-05-01 PROCEDURE — G9001: CPT

## 2020-05-01 PROCEDURE — G9005: CPT

## 2020-08-19 NOTE — PROGRESS NOTE ADULT - ASSESSMENT
ED discharge 39 year old Female with past medical history of dermatomyositis and Cryptogenic organizing pneumonia presenting to the ED for evaluation of diffuse muscle aches.    # Dermatomyositis - Cryptogenic organizing pneumonia:  - Pt hemodynamically and clinically stable.   - s/p solumedrol 100 mg one dose, now prednisone tapered to 20mg/day.  - c/w Azathioprine 150 mg q24hr, Cellcept 1500 mg q 12 hrs   - Rheumatology rcs noted: patient to be d/brianda on PO prednisone 20 mg and f/up with Dr Rea on 12/19 at 4:20  - ESR 53, pending aldolase and CRP   - Patient underwent a bronchoscopy in 2017 which revealed 65% segs and 25% lymphocytes. No evidence of infection was seen.   - CT guided lung biopsy revealed Histopathology consistent with Organizing pneumonia.   - c/w Symbicort and Duoneb     #) troponemia  - Chronically elevated, No chest pain, EKG non ischemic    # history of mechanical fall with left sided knee pain:  - X ray knee did not show acute abnormality.  - pain control  - PT/Physiatry eval appreciated possible 4-A candidate?     Diet: Regular  Dvt Ppx: Lovenox SQ   Activity: out of bed to chair   Dispo: Pending 4A placement  Code Status: Full Code 39 year old Female with past medical history of dermatomyositis and Cryptogenic organizing pneumonia presenting to the ED for evaluation of diffuse muscle aches.    # Dermatomyositis - Cryptogenic organizing pneumonia:  - Pt hemodynamically and clinically stable.   - s/p solumedrol 100 mg one dose, now prednisone tapered to 20mg/day.  - c/w Azathioprine 150 mg q24hr, Cellcept 1500 mg q 12 hrs   - Rheumatology rcs noted: patient to be d/brianda on PO prednisone 20 mg and f/up with Dr Rea on 12/19 at 4:20  - ESR 53, pending aldolase and CRP   - Patient underwent a bronchoscopy in 2017 which revealed 65% segs and 25% lymphocytes. No evidence of infection was seen.   - CT guided lung biopsy revealed Histopathology consistent with Organizing pneumonia.   - c/w Symbicort and Duoneb     # UTI   - + UA, c/w Rocephin 1gm q 24, pending urine cx.    #) troponemia  - Chronically elevated, No chest pain, EKG non ischemic    # history of mechanical fall with left sided knee pain:  - X ray knee did not show acute abnormality.  - pain control  - PT/Physiatry eval appreciated possible 4-A candidate?     Diet: Regular  Dvt Ppx: Lovenox SQ   Activity: out of bed to chair   Dispo: Pending 4A placement  Code Status: Full Code

## 2020-09-02 NOTE — DISCHARGE NOTE ADULT - NSCORESITESY/N_GEN_A_CORE_RD
Patient attended Phase 2 Cardiac Rehab Exercise Session. Further documentation will be completed in Cardiac Science/Q-Tel System and will be scanned into the medical record upon discharge.   No

## 2020-10-28 ENCOUNTER — INPATIENT (INPATIENT)
Facility: HOSPITAL | Age: 40
LOS: 8 days | Discharge: HOME HEALTH | End: 2020-11-06
Attending: INTERNAL MEDICINE | Admitting: INTERNAL MEDICINE
Payer: MEDICAID

## 2020-10-28 VITALS
OXYGEN SATURATION: 97 % | HEIGHT: 64 IN | DIASTOLIC BLOOD PRESSURE: 62 MMHG | SYSTOLIC BLOOD PRESSURE: 127 MMHG | WEIGHT: 253.09 LBS | TEMPERATURE: 98 F | RESPIRATION RATE: 20 BRPM | HEART RATE: 95 BPM

## 2020-10-28 DIAGNOSIS — S82.899A OTHER FRACTURE OF UNSPECIFIED LOWER LEG, INITIAL ENCOUNTER FOR CLOSED FRACTURE: Chronic | ICD-10-CM

## 2020-10-28 DIAGNOSIS — Z98.890 OTHER SPECIFIED POSTPROCEDURAL STATES: Chronic | ICD-10-CM

## 2020-10-28 PROCEDURE — 99285 EMERGENCY DEPT VISIT HI MDM: CPT

## 2020-10-28 RX ORDER — AZITHROMYCIN 500 MG/1
500 TABLET, FILM COATED ORAL ONCE
Refills: 0 | Status: COMPLETED | OUTPATIENT
Start: 2020-10-28 | End: 2020-10-28

## 2020-10-28 RX ORDER — CEFTRIAXONE 500 MG/1
1000 INJECTION, POWDER, FOR SOLUTION INTRAMUSCULAR; INTRAVENOUS ONCE
Refills: 0 | Status: COMPLETED | OUTPATIENT
Start: 2020-10-28 | End: 2020-10-28

## 2020-10-28 NOTE — ED ADULT NURSE NOTE - NSIMPLEMENTINTERV_GEN_ALL_ED
Implemented All Universal Safety Interventions:  [place] to call system. Call bell, personal items and telephone within reach. Instruct patient to call for assistance. Room bathroom lighting operational. Non-slip footwear when patient is off stretcher. Physically safe environment: no spills, clutter or unnecessary equipment. Stretcher in lowest position, wheels locked, appropriate side rails in place.

## 2020-10-28 NOTE — ED ADULT NURSE NOTE - FAMILY HISTORY
No pertinent family history     Father  Still living? Unknown  Family history of sarcoidosis, Age at diagnosis: Age Unknown     Mother  Still living? Unknown  Family history of fibromyalgia, Age at diagnosis: Age Unknown

## 2020-10-28 NOTE — ED ADULT NURSE NOTE - CHIEF COMPLAINT
If provider orders tests at today's visit, patient would like to be contacted via  ([facility] In[facility] or by telephone).  If to contact patient by phone, patient's preferred phone # is [phone] and it is ok to leave message on voice mail or with family member.  If medications are ordered at today's visit, the pharmacy name/location patient would like them to be sent to [facility].      The patient is a 40y Female complaining of shortness of breath.

## 2020-10-28 NOTE — ED ADULT NURSE NOTE - OBJECTIVE STATEMENT
Presents in ED c/o SOB and cough. Presents in ED c/o SOB and cough. Pt's respirations are unlabored but coughing intermittently with dry cough. Denies N,V,D, chest pain.

## 2020-10-28 NOTE — ED ADULT TRIAGE NOTE - CHIEF COMPLAINT QUOTE
pt biba from home complaining of shortness of breath for the past few days. pt sts she has a history of organized pneumonia which causes her to be short of breath.

## 2020-10-29 LAB
ALBUMIN SERPL ELPH-MCNC: 3.7 G/DL — SIGNIFICANT CHANGE UP (ref 3.5–5.2)
ALBUMIN SERPL ELPH-MCNC: 3.9 G/DL — SIGNIFICANT CHANGE UP (ref 3.5–5.2)
ALP SERPL-CCNC: 74 U/L — SIGNIFICANT CHANGE UP (ref 30–115)
ALP SERPL-CCNC: 76 U/L — SIGNIFICANT CHANGE UP (ref 30–115)
ALT FLD-CCNC: 17 U/L — SIGNIFICANT CHANGE UP (ref 0–41)
ALT FLD-CCNC: 17 U/L — SIGNIFICANT CHANGE UP (ref 0–41)
ANION GAP SERPL CALC-SCNC: 11 MMOL/L — SIGNIFICANT CHANGE UP (ref 7–14)
ANION GAP SERPL CALC-SCNC: 11 MMOL/L — SIGNIFICANT CHANGE UP (ref 7–14)
APPEARANCE UR: CLEAR — SIGNIFICANT CHANGE UP
AST SERPL-CCNC: 15 U/L — SIGNIFICANT CHANGE UP (ref 0–41)
AST SERPL-CCNC: 21 U/L — SIGNIFICANT CHANGE UP (ref 0–41)
BASE EXCESS BLDV CALC-SCNC: -1.5 MMOL/L — SIGNIFICANT CHANGE UP (ref -2–2)
BASOPHILS # BLD AUTO: 0.02 K/UL — SIGNIFICANT CHANGE UP (ref 0–0.2)
BASOPHILS # BLD AUTO: 0.05 K/UL — SIGNIFICANT CHANGE UP (ref 0–0.2)
BASOPHILS NFR BLD AUTO: 0.1 % — SIGNIFICANT CHANGE UP (ref 0–1)
BASOPHILS NFR BLD AUTO: 0.2 % — SIGNIFICANT CHANGE UP (ref 0–1)
BILIRUB SERPL-MCNC: <0.2 MG/DL — SIGNIFICANT CHANGE UP (ref 0.2–1.2)
BILIRUB SERPL-MCNC: <0.2 MG/DL — SIGNIFICANT CHANGE UP (ref 0.2–1.2)
BILIRUB UR-MCNC: NEGATIVE — SIGNIFICANT CHANGE UP
BUN SERPL-MCNC: 12 MG/DL — SIGNIFICANT CHANGE UP (ref 10–20)
BUN SERPL-MCNC: 14 MG/DL — SIGNIFICANT CHANGE UP (ref 10–20)
CA-I SERPL-SCNC: 1.2 MMOL/L — SIGNIFICANT CHANGE UP (ref 1.12–1.3)
CALCIUM SERPL-MCNC: 9 MG/DL — SIGNIFICANT CHANGE UP (ref 8.5–10.1)
CALCIUM SERPL-MCNC: 9.3 MG/DL — SIGNIFICANT CHANGE UP (ref 8.5–10.1)
CHLORIDE SERPL-SCNC: 103 MMOL/L — SIGNIFICANT CHANGE UP (ref 98–110)
CHLORIDE SERPL-SCNC: 105 MMOL/L — SIGNIFICANT CHANGE UP (ref 98–110)
CK SERPL-CCNC: 300 U/L — HIGH (ref 0–225)
CO2 SERPL-SCNC: 20 MMOL/L — SIGNIFICANT CHANGE UP (ref 17–32)
CO2 SERPL-SCNC: 22 MMOL/L — SIGNIFICANT CHANGE UP (ref 17–32)
COLOR SPEC: COLORLESS — SIGNIFICANT CHANGE UP
CREAT SERPL-MCNC: 0.7 MG/DL — SIGNIFICANT CHANGE UP (ref 0.7–1.5)
CREAT SERPL-MCNC: 0.8 MG/DL — SIGNIFICANT CHANGE UP (ref 0.7–1.5)
CRP SERPL-MCNC: 0.2 MG/DL — SIGNIFICANT CHANGE UP (ref 0–0.4)
DIFF PNL FLD: NEGATIVE — SIGNIFICANT CHANGE UP
EOSINOPHIL # BLD AUTO: 0 K/UL — SIGNIFICANT CHANGE UP (ref 0–0.7)
EOSINOPHIL # BLD AUTO: 0.13 K/UL — SIGNIFICANT CHANGE UP (ref 0–0.7)
EOSINOPHIL NFR BLD AUTO: 0 % — SIGNIFICANT CHANGE UP (ref 0–8)
EOSINOPHIL NFR BLD AUTO: 0.6 % — SIGNIFICANT CHANGE UP (ref 0–8)
ERYTHROCYTE [SEDIMENTATION RATE] IN BLOOD: 41 MM/HR — HIGH (ref 0–20)
GAS PNL BLDV: 140 MMOL/L — SIGNIFICANT CHANGE UP (ref 136–145)
GAS PNL BLDV: SIGNIFICANT CHANGE UP
GLUCOSE SERPL-MCNC: 134 MG/DL — HIGH (ref 70–99)
GLUCOSE SERPL-MCNC: 97 MG/DL — SIGNIFICANT CHANGE UP (ref 70–99)
GLUCOSE UR QL: NEGATIVE — SIGNIFICANT CHANGE UP
HCG SERPL QL: NEGATIVE — SIGNIFICANT CHANGE UP
HCO3 BLDV-SCNC: 25 MMOL/L — SIGNIFICANT CHANGE UP (ref 22–29)
HCT VFR BLD CALC: 39.1 % — SIGNIFICANT CHANGE UP (ref 37–47)
HCT VFR BLD CALC: 41.9 % — SIGNIFICANT CHANGE UP (ref 37–47)
HCT VFR BLDA CALC: 34.3 % — SIGNIFICANT CHANGE UP (ref 34–44)
HGB BLD CALC-MCNC: 11.2 G/DL — LOW (ref 14–18)
HGB BLD-MCNC: 12.1 G/DL — SIGNIFICANT CHANGE UP (ref 12–16)
HGB BLD-MCNC: 12.8 G/DL — SIGNIFICANT CHANGE UP (ref 12–16)
IMM GRANULOCYTES NFR BLD AUTO: 0.6 % — HIGH (ref 0.1–0.3)
IMM GRANULOCYTES NFR BLD AUTO: 0.8 % — HIGH (ref 0.1–0.3)
KETONES UR-MCNC: NEGATIVE — SIGNIFICANT CHANGE UP
LACTATE BLDV-MCNC: 3.2 MMOL/L — HIGH (ref 0.5–1.6)
LEUKOCYTE ESTERASE UR-ACNC: NEGATIVE — SIGNIFICANT CHANGE UP
LYMPHOCYTES # BLD AUTO: 0.79 K/UL — LOW (ref 1.2–3.4)
LYMPHOCYTES # BLD AUTO: 1.47 K/UL — SIGNIFICANT CHANGE UP (ref 1.2–3.4)
LYMPHOCYTES # BLD AUTO: 4.9 % — LOW (ref 20.5–51.1)
LYMPHOCYTES # BLD AUTO: 7.1 % — LOW (ref 20.5–51.1)
MAGNESIUM SERPL-MCNC: 1.9 MG/DL — SIGNIFICANT CHANGE UP (ref 1.8–2.4)
MCHC RBC-ENTMCNC: 29.7 PG — SIGNIFICANT CHANGE UP (ref 27–31)
MCHC RBC-ENTMCNC: 29.8 PG — SIGNIFICANT CHANGE UP (ref 27–31)
MCHC RBC-ENTMCNC: 30.5 G/DL — LOW (ref 32–37)
MCHC RBC-ENTMCNC: 30.9 G/DL — LOW (ref 32–37)
MCV RBC AUTO: 95.8 FL — SIGNIFICANT CHANGE UP (ref 81–99)
MCV RBC AUTO: 97.4 FL — SIGNIFICANT CHANGE UP (ref 81–99)
MONOCYTES # BLD AUTO: 0.06 K/UL — LOW (ref 0.1–0.6)
MONOCYTES # BLD AUTO: 1.01 K/UL — HIGH (ref 0.1–0.6)
MONOCYTES NFR BLD AUTO: 0.4 % — LOW (ref 1.7–9.3)
MONOCYTES NFR BLD AUTO: 4.9 % — SIGNIFICANT CHANGE UP (ref 1.7–9.3)
NEUTROPHILS # BLD AUTO: 15.08 K/UL — HIGH (ref 1.4–6.5)
NEUTROPHILS # BLD AUTO: 17.79 K/UL — HIGH (ref 1.4–6.5)
NEUTROPHILS NFR BLD AUTO: 86.4 % — HIGH (ref 42.2–75.2)
NEUTROPHILS NFR BLD AUTO: 94 % — HIGH (ref 42.2–75.2)
NITRITE UR-MCNC: NEGATIVE — SIGNIFICANT CHANGE UP
NRBC # BLD: 0 /100 WBCS — SIGNIFICANT CHANGE UP (ref 0–0)
NRBC # BLD: 0 /100 WBCS — SIGNIFICANT CHANGE UP (ref 0–0)
PCO2 BLDV: 50 MMHG — SIGNIFICANT CHANGE UP (ref 41–51)
PH BLDV: 7.31 — SIGNIFICANT CHANGE UP (ref 7.26–7.43)
PH UR: 6.5 — SIGNIFICANT CHANGE UP (ref 5–8)
PLATELET # BLD AUTO: 445 K/UL — HIGH (ref 130–400)
PLATELET # BLD AUTO: 458 K/UL — HIGH (ref 130–400)
PO2 BLDV: 36 MMHG — SIGNIFICANT CHANGE UP (ref 20–40)
POTASSIUM BLDV-SCNC: 4.2 MMOL/L — SIGNIFICANT CHANGE UP (ref 3.3–5.6)
POTASSIUM SERPL-MCNC: 4.5 MMOL/L — SIGNIFICANT CHANGE UP (ref 3.5–5)
POTASSIUM SERPL-MCNC: 5.2 MMOL/L — HIGH (ref 3.5–5)
POTASSIUM SERPL-SCNC: 4.5 MMOL/L — SIGNIFICANT CHANGE UP (ref 3.5–5)
POTASSIUM SERPL-SCNC: 5.2 MMOL/L — HIGH (ref 3.5–5)
PROCALCITONIN SERPL-MCNC: 0.03 NG/ML — SIGNIFICANT CHANGE UP (ref 0.02–0.1)
PROT SERPL-MCNC: 7.6 G/DL — SIGNIFICANT CHANGE UP (ref 6–8)
PROT SERPL-MCNC: 8 G/DL — SIGNIFICANT CHANGE UP (ref 6–8)
PROT UR-MCNC: NEGATIVE — SIGNIFICANT CHANGE UP
RBC # BLD: 4.08 M/UL — LOW (ref 4.2–5.4)
RBC # BLD: 4.3 M/UL — SIGNIFICANT CHANGE UP (ref 4.2–5.4)
RBC # FLD: 14.2 % — SIGNIFICANT CHANGE UP (ref 11.5–14.5)
RBC # FLD: 14.4 % — SIGNIFICANT CHANGE UP (ref 11.5–14.5)
SAO2 % BLDV: 62 % — SIGNIFICANT CHANGE UP
SARS-COV-2 RNA SPEC QL NAA+PROBE: SIGNIFICANT CHANGE UP
SODIUM SERPL-SCNC: 136 MMOL/L — SIGNIFICANT CHANGE UP (ref 135–146)
SODIUM SERPL-SCNC: 136 MMOL/L — SIGNIFICANT CHANGE UP (ref 135–146)
SP GR SPEC: 1 — LOW (ref 1.01–1.03)
TROPONIN T SERPL-MCNC: 0.04 NG/ML — CRITICAL HIGH
TROPONIN T SERPL-MCNC: <0.01 NG/ML — SIGNIFICANT CHANGE UP
UROBILINOGEN FLD QL: SIGNIFICANT CHANGE UP
WBC # BLD: 16.05 K/UL — HIGH (ref 4.8–10.8)
WBC # BLD: 20.62 K/UL — HIGH (ref 4.8–10.8)
WBC # FLD AUTO: 16.05 K/UL — HIGH (ref 4.8–10.8)
WBC # FLD AUTO: 20.62 K/UL — HIGH (ref 4.8–10.8)

## 2020-10-29 PROCEDURE — 93010 ELECTROCARDIOGRAM REPORT: CPT | Mod: 76

## 2020-10-29 PROCEDURE — 99223 1ST HOSP IP/OBS HIGH 75: CPT | Mod: AI

## 2020-10-29 PROCEDURE — 71045 X-RAY EXAM CHEST 1 VIEW: CPT | Mod: 26

## 2020-10-29 RX ORDER — BUDESONIDE AND FORMOTEROL FUMARATE DIHYDRATE 160; 4.5 UG/1; UG/1
2 AEROSOL RESPIRATORY (INHALATION)
Refills: 0 | Status: DISCONTINUED | OUTPATIENT
Start: 2020-10-29 | End: 2020-11-06

## 2020-10-29 RX ORDER — ENOXAPARIN SODIUM 100 MG/ML
40 INJECTION SUBCUTANEOUS DAILY
Refills: 0 | Status: DISCONTINUED | OUTPATIENT
Start: 2020-10-29 | End: 2020-11-06

## 2020-10-29 RX ORDER — ALBUTEROL 90 UG/1
2 AEROSOL, METERED ORAL DAILY
Refills: 0 | Status: DISCONTINUED | OUTPATIENT
Start: 2020-10-29 | End: 2020-11-06

## 2020-10-29 RX ORDER — MYCOPHENOLATE MOFETIL 250 MG/1
500 CAPSULE ORAL
Refills: 0 | Status: DISCONTINUED | OUTPATIENT
Start: 2020-10-29 | End: 2020-11-06

## 2020-10-29 RX ORDER — LANOLIN ALCOHOL/MO/W.PET/CERES
10 CREAM (GRAM) TOPICAL AT BEDTIME
Refills: 0 | Status: DISCONTINUED | OUTPATIENT
Start: 2020-10-29 | End: 2020-11-06

## 2020-10-29 RX ORDER — CHLORHEXIDINE GLUCONATE 213 G/1000ML
1 SOLUTION TOPICAL
Refills: 0 | Status: DISCONTINUED | OUTPATIENT
Start: 2020-10-29 | End: 2020-11-06

## 2020-10-29 RX ORDER — ALBUTEROL 90 UG/1
2 AEROSOL, METERED ORAL EVERY 6 HOURS
Refills: 0 | Status: DISCONTINUED | OUTPATIENT
Start: 2020-10-29 | End: 2020-11-06

## 2020-10-29 RX ORDER — AZATHIOPRINE 100 MG/1
3 TABLET ORAL
Qty: 0 | Refills: 2 | DISCHARGE

## 2020-10-29 RX ORDER — ACETAMINOPHEN 500 MG
650 TABLET ORAL EVERY 6 HOURS
Refills: 0 | Status: DISCONTINUED | OUTPATIENT
Start: 2020-10-29 | End: 2020-11-06

## 2020-10-29 RX ORDER — SODIUM CHLORIDE 9 MG/ML
1000 INJECTION, SOLUTION INTRAVENOUS ONCE
Refills: 0 | Status: COMPLETED | OUTPATIENT
Start: 2020-10-29 | End: 2020-10-29

## 2020-10-29 RX ADMIN — Medication 40 MILLIGRAM(S): at 04:10

## 2020-10-29 RX ADMIN — AZITHROMYCIN 255 MILLIGRAM(S): 500 TABLET, FILM COATED ORAL at 00:37

## 2020-10-29 RX ADMIN — BUDESONIDE AND FORMOTEROL FUMARATE DIHYDRATE 2 PUFF(S): 160; 4.5 AEROSOL RESPIRATORY (INHALATION) at 21:00

## 2020-10-29 RX ADMIN — BUDESONIDE AND FORMOTEROL FUMARATE DIHYDRATE 2 PUFF(S): 160; 4.5 AEROSOL RESPIRATORY (INHALATION) at 09:11

## 2020-10-29 RX ADMIN — Medication 650 MILLIGRAM(S): at 17:09

## 2020-10-29 RX ADMIN — MYCOPHENOLATE MOFETIL 500 MILLIGRAM(S): 250 CAPSULE ORAL at 06:03

## 2020-10-29 RX ADMIN — Medication 650 MILLIGRAM(S): at 16:48

## 2020-10-29 RX ADMIN — CEFTRIAXONE 100 MILLIGRAM(S): 500 INJECTION, POWDER, FOR SOLUTION INTRAMUSCULAR; INTRAVENOUS at 00:37

## 2020-10-29 RX ADMIN — ALBUTEROL 2 PUFF(S): 90 AEROSOL, METERED ORAL at 09:12

## 2020-10-29 RX ADMIN — Medication 125 MILLIGRAM(S): at 00:36

## 2020-10-29 RX ADMIN — ENOXAPARIN SODIUM 40 MILLIGRAM(S): 100 INJECTION SUBCUTANEOUS at 12:03

## 2020-10-29 RX ADMIN — MYCOPHENOLATE MOFETIL 500 MILLIGRAM(S): 250 CAPSULE ORAL at 17:07

## 2020-10-29 RX ADMIN — SODIUM CHLORIDE 1000 MILLILITER(S): 9 INJECTION, SOLUTION INTRAVENOUS at 02:43

## 2020-10-29 RX ADMIN — Medication 40 MILLIGRAM(S): at 17:07

## 2020-10-29 RX ADMIN — ALBUTEROL 2 PUFF(S): 90 AEROSOL, METERED ORAL at 00:37

## 2020-10-29 NOTE — H&P ADULT - NSHPLABSRESULTS_GEN_ALL_CORE
12.8   20.62 )-----------( 458      ( 29 Oct 2020 00:09 )             41.9     10-29    136  |  103  |  14  ----------------------------<  97  5.2<H>   |  22  |  0.8    Ca    9.3      29 Oct 2020 00:09  Mg     1.9     10-29    TPro  8.0  /  Alb  3.9  /  TBili  <0.2  /  DBili  x   /  AST  21  /  ALT  17  /  AlkPhos  76  10-29    < from: CT Chest No Cont (01.22.20 @ 22:05) >    Lungs, Pleura, and Airways:Examination reveals subpleural lobular septal thickening in the apices with areas of scarring. No honeycombing is seen. At the lung bases, atelectasis versus similar appearing reticular opacifications are recognized. There is no emphysematous change.     Mediastinum/Lymph Nodes:There is no axillary, hilar, or mediastinal lymphadenopathy.    Heart/Great Vessels: Heart is enlarged. There is trace pericardial fluid.    Abdomen: Limited evaluation of the upper abdomen is within normal limits.    Bones and soft tissues: Bones and soft tissues are normal.    IMPRESSION:    Findings may be indicative of nonspecific interstitial lung disease versus organizing pneumonia. No bronchiectasis or honeycombing.    < end of copied text >

## 2020-10-29 NOTE — CONSULT NOTE ADULT - SUBJECTIVE AND OBJECTIVE BOX
Patient is a 40y old  Female who presents with a chief complaint of SOB (29 Oct 2020 02:23)      HPI:    40 year old female PMH of dermatomyositis, Cryptogenic organizing PNA diagnosed 2017 was on home oxygen till 2018 since then managed with daily 20mg prednisone and cellcept and albuterol presents for SOB.  Pt. reports feeling progressively worsening SOB for 6 days associated with productive cough. No alleviating or aggravating factors. No inciting events, denies changes to medications, no muscle pain, no sick contacts no fevers. Denies chest pain, abdominal pain does endorse frequent urination denies dysuria. Pt. reports feeling similar to her last flare in .   Vitals In ED T(F): 98 HR: 95 BP: 127/62 RR: 20 SpO2: 97%.   In ED Pt. placed on 2 L NC and feels better, Lab work significant for WBC 20, trop 0.04 which is baseline for Pt. UA neg, Chest Xray with scattered interstitial opacities pending official read.   (29 Oct 2020 02:23)      PAST MEDICAL & SURGICAL HISTORY:  H/O dermatomyositis    Dermatomyositis    Cryptogenic organizing pneumonia    History of cervical cerclage    Ankle fracture        SOCIAL HX:   Smoking                         ETOH                            Other    FAMILY HISTORY:  No pertinent family history    Family history of fibromyalgia (Mother)    Family history of sarcoidosis (Father)  Father, paternal grandmother and paternal aunt    .  No cardiovascular or pulmonary family history     REVIEW OF SYSTEMS:    All ROS are negative exept per HPI       Allergies    contrast media (iodine-based) (Unknown)  iodine (Anaphylaxis)  peanuts (Unknown)  shellfish (Anaphylaxis)  shellfish (Unknown)    Intolerances          PHYSICAL EXAM  Vital Signs Last 24 Hrs  T(C): 36.6 (29 Oct 2020 07:37), Max: 36.7 (28 Oct 2020 22:37)  T(F): 97.8 (29 Oct 2020 07:37), Max: 98 (28 Oct 2020 22:37)  HR: 80 (29 Oct 2020 07:37) (77 - 95)  BP: 114/67 (29 Oct 2020 07:37) (114/67 - 127/62)  BP(mean): --  RR: 18 (29 Oct 2020 07:37) (17 - 20)  SpO2: 100% (29 Oct 2020 07:37) (97% - 100%)    CONSTITUTIONAL:  Well nourished.  NAD    ENT:   Airway patent,   No thrush    EYES:   Clear bilaterally,   pupils equal,   round and reactive to light.    CARDIAC:   Normal rate,   regular rhythm.    no edema      RESPIRATORY:   No wheezing   Normal chest expansion  Not tachypneic,  No use of accessory muscles    GASTROINTESTINAL:  Abdomen soft, non-tender,   No guarding,   Positive BS    MUSCULOSKELETAL:   Range of motion is not limited,  No clubbing, cyanosis    NEUROLOGICAL:   Alert and oriented   No motor deficits.    SKIN:   Skin normal color for race,   No evidence of rash.      HEME LYMPH:   No cervical  lymphadenopathy.  no inguinal lymphadenopathy          LABS:                          12.1   16.05 )-----------( 445      ( 29 Oct 2020 07:08 )             39.1                                               10-29    136  |  103  |  14  ----------------------------<  97  5.2<H>   |  22  |  0.8    Ca    9.3      29 Oct 2020 00:09  Mg     1.9     10-29    TPro  8.0  /  Alb  3.9  /  TBili  <0.2  /  DBili  x   /  AST  21  /  ALT  17  /  AlkPhos  76  10-29                                             Urinalysis Basic - ( 29 Oct 2020 00:40 )    Color: Colorless / Appearance: Clear / S.005 / pH: x  Gluc: x / Ketone: Negative  / Bili: Negative / Urobili: <2 mg/dL   Blood: x / Protein: Negative / Nitrite: Negative   Leuk Esterase: Negative / RBC: x / WBC x   Sq Epi: x / Non Sq Epi: x / Bacteria: x        CARDIAC MARKERS ( 29 Oct 2020 00:09 )  x     / 0.04 ng/mL / x     / x     / x                                                LIVER FUNCTIONS - ( 29 Oct 2020 00:09 )  Alb: 3.9 g/dL / Pro: 8.0 g/dL / ALK PHOS: 76 U/L / ALT: 17 U/L / AST: 21 U/L / GGT: x                                                                                                MEDICATIONS  (STANDING):  ALBUTerol    90 MICROgram(s) HFA Inhaler 2 Puff(s) Inhalation daily  budesonide 160 MICROgram(s)/formoterol 4.5 MICROgram(s) Inhaler 2 Puff(s) Inhalation two times a day  chlorhexidine 4% Liquid 1 Application(s) Topical <User Schedule>  enoxaparin Injectable 40 milliGRAM(s) SubCutaneous daily  melatonin 10 milliGRAM(s) Oral at bedtime  methylPREDNISolone sodium succinate Injectable 40 milliGRAM(s) IV Push two times a day  mycophenolate mofetil  Oral Tab/Cap - Peds 500 milliGRAM(s) Oral two times a day    MEDICATIONS  (PRN):  ALBUTerol    90 MICROgram(s) HFA Inhaler 2 Puff(s) Inhalation every 6 hours PRN Bronchospasm      X-Rays reviewed     Patient is a 40y old  Female who presents with a chief complaint of SOB (29 Oct 2020 02:23)      HPI:    40 year old female PMH of dermatomyositis, Cryptogenic organizing PNA diagnosed 2017 was on home oxygen till 2018 since then managed with daily 20mg prednisone and cellcept and albuterol presents for SOB.  Pt. reports feeling progressively worsening SOB for 6 days associated with productive cough. No alleviating or aggravating factors. No inciting events, denies changes to medications, no muscle pain, no sick contacts no fevers. Denies chest pain, abdominal pain does endorse frequent urination denies dysuria. Pt. reports feeling similar to her last flare in .   Vitals In ED T(F): 98 HR: 95 BP: 127/62 RR: 20 SpO2: 97%.   In ED Pt. placed on 2 L NC and feels better, Lab work significant for WBC 20, trop 0.04 which is baseline for Pt. UA neg, Chest Xray with scattered interstitial opacities pending official read.   (29 Oct 2020 02:23)    Pulm Note:    Patient is never smoker has a hx of dermatomyositis and organizing pneumonia [connective tissue disease associated ILD], chronic noncompliance with follow up, recurrent DM and ILD flares leading to several hospitalizations in the past, and refractory disease. She is now following with rheumatology regularly and she is on cellcept, imuran, rituxin and prednisone 20mg daily. Prednisone dose has been stable for the last 6 months. Her last rituxin dose was in 2020. She was lost to follow up in pulm clinic.    She presents now with worsening dyspnea on exertion for the last 6 months, she also has cough productive of clear sputum which is unchanged for many months. Her breathing worsening over the past 1 week so she came to ed. She denies fevers, chills, myalgias, arthralgias, rash, weakness, URI symptoms.    PAST MEDICAL & SURGICAL HISTORY:  H/O dermatomyositis    Dermatomyositis    Cryptogenic organizing pneumonia    History of cervical cerclage    Ankle fracture        SOCIAL HX:   Smoking   nonsmoker                      ETOH    denies                        Other    FAMILY HISTORY:  No pertinent family history    Family history of fibromyalgia (Mother)    Family history of sarcoidosis (Father)  Father, paternal grandmother and paternal aunt    .  No cardiovascular or pulmonary family history     REVIEW OF SYSTEMS:    All ROS are negative exept per HPI       Allergies    contrast media (iodine-based) (Unknown)  iodine (Anaphylaxis)  peanuts (Unknown)  shellfish (Anaphylaxis)  shellfish (Unknown)    Intolerances          PHYSICAL EXAM  Vital Signs Last 24 Hrs  T(C): 36.6 (29 Oct 2020 07:37), Max: 36.7 (28 Oct 2020 22:37)  T(F): 97.8 (29 Oct 2020 07:37), Max: 98 (28 Oct 2020 22:37)  HR: 80 (29 Oct 2020 07:37) (77 - 95)  BP: 114/67 (29 Oct 2020 07:37) (114/67 - 127/62)  BP(mean): --  RR: 18 (29 Oct 2020 07:37) (17 - 20)  SpO2: 100% (29 Oct 2020 07:37) (97% - 100%)    CONSTITUTIONAL:  Well nourished.  NAD  obese    ENT:   Airway patent,   No thrush    EYES:   Clear bilaterally,   pupils equal,   round and reactive to light.    CARDIAC:   Normal rate,   regular rhythm.    no edema      RESPIRATORY:   No wheezing   Normal chest expansion  Not tachypneic,  No use of accessory muscles    GASTROINTESTINAL:  Abdomen soft, non-tender,   No guarding,   Positive BS    MUSCULOSKELETAL:   Range of motion is not limited,  No clubbing, cyanosis    NEUROLOGICAL:   Alert and oriented   No motor deficits.    SKIN:   Skin normal color for race,   No evidence of rash.  xanthelsma on eye lids      HEME LYMPH:   No cervical  lymphadenopathy.  no inguinal lymphadenopathy          LABS:                          12.1   16.05 )-----------( 445      ( 29 Oct 2020 07:08 )             39.1                                               10-    136  |  103  |  14  ----------------------------<  97  5.2<H>   |  22  |  0.8    Ca    9.3      29 Oct 2020 00:09  Mg     1.9     10-    TPro  8.0  /  Alb  3.9  /  TBili  <0.2  /  DBili  x   /  AST  21  /  ALT  17  /  AlkPhos  76  10-29                                             Urinalysis Basic - ( 29 Oct 2020 00:40 )    Color: Colorless / Appearance: Clear / S.005 / pH: x  Gluc: x / Ketone: Negative  / Bili: Negative / Urobili: <2 mg/dL   Blood: x / Protein: Negative / Nitrite: Negative   Leuk Esterase: Negative / RBC: x / WBC x   Sq Epi: x / Non Sq Epi: x / Bacteria: x        CARDIAC MARKERS ( 29 Oct 2020 00:09 )  x     / 0.04 ng/mL / x     / x     / x                                                LIVER FUNCTIONS - ( 29 Oct 2020 00:09 )  Alb: 3.9 g/dL / Pro: 8.0 g/dL / ALK PHOS: 76 U/L / ALT: 17 U/L / AST: 21 U/L / GGT: x                                                                                                MEDICATIONS  (STANDING):  ALBUTerol    90 MICROgram(s) HFA Inhaler 2 Puff(s) Inhalation daily  budesonide 160 MICROgram(s)/formoterol 4.5 MICROgram(s) Inhaler 2 Puff(s) Inhalation two times a day  chlorhexidine 4% Liquid 1 Application(s) Topical <User Schedule>  enoxaparin Injectable 40 milliGRAM(s) SubCutaneous daily  melatonin 10 milliGRAM(s) Oral at bedtime  methylPREDNISolone sodium succinate Injectable 40 milliGRAM(s) IV Push two times a day  mycophenolate mofetil  Oral Tab/Cap - Peds 500 milliGRAM(s) Oral two times a day    MEDICATIONS  (PRN):  ALBUTerol    90 MICROgram(s) HFA Inhaler 2 Puff(s) Inhalation every 6 hours PRN Bronchospasm      X-Rays reviewed

## 2020-10-29 NOTE — ED PROVIDER NOTE - PHYSICAL EXAMINATION
CONSTITUTIONAL: Well-appearing; well-nourished; in no apparent distress.   NECK: Supple; non-tender; no cervical lymphadenopathy. No JVD.   CARDIOVASCULAR: Normal S1, S2; no murmurs, rubs, or gallops.   RESPIRATORY: Normal chest excursion with respiration; breath sounds clear and equal bilaterally; no wheezes, rhonchi, or rales.  GI/: non-distended; non-tender; no palpable organomegaly.   SKIN: Normal for age and race; warm; dry; good turgor; no apparent lesions or exudate.   NEURO/PSYCH: A & O x 4; grossly unremarkable. mood and manner are appropriate. Grooming and personal hygiene are appropriate. No apparent thoughts of harm to self or others.

## 2020-10-29 NOTE — ED PROVIDER NOTE - OBJECTIVE STATEMENT
39 year old female with h/o dermatomyositis, Cryptogenic organizing PNA presents to the ER for three days of cough, worse today. patient on chronic steroids and immunosuppressants presenting with worsening productive cough, as well as foul smelling urine recently. pt had admission for same in 1/2020. Denies fever/chill/HA/dizziness/chest pain/palpitation/abd pain/n/v/d/ black stool/bloody stool/urinary sxs

## 2020-10-29 NOTE — H&P ADULT - ASSESSMENT
40 year old female PMH of dermatomyositis, Cryptogenic organizing PNA diagnosed 2017 was on home oxygen till 2018 since then managed with daily 20mg prednisone and cellcept and albuterol presents for SOB.    #SOB likely flare of Cryptogenic organizing pneumonia and WBC of 20 is  likely from chronic daily steroids, as there is no fever, no focal consolidation vs. less likely PNA as presented with SOB, productive cough, wbc 20, will send procal and hold off abx for now, Hemodynamically stable no signs of sepsis,   -symptoms improving on 2L NC Pt. has been off Home oxygen for 2 years  -chest xray with b/l scattered interstitial opacities pending official read  -will start with solumedrol 40 BID (hold home dose 20 prednisone),  -c/w symbicort, albuterol  -NIF, VC  -esr/crp/procal, ck level, blood cx  -pulm consult    #Hx of dermatomyositis and  Cryptogenic organizing pneumonia   -c/w home meds- cellcept, changes to steroids as above, c/w symbicort  -Pt. followed with Dr. Burris has an appointment with another Pulmonologist does not have name on hand    #troponemia -chronic  0.04 no chest pain would trend 1 one set for am     #increased urinary frequency no dysuria  -UA negative no abx for now f/u urine cx    #diet-regular  #DVT ppx- lovenox

## 2020-10-29 NOTE — ED PROVIDER NOTE - PROGRESS NOTE DETAILS
trop noted, however records indicate pt "always has high trop"; confirmed with pt. will still likely admit to tele and trend. plan for repeat lactate after fluids, but IV line being replaced now via US guided. blood cx ordered after labs resulted, not susp for sepsis though and pt stable

## 2020-10-29 NOTE — ED PROVIDER NOTE - ATTENDING CONTRIBUTION TO CARE
I personally evaluated the patient. I reviewed the Resident’s or Physician Assistant’s note (as assigned above), and agree with the findings and plan except as documented in my note.  40 F with hx of dermamyositis on daily steriods, cryptogenic organizing PNA, chronic cough on albuterol inhalers with complaints of about 3 days of exacerbation of her coughing and worsening SOB. Denies fever, CP, lightheadedness or LOC. No abd or urinary complaints. VS reviewed, pt non-toxic appearing, coughing but able to speak in full sentences, NAD. Head ncat, MMM, neck supple, normal ROM, normal s1s2 without any murmurs, Lungs CTAB with normal work of breathing. abd +BS, s/nd/nt, extremities wnl, neuro exam grossly normal. No acute skin rashes. Plan is ekg, labs, CXR, meds and manage accordingly.

## 2020-10-29 NOTE — ED PROVIDER NOTE - CLINICAL SUMMARY MEDICAL DECISION MAKING FREE TEXT BOX
Pt presented with cough and SOB. Required ekg, labs, CXR and meds. Was found to have elevation in trop, which is not new. Will be admitted for further management.

## 2020-10-29 NOTE — PROGRESS NOTE ADULT - SUBJECTIVE AND OBJECTIVE BOX
JESSI KING 40y Female  MRN#: [id number]     SUBJECTIVE  Patient is a 40y old Female who presents with a chief complaint of SOB (29 Oct 2020 08:03)  Currently admitted to medicine with the primary diagnosis of Cough  Today is hospital day , and this morning she still reports some mild shortness of breath.     OBJECTIVE  PAST MEDICAL & SURGICAL HISTORY  H/O dermatomyositis  Dermatomyositis  Cryptogenic organizing pneumonia  History of cervical cerclage  Ankle fracture    ALLERGIES:  contrast media (iodine-based) (Unknown)  iodine (Anaphylaxis)  peanuts (Unknown)  shellfish (Anaphylaxis)  shellfish (Unknown)    MEDICATIONS:  STANDING MEDICATIONS  ALBUTerol    90 MICROgram(s) HFA Inhaler 2 Puff(s) Inhalation daily  budesonide 160 MICROgram(s)/formoterol 4.5 MICROgram(s) Inhaler 2 Puff(s) Inhalation two times a day  chlorhexidine 4% Liquid 1 Application(s) Topical <User Schedule>  enoxaparin Injectable 40 milliGRAM(s) SubCutaneous daily  melatonin 10 milliGRAM(s) Oral at bedtime  methylPREDNISolone sodium succinate Injectable 40 milliGRAM(s) IV Push two times a day  mycophenolate mofetil  Oral Tab/Cap - Peds 500 milliGRAM(s) Oral two times a day    PRN MEDICATIONS  ALBUTerol    90 MICROgram(s) HFA Inhaler 2 Puff(s) Inhalation every 6 hours PRN      VITAL SIGNS: Last 24 Hours  T(C): 36.6 (29 Oct 2020 07:37), Max: 36.7 (28 Oct 2020 22:37)  T(F): 97.8 (29 Oct 2020 07:37), Max: 98 (28 Oct 2020 22:37)  HR: 80 (29 Oct 2020 07:37) (77 - 95)  BP: 114/67 (29 Oct 2020 07:37) (114/67 - 127/62)  BP(mean): --  RR: 18 (29 Oct 2020 07:37) (17 - 20)  SpO2: 90% (29 Oct 2020 09:33) (90% - 100%)    LABS:                        12.1   16.05 )-----------( 445      ( 29 Oct 2020 07:08 )             39.1     10-29    136  |  105  |  12  ----------------------------<  134<H>  4.5   |  20  |  0.7    Ca    9.0      29 Oct 2020 07:08  Mg     1.9     10-29    TPro  7.6  /  Alb  3.7  /  TBili  <0.2  /  DBili  x   /  AST  15  /  ALT  17  /  AlkPhos  74  10-29      Urinalysis Basic - ( 29 Oct 2020 00:40 )    Color: Colorless / Appearance: Clear / S.005 / pH: x  Gluc: x / Ketone: Negative  / Bili: Negative / Urobili: <2 mg/dL   Blood: x / Protein: Negative / Nitrite: Negative   Leuk Esterase: Negative / RBC: x / WBC x   Sq Epi: x / Non Sq Epi: x / Bacteria: x        Sedimentation Rate, Erythrocyte: 41 mm/Hr <H> (10-29-20 @ 07:08)  Creatine Kinase, Serum: 300 U/L <H> (10-29-20 @ 07:08)  Troponin T, Serum: <0.01 ng/mL (10-29-20 @ 07:08)  Troponin T, Serum: 0.04 ng/mL <HH> (10-29-20 @ 00:09)      CARDIAC MARKERS ( 29 Oct 2020 07:08 )  x     / <0.01 ng/mL / 300 U/L / x     / x      CARDIAC MARKERS ( 29 Oct 2020 00:09 )  x     / 0.04 ng/mL / x     / x     / x          RADIOLOGY:    PHYSICAL EXAM:    GENERAL: NAD, well-developed, AAOx3  HEENT:  Atraumatic, Normocephalic. EOMI, PERRLA, conjunctiva and sclera clear, No JVD  PULMONARY: Clear to auscultation bilaterally; mild expiratory wheezing.  CARDIOVASCULAR: Regular rate and rhythm; No murmurs, rubs, or gallops  GASTROINTESTINAL: Soft, Nontender, Nondistended; Bowel sounds present  MUSCULOSKELETAL:  2+ Peripheral Pulses, No clubbing, cyanosis, or edema  NEUROLOGY: non-focal  SKIN: No rashes or lesions      ASSESSMENT & PLAN  40 year old female PMH of dermatomyositis, Cryptogenic organizing PNA diagnosed 2017 was on home oxygen till 2018 since then managed with daily 20mg prednisone and cellcept and albuterol presents for SOB.    #SOB likely flare of Cryptogenic organizing pneumonia   WBC of 20 --> 16  CXR: no PNAv  - f/u procal and hold off abx for now, Hemodynamically stable no signs of sepsis,   -symptoms improving on 2L NC   -chest xray with b/l scattered interstitial opacities pending official read  -will start with solumedrol 40 BID (hold home dose 20 prednisone),  -c/w symbicort, albuterol  -NIF, VC  -pulm consult    #Hx of dermatomyositis and  Cryptogenic organizing pneumonia   -c/w home meds- cellcept, changes to steroids as above, c/w symbicort  -Pt. followed with Dr. Burris has an appointment with another Pulmonologist does not have name on hand    #troponemia --> resolved  0.04 no chest pain would trend 1 one set for am     #increased urinary frequency no dysuria  -UA negative no abx for now f/u urine cx    DVT prophylaxis: lovenox  Diet: Diet, Regular (10-29-20 @ 02:41)  Dispo: from home  Ambulation: as tolerated   JESSI KING 40y Female  MRN#: [id number]     SUBJECTIVE  Patient is a 40y old Female who presents with a chief complaint of SOB (29 Oct 2020 08:03)  Currently admitted to medicine with the primary diagnosis of Cough  Today is hospital day , and this morning she still reports some mild shortness of breath.     OBJECTIVE  PAST MEDICAL & SURGICAL HISTORY  H/O dermatomyositis  Dermatomyositis  Cryptogenic organizing pneumonia  History of cervical cerclage  Ankle fracture    ALLERGIES:  contrast media (iodine-based) (Unknown)  iodine (Anaphylaxis)  peanuts (Unknown)  shellfish (Anaphylaxis)  shellfish (Unknown)    MEDICATIONS:  STANDING MEDICATIONS  ALBUTerol    90 MICROgram(s) HFA Inhaler 2 Puff(s) Inhalation daily  budesonide 160 MICROgram(s)/formoterol 4.5 MICROgram(s) Inhaler 2 Puff(s) Inhalation two times a day  chlorhexidine 4% Liquid 1 Application(s) Topical <User Schedule>  enoxaparin Injectable 40 milliGRAM(s) SubCutaneous daily  melatonin 10 milliGRAM(s) Oral at bedtime  methylPREDNISolone sodium succinate Injectable 40 milliGRAM(s) IV Push two times a day  mycophenolate mofetil  Oral Tab/Cap - Peds 500 milliGRAM(s) Oral two times a day    PRN MEDICATIONS  ALBUTerol    90 MICROgram(s) HFA Inhaler 2 Puff(s) Inhalation every 6 hours PRN      VITAL SIGNS: Last 24 Hours  T(C): 36.6 (29 Oct 2020 07:37), Max: 36.7 (28 Oct 2020 22:37)  T(F): 97.8 (29 Oct 2020 07:37), Max: 98 (28 Oct 2020 22:37)  HR: 80 (29 Oct 2020 07:37) (77 - 95)  BP: 114/67 (29 Oct 2020 07:37) (114/67 - 127/62)  BP(mean): --  RR: 18 (29 Oct 2020 07:37) (17 - 20)  SpO2: 90% (29 Oct 2020 09:33) (90% - 100%)    LABS:                        12.1   16.05 )-----------( 445      ( 29 Oct 2020 07:08 )             39.1     10-29    136  |  105  |  12  ----------------------------<  134<H>  4.5   |  20  |  0.7    Ca    9.0      29 Oct 2020 07:08  Mg     1.9     10-29    TPro  7.6  /  Alb  3.7  /  TBili  <0.2  /  DBili  x   /  AST  15  /  ALT  17  /  AlkPhos  74  10-29      Urinalysis Basic - ( 29 Oct 2020 00:40 )    Color: Colorless / Appearance: Clear / S.005 / pH: x  Gluc: x / Ketone: Negative  / Bili: Negative / Urobili: <2 mg/dL   Blood: x / Protein: Negative / Nitrite: Negative   Leuk Esterase: Negative / RBC: x / WBC x   Sq Epi: x / Non Sq Epi: x / Bacteria: x        Sedimentation Rate, Erythrocyte: 41 mm/Hr <H> (10-29-20 @ 07:08)  Creatine Kinase, Serum: 300 U/L <H> (10-29-20 @ 07:08)  Troponin T, Serum: <0.01 ng/mL (10-29-20 @ 07:08)  Troponin T, Serum: 0.04 ng/mL <HH> (10-29-20 @ 00:09)      CARDIAC MARKERS ( 29 Oct 2020 07:08 )  x     / <0.01 ng/mL / 300 U/L / x     / x      CARDIAC MARKERS ( 29 Oct 2020 00:09 )  x     / 0.04 ng/mL / x     / x     / x          RADIOLOGY:    PHYSICAL EXAM:    GENERAL: NAD, well-developed, AAOx3  HEENT:  Atraumatic, Normocephalic. EOMI, PERRLA, conjunctiva and sclera clear, No JVD  PULMONARY: Clear to auscultation bilaterally; mild expiratory wheezing.  CARDIOVASCULAR: Regular rate and rhythm; No murmurs, rubs, or gallops  GASTROINTESTINAL: Soft, Nontender, Nondistended; Bowel sounds present  MUSCULOSKELETAL:  2+ Peripheral Pulses, No clubbing, cyanosis, or edema  NEUROLOGY: non-focal  SKIN: No rashes or lesions      ASSESSMENT & PLAN  40 year old female PMH of dermatomyositis, Cryptogenic organizing PNA diagnosed 2017 was on home oxygen till 2018 since then managed with daily 20mg prednisone and cellcept and albuterol presents for SOB.    #SOB DDx: pulmonary HTN, PE, PCP (given chronic steroid use and immunosuppression)   WBC of 20 --> 16  CXR: no PNA  - f/u procal and hold off abx for now, Hemodynamically stable no signs of sepsis,   -symptoms improving on 2L NC   - CT angio to eval for PE  - echo to eval for pulHTN  - LDH in am  - Fungitell   - on solumedrol 40 BID (hold home dose 20 prednisone),  -c/w symbicort, albuterol  -pulm consult    #Hx of dermatomyositis and  Cryptogenic organizing pneumonia   -c/w home meds- cellcept, changes to steroids as above, c/w symbicort  -Pt. followed with Dr. Burris has an appointment with another Pulmonologist does not have name on hand    #troponemia --> resolved  0.04 no chest pain would trend 1 one set for am     #increased urinary frequency no dysuria  -UA negative no abx for now f/u urine cx    # high risk for osteoporosis given chronic steroid use  - OP DEXA    DVT prophylaxis: lovenox  Diet: Diet, Regular (10-29-20 @ 02:41)  Dispo: from home  Ambulation: as tolerated

## 2020-10-29 NOTE — CONSULT NOTE ADULT - ASSESSMENT
IMPRESSION:    Chronic and progressive dyspnea on exertion with cough   Hx DM-associated ILD [bx confirmed organizing pneumonia 2017], cxr with stable bilateral intersitial opacities  Hx of Dermatomyositis currently on cellcept, imuran, biannual rituxin [last dose feb] currently without muscle weakness or rash  Not on PCP ppx unclear reason  Leukocytosis likely due to chronic prednisone  Probable RODRICK / high STOP BANG  Morbid obesity  Hx of noncompliance     PLAN:    ·	CT chest NC   ·	LDH, CPK  ·	C/w home immunosuppressants   ·	Doubt flare up of DM or ILD and would not treat with pulse steroids  ·	Outpatient PFTs and PSG  ·	DVT ppx     IMPRESSION:    Chronic and progressive dyspnea on exertion with cough   Hx DM-associated ILD [bx confirmed organizing pneumonia 2017], cxr with stable bilateral intersitial opacities  Hx of Dermatomyositis currently on cellcept, imuran, biannual rituxin [last dose feb] currently without muscle weakness or rash  Not on PCP ppx unclear reason  Leukocytosis likely due to chronic prednisone  Probable RODRICK / high STOP BANG  Morbid obesity  Hx of noncompliance     PLAN:    ·	CT chest NC, may need bronch rule out PCP if there is new ground glass  ·	LDH, CPK  ·	C/w home immunosuppressants   ·	Doubt flare up of DM or ILD and would not treat with pulse steroids  ·	Outpatient PFTs and PSG  ·	DVT ppx     IMPRESSION:    Chronic and progressive dyspnea on exertion with cough   Hx DM-associated ILD [bx confirmed organizing pneumonia 2017], cxr with stable bilateral intersitial opacities  Hx of Dermatomyositis currently on cellcept, imuran, biannual rituxin [last dose feb] currently without muscle weakness or rash  Not on PCP ppx unclear reason  PE is also in differential  Leukocytosis likely due to chronic prednisone  Probable RODRICK / high STOP BANG  Morbid obesity  Hx of noncompliance     PLAN:    ·	CTA PE protocol  ·	May need bronch to evaluate for PCP if the CT chest is suggestive  ·	LDH, CPK, BNP  ·	Echo  ·	C/w home immunosuppressants   ·	Doubt flare up of DM or ILD and would not treat with pulse steroids  ·	Outpatient PFTs and PSG  ·	DVT ppx  ·	will discuss with attending     IMPRESSION:    Chronic and progressive dyspnea on exertion with cough   Hx DM-associated ILD [bx confirmed organizing pneumonia 2017], cxr with stable bilateral intersitial opacities  Hx of Dermatomyositis currently on cellcept, imuran, biannual rituxin [last dose feb] currently without muscle weakness or rash  Not on PCP ppx unclear reason  PE is also in differential  Leukocytosis likely due to chronic prednisone  Probable RODRICK / high STOP BANG  Morbid obesity  Hx of noncompliance     PLAN:    ·	CTA PE protocol  ·	May need bronch to evaluate for PCP if the CT chest is suggestive  ·	LDH, CPK, BNP  ·	Send COVID nasal PCR  ·	Echo  ·	C/w home immunosuppressants   ·	Doubt flare up of DM or ILD and would not treat with pulse steroids  ·	Outpatient PFTs and PSG  ·	DVT ppx  ·	will discuss with attending     IMPRESSION:    Chronic and progressive dyspnea on exertion with cough   Hx DM-associated ILD [bx confirmed organizing pneumonia 2017], cxr with stable bilateral intersitial opacities  Hx of Dermatomyositis refractory disease/steroid dependant on prednisone 20mg daily, cellcept, imuran, biannual rituxin [last dose feb] currently without muscle weakness or rash  Not on PCP ppx unclear reason  PE is also in differential. Dimer may be unreliable in this case.  Leukocytosis likely due to chronic prednisone  Probable RODRICK / high STOP BANG  Morbid obesity  Hx of noncompliance     PLAN:    ·	CTA PE protocol  ·	May need bronch to evaluate for PCP if the CT chest is suggestive  ·	LDH, CPK, BNP  ·	Send COVID nasal PCR  ·	Echo  ·	C/w home immunosuppressants   ·	Doubt flare up of DM or ILD and would not treat with pulse steroids  ·	Outpatient PFTs and PSG  ·	DVT ppx  ·	will discuss with attending     IMPRESSION:    Chronic and progressive dyspnea on exertion with cough   Hx DM-associated ILD [bx confirmed organizing pneumonia 2017], cxr with stable bilateral intersitial opacities  Hx of Dermatomyositis refractory disease/steroid dependant on prednisone 20mg daily, cellcept, imuran, biannual rituxin [last dose feb] currently without muscle weakness or rash and CPK near normal  Not on PCP ppx unclear reason  PE is also in differential. Dimer may be unreliable in this case.  Leukocytosis likely due to chronic prednisone  Probable RODRICK / high STOP BANG  Morbid obesity  Hx of noncompliance     PLAN:    ·	CTA PE protocol  ·	May need bronch to evaluate for PCP if the CT chest is suggestive  ·	LDH, CPK, BNP  ·	Send COVID nasal PCR  ·	Echo  ·	C/w home immunosuppressants   ·	Doubt flare up of DM or ILD and would not treat with pulse steroids  ·	Outpatient PFTs and PSG  ·	DVT ppx  ·	will discuss with attending     IMPRESSION:    Chronic and progressive dyspnea on exertion with cough   Hx DM-associated ILD [bx confirmed organizing pneumonia 2017], cxr with stable bilateral intersitial opacities  Hx of Dermatomyositis refractory disease/steroid dependant on prednisone 20mg daily, cellcept, imuran, biannual rituxin [last dose feb] currently without muscle weakness or rash and CPK near normal  Not on PCP ppx unclear reason  PE is also in differential. Dimer may be unreliable in this case.  Leukocytosis likely due to chronic prednisone  Probable RODRICK / high STOP BANG  Morbid obesity  Hx of noncompliance     PLAN:    ·	CTA PE protocol  ·	May need bronch to evaluate for PCP if the CT chest is suggestive  ·	LDH  ·	Send COVID nasal PCR  ·	Echo and BNP to eval for Pulm HTN  ·	C/w home immunosuppressants   ·	Doubt flare up of DM or ILD and would not treat with pulse steroids  ·	Needs to follow up Outpatient for PFTs and PSG  ·	DVT ppx  ·	will discuss with attending     IMPRESSION:    Chronic and progressive dyspnea on exertion with cough   Hx DM-associated ILD [bx confirmed organizing pneumonia 2017], cxr with stable bilateral intersitial opacities  Hx of Dermatomyositis refractory disease/steroid dependant on prednisone 20mg daily, cellcept, imuran, biannual rituxin [last dose feb] currently without muscle weakness or rash and CPK near normal  Not on PCP ppx unclear reason  PE is also in differential. Dimer may be unreliable in this case.  Leukocytosis likely due to chronic prednisone  Probable RODRICK / high STOP BANG  Morbid obesity  Hx of noncompliance     PLAN:    ·	CTA PE protocol  ·	May need bronch to evaluate for PCP if the CT chest is suggestive  ·	LDH  ·	Send COVID nasal PCR  ·	Echo and BNP to eval for Pulm HTN  ·	C/w home immunosuppressants   ·	Doubt flare up of DM or ILD and would not treat with pulse steroids  ·	Needs to follow up Outpatient in [facility]   ·	Outpatient PFTs and PSG  ·	DVT ppx  ·	will discuss with attending     IMPRESSION:    Chronic and progressive dyspnea on exertion with cough   Hx DM-associated ILD [bx confirmed organizing pneumonia 2017], cxr with stable bilateral intersitial opacities  Hx of Dermatomyositis refractory disease/steroid dependant on prednisone 20mg daily, cellcept, imuran, biannual rituxin [last dose feb] currently without muscle weakness or rash and CPK near normal  Not on PCP ppx unclear reason  PE is also in differential. Dimer may be unreliable in this case.  Leukocytosis likely due to chronic prednisone  Probable RODRICK / high STOP BANG  Morbid obesity  Hx of noncompliance     PLAN:    ·	CTA PE protocol  ·	May need bronch to evaluate for PCP if the CT chest is suggestive  ·	LDH  ·	Send COVID nasal PCR  ·	Echo and BNP to eval for Pulm HTN  ·	C/w home immunosuppressants   ·	Doubt flare up of DM or ILD and would not treat with pulse steroids  ·	Needs to follow up Outpatient in [facility]   ·	Outpatient PFTs and PSG  ·	Patient should be on calcium/Vit D supplements for prevention of steroid-induced osteoporosis. Outpatient DEXA scan.  ·	DVT ppx  ·	will discuss with attending     IMPRESSION:    Chronic and progressive dyspnea on exertion with cough   Hx DM-associated ILD [bx confirmed organizing pneumonia 2017], cxr with stable bilateral intersitial opacities  Hx of Dermatomyositis refractory disease/steroid dependant on prednisone 20mg daily, cellcept, imuran, biannual rituxin [last dose feb] currently without muscle weakness or rash and CPK near normal  Not on PCP ppx unclear reason  PE is also in differential. Dimer may be unreliable in this case.  Leukocytosis likely due to chronic prednisone  Probable RODRICK / high STOP BANG  Morbid obesity  Hx of noncompliance     PLAN:    ·	CTA PE protocol  ·	May need bronch to evaluate for PCP if the CT chest is suggestive  ·	LDH  ·	Send COVID nasal PCR  ·	Echo and BNP   ·	C/w home immunosuppressants   ·	Doubt flare up of DM or ILD and would not treat with pulse steroids  ·	Needs to follow up Outpatient in [facility]   ·	Outpatient PFTs and PSG  ·	Patient should be on calcium/Vit D supplements for prevention of steroid-induced osteoporosis. Outpatient DEXA scan.  ·	DVT ppx  ·	will discuss with attending     IMPRESSION:    Chronic and progressive dyspnea on exertion with cough   Hx DM-associated ILD [bx confirmed organizing pneumonia 2017], cxr with stable bilateral intersitial opacities  Hx of Dermatomyositis refractory disease/steroid dependant on prednisone 20mg daily, cellcept, imuran, biannual rituxin [last dose feb] currently without muscle weakness or rash and CPK near normal  Not on PCP ppx unclear reason  PE is also in differential. Dimer may be unreliable in this case.  Leukocytosis likely due to chronic prednisone  Probable RODRICK / high STOP BANG  Morbid obesity  Hx of noncompliance     PLAN:    ·	CTA PE protocol  ·	May need bronch to evaluate for PCP if the CT chest is suggestive  ·	LDH  ·	Send COVID nasal PCR  ·	Echo and BNP   ·	C/w home immunosuppressants   ·	Doubt flare up of DM or ILD and would not treat with pulse steroids  ·	Needs to follow up Outpatient in [facility]   ·	Outpatient PFTs and PSG  ·	Patient should be on calcium/Vit D supplements for prevention of steroid-induced osteoporosis. Outpatient DEXA scan.  ·	DVT ppx     IMPRESSION:    Chronic and progressive dyspnea on exertion with cough   Hx DM-associated ILD [bx confirmed organizing pneumonia 2017], cxr with stable bilateral intersitial opacities  Hx of Dermatomyositis refractory disease/steroid dependant on prednisone 20mg daily, cellcept, imuran, biannual rituxin [last dose feb] currently without muscle weakness or rash and CPK near normal  Not on PCP ppx unclear reason  PE is also in differential. Dimer may be unreliable in this case.  Leukocytosis likely due to chronic prednisone  Probable RODRICK / high STOP BANG  Morbid obesity  Hx of noncompliance     PLAN:    ·	CTA PE protocol  ·	May need bronch to evaluate for PCP if the CT chest is suggestive  ·	LDH, fungitell  ·	Send COVID nasal PCR  ·	Echo and BNP   ·	C/w home immunosuppressants   ·	Doubt flare up of DM or ILD and would not treat with pulse steroids  ·	Needs to follow up Outpatient in [facility]   ·	Outpatient PFTs and PSG  ·	Patient should be on calcium/Vit D supplements for prevention of steroid-induced osteoporosis. Outpatient DEXA scan.  ·	DVT ppx

## 2020-10-29 NOTE — H&P ADULT - NSICDXFAMILYHX_GEN_ALL_CORE_FT
FAMILY HISTORY:  No pertinent family history    Father  Still living? Unknown  Family history of sarcoidosis, Age at diagnosis: Age Unknown    Mother  Still living? Unknown  Family history of fibromyalgia, Age at diagnosis: Age Unknown

## 2020-10-29 NOTE — H&P ADULT - ATTENDING COMMENTS
41 YO F with a PMH of dermatomyositis and  diagnosed in 2017 was on home O2 until 2018 who presents to the hospital with a c/o SOB for the past x 6 days. Associated with productive cough. Denies any fevers/chills, COVID19 contact, recent travel, CP, palpitations, LE swelling, ABD pain, or N/V/D. In the ED, Chest X-Ray with B/L patchy interstitial opacities (pending official read). COVID19 sent. Started on IV steroids, IVFs (LR), and IV ABXs (Ceftriaxone/Azithro).     Physical exam shows pt in NAD. VSS, afebrile, not hypoxic on RA. A&Ox3. Non-focal neuro exam. Muscle strength/sensation intact. CTA B/L with no W/C/R. RRR, no M/G/R. ABD is soft and non-tender, normoactive BSs. LEs without swelling. No rashes. Labs and radiology as above.     SOB/Cough, rule out  recurrence vs COVID19 pneumonia; no sepsis on admission. IV Steroids. Send Procal/CRP/ESR. No ABXs. IVFs (LR). FU cultures. Anti-tussives PRN. Send COVID19. Pulm consult. Supplemental O2 PRN.     Leukocytosis, likely from chronic steroid use.     Troponin elevation, as per pt and EMR, always slightly elevated. Doubt ACS. Repeat one more set of cardiac enzymes and EKG.    Hx of dermatomyositis. Restart home meds. DVT PPX. Inform PCP of pt's admission to hospital. My note supersedes the residents note.

## 2020-10-29 NOTE — H&P ADULT - NSHPREVIEWOFSYSTEMS_GEN_ALL_CORE
CONSTITUTIONAL: No weakness, fevers or chills  EYES/ENT: No visual changes;  No vertigo or throat pain   NECK: No pain or stiffness  RESPIRATORY: + cough, wheezing, hemoptysis; + shortness of breath  CARDIOVASCULAR: No chest pain or palpitations  GASTROINTESTINAL: No abdominal or epigastric pain. No nausea, vomiting, or hematemesis; No diarrhea or constipation. No melena or hematochezia.  GENITOURINARY: No dysuria, +frequency  no hematuria  NEUROLOGICAL: No numbness or weakness  SKIN: No itching, rashes

## 2020-10-30 LAB
ANION GAP SERPL CALC-SCNC: 9 MMOL/L — SIGNIFICANT CHANGE UP (ref 7–14)
BUN SERPL-MCNC: 15 MG/DL — SIGNIFICANT CHANGE UP (ref 10–20)
CALCIUM SERPL-MCNC: 8.7 MG/DL — SIGNIFICANT CHANGE UP (ref 8.5–10.1)
CHLORIDE SERPL-SCNC: 104 MMOL/L — SIGNIFICANT CHANGE UP (ref 98–110)
CO2 SERPL-SCNC: 23 MMOL/L — SIGNIFICANT CHANGE UP (ref 17–32)
CREAT SERPL-MCNC: 0.6 MG/DL — LOW (ref 0.7–1.5)
CULTURE RESULTS: SIGNIFICANT CHANGE UP
ERYTHROCYTE [SEDIMENTATION RATE] IN BLOOD: 39 MM/HR — HIGH (ref 0–20)
GLUCOSE SERPL-MCNC: 113 MG/DL — HIGH (ref 70–99)
GRAM STN FLD: SIGNIFICANT CHANGE UP
HCT VFR BLD CALC: 36.7 % — LOW (ref 37–47)
HGB BLD-MCNC: 11.4 G/DL — LOW (ref 12–16)
LDH SERPL L TO P-CCNC: 219 — SIGNIFICANT CHANGE UP (ref 50–242)
MCHC RBC-ENTMCNC: 29.6 PG — SIGNIFICANT CHANGE UP (ref 27–31)
MCHC RBC-ENTMCNC: 31.1 G/DL — LOW (ref 32–37)
MCV RBC AUTO: 95.3 FL — SIGNIFICANT CHANGE UP (ref 81–99)
METHOD TYPE: SIGNIFICANT CHANGE UP
MSSA DNA SPEC QL NAA+PROBE: SIGNIFICANT CHANGE UP
NRBC # BLD: 0 /100 WBCS — SIGNIFICANT CHANGE UP (ref 0–0)
NT-PROBNP SERPL-SCNC: 46 PG/ML — SIGNIFICANT CHANGE UP (ref 0–300)
PLATELET # BLD AUTO: 440 K/UL — HIGH (ref 130–400)
POTASSIUM SERPL-MCNC: 5 MMOL/L — SIGNIFICANT CHANGE UP (ref 3.5–5)
POTASSIUM SERPL-SCNC: 5 MMOL/L — SIGNIFICANT CHANGE UP (ref 3.5–5)
RBC # BLD: 3.85 M/UL — LOW (ref 4.2–5.4)
RBC # FLD: 14.4 % — SIGNIFICANT CHANGE UP (ref 11.5–14.5)
SODIUM SERPL-SCNC: 136 MMOL/L — SIGNIFICANT CHANGE UP (ref 135–146)
SPECIMEN SOURCE: SIGNIFICANT CHANGE UP
SPECIMEN SOURCE: SIGNIFICANT CHANGE UP
WBC # BLD: 16.95 K/UL — HIGH (ref 4.8–10.8)
WBC # FLD AUTO: 16.95 K/UL — HIGH (ref 4.8–10.8)

## 2020-10-30 PROCEDURE — 99233 SBSQ HOSP IP/OBS HIGH 50: CPT

## 2020-10-30 PROCEDURE — 71275 CT ANGIOGRAPHY CHEST: CPT | Mod: 26

## 2020-10-30 RX ORDER — AZATHIOPRINE 100 MG/1
150 TABLET ORAL DAILY
Refills: 0 | Status: DISCONTINUED | OUTPATIENT
Start: 2020-10-30 | End: 2020-11-06

## 2020-10-30 RX ORDER — DIPHENHYDRAMINE HCL 50 MG
50 CAPSULE ORAL ONCE
Refills: 0 | Status: DISCONTINUED | OUTPATIENT
Start: 2020-10-30 | End: 2020-10-30

## 2020-10-30 RX ORDER — CEFAZOLIN SODIUM 1 G
2000 VIAL (EA) INJECTION EVERY 8 HOURS
Refills: 0 | Status: DISCONTINUED | OUTPATIENT
Start: 2020-10-31 | End: 2020-11-06

## 2020-10-30 RX ORDER — DIPHENHYDRAMINE HCL 50 MG
50 CAPSULE ORAL ONCE
Refills: 0 | Status: COMPLETED | OUTPATIENT
Start: 2020-10-30 | End: 2020-10-30

## 2020-10-30 RX ORDER — PANTOPRAZOLE SODIUM 20 MG/1
40 TABLET, DELAYED RELEASE ORAL
Refills: 0 | Status: DISCONTINUED | OUTPATIENT
Start: 2020-10-30 | End: 2020-11-06

## 2020-10-30 RX ADMIN — ENOXAPARIN SODIUM 40 MILLIGRAM(S): 100 INJECTION SUBCUTANEOUS at 11:16

## 2020-10-30 RX ADMIN — Medication 650 MILLIGRAM(S): at 21:46

## 2020-10-30 RX ADMIN — MYCOPHENOLATE MOFETIL 500 MILLIGRAM(S): 250 CAPSULE ORAL at 18:13

## 2020-10-30 RX ADMIN — BUDESONIDE AND FORMOTEROL FUMARATE DIHYDRATE 2 PUFF(S): 160; 4.5 AEROSOL RESPIRATORY (INHALATION) at 11:15

## 2020-10-30 RX ADMIN — ALBUTEROL 2 PUFF(S): 90 AEROSOL, METERED ORAL at 11:16

## 2020-10-30 RX ADMIN — Medication 50 MILLIGRAM(S): at 08:24

## 2020-10-30 RX ADMIN — Medication 10 MILLIGRAM(S): at 01:31

## 2020-10-30 RX ADMIN — Medication 40 MILLIGRAM(S): at 07:01

## 2020-10-30 RX ADMIN — MYCOPHENOLATE MOFETIL 500 MILLIGRAM(S): 250 CAPSULE ORAL at 07:01

## 2020-10-30 RX ADMIN — Medication 650 MILLIGRAM(S): at 22:18

## 2020-10-30 NOTE — CONSULT NOTE ADULT - ASSESSMENT
ASSESSMENT  40 year old female PMH of dermatomyositis, Cryptogenic organizing PNA diagnosed 2017 was on home oxygen till 2018 since then managed with daily 20mg prednisone and cellcept and albuterol presents for SOB and cough x 6 days, found to have MSSA bacteremia     IMPRESSION  #MSSA bacteremia with severe sepsis on admission (P>90 WBC 20, lactic acidosis 3)    10/29 1/4 bottles    No clear source    Reports cough, but imaging not indicative of PNA    No skin lesions    No recent lines, injections, IVDU    No paraspinal tenderness    No murmur on exam   #Morbid Obesity BMI (kg/m2): 43.5  #Dermatomyositis on immunosuppressives (pred, azathioprine, cellcept)  #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

## 2020-10-30 NOTE — CONSULT NOTE ADULT - SUBJECTIVE AND OBJECTIVE BOX
JESSI KING  40y, Female  Allergy: contrast media (iodine-based) (Unknown)  iodine (Anaphylaxis)  peanuts (Unknown)  shellfish (Anaphylaxis)  shellfish (Unknown)      CHIEF COMPLAINT:   SOB (30 Oct 2020 15:50)      LOS  1d    HPI  HPI:  40 year old female PMH of dermatomyositis, Cryptogenic organizing PNA diagnosed 2017 was on home oxygen till 2018 since then managed with daily 20mg prednisone and cellcept and albuterol presents for SOB.  Pt. reports feeling progressively worsening SOB for 6 days associated with productive cough. No alleviating or aggravating factors. No inciting events, denies changes to medications, no muscle pain, no sick contacts no fevers. Denies chest pain, abdominal pain does endorse frequent urination denies dysuria. Pt. reports feeling similar to her last flare in .   Vitals In ED T(F): 98 HR: 95 BP: 127/62 RR: 20 SpO2: 97%.   In ED Pt. placed on 2 L NC and feels better, Lab work significant for WBC 20, trop 0.04 which is baseline for Pt. UA neg, Chest Xray with scattered interstitial opacities pending official read.   (29 Oct 2020 02:23)      INFECTIOUS DISEASE HISTORY:    10/29 1/4 bottles MSSA    No skin lesions or open wounds    No recent lines, injections, IVDU    She reports that she gets recurrent vesicular lesions on her upper L thigh, no known hx herpes, not sexually active in 3 yrs    PMH  PAST MEDICAL & SURGICAL HISTORY:  H/O dermatomyositis    Dermatomyositis    Cryptogenic organizing pneumonia    History of cervical cerclage    Ankle fracture        FAMILY HISTORY  No pertinent family history    Family history of fibromyalgia (Mother)    Family history of sarcoidosis (Father)        SOCIAL HISTORY  Social History:  denies smoking/etoh/drug use (29 Oct 2020 02:23)        ROS  General: Denies rigors, nightsweats  HEENT: Denies headache, rhinorrhea, sore throat, eye pain  CV: Denies CP, palpitations  PULM: as noted above   GI: Denies hematemesis, hematochezia, melena  : Denies discharge, hematuria  MSK: Denies arthralgias, myalgias  SKIN: as noted above   NEURO: Denies paresthesias, weakness  PSYCH: Denies depression, anxiety     VITALS:  T(F): 97.1, Max: 98.3 (10-29-20 @ 21:04)  HR: 80  BP: 120/59  RR: 20Vital Signs Last 24 Hrs  T(C): 36.2 (30 Oct 2020 15:53), Max: 36.8 (29 Oct 2020 21:04)  T(F): 97.1 (30 Oct 2020 15:53), Max: 98.3 (29 Oct 2020 21:04)  HR: 80 (30 Oct 2020 15:53) (80 - 95)  BP: 120/59 (30 Oct 2020 15:53) (119/56 - 132/64)  BP(mean): --  RR: 20 (30 Oct 2020 15:53) (18 - 20)  SpO2: --    PHYSICAL EXAM:  Gen: Obese NAD, resting in bed  HEENT: Normocephalic, atraumatic  Neck: supple, no lymphadenopathy  CV: Regular rate & regular rhythm. no murmur  back: no paraspinal tenderness to palpation   Lungs: decreased BS at bases, no fremitus  Abdomen: Soft, BS present  Ext: Warm, well perfused  Neuro: non focal, awake  Skin: no rash, no erythema, healed lesions L upper thigh  Lines: no phlebitis     TESTS & MEASUREMENTS:                        11.4   16.95 )-----------( 440      ( 30 Oct 2020 08:35 )             36.7     10-30    136  |  104  |  15  ----------------------------<  113<H>  5.0   |  23  |  0.6<L>    Ca    8.7      30 Oct 2020 08:35  Mg     1.9     10-29    TPro  7.6  /  Alb  3.7  /  TBili  <0.2  /  DBili  x   /  AST  15  /  ALT  17  /  AlkPhos  74  10-29    eGFR if Non African American: 113 mL/min/1.73M2 (10-30-20 @ 08:35)  eGFR if : 131 mL/min/1.73M2 (10-30-20 @ 08:35)    LIVER FUNCTIONS - ( 29 Oct 2020 07:08 )  Alb: 3.7 g/dL / Pro: 7.6 g/dL / ALK PHOS: 74 U/L / ALT: 17 U/L / AST: 15 U/L / GGT: x           Urinalysis Basic - ( 29 Oct 2020 00:40 )    Color: Colorless / Appearance: Clear / S.005 / pH: x  Gluc: x / Ketone: Negative  / Bili: Negative / Urobili: <2 mg/dL   Blood: x / Protein: Negative / Nitrite: Negative   Leuk Esterase: Negative / RBC: x / WBC x   Sq Epi: x / Non Sq Epi: x / Bacteria: x        Culture - Blood (collected 10-29-20 @ 07:08)  Source: .Blood None  Gram Stain (10-30-20 @ 02:03):    Growth in anaerobic bottle: Gram Positive Cocci in Clusters  Preliminary Report (10-30-20 @ 02:03):    Growth in anaerobic bottle: Gram Positive Cocci in Clusters    "Due to technical problems, Proteus sp. will Not be reported as part of    the BCID panel until further notice"    ***Blood Panel PCR results on this specimen are available    approximately 3 hours after the Gram stain result.***    Gram stain, PCR, and/or culture results may not always    correspond due to difference in methodologies.    ************************************************************    This PCR assay was performed using [email or web address].    The following targets are tested for: Enterococcus,    vancomycin resistant enterococci, Listeria monocytogenes,    coagulase negative staphylococci, S. aureus,    methicillin resistant S. aureus, Streptococcus agalactiae    (Group B), S. pneumoniae, S. pyogenes (Group A),    Acinetobacter baumannii, Enterobacter cloacae, E. coli,    Klebsiella oxytoca, K. pneumoniae, Proteus sp.,    Serratia marcescens, Haemophilus influenzae,    Neisseria meningitidis, Pseudomonas aeruginosa, Candida    albicans, C. glabrata, C krusei, C parapsilosis,    C. tropicalis and the KPC resistance gene.  Organism: Blood Culture PCR (10-30-20 @ 03:55)  Organism: Blood Culture PCR (10-30-20 @ 03:55)      -  Staphylococcus aureus: Detec Any isolate of Staphylococcus aureus from a blood culture is NOT considered a contaminant.      Method Type: PCR    Culture - Blood (collected 10-29-20 @ 01:50)  Source: .Blood Blood  Preliminary Report (10-30-20 @ 07:02):    No growth to date.    Culture - Urine (collected 10-29-20 @ 00:40)  Source: .Urine Clean Catch (Midstream)  Final Report (10-30-20 @ 06:49):    <10,000 CFU/mL Normal Urogenital Alejandra    Culture - Blood (collected 20 @ 06:26)  Source: .Blood None  Final Report (20 @ 14:01):    No growth at 5 days.    Culture - Blood (collected 20 @ 06:26)  Source: .Blood None  Final Report (20 @ 14:01):    No growth at 5 days.    Culture - Urine (collected 20 @ 02:44)  Source: .Urine Clean Catch (Midstream)  Final Report (20 @ 22:27):    >100,000 CFU/ml Klebsiella pneumoniae  Organism: Klebsiella pneumoniae (20 @ 22:27)  Organism: Klebsiella pneumoniae (20 @ 22:27)      -  Amikacin: S <=16      -  Ampicillin: R >16 These ampicillin results predict results for amoxicillin      -  Ampicillin/Sulbactam: R >16/8 Enterobacter, Citrobacter, and Serratia may develop resistance during prolonged therapy (3-4 days)      -  Aztreonam: S <=4      -  Cefazolin: S <=8 (MIC_CL_COM_ENTERIC_CEFAZU) For uncomplicated UTI with K. pneumoniae, E. coli, or P. mirablis: CONNER <=16 is sensitive and CONNER >=32 is resistant. This also predicts results for oral agents cefaclor, cefdinir, cefpodoxime, cefprozil, cefuroxime axetil, cephalexin and locarbef for uncomplicated UTI. Note that some isolates may be susceptible to these agents while testing resistant to cefazolin.      -  Cefepime: S <=4      -  Cefoxitin: S <=8      -  Ceftriaxone: S <=1 Enterobacter, Citrobacter, and Serratia may develop resistance during prolonged therapy      -  Ciprofloxacin: S <=1      -  Gentamicin: S <=4      -  Imipenem: S <=1      -  Levofloxacin: S <=2      -  Meropenem: S <=1      -  Nitrofurantoin: S <=32 Should not be used to treat pyelonephritis      -  Piperacillin/Tazobactam: S <=16      -  Tigecycline: S <=2      -  Tobramycin: S <=4      -  Trimethoprim/Sulfamethoxazole: R >2/38      Method Type: CONNER    Culture - Urine (collected 12-15-19 @ 11:10)  Source: .Urine Clean Catch (Midstream)  Final Report (19 @ 18:20):    >100,000 CFU/ml Klebsiella pneumoniae  Organism: Klebsiella pneumoniae (19 @ 18:20)  Organism: Klebsiella pneumoniae (19 @ 18:20)      -  Amikacin: S <=16      -  Ampicillin: R >16 These ampicillin results predict results for amoxicillin      -  Ampicillin/Sulbactam: S <=8/4 Enterobacter, Citrobacter, and Serratia may develop resistance during prolonged therapy (3-4 days)      -  Aztreonam: S <=4      -  Cefazolin: S <=8 (MIC_CL_COM_ENTERIC_CEFAZU) For uncomplicated UTI with K. pneumoniae, E. coli, or P. mirablis: CONNER <=16 is sensitive and CONNER >=32 is resistant. This also predicts results for oral agents cefaclor, cefdinir, cefpodoxime, cefprozil, cefuroxime axetil, cephalexin and locarbef for uncomplicated UTI. Note that some isolates may be susceptible to these agents while testing resistant to cefazolin.      -  Cefepime: S <=4      -  Cefoxitin: S <=8      -  Ceftriaxone: S <=1 Enterobacter, Citrobacter, and Serratia may develop resistance during prolonged therapy      -  Ciprofloxacin: S <=1      -  Gentamicin: S <=4      -  Imipenem: S <=1      -  Levofloxacin: S <=2      -  Meropenem: S <=1      -  Nitrofurantoin: S <=32 Should not be used to treat pyelonephritis      -  Piperacillin/Tazobactam: S <=16      -  Tigecycline: S <=2      -  Tobramycin: S <=4      -  Trimethoprim/Sulfamethoxazole: S <=2/38      Method Type: CONNER        Blood Gas Venous - Lactate: 3.2 mmoL/L (10-29-20 @ 00:27)      INFECTIOUS DISEASES TESTING  Procalcitonin, Serum: 0.03 ng/mL (10-29-20 @ 07:08)  COVID-19 PCR: NotDetec (10-29-20 @ 01:36)  Hepatitis B Surface Antigen: Nonreact (20 @ 10:45)  Hepatitis C Virus Interpretation: Nonreact (20 @ 10:45)  Procalcitonin, Serum: 0.03 ng/mL (20 @ 06:26)      RADIOLOGY & ADDITIONAL TESTS:  I have personally reviewed the last Chest xray  CXR      CT  CT Angio Chest PE Protocol w/ IV Cont:   EXAM:  CT ANGIO CHEST PE PROTOCOL IC            PROCEDURE DATE:  10/30/2020            INTERPRETATION:  CLINICAL INDICATION: Shortness of breath    TECHNIQUE:  CT of the thorax was performed after administration of contrast. Sagittal and coronal reformats were performed as well as MIP reconstructions.  Intravenous contrast: 100 cc [id number]-[id number]    COMPARISON: CT 2020    INTERPRETATION:    AIRWAYS, LUNGS AND PLEURA: The central tracheobronchial tree is patent. There are low lung volumes. Redemonstrated are peripheral predominant opacities bilaterally with bilateral lower lobe multisegmental atelectasis. There is stable focal opacity within the posterior medial left upper lobe. There is no pleural effusion. There is no pneumothorax.    MEDIASTINUM: There are no enlarged mediastinal, hilar or axillary lymph nodes. The visualized portion of the thyroid gland is heterogeneous.    HEART AND VESSELS: The right atrium and ventricle appear enlarged. There is no pericardial effusion. There areno filling defects in the main pulmonary artery or segmental branches to suggest pulmonary embolus. The main pulmonary artery is dilated, which can be seen with pulmonary arterial hypertension.    UPPER ABDOMEN: Images of the upper abdomen demonstrate no abnormality.    BONES AND SOFT TISSUES: There are degenerative changes of the spine.  The soft tissues are unremarkable.    IMPRESSION:  No central pulmonary embolism.    Enlarged right atrium and ventricle as well as dilated main pulmonary artery which can be seen with pulmonary arterial hypertension.    Low lung volume. Bilateral peripheral predominant opacities as well as multisegmental atelectasis within the bilateral lower lobes without significant change since CT 2020. Differentialincludes nonspecific interstitial lung disease and organizing pneumonia.                    JAVIER CHIANG MD; Attending Radiologist  This document has been electronically signed. Oct 30 2020 10:30AM (10-30-20 @ 09:51)      CARDIOLOGY TESTING  12 Lead ECG:   Ventricular Rate 100 BPM    Atrial Rate 100 BPM    P-R Interval 146 ms    QRS Duration 82 ms    Q-T Interval 330 ms    QTC Calculation(Bazett) 425 ms    P Axis 39 degrees    R Axis -11 degrees    T Axis 30 degrees    Diagnosis Line Normal sinus rhythm  Possible Left atrial enlargement  Left ventricular hypertrophy  Abnormal ECG    Confirmed by Scott Mosqueda ([id number]) on 10/29/2020 12:52:55 PM (10-29-20 @ 09:50)  12 Lead ECG:   Ventricular Rate 97 BPM    Atrial Rate 97 BPM    P-R Interval 138 ms    QRS Duration 74 ms    Q-T Interval 328 ms    QTC Calculation(Bazett) 416 ms    P Axis 31 degrees    R Axis -12 degrees    T Axis 22 degrees    Diagnosis Line Normal sinus rhythm  Possible Left atrial enlargement  Left ventricular hypertrophy  Abnormal ECG    Confirmed by Scott Mosqueda ([id number]) on 10/29/2020 7:15:20 AM (10-29-20 @ 00:26)      MEDICATIONS  ALBUTerol    90 MICROgram(s) HFA Inhaler 2 Inhalation daily  azaTHIOprine 150 Oral daily  budesonide 160 MICROgram(s)/formoterol 4.5 MICROgram(s) Inhaler 2 Inhalation two times a day  calcium carbonate 1250 mG  + Vitamin D (OsCal 500 + D) 1 Oral daily  chlorhexidine 4% Liquid 1 Topical <User Schedule>  enoxaparin Injectable 40 SubCutaneous daily  melatonin 10 Oral at bedtime  mycophenolate mofetil  Oral Tab/Cap - Peds 500 Oral two times a day  trimethoprim  160 mG/sulfamethoxazole 800 mG 1 Oral <User Schedule>      Weight  Weight (kg): 115 (10-29-20 @ 15:46)    ANTIBIOTICS:  trimethoprim  160 mG/sulfamethoxazole 800 mG 1 Tablet(s) Oral <User Schedule>      ALLERGIES:  contrast media (iodine-based) (Unknown)  iodine (Anaphylaxis)  peanuts (Unknown)  shellfish (Anaphylaxis)  shellfish (Unknown)

## 2020-10-30 NOTE — PROGRESS NOTE ADULT - ASSESSMENT
IMPRESSION:    Chronic and progressive dyspnea on exertion with cough   CT chest with stable ILD but new PA enlargement and RV dialtion suggesting PHTN. No PE  ? bacteremia S aureus  Hx DM-associated ILD [bx confirmed organizing pneumonia 2017], cxr with stable bilateral intersitial opacities  Hx of Dermatomyositis refractory disease/steroid dependant on prednisone 20mg daily, cellcept, imuran, biannual rituxin [last dose feb] currently without muscle weakness or rash and CPK near normal  Not on PCP ppx unclear reason  PE is also in differential. Dimer may be unreliable in this case.  Probable RODRICK / high STOP BANG  Morbid obesity  Hx of noncompliance     PLAN:    ·	ID eval  ·	Fup Echo, may need CONNOR to rule out vegetation  ·	DC solumedrol, Resume home dose prednisone 20mg daily, cellcept, imuran  ·	start Bactrim 1 DS 3x weekly for PCP ppx  ·	Doubt flare up of DM or ILD and would not treat with pulse steroids  ·	Needs to follow up Outpatient in [facility]   ·	Outpatient PFTs and PSG  ·	Patient should be on calcium/Vit D supplements for prevention of steroid-induced osteoporosis. Outpatient DEXA scan.  ·	DVT ppx     IMPRESSION:    Chronic and progressive dyspnea on exertion with cough nonproductive  CT chest with stable ILD but new PA enlargement and RV enlargement suggesting PHTN. No PE  ? Bacteremia - S aureus  Hx DM-associated ILD [bx confirmed organizing pneumonia 2017]  Hx of Dermatomyositis refractory disease/steroid dependant on prednisone 20mg daily, cellcept, imuran, biannual rituxin [last dose feb] currently without muscle weakness or rash and CPK near normal  Not on PCP ppx unclear reason  Probable RODRICK / high STOP BANG  Morbid obesity  Hx of noncompliance     PLAN:    ·	ID eval  ·	Fup Echo, may need CONNOR to rule out vegetation  ·	DC solumedrol, Resume home dose prednisone 20mg daily, cellcept, imuran  ·	start Bactrim 1 DS 3x weekly for PCP ppx  ·	Doubt flare up of DM or ILD and would not treat with pulse steroids  ·	Needs to follow up Outpatient in [facility]   ·	Titrate down oxygen, goal spo2 94-98%, avoid hyperoxia  ·	Outpatient PFTs and PSG  ·	Patient should be on calcium/Vit D supplements for prevention of steroid-induced osteoporosis. Outpatient DEXA scan.  ·	DVT ppx     IMPRESSION:    Chronic and progressive dyspnea on exertion with cough nonproductive  CT chest with stable ILD but new PA enlargement and RV enlargement suggesting PHTN. No PE  ? Bacteremia - S aureus  Hx DM-associated ILD [bx confirmed organizing pneumonia 2017]  Hx of Dermatomyositis refractory disease/steroid dependant on prednisone 20mg daily, cellcept, imuran, biannual rituxin [last dose feb] currently without muscle weakness or rash and CPK near normal  Not on PCP ppx unclear reason  Probable RODRICK / high STOP BANG  Morbid obesity  Hx of noncompliance     PLAN:    ·	ID eval  ·	Fup Echo, concern for pulm HTN which can occur in dermatomyositis [group 1 PAH]  ·	DC solumedrol, Resume home dose prednisone 20mg daily, cellcept, imuran  ·	start Bactrim 1 DS 3x weekly for PCP ppx  ·	Needs to follow up Outpatient in [facility]   ·	Evaluate need for home oxygen, Titrate down oxygen, goal spo2 94-98%, avoid hyperoxia  ·	Outpatient PFTs and PSG  ·	Patient should be on calcium/Vit D supplements for prevention of steroid-induced osteoporosis. Outpatient DEXA scan.  ·	DVT ppx

## 2020-10-30 NOTE — PROGRESS NOTE ADULT - ATTENDING COMMENTS
I saw and examined the patient at bedside.   She is still with SOB  Blood cx is positive for staph aureus.   no source of infection, will get echo, repeat blood cx, start on cefazolin

## 2020-10-30 NOTE — PROGRESS NOTE ADULT - SUBJECTIVE AND OBJECTIVE BOX
JESSI KING 40y Female  MRN#: [id number]   Hospital Day: 1d    SUBJECTIVE  Patient is a 40y old Female who presents with a chief complaint of SOB (30 Oct 2020 13:06)  Currently admitted to medicine with the primary diagnosis of Cough      INTERVAL HPI AND OVERNIGHT EVENTS:  Patient was examined and seen at bedside. This morning she is resting comfortably in bed and reports no issues or overnight events.    REVIEW OF SYMPTOMS:  CONSTITUTIONAL: No weakness, fevers or chills; No headaches  EYES: No visual changes, eye pain, or discharge  ENT: No vertigo; No ear pain or change in hearing; No sore throat or difficulty swallowing  NECK: No pain or stiffness  RESPIRATORY: No cough, wheezing, or hemoptysis; No shortness of breath  CARDIOVASCULAR: No chest pain or palpitations  GASTROINTESTINAL: No abdominal or epigastric pain; No nausea, vomiting, or hematemesis; No diarrhea or constipation; No melena or hematochezia  GENITOURINARY: No dysuria, frequency or hematuria  MUSCULOSKELETAL: No joint pain, no muscle pain, no weakness  NEUROLOGICAL: No numbness or weakness  SKIN: No itching or rashes    OBJECTIVE  PAST MEDICAL & SURGICAL HISTORY  H/O dermatomyositis  Dermatomyositis  Cryptogenic organizing pneumonia  History of cervical cerclage  Ankle fracture      ALLERGIES:  contrast media (iodine-based) (Unknown)  iodine (Anaphylaxis)  peanuts (Unknown)  shellfish (Anaphylaxis)  shellfish (Unknown)    MEDICATIONS:  STANDING MEDICATIONS  ALBUTerol    90 MICROgram(s) HFA Inhaler 2 Puff(s) Inhalation daily  azaTHIOprine 150 milliGRAM(s) Oral daily  budesonide 160 MICROgram(s)/formoterol 4.5 MICROgram(s) Inhaler 2 Puff(s) Inhalation two times a day  calcium carbonate 1250 mG  + Vitamin D (OsCal 500 + D) 1 Tablet(s) Oral daily  chlorhexidine 4% Liquid 1 Application(s) Topical <User Schedule>  enoxaparin Injectable 40 milliGRAM(s) SubCutaneous daily  melatonin 10 milliGRAM(s) Oral at bedtime  mycophenolate mofetil  Oral Tab/Cap - Peds 500 milliGRAM(s) Oral two times a day  trimethoprim  160 mG/sulfamethoxazole 800 mG 1 Tablet(s) Oral <User Schedule>    PRN MEDICATIONS  acetaminophen   Tablet .. 650 milliGRAM(s) Oral every 6 hours PRN  ALBUTerol    90 MICROgram(s) HFA Inhaler 2 Puff(s) Inhalation every 6 hours PRN      VITAL SIGNS: Last 24 Hours  T(C): 36.7 (30 Oct 2020 04:41), Max: 36.8 (29 Oct 2020 21:04)  T(F): 98.1 (30 Oct 2020 04:41), Max: 98.3 (29 Oct 2020 21:04)  HR: 89 (30 Oct 2020 04:41) (89 - 95)  BP: 119/56 (30 Oct 2020 04:41) (119/56 - 132/64)  BP(mean): --  RR: 18 (30 Oct 2020 04:41) (18 - 20)  SpO2: --    LABS:                        11.4   16.95 )-----------( 440      ( 30 Oct 2020 08:35 )             36.7     1030    136  |  104  |  15  ----------------------------<  113<H>  5.0   |  23  |  0.6<L>    Ca    8.7      30 Oct 2020 08:35  Mg     1.9     10    TPro  7.6  /  Alb  3.7  /  TBili  <0.2  /  DBili  x   /  AST  15  /  ALT  17  /  AlkPhos  74  10      Urinalysis Basic - ( 29 Oct 2020 00:40 )    Color: Colorless / Appearance: Clear / S.005 / pH: x  Gluc: x / Ketone: Negative  / Bili: Negative / Urobili: <2 mg/dL   Blood: x / Protein: Negative / Nitrite: Negative   Leuk Esterase: Negative / RBC: x / WBC x   Sq Epi: x / Non Sq Epi: x / Bacteria: x    Culture - Blood (collected 29 Oct 2020 07:08)  Source: .Blood None  Gram Stain (30 Oct 2020 02:03):    Growth in anaerobic bottle: Gram Positive Cocci in Clusters  Preliminary Report (30 Oct 2020 02:03):    Growth in anaerobic bottle: Gram Positive Cocci in Clusters    "Due to technical problems, Proteus sp. will Not be reported as part of    the BCID panel until further notice"    ***Blood Panel PCR results on this specimen are available    approximately 3 hours after the Gram stain result.***    Gram stain, PCR, and/or culture results may not always    correspond due to difference in methodologies.    ************************************************************    This PCR assay was performed using [employer].    The following targets are tested for: Enterococcus,    vancomycin resistant enterococci, Listeria monocytogenes,    coagulase negative staphylococci, S. aureus,    methicillin resistant S. aureus, Streptococcus agalactiae    (Group B), S. pneumoniae, S. pyogenes (Group A),    Acinetobacter baumannii, Enterobacter cloacae, E. coli,    Klebsiella oxytoca, K. pneumoniae, Proteus sp.,    Serratia marcescens, Haemophilus influenzae,    Neisseria meningitidis, Pseudomonas aeruginosa, Candida    albicans, C. glabrata, C krusei, C parapsilosis,    C. tropicalis and the KPC resistance gene.  Organism: Blood Culture PCR (30 Oct 2020 03:55)  Organism: Blood Culture PCR (30 Oct 2020 03:55)    Culture - Blood (collected 29 Oct 2020 01:50)  Source: .Blood Blood  Preliminary Report (30 Oct 2020 07:02):    No growth to date.    Culture - Urine (collected 29 Oct 2020 00:40)  Source: .Urine Clean Catch (Midstream)  Final Report (30 Oct 2020 06:49):    <10,000 CFU/mL Normal Urogenital Alejandra      CARDIAC MARKERS ( 29 Oct 2020 07:08 )  x     / <0.01 ng/mL / 300 U/L / x     / x      CARDIAC MARKERS ( 29 Oct 2020 00:09 )  x     / 0.04 ng/mL / x     / x     / x          RADIOLOGY:  < from: Xray Chest 1 View-PORTABLE IMMEDIATE (Xray Chest 1 View-PORTABLE IMMEDIATE .) (10.29.20 @ 01:01) >  Findings:  Support devices: None.  Cardiac/mediastinum/hilum: Unremarkable.  Lung parenchyma/Pleura: Diffuse bilateral interstitial opacities. No pneumothorax.  Skeleton/soft tissues: Unremarkable.    Impression:  Diffuse bilateral interstitial opacities.  < end of copied text >    PHYSICAL EXAM:  CONSTITUTIONAL: No acute distress, well-developed, well-groomed, AAOx3  HEAD: Atraumatic, normocephalic  EYES: EOM intact, PERRLA, conjunctiva and sclera clear  ENT: Supple, no masses, no thyromegaly, no bruits, no JVD; moist mucous membranes  PULMONARY: Clear to auscultation bilaterally; no wheezes, rales, or rhonchi  CARDIOVASCULAR: Regular rate and rhythm; no murmurs, rubs, or gallops  GASTROINTESTINAL: Soft, non-tender, non-distended; bowel sounds present  MUSCULOSKELETAL: 2+ peripheral pulses; no clubbing, no cyanosis, no edema  NEUROLOGY: non-focal  SKIN: No rashes or lesions; warm and dry    ASSESSMENT & PLAN  Patient is a     #Staph aureus bacteremia  - Blood Cx 10/29/2020      40 year old female PMH of dermatomyositis, Cryptogenic organizing PNA diagnosed 2017 was on home oxygen till 2018 since then managed with daily 20mg prednisone and cellcept and albuterol presents for SOB.    #SOB DDx: pulmonary HTN, PE, PCP (given chronic steroid use and immunosuppression)   WBC of 20 --> 16  CXR: no PNA  - f/u procal and hold off abx for now, Hemodynamically stable no signs of sepsis,   -symptoms improving on 2L NC   - CT angio to eval for PE  - echo to eval for pulHTN  - LDH in am  - Fungitell   - on solumedrol 40 BID (hold home dose 20 prednisone),  -c/w symbicort, albuterol  -pulm consult    #Hx of dermatomyositis and  Cryptogenic organizing pneumonia   -c/w home meds- cellcept, changes to steroids as above, c/w symbicort  -Pt. followed with Dr. Burris has an appointment with another Pulmonologist does not have name on hand    #troponemia --> resolved  0.04 no chest pain would trend 1 one set for am     #increased urinary frequency no dysuria  -UA negative no abx for now f/u urine cx    # high risk for osteoporosis given chronic steroid use  - OP DEXA    #Misc  - DVT Prophylaxis:  - GI Prophylaxis:  - Diet:  - Activity:  - IV Fluids:  - Code Status:    Dispo: JESSI KING 40y Female  MRN#: [id number]   Hospital Day: 1d    SUBJECTIVE  Patient is a 40y old Female who presents with a chief complaint of SOB (30 Oct 2020 13:06)  Currently admitted to medicine with the primary diagnosis of Cough      INTERVAL HPI AND OVERNIGHT EVENTS:  Patient was examined and seen at bedside. This morning she is resting comfortably in bed and reports no issues or overnight events.    REVIEW OF SYMPTOMS:  CONSTITUTIONAL: No weakness, fevers or chills; No headaches  EYES: No visual changes, eye pain, or discharge  ENT: No vertigo; No ear pain or change in hearing; No sore throat or difficulty swallowing  NECK: No pain or stiffness  RESPIRATORY: No cough, wheezing, or hemoptysis; No shortness of breath  CARDIOVASCULAR: No chest pain or palpitations  GASTROINTESTINAL: No abdominal or epigastric pain; No nausea, vomiting, or hematemesis; No diarrhea or constipation; No melena or hematochezia  GENITOURINARY: No dysuria, frequency or hematuria  MUSCULOSKELETAL: No joint pain, no muscle pain, no weakness  NEUROLOGICAL: No numbness or weakness  SKIN: No itching or rashes    OBJECTIVE  PAST MEDICAL & SURGICAL HISTORY  H/O dermatomyositis  Dermatomyositis  Cryptogenic organizing pneumonia  History of cervical cerclage  Ankle fracture      ALLERGIES:  contrast media (iodine-based) (Unknown)  iodine (Anaphylaxis)  peanuts (Unknown)  shellfish (Anaphylaxis)  shellfish (Unknown)    MEDICATIONS:  STANDING MEDICATIONS  ALBUTerol    90 MICROgram(s) HFA Inhaler 2 Puff(s) Inhalation daily  azaTHIOprine 150 milliGRAM(s) Oral daily  budesonide 160 MICROgram(s)/formoterol 4.5 MICROgram(s) Inhaler 2 Puff(s) Inhalation two times a day  calcium carbonate 1250 mG  + Vitamin D (OsCal 500 + D) 1 Tablet(s) Oral daily  chlorhexidine 4% Liquid 1 Application(s) Topical <User Schedule>  enoxaparin Injectable 40 milliGRAM(s) SubCutaneous daily  melatonin 10 milliGRAM(s) Oral at bedtime  mycophenolate mofetil  Oral Tab/Cap - Peds 500 milliGRAM(s) Oral two times a day  trimethoprim  160 mG/sulfamethoxazole 800 mG 1 Tablet(s) Oral <User Schedule>    PRN MEDICATIONS  acetaminophen   Tablet .. 650 milliGRAM(s) Oral every 6 hours PRN  ALBUTerol    90 MICROgram(s) HFA Inhaler 2 Puff(s) Inhalation every 6 hours PRN      VITAL SIGNS: Last 24 Hours  T(C): 36.7 (30 Oct 2020 04:41), Max: 36.8 (29 Oct 2020 21:04)  T(F): 98.1 (30 Oct 2020 04:41), Max: 98.3 (29 Oct 2020 21:04)  HR: 89 (30 Oct 2020 04:41) (89 - 95)  BP: 119/56 (30 Oct 2020 04:41) (119/56 - 132/64)  BP(mean): --  RR: 18 (30 Oct 2020 04:41) (18 - 20)  SpO2: --    LABS:                        11.4   16.95 )-----------( 440      ( 30 Oct 2020 08:35 )             36.7     1030    136  |  104  |  15  ----------------------------<  113<H>  5.0   |  23  |  0.6<L>    Ca    8.7      30 Oct 2020 08:35  Mg     1.9     10    TPro  7.6  /  Alb  3.7  /  TBili  <0.2  /  DBili  x   /  AST  15  /  ALT  17  /  AlkPhos  74  10      Urinalysis Basic - ( 29 Oct 2020 00:40 )    Color: Colorless / Appearance: Clear / S.005 / pH: x  Gluc: x / Ketone: Negative  / Bili: Negative / Urobili: <2 mg/dL   Blood: x / Protein: Negative / Nitrite: Negative   Leuk Esterase: Negative / RBC: x / WBC x   Sq Epi: x / Non Sq Epi: x / Bacteria: x    Culture - Blood (collected 29 Oct 2020 07:08)  Source: .Blood None  Gram Stain (30 Oct 2020 02:03):    Growth in anaerobic bottle: Gram Positive Cocci in Clusters  Preliminary Report (30 Oct 2020 02:03):    Growth in anaerobic bottle: Gram Positive Cocci in Clusters    "Due to technical problems, Proteus sp. will Not be reported as part of    the BCID panel until further notice"    ***Blood Panel PCR results on this specimen are available    approximately 3 hours after the Gram stain result.***    Gram stain, PCR, and/or culture results may not always    correspond due to difference in methodologies.    ************************************************************    This PCR assay was performed using [employer].    The following targets are tested for: Enterococcus,    vancomycin resistant enterococci, Listeria monocytogenes,    coagulase negative staphylococci, S. aureus,    methicillin resistant S. aureus, Streptococcus agalactiae    (Group B), S. pneumoniae, S. pyogenes (Group A),    Acinetobacter baumannii, Enterobacter cloacae, E. coli,    Klebsiella oxytoca, K. pneumoniae, Proteus sp.,    Serratia marcescens, Haemophilus influenzae,    Neisseria meningitidis, Pseudomonas aeruginosa, Candida    albicans, C. glabrata, C krusei, C parapsilosis,    C. tropicalis and the KPC resistance gene.  Organism: Blood Culture PCR (30 Oct 2020 03:55)  Organism: Blood Culture PCR (30 Oct 2020 03:55)    Culture - Blood (collected 29 Oct 2020 01:50)  Source: .Blood Blood  Preliminary Report (30 Oct 2020 07:02):    No growth to date.    Culture - Urine (collected 29 Oct 2020 00:40)  Source: .Urine Clean Catch (Midstream)  Final Report (30 Oct 2020 06:49):    <10,000 CFU/mL Normal Urogenital Alejandra      CARDIAC MARKERS ( 29 Oct 2020 07:08 )  x     / <0.01 ng/mL / 300 U/L / x     / x      CARDIAC MARKERS ( 29 Oct 2020 00:09 )  x     / 0.04 ng/mL / x     / x     / x          RADIOLOGY:  < from: Xray Chest 1 View-PORTABLE IMMEDIATE (Xray Chest 1 View-PORTABLE IMMEDIATE .) (10.29.20 @ 01:01) >  Findings:  Support devices: None.  Cardiac/mediastinum/hilum: Unremarkable.  Lung parenchyma/Pleura: Diffuse bilateral interstitial opacities. No pneumothorax.  Skeleton/soft tissues: Unremarkable.    Impression:  Diffuse bilateral interstitial opacities.  < end of copied text >    PHYSICAL EXAM:  CONSTITUTIONAL: No acute distress, well-developed, well-groomed, AAOx3  HEAD: Atraumatic, normocephalic  EYES: EOM intact, PERRLA, conjunctiva and sclera clear  ENT: Supple, no masses, no thyromegaly, no bruits, no JVD; moist mucous membranes  PULMONARY: Clear to auscultation bilaterally; no wheezes, rales, or rhonchi  CARDIOVASCULAR: Regular rate and rhythm; no murmurs, rubs, or gallops  GASTROINTESTINAL: Soft, non-tender, non-distended; bowel sounds present  MUSCULOSKELETAL: 2+ peripheral pulses; no clubbing, no cyanosis, no edema  NEUROLOGY: non-focal  SKIN: No rashes or lesions; warm and dry    ASSESSMENT & PLAN  Patient is a 41yo female with PMH of dermatomyositis (on prednisone and Cellcept) and cryptogenic organizing pneumonia 2017 on home O2 since 2018 who presented to the hospital complaining of shortness of breath.     #Staph aureus bacteremia  - Blood Cx 10/29/2020      40 year old female PMH of dermatomyositis, Cryptogenic organizing PNA diagnosed 2017 was on home oxygen till 2018 since then managed with daily 20mg prednisone and cellcept and albuterol presents for SOB.    #SOB DDx: pulmonary HTN, PE, PCP (given chronic steroid use and immunosuppression)   WBC of 20 --> 16  CXR: no PNA  - f/u procal and hold off abx for now, Hemodynamically stable no signs of sepsis,   -symptoms improving on 2L NC   - CT angio to eval for PE  - echo to eval for pulHTN  - LDH in am  - Fungitell   - on solumedrol 40 BID (hold home dose 20 prednisone),  -c/w symbicort, albuterol  -pulm consult    #Hx of dermatomyositis and  Cryptogenic organizing pneumonia   -c/w home meds- cellcept, changes to steroids as above, c/w symbicort  -Pt. followed with Dr. Burris has an appointment with another Pulmonologist does not have name on hand    #troponemia --> resolved  0.04 no chest pain would trend 1 one set for am     #increased urinary frequency no dysuria  -UA negative no abx for now f/u urine cx    # high risk for osteoporosis given chronic steroid use  - OP DEXA    #Misc  - DVT Prophylaxis:  - GI Prophylaxis:  - Diet:  - Activity:  - IV Fluids:  - Code Status:    Dispo: JESSI KING 40y Female  MRN#: [id number]   Hospital Day: 1d    SUBJECTIVE  Patient is a 40y old Female who presents with a chief complaint of SOB (30 Oct 2020 13:06)  Currently admitted to medicine with the primary diagnosis of Cough      INTERVAL HPI AND OVERNIGHT EVENTS:  Patient was examined and seen at bedside. This morning she is resting comfortably in bed and reports no issues or overnight events.    REVIEW OF SYMPTOMS:  CONSTITUTIONAL: No weakness, fevers or chills; No headaches  EYES: No visual changes, eye pain, or discharge  ENT: No vertigo; No ear pain or change in hearing; No sore throat or difficulty swallowing  NECK: No pain or stiffness  RESPIRATORY: No cough, wheezing, or hemoptysis; No shortness of breath  CARDIOVASCULAR: No chest pain or palpitations  GASTROINTESTINAL: No abdominal or epigastric pain; No nausea, vomiting, or hematemesis; No diarrhea or constipation; No melena or hematochezia  GENITOURINARY: No dysuria, frequency or hematuria  MUSCULOSKELETAL: No joint pain, no muscle pain, no weakness  NEUROLOGICAL: No numbness or weakness  SKIN: No itching or rashes    OBJECTIVE  PAST MEDICAL & SURGICAL HISTORY  H/O dermatomyositis  Dermatomyositis  Cryptogenic organizing pneumonia  History of cervical cerclage  Ankle fracture      ALLERGIES:  contrast media (iodine-based) (Unknown)  iodine (Anaphylaxis)  peanuts (Unknown)  shellfish (Anaphylaxis)  shellfish (Unknown)    MEDICATIONS:  STANDING MEDICATIONS  ALBUTerol    90 MICROgram(s) HFA Inhaler 2 Puff(s) Inhalation daily  azaTHIOprine 150 milliGRAM(s) Oral daily  budesonide 160 MICROgram(s)/formoterol 4.5 MICROgram(s) Inhaler 2 Puff(s) Inhalation two times a day  calcium carbonate 1250 mG  + Vitamin D (OsCal 500 + D) 1 Tablet(s) Oral daily  chlorhexidine 4% Liquid 1 Application(s) Topical <User Schedule>  enoxaparin Injectable 40 milliGRAM(s) SubCutaneous daily  melatonin 10 milliGRAM(s) Oral at bedtime  mycophenolate mofetil  Oral Tab/Cap - Peds 500 milliGRAM(s) Oral two times a day  trimethoprim  160 mG/sulfamethoxazole 800 mG 1 Tablet(s) Oral <User Schedule>    PRN MEDICATIONS  acetaminophen   Tablet .. 650 milliGRAM(s) Oral every 6 hours PRN  ALBUTerol    90 MICROgram(s) HFA Inhaler 2 Puff(s) Inhalation every 6 hours PRN      VITAL SIGNS: Last 24 Hours  T(C): 36.7 (30 Oct 2020 04:41), Max: 36.8 (29 Oct 2020 21:04)  T(F): 98.1 (30 Oct 2020 04:41), Max: 98.3 (29 Oct 2020 21:04)  HR: 89 (30 Oct 2020 04:41) (89 - 95)  BP: 119/56 (30 Oct 2020 04:41) (119/56 - 132/64)  BP(mean): --  RR: 18 (30 Oct 2020 04:41) (18 - 20)  SpO2: --    LABS:                        11.4   16.95 )-----------( 440      ( 30 Oct 2020 08:35 )             36.7     1030    136  |  104  |  15  ----------------------------<  113<H>  5.0   |  23  |  0.6<L>    Ca    8.7      30 Oct 2020 08:35  Mg     1.9     10    TPro  7.6  /  Alb  3.7  /  TBili  <0.2  /  DBili  x   /  AST  15  /  ALT  17  /  AlkPhos  74  10      Urinalysis Basic - ( 29 Oct 2020 00:40 )    Color: Colorless / Appearance: Clear / S.005 / pH: x  Gluc: x / Ketone: Negative  / Bili: Negative / Urobili: <2 mg/dL   Blood: x / Protein: Negative / Nitrite: Negative   Leuk Esterase: Negative / RBC: x / WBC x   Sq Epi: x / Non Sq Epi: x / Bacteria: x    Culture - Blood (collected 29 Oct 2020 07:08)  Source: .Blood None  Gram Stain (30 Oct 2020 02:03):    Growth in anaerobic bottle: Gram Positive Cocci in Clusters  Preliminary Report (30 Oct 2020 02:03):    Growth in anaerobic bottle: Gram Positive Cocci in Clusters    "Due to technical problems, Proteus sp. will Not be reported as part of    the BCID panel until further notice"    ***Blood Panel PCR results on this specimen are available    approximately 3 hours after the Gram stain result.***    Gram stain, PCR, and/or culture results may not always    correspond due to difference in methodologies.    ************************************************************    This PCR assay was performed using [employer].    The following targets are tested for: Enterococcus,    vancomycin resistant enterococci, Listeria monocytogenes,    coagulase negative staphylococci, S. aureus,    methicillin resistant S. aureus, Streptococcus agalactiae    (Group B), S. pneumoniae, S. pyogenes (Group A),    Acinetobacter baumannii, Enterobacter cloacae, E. coli,    Klebsiella oxytoca, K. pneumoniae, Proteus sp.,    Serratia marcescens, Haemophilus influenzae,    Neisseria meningitidis, Pseudomonas aeruginosa, Candida    albicans, C. glabrata, C krusei, C parapsilosis,    C. tropicalis and the KPC resistance gene.  Organism: Blood Culture PCR (30 Oct 2020 03:55)  Organism: Blood Culture PCR (30 Oct 2020 03:55)    Culture - Blood (collected 29 Oct 2020 01:50)  Source: .Blood Blood  Preliminary Report (30 Oct 2020 07:02):    No growth to date.    Culture - Urine (collected 29 Oct 2020 00:40)  Source: .Urine Clean Catch (Midstream)  Final Report (30 Oct 2020 06:49):    <10,000 CFU/mL Normal Urogenital Joon      CARDIAC MARKERS ( 29 Oct 2020 07:08 )  x     / <0.01 ng/mL / 300 U/L / x     / x      CARDIAC MARKERS ( 29 Oct 2020 00:09 )  x     / 0.04 ng/mL / x     / x     / x          RADIOLOGY:  < from: Xray Chest 1 View-PORTABLE IMMEDIATE (Xray Chest 1 View-PORTABLE IMMEDIATE .) (10.29.20 @ 01:01) >  Findings:  Support devices: None.  Cardiac/mediastinum/hilum: Unremarkable.  Lung parenchyma/Pleura: Diffuse bilateral interstitial opacities. No pneumothorax.  Skeleton/soft tissues: Unremarkable.    Impression:  Diffuse bilateral interstitial opacities.  < end of copied text >    PHYSICAL EXAM:  CONSTITUTIONAL: No acute distress, well-developed, well-groomed, AAOx3  HEAD: Atraumatic, normocephalic  EYES: EOM intact, PERRLA, conjunctiva and sclera clear  ENT: Supple, no masses, no thyromegaly, no bruits, no JVD; moist mucous membranes  PULMONARY: Clear to auscultation bilaterally; no wheezes, rales, or rhonchi  CARDIOVASCULAR: Regular rate and rhythm; no murmurs, rubs, or gallops  GASTROINTESTINAL: Soft, non-tender, non-distended; bowel sounds present  MUSCULOSKELETAL: 2+ peripheral pulses; no clubbing, no cyanosis, no edema  NEUROLOGY: non-focal  SKIN: No rashes or lesions; warm and dry    ASSESSMENT & PLAN  Patient is a 39yo female with PMH of dermatomyositis (on prednisone and Cellcept) and cryptogenic organizing pneumonia 2017 on home O2 since 2018 who presented to the hospital complaining of shortness of breath.     #Staph aureus bacteremia  - No sepsis present on admission  - Blood Cx 10/29/2020: Staph aureus  - ID consult appreciated  - Repeat blood culture x2 then start cefazolin 2g IV Q8H  - Follow ESR, CRP, and procalcitonin  - Follow sputum culture    #Shortness of breath possibly secondary to pulmonary hypertension vs PCP  - WBC 20->16  - Chest X-ray: diffuse interstitial bilateral opacities, no acute consolidation  - CT angio chest: negative for pulmonary embolism  - LDH: 219 (hemolyzed)  - Follow procalcitonin, Fungitell, and sputum culture  - Pulmonary consult appreciated  - Discontinue methylprednisolone and start home dose of prednisone 20mg PO QD  - Continue with mycophenolate 500mg PO BID, azathioprine 150mg PO QD, albuterol 90mcg HFA 2 puffs PO QD and Q6H PRN  - Start calcium and vitamin D for osteoporosis prophylaxis given chronic steroid use    #Elevated troponin, resolved  - Troponin 0.04->0.01  - Patient denies chest pain at this time    #History of dermatomyositis and cryptogenic organizing pneumonia  - Continue with mycophenolate 500mg PO BID, azathioprine 150mg PO QD  - Discontinue methylprednisolone and start home dose of prednisone 20mg PO QD  - Followed outpatient with Dr. Burris, but has appointment with another pulmonologist (does not know name)    #Urinary frequency without dysuria  - Urinalysis negative  - Urine culture 10/29/2020: <10,000 normal joon    #High risk for osteoporosis given chronic steroid use  - Start calcium and vitamin D  - Follow outpatient with DEXA scan    #Misc  - DVT Prophylaxis: Lovenox 40mg SQ QD  - GI Prophylaxis: pantoprazole 40mg PO QD   - Diet: Regular  - Activity: increase as tolerated  - IV Fluids: not indicated   - Code Status: Full Code    Dispo: pending repeat blood cultures

## 2020-10-30 NOTE — PROGRESS NOTE ADULT - SUBJECTIVE AND OBJECTIVE BOX
Patient is a 40y old  Female who presents with a chief complaint of SOB (29 Oct 2020 10:28)        SUBJECTIVE:    seen and examined + bcx s aureus. CT chest noted    REVIEW OF SYSTEMS:    All Pertinent ROS are negative except per HPI       PHYSICAL EXAM  Vital Signs Last 24 Hrs  T(C): 36.7 (30 Oct 2020 04:41), Max: 36.8 (29 Oct 2020 15:46)  T(F): 98.1 (30 Oct 2020 04:41), Max: 98.3 (29 Oct 2020 15:46)  HR: 89 (30 Oct 2020 04:41) (89 - 109)  BP: 119/56 (30 Oct 2020 04:41) (119/56 - 132/64)  BP(mean): --  RR: 18 (30 Oct 2020 04:41) (18 - 20)  SpO2: 98% (29 Oct 2020 15:46) (98% - 98%)    CONSTITUTIONAL:  Well nourished.  NAD    ENT:   Airway patent,   No thrush    CARDIAC:   Normal rate,   regular rhythm.    no edema      RESPIRATORY:   NO Wheezing  Nnormal chest expansion  Nnot tachypneic,  No use of accessory muscles    GASTROINTESTINAL:  Abdomen soft,   non-tender,   no guarding,   + BS    MUSCULOSKELETAL:   range of motion is not limited,  no clubbing, cyanosis    NEUROLOGICAL:   Alert and oriented   no motor or deficits.    SKIN:   Skin normal color for race,   warm, dry   No evidence of rash.        LABS:                          11.4   16.95 )-----------( 440      ( 30 Oct 2020 08:35 )             36.7                                               10-30    136  |  104  |  15  ----------------------------<  113<H>  5.0   |  23  |  0.6<L>    Ca    8.7      30 Oct 2020 08:35  Mg     1.9     10-29    TPro  7.6  /  Alb  3.7  /  TBili  <0.2  /  DBili  x   /  AST  15  /  ALT  17  /  AlkPhos  74  10-29                                             Urinalysis Basic - ( 29 Oct 2020 00:40 )    Color: Colorless / Appearance: Clear / S.005 / pH: x  Gluc: x / Ketone: Negative  / Bili: Negative / Urobili: <2 mg/dL   Blood: x / Protein: Negative / Nitrite: Negative   Leuk Esterase: Negative / RBC: x / WBC x   Sq Epi: x / Non Sq Epi: x / Bacteria: x        CARDIAC MARKERS ( 29 Oct 2020 07:08 )  x     / <0.01 ng/mL / 300 U/L / x     / x      CARDIAC MARKERS ( 29 Oct 2020 00:09 )  x     / 0.04 ng/mL / x     / x     / x                                                LIVER FUNCTIONS - ( 29 Oct 2020 07:08 )  Alb: 3.7 g/dL / Pro: 7.6 g/dL / ALK PHOS: 74 U/L / ALT: 17 U/L / AST: 15 U/L / GGT: x                                                  Culture - Blood (collected 29 Oct 2020 07:08)  Source: .Blood None  Gram Stain (30 Oct 2020 02:03):    Growth in anaerobic bottle: Gram Positive Cocci in Clusters  Preliminary Report (30 Oct 2020 02:03):    Growth in anaerobic bottle: Gram Positive Cocci in Clusters    "Due to technical problems, Proteus sp. will Not be reported as part of    the BCID panel until further notice"    ***Blood Panel PCR results on this specimen are available    approximately 3 hours after the Gram stain result.***    Gram stain, PCR, and/or culture results may not always    correspond due to difference in methodologies.    ************************************************************    This PCR assay was performed using [employer].    The following targets are tested for: Enterococcus,    vancomycin resistant enterococci, Listeria monocytogenes,    coagulase negative staphylococci, S. aureus,    methicillin resistant S. aureus, Streptococcus agalactiae    (Group B), S. pneumoniae, S. pyogenes (Group A),    Acinetobacter baumannii, Enterobacter cloacae, E. coli,    Klebsiella oxytoca, K. pneumoniae, Proteus sp.,    Serratia marcescens, Haemophilus influenzae,    Neisseria meningitidis, Pseudomonas aeruginosa, Candida    albicans, C. glabrata, C krusei, C parapsilosis,    C. tropicalis and the KPC resistance gene.  Organism: Blood Culture PCR (30 Oct 2020 03:55)  Organism: Blood Culture PCR (30 Oct 2020 03:55)    Culture - Blood (collected 29 Oct 2020 01:50)  Source: .Blood Blood  Preliminary Report (30 Oct 2020 07:02):    No growth to date.    Culture - Urine (collected 29 Oct 2020 00:40)  Source: .Urine Clean Catch (Midstream)  Final Report (30 Oct 2020 06:49):    <10,000 CFU/mL Normal Urogenital Alejandra                                                    MEDICATIONS  (STANDING):  ALBUTerol    90 MICROgram(s) HFA Inhaler 2 Puff(s) Inhalation daily  budesonide 160 MICROgram(s)/formoterol 4.5 MICROgram(s) Inhaler 2 Puff(s) Inhalation two times a day  chlorhexidine 4% Liquid 1 Application(s) Topical <User Schedule>  enoxaparin Injectable 40 milliGRAM(s) SubCutaneous daily  melatonin 10 milliGRAM(s) Oral at bedtime  methylPREDNISolone sodium succinate Injectable 40 milliGRAM(s) IV Push two times a day  mycophenolate mofetil  Oral Tab/Cap - Peds 500 milliGRAM(s) Oral two times a day    MEDICATIONS  (PRN):  acetaminophen   Tablet .. 650 milliGRAM(s) Oral every 6 hours PRN Moderate Pain (4 - 6)  ALBUTerol    90 MICROgram(s) HFA Inhaler 2 Puff(s) Inhalation every 6 hours PRN Bronchospasm      X-Rays reviewed     Patient is a 40y old  Female who presents with a chief complaint of SOB (29 Oct 2020 10:28)        SUBJECTIVE:    seen and examined + bcx s aureus. CT chest noted. She feels the same.    REVIEW OF SYSTEMS:    All Pertinent ROS are negative except per HPI       PHYSICAL EXAM  Vital Signs Last 24 Hrs  T(C): 36.7 (30 Oct 2020 04:41), Max: 36.8 (29 Oct 2020 15:46)  T(F): 98.1 (30 Oct 2020 04:41), Max: 98.3 (29 Oct 2020 15:46)  HR: 89 (30 Oct 2020 04:41) (89 - 109)  BP: 119/56 (30 Oct 2020 04:41) (119/56 - 132/64)  BP(mean): --  RR: 18 (30 Oct 2020 04:41) (18 - 20)  SpO2: 98% (29 Oct 2020 15:46) (98% - 98%)    CONSTITUTIONAL:  Well nourished.  NAD    ENT:   Airway patent,   No thrush    CARDIAC:   Normal rate,   regular rhythm.    no edema      RESPIRATORY:   NO Wheezing  Nnormal chest expansion  Nnot tachypneic,  No use of accessory muscles    GASTROINTESTINAL:  Abdomen soft,   non-tender,   no guarding,   + BS    MUSCULOSKELETAL:   range of motion is not limited,  no clubbing, cyanosis    NEUROLOGICAL:   Alert and oriented   no motor or deficits.    SKIN:   Skin normal color for race,   warm, dry   No evidence of rash.        LABS:                          11.4   16.95 )-----------( 440      ( 30 Oct 2020 08:35 )             36.7                                               10-30    136  |  104  |  15  ----------------------------<  113<H>  5.0   |  23  |  0.6<L>    Ca    8.7      30 Oct 2020 08:35  Mg     1.9     10-29    TPro  7.6  /  Alb  3.7  /  TBili  <0.2  /  DBili  x   /  AST  15  /  ALT  17  /  AlkPhos  74  10-29                                             Urinalysis Basic - ( 29 Oct 2020 00:40 )    Color: Colorless / Appearance: Clear / S.005 / pH: x  Gluc: x / Ketone: Negative  / Bili: Negative / Urobili: <2 mg/dL   Blood: x / Protein: Negative / Nitrite: Negative   Leuk Esterase: Negative / RBC: x / WBC x   Sq Epi: x / Non Sq Epi: x / Bacteria: x        CARDIAC MARKERS ( 29 Oct 2020 07:08 )  x     / <0.01 ng/mL / 300 U/L / x     / x      CARDIAC MARKERS ( 29 Oct 2020 00:09 )  x     / 0.04 ng/mL / x     / x     / x                                                LIVER FUNCTIONS - ( 29 Oct 2020 07:08 )  Alb: 3.7 g/dL / Pro: 7.6 g/dL / ALK PHOS: 74 U/L / ALT: 17 U/L / AST: 15 U/L / GGT: x                                                  Culture - Blood (collected 29 Oct 2020 07:08)  Source: .Blood None  Gram Stain (30 Oct 2020 02:03):    Growth in anaerobic bottle: Gram Positive Cocci in Clusters  Preliminary Report (30 Oct 2020 02:03):    Growth in anaerobic bottle: Gram Positive Cocci in Clusters    "Due to technical problems, Proteus sp. will Not be reported as part of    the BCID panel until further notice"    ***Blood Panel PCR results on this specimen are available    approximately 3 hours after the Gram stain result.***    Gram stain, PCR, and/or culture results may not always    correspond due to difference in methodologies.    ************************************************************    This PCR assay was performed using [employer].    The following targets are tested for: Enterococcus,    vancomycin resistant enterococci, Listeria monocytogenes,    coagulase negative staphylococci, S. aureus,    methicillin resistant S. aureus, Streptococcus agalactiae    (Group B), S. pneumoniae, S. pyogenes (Group A),    Acinetobacter baumannii, Enterobacter cloacae, E. coli,    Klebsiella oxytoca, K. pneumoniae, Proteus sp.,    Serratia marcescens, Haemophilus influenzae,    Neisseria meningitidis, Pseudomonas aeruginosa, Candida    albicans, C. glabrata, C krusei, C parapsilosis,    C. tropicalis and the KPC resistance gene.  Organism: Blood Culture PCR (30 Oct 2020 03:55)  Organism: Blood Culture PCR (30 Oct 2020 03:55)    Culture - Blood (collected 29 Oct 2020 01:50)  Source: .Blood Blood  Preliminary Report (30 Oct 2020 07:02):    No growth to date.    Culture - Urine (collected 29 Oct 2020 00:40)  Source: .Urine Clean Catch (Midstream)  Final Report (30 Oct 2020 06:49):    <10,000 CFU/mL Normal Urogenital Alejandra                                                    MEDICATIONS  (STANDING):  ALBUTerol    90 MICROgram(s) HFA Inhaler 2 Puff(s) Inhalation daily  budesonide 160 MICROgram(s)/formoterol 4.5 MICROgram(s) Inhaler 2 Puff(s) Inhalation two times a day  chlorhexidine 4% Liquid 1 Application(s) Topical <User Schedule>  enoxaparin Injectable 40 milliGRAM(s) SubCutaneous daily  melatonin 10 milliGRAM(s) Oral at bedtime  methylPREDNISolone sodium succinate Injectable 40 milliGRAM(s) IV Push two times a day  mycophenolate mofetil  Oral Tab/Cap - Peds 500 milliGRAM(s) Oral two times a day    MEDICATIONS  (PRN):  acetaminophen   Tablet .. 650 milliGRAM(s) Oral every 6 hours PRN Moderate Pain (4 - 6)  ALBUTerol    90 MICROgram(s) HFA Inhaler 2 Puff(s) Inhalation every 6 hours PRN Bronchospasm      X-Rays reviewed

## 2020-10-31 LAB
-  AMPICILLIN/SULBACTAM: SIGNIFICANT CHANGE UP
-  CEFAZOLIN: SIGNIFICANT CHANGE UP
-  CLINDAMYCIN: SIGNIFICANT CHANGE UP
-  ERYTHROMYCIN: SIGNIFICANT CHANGE UP
-  GENTAMICIN: SIGNIFICANT CHANGE UP
-  OXACILLIN: SIGNIFICANT CHANGE UP
-  PENICILLIN: SIGNIFICANT CHANGE UP
-  RIFAMPIN: SIGNIFICANT CHANGE UP
-  TETRACYCLINE: SIGNIFICANT CHANGE UP
-  TRIMETHOPRIM/SULFAMETHOXAZOLE: SIGNIFICANT CHANGE UP
-  VANCOMYCIN: SIGNIFICANT CHANGE UP
CRP SERPL-MCNC: <0.1 MG/DL — SIGNIFICANT CHANGE UP (ref 0–0.4)
CULTURE RESULTS: SIGNIFICANT CHANGE UP
METHOD TYPE: SIGNIFICANT CHANGE UP
ORGANISM # SPEC MICROSCOPIC CNT: SIGNIFICANT CHANGE UP
SPECIMEN SOURCE: SIGNIFICANT CHANGE UP

## 2020-10-31 PROCEDURE — 99233 SBSQ HOSP IP/OBS HIGH 50: CPT

## 2020-10-31 RX ORDER — VALACYCLOVIR 500 MG/1
1000 TABLET, FILM COATED ORAL DAILY
Refills: 0 | Status: DISCONTINUED | OUTPATIENT
Start: 2020-10-31 | End: 2020-11-06

## 2020-10-31 RX ORDER — IBUPROFEN 200 MG
400 TABLET ORAL EVERY 6 HOURS
Refills: 0 | Status: DISCONTINUED | OUTPATIENT
Start: 2020-10-31 | End: 2020-11-06

## 2020-10-31 RX ORDER — IBUPROFEN 200 MG
400 TABLET ORAL ONCE
Refills: 0 | Status: COMPLETED | OUTPATIENT
Start: 2020-10-31 | End: 2020-10-31

## 2020-10-31 RX ADMIN — BUDESONIDE AND FORMOTEROL FUMARATE DIHYDRATE 2 PUFF(S): 160; 4.5 AEROSOL RESPIRATORY (INHALATION) at 08:49

## 2020-10-31 RX ADMIN — MYCOPHENOLATE MOFETIL 500 MILLIGRAM(S): 250 CAPSULE ORAL at 17:08

## 2020-10-31 RX ADMIN — Medication 100 MILLIGRAM(S): at 16:06

## 2020-10-31 RX ADMIN — BUDESONIDE AND FORMOTEROL FUMARATE DIHYDRATE 2 PUFF(S): 160; 4.5 AEROSOL RESPIRATORY (INHALATION) at 20:25

## 2020-10-31 RX ADMIN — Medication 400 MILLIGRAM(S): at 23:12

## 2020-10-31 RX ADMIN — Medication 20 MILLIGRAM(S): at 05:58

## 2020-10-31 RX ADMIN — Medication 1 TABLET(S): at 11:51

## 2020-10-31 RX ADMIN — ENOXAPARIN SODIUM 40 MILLIGRAM(S): 100 INJECTION SUBCUTANEOUS at 11:51

## 2020-10-31 RX ADMIN — ALBUTEROL 2 PUFF(S): 90 AEROSOL, METERED ORAL at 11:50

## 2020-10-31 RX ADMIN — Medication 400 MILLIGRAM(S): at 16:18

## 2020-10-31 RX ADMIN — Medication 100 MILLIGRAM(S): at 05:57

## 2020-10-31 RX ADMIN — Medication 1 TABLET(S): at 06:22

## 2020-10-31 RX ADMIN — AZATHIOPRINE 150 MILLIGRAM(S): 100 TABLET ORAL at 11:51

## 2020-10-31 RX ADMIN — Medication 400 MILLIGRAM(S): at 17:09

## 2020-10-31 RX ADMIN — CHLORHEXIDINE GLUCONATE 1 APPLICATION(S): 213 SOLUTION TOPICAL at 06:21

## 2020-10-31 RX ADMIN — Medication 400 MILLIGRAM(S): at 23:42

## 2020-10-31 RX ADMIN — PANTOPRAZOLE SODIUM 40 MILLIGRAM(S): 20 TABLET, DELAYED RELEASE ORAL at 05:58

## 2020-10-31 RX ADMIN — Medication 650 MILLIGRAM(S): at 07:37

## 2020-10-31 RX ADMIN — Medication 100 MILLIGRAM(S): at 21:47

## 2020-10-31 RX ADMIN — Medication 10 MILLIGRAM(S): at 01:44

## 2020-10-31 RX ADMIN — MYCOPHENOLATE MOFETIL 500 MILLIGRAM(S): 250 CAPSULE ORAL at 05:58

## 2020-10-31 RX ADMIN — Medication 10 MILLIGRAM(S): at 21:46

## 2020-10-31 RX ADMIN — VALACYCLOVIR 1000 MILLIGRAM(S): 500 TABLET, FILM COATED ORAL at 11:51

## 2020-10-31 RX ADMIN — Medication 650 MILLIGRAM(S): at 07:00

## 2020-10-31 NOTE — PROGRESS NOTE ADULT - SUBJECTIVE AND OBJECTIVE BOX
JESSI KING  40y  Female      Patient is a 40y old  Female who presents with a chief complaint of SOB (31 Oct 2020 07:32)      INTERVAL HPI/OVERNIGHT EVENTS:  She feels slightly better, SOB still with exertion, no fever.   Vital Signs Last 24 Hrs  T(C): 36.3 (31 Oct 2020 06:04), Max: 36.3 (31 Oct 2020 06:04)  T(F): 97.4 (31 Oct 2020 06:04), Max: 97.4 (31 Oct 2020 06:04)  HR: 85 (31 Oct 2020 06:04) (80 - 85)  BP: 124/58 (31 Oct 2020 06:04) (120/59 - 133/87)  BP(mean): --  RR: 20 (31 Oct 2020 06:04) (18 - 20)  SpO2: --      10-30-20 @ 07:01  -  10-31-20 @ 07:00  --------------------------------------------------------  IN: 50 mL / OUT: 3 mL / NET: 47 mL            Consultant(s) Notes Reviewed:  [x ] YES  [ ] NO          MEDICATIONS  (STANDING):  ALBUTerol    90 MICROgram(s) HFA Inhaler 2 Puff(s) Inhalation daily  azaTHIOprine 150 milliGRAM(s) Oral daily  budesonide 160 MICROgram(s)/formoterol 4.5 MICROgram(s) Inhaler 2 Puff(s) Inhalation two times a day  calcium carbonate 1250 mG  + Vitamin D (OsCal 500 + D) 1 Tablet(s) Oral daily  ceFAZolin   IVPB 2000 milliGRAM(s) IV Intermittent every 8 hours  chlorhexidine 4% Liquid 1 Application(s) Topical <User Schedule>  enoxaparin Injectable 40 milliGRAM(s) SubCutaneous daily  melatonin 10 milliGRAM(s) Oral at bedtime  mycophenolate mofetil  Oral Tab/Cap - Peds 500 milliGRAM(s) Oral two times a day  pantoprazole    Tablet 40 milliGRAM(s) Oral before breakfast  predniSONE   Tablet 20 milliGRAM(s) Oral daily  trimethoprim  160 mG/sulfamethoxazole 800 mG 1 Tablet(s) Oral <User Schedule>  valACYclovir 1000 milliGRAM(s) Oral daily    MEDICATIONS  (PRN):  acetaminophen   Tablet .. 650 milliGRAM(s) Oral every 6 hours PRN Moderate Pain (4 - 6)  ALBUTerol    90 MICROgram(s) HFA Inhaler 2 Puff(s) Inhalation every 6 hours PRN Bronchospasm      LABS                          11.4   16.95 )-----------( 440      ( 30 Oct 2020 08:35 )             36.7     10-30    136  |  104  |  15  ----------------------------<  113<H>  5.0   |  23  |  0.6<L>    Ca    8.7      30 Oct 2020 08:35            Lactate Trend        CAPILLARY BLOOD GLUCOSE          Culture - Blood (collected 10-29-20 @ 07:08)  Source: .Blood None  Gram Stain (10-30-20 @ 02:03):    Growth in anaerobic bottle: Gram Positive Cocci in Clusters  Preliminary Report (10-30-20 @ 21:51):    Growth in anaerobic bottle: Staphylococcus aureus    "Due to technical problems, Proteus sp. will Not be reported as part of    the BCID panel until further notice"    ***Blood Panel PCR results on this specimen are available    approximately 3 hours afterthe Gram stain result.***    Gram stain, PCR, and/or culture results may not always    correspond due to difference in methodologies.    ************************************************************    This PCR assay was performed using [employer].    The following targets are tested for: Enterococcus,    vancomycin resistant enterococci, Listeria monocytogenes,    coagulase negative staphylococci, S. aureus,    methicillin resistant S. aureus, Streptococcus agalactiae    (Group B), S. pneumoniae, S. pyogenes (Group A),    Acinetobacter baumannii, Enterobacter cloacae, E. coli,    Klebsiella oxytoca, K. pneumoniae, Proteus sp.,    Serratia marcescens, Haemophilus influenzae,    Neisseria meningitidis, Pseudomonas aeruginosa, Candida    albicans, C. glabrata, C krusei, C parapsilosis,    C. tropicalis and the KPC resistance gene.  Organism: Blood Culture PCR (10-30-20 @ 03:55)  Organism: Blood Culture PCR (10-30-20 @ 03:55)      -  Staphylococcus aureus: Detec Any isolate of Staphylococcus aureus from a blood culture is NOT considered a contaminant.      Method Type: PCR    Culture - Blood (collected 10-29-20 @ 01:50)  Source: .Blood Blood  Preliminary Report (10-30-20 @ 07:02):    No growth to date.    Culture - Urine (collected 10-29-20 @ 00:40)  Source: .Urine Clean Catch (Midstream)  Final Report (10-30-20 @ 06:49):    <10,000 CFU/mL Normal Urogenital Alejandra        RADIOLOGY & ADDITIONAL TESTS:    Imaging Personally Reviewed:  [ ] YES  [ ] NO    HEALTH ISSUES - PROBLEM Dx:          PHYSICAL EXAM:  GENERAL: NAD, well-developed.  HEAD:  Atraumatic, Normocephalic.  EYES: EOMI, PERRLA, conjunctiva and sclera clear.  NECK: Supple, No JVD.  CHEST/LUNG: bibasilar rales.   HEART: Regular rate and rhythm; S1 S2.   ABDOMEN: Soft, Nontender, Nondistended; Bowel sounds present.  EXTREMITIES:  2+ Peripheral Pulses, No clubbing, cyanosis, or edema.  PSYCH: AAOx3.  NEUROLOGY: non-focal.  SKIN: No rashes or lesions.

## 2020-10-31 NOTE — PROGRESS NOTE ADULT - SUBJECTIVE AND OBJECTIVE BOX
JESSI KING 40y Female  MRN#: [id number]   CODE STATUS:________      SUBJECTIVE  Patient is a 40y old Female who presents with a chief complaint of SOB (30 Oct 2020 16:01)  Currently admitted to medicine with the primary diagnosis of Cough      Today is hospital day 2d, and this morning she is           OBJECTIVE  PAST MEDICAL & SURGICAL HISTORY  H/O dermatomyositis    Dermatomyositis    Cryptogenic organizing pneumonia    History of cervical cerclage    Ankle fracture      ALLERGIES:  contrast media (iodine-based) (Unknown)  iodine (Anaphylaxis)  peanuts (Unknown)  shellfish (Anaphylaxis)  shellfish (Unknown)    MEDICATIONS:  STANDING MEDICATIONS  ALBUTerol    90 MICROgram(s) HFA Inhaler 2 Puff(s) Inhalation daily  azaTHIOprine 150 milliGRAM(s) Oral daily  budesonide 160 MICROgram(s)/formoterol 4.5 MICROgram(s) Inhaler 2 Puff(s) Inhalation two times a day  calcium carbonate 1250 mG  + Vitamin D (OsCal 500 + D) 1 Tablet(s) Oral daily  ceFAZolin   IVPB 2000 milliGRAM(s) IV Intermittent every 8 hours  chlorhexidine 4% Liquid 1 Application(s) Topical <User Schedule>  enoxaparin Injectable 40 milliGRAM(s) SubCutaneous daily  melatonin 10 milliGRAM(s) Oral at bedtime  mycophenolate mofetil  Oral Tab/Cap - Peds 500 milliGRAM(s) Oral two times a day  pantoprazole    Tablet 40 milliGRAM(s) Oral before breakfast  predniSONE   Tablet 20 milliGRAM(s) Oral daily  trimethoprim  160 mG/sulfamethoxazole 800 mG 1 Tablet(s) Oral <User Schedule>    PRN MEDICATIONS  acetaminophen   Tablet .. 650 milliGRAM(s) Oral every 6 hours PRN  ALBUTerol    90 MICROgram(s) HFA Inhaler 2 Puff(s) Inhalation every 6 hours PRN      VITAL SIGNS: Last 24 Hours  T(C): 36.3 (31 Oct 2020 06:04), Max: 36.3 (31 Oct 2020 06:04)  T(F): 97.4 (31 Oct 2020 06:04), Max: 97.4 (31 Oct 2020 06:04)  HR: 85 (31 Oct 2020 06:04) (80 - 85)  BP: 124/58 (31 Oct 2020 06:04) (120/59 - 133/87)  BP(mean): --  RR: 20 (31 Oct 2020 06:04) (18 - 20)  SpO2: --    LABS:                        11.4   16.95 )-----------( 440      ( 30 Oct 2020 08:35 )             36.7     10-30    136  |  104  |  15  ----------------------------<  113<H>  5.0   |  23  |  0.6<L>    Ca    8.7      30 Oct 2020 08:35            Sedimentation Rate, Erythrocyte: 39 mm/Hr <H> (10-30-20 @ 20:00)      Culture - Blood (collected 29 Oct 2020 07:08)  Source: .Blood None  Gram Stain (30 Oct 2020 02:03):    Growth in anaerobic bottle: Gram Positive Cocci in Clusters  Preliminary Report (30 Oct 2020 21:51):    Growth in anaerobic bottle: Staphylococcus aureus    "Due to technical problems, Proteus sp. will Not be reported as part of    the BCID panel until further notice"    ***Blood Panel PCR results on this specimen are available    approximately 3 hours afterthe Gram stain result.***    Gram stain, PCR, and/or culture results may not always    correspond due to difference in methodologies.    ************************************************************    This PCR assay was performed using [employer].    The following targets are tested for: Enterococcus,    vancomycin resistant enterococci, Listeria monocytogenes,    coagulase negative staphylococci, S. aureus,    methicillin resistant S. aureus, Streptococcus agalactiae    (Group B), S. pneumoniae, S. pyogenes (Group A),    Acinetobacter baumannii, Enterobacter cloacae, E. coli,    Klebsiella oxytoca, K. pneumoniae, Proteus sp.,    Serratia marcescens, Haemophilus influenzae,    Neisseria meningitidis, Pseudomonas aeruginosa, Candida    albicans, C. glabrata, C krusei, C parapsilosis,    C. tropicalis and the KPC resistance gene.  Organism: Blood Culture PCR (30 Oct 2020 03:55)  Organism: Blood Culture PCR (30 Oct 2020 03:55)    Culture - Blood (collected 29 Oct 2020 01:50)  Source: .Blood Blood  Preliminary Report (30 Oct 2020 07:02):    No growth to date.    Culture - Urine (collected 29 Oct 2020 00:40)  Source: .Urine Clean Catch (Midstream)  Final Report (30 Oct 2020 06:49):    <10,000 CFU/mL Normal Urogenital Alejandra          RADIOLOGY:      PHYSICAL EXAM:    GENERAL: NAD, well-developed, AAOx3  HEENT:  Atraumatic, Normocephalic. EOMI, PERRLA, conjunctiva and sclera clear, No JVD  PULMONARY: Clear to auscultation bilaterally; No wheeze  CARDIOVASCULAR: Regular rate and rhythm; No murmurs, rubs, or gallops  GASTROINTESTINAL: Soft, Nontender, Nondistended; Bowel sounds present  MUSCULOSKELETAL:  2+ Peripheral Pulses, No clubbing, cyanosis, or edema  NEUROLOGY: non-focal  SKIN: No rashes or lesions       JESSI KING 40y Female  MRN#: [id number]   CODE STATUS:________      SUBJECTIVE  Patient is a 40y old Female who presents with a chief complaint of SOB (30 Oct 2020 16:01)  Currently admitted to medicine with the primary diagnosis of Cough      Today is hospital day 2d.  No acute event overnight. Patient was seen sitting up awake and alert, denies SOB or CP. No complaints.           OBJECTIVE  PAST MEDICAL & SURGICAL HISTORY  H/O dermatomyositis    Dermatomyositis    Cryptogenic organizing pneumonia    History of cervical cerclage    Ankle fracture      ALLERGIES:  contrast media (iodine-based) (Unknown)  iodine (Anaphylaxis)  peanuts (Unknown)  shellfish (Anaphylaxis)  shellfish (Unknown)    MEDICATIONS:  STANDING MEDICATIONS  ALBUTerol    90 MICROgram(s) HFA Inhaler 2 Puff(s) Inhalation daily  azaTHIOprine 150 milliGRAM(s) Oral daily  budesonide 160 MICROgram(s)/formoterol 4.5 MICROgram(s) Inhaler 2 Puff(s) Inhalation two times a day  calcium carbonate 1250 mG  + Vitamin D (OsCal 500 + D) 1 Tablet(s) Oral daily  ceFAZolin   IVPB 2000 milliGRAM(s) IV Intermittent every 8 hours  chlorhexidine 4% Liquid 1 Application(s) Topical <User Schedule>  enoxaparin Injectable 40 milliGRAM(s) SubCutaneous daily  melatonin 10 milliGRAM(s) Oral at bedtime  mycophenolate mofetil  Oral Tab/Cap - Peds 500 milliGRAM(s) Oral two times a day  pantoprazole    Tablet 40 milliGRAM(s) Oral before breakfast  predniSONE   Tablet 20 milliGRAM(s) Oral daily  trimethoprim  160 mG/sulfamethoxazole 800 mG 1 Tablet(s) Oral <User Schedule>    PRN MEDICATIONS  acetaminophen   Tablet .. 650 milliGRAM(s) Oral every 6 hours PRN  ALBUTerol    90 MICROgram(s) HFA Inhaler 2 Puff(s) Inhalation every 6 hours PRN      VITAL SIGNS: Last 24 Hours  T(C): 36.3 (31 Oct 2020 06:04), Max: 36.3 (31 Oct 2020 06:04)  T(F): 97.4 (31 Oct 2020 06:04), Max: 97.4 (31 Oct 2020 06:04)  HR: 85 (31 Oct 2020 06:04) (80 - 85)  BP: 124/58 (31 Oct 2020 06:04) (120/59 - 133/87)  BP(mean): --  RR: 20 (31 Oct 2020 06:04) (18 - 20)  SpO2: --    LABS:                        11.4   16.95 )-----------( 440      ( 30 Oct 2020 08:35 )             36.7     10-30    136  |  104  |  15  ----------------------------<  113<H>  5.0   |  23  |  0.6<L>    Ca    8.7      30 Oct 2020 08:35            Sedimentation Rate, Erythrocyte: 39 mm/Hr <H> (10-30-20 @ 20:00)      Culture - Blood (collected 29 Oct 2020 07:08)  Source: .Blood None  Gram Stain (30 Oct 2020 02:03):    Growth in anaerobic bottle: Gram Positive Cocci in Clusters  Preliminary Report (30 Oct 2020 21:51):    Growth in anaerobic bottle: Staphylococcus aureus    "Due to technical problems, Proteus sp. will Not be reported as part of    the BCID panel until further notice"    ***Blood Panel PCR results on this specimen are available    approximately 3 hours afterthe Gram stain result.***    Gram stain, PCR, and/or culture results may not always    correspond due to difference in methodologies.    ************************************************************    This PCR assay was performed using [employer].    The following targets are tested for: Enterococcus,    vancomycin resistant enterococci, Listeria monocytogenes,    coagulase negative staphylococci, S. aureus,    methicillin resistant S. aureus, Streptococcus agalactiae    (Group B), S. pneumoniae, S. pyogenes (Group A),    Acinetobacter baumannii, Enterobacter cloacae, E. coli,    Klebsiella oxytoca, K. pneumoniae, Proteus sp.,    Serratia marcescens, Haemophilus influenzae,    Neisseria meningitidis, Pseudomonas aeruginosa, Candida    albicans, C. glabrata, C krusei, C parapsilosis,    C. tropicalis and the KPC resistance gene.  Organism: Blood Culture PCR (30 Oct 2020 03:55)  Organism: Blood Culture PCR (30 Oct 2020 03:55)    Culture - Blood (collected 29 Oct 2020 01:50)  Source: .Blood Blood  Preliminary Report (30 Oct 2020 07:02):    No growth to date.    Culture - Urine (collected 29 Oct 2020 00:40)  Source: .Urine Clean Catch (Midstream)  Final Report (30 Oct 2020 06:49):    <10,000 CFU/mL Normal Urogenital Alejandra          RADIOLOGY:      PHYSICAL EXAM:    CONSTITUTIONAL: No acute distress, well-developed, well-groomed, AAOx3  HEAD: Atraumatic, normocephalic  EYES: EOM intact, PERRLA, conjunctiva and sclera clear  ENT: Supple, no masses, no thyromegaly, no bruits, no JVD; moist mucous membranes  PULMONARY: Clear to auscultation bilaterally; no wheezes, rales, or rhonchi  CARDIOVASCULAR: Regular rate and rhythm; no murmurs, rubs, or gallops  GASTROINTESTINAL: Soft, non-tender, non-distended; bowel sounds present  MUSCULOSKELETAL: 2+ peripheral pulses; no clubbing, no cyanosis, no edema  NEUROLOGY: non-focal  SKIN: No rashes or lesions; warm and dry

## 2020-10-31 NOTE — PROGRESS NOTE ADULT - ASSESSMENT
#Staph aureus bacteremia  - No sepsis present on admission  - Blood Cx 10/29/2020: Staph aureus  - ID consult appreciated  - Repeat blood culture x2 then start cefazolin 2g IV Q8H  - Follow ESR, CRP, and procalcitonin  - Follow sputum culture    #Shortness of breath possibly secondary to pulmonary hypertension vs PCP  - WBC 20->16  - Chest X-ray: diffuse interstitial bilateral opacities, no acute consolidation  - CT angio chest: negative for pulmonary embolism  - LDH: 219 (hemolyzed)  - Follow procalcitonin, Fungitell, and sputum culture  - Pulmonary consult appreciated  - Discontinue methylprednisolone and start home dose of prednisone 20mg PO QD  - Continue with mycophenolate 500mg PO BID, azathioprine 150mg PO QD, albuterol 90mcg HFA 2 puffs PO QD and Q6H PRN  - Start calcium and vitamin D for osteoporosis prophylaxis given chronic steroid use    #Elevated troponin, resolved  - Troponin 0.04->0.01  - Patient denies chest pain at this time    #History of dermatomyositis and cryptogenic organizing pneumonia  - Continue with mycophenolate 500mg PO BID, azathioprine 150mg PO QD  - Discontinue methylprednisolone and start home dose of prednisone 20mg PO QD  - Followed outpatient with Dr. Burris, but has appointment with another pulmonologist (does not know name)    #Urinary frequency without dysuria  - Urinalysis negative  - Urine culture 10/29/2020: <10,000 normal joon    #High risk for osteoporosis given chronic steroid use  - Start calcium and vitamin D  - Follow outpatient with DEXA scan    #Misc  - DVT Prophylaxis: Lovenox 40mg SQ QD  - GI Prophylaxis: pantoprazole 40mg PO QD   - Diet: Regular  - Activity: increase as tolerated  - IV Fluids: not indicated   - Code Status: Full Code    Dispo: pending repeat blood cultures   #Staph aureus bacteremia  - No sepsis present on admission  - Blood Cx 10/29/2020: Staph aureus  - ID consult appreciated  - Repeat blood culture x2 then start cefazolin 2g IV Q8H  - Follow ESR, CRP, and procalcitonin  - Follow sputum culture  - Follow up blood culture    #Shortness of breath possibly secondary to pulmonary hypertension vs PCP  - WBC 20->16  - Chest X-ray: diffuse interstitial bilateral opacities, no acute consolidation  - CT angio chest: negative for pulmonary embolism  - LDH: 219 (hemolyzed)  - Follow procalcitonin, Fungitell, and sputum culture  - Pulmonary consult appreciated  - Discontinue methylprednisolone and start home dose of prednisone 20mg PO QD  - Continue with mycophenolate 500mg PO BID, azathioprine 150mg PO QD, albuterol 90mcg HFA 2 puffs PO QD and Q6H PRN  - continue calcium and vitamin D for osteoporosis prophylaxis given chronic steroid use    #Elevated troponin, resolved  - Troponin 0.04->0.01  - Patient denies chest pain at this time    #History of dermatomyositis and cryptogenic organizing pneumonia  - Continue with mycophenolate 500mg PO BID, azathioprine 150mg PO QD  - Discontinue methylprednisolone and start home dose of prednisone 20mg PO QD  - Followed outpatient with Dr. Burris, but has appointment with another pulmonologist (does not know name)    #Urinary frequency without dysuria  - Urinalysis negative  - Urine culture 10/29/2020: <10,000 normal joon    #High risk for osteoporosis given chronic steroid use  - Start calcium and vitamin D  - Follow outpatient with DEXA scan    #Misc  - DVT Prophylaxis: Lovenox 40mg SQ QD  - GI Prophylaxis: pantoprazole 40mg PO QD   - Diet: Regular  - Activity: increase as tolerated  - IV Fluids: not indicated   - Code Status: Full Code    Dispo: pending repeat blood cultures

## 2020-10-31 NOTE — PROGRESS NOTE ADULT - ASSESSMENT
39yo female with PMH of dermatomyositis (on prednisone and Cellcept) and cryptogenic organizing pneumonia 2017 on home O2 since 2018 who presented to the hospital complaining of shortness of breath.     A/P:   Staph aureus bacteremia  Blood Cx 10/29/2020: Staph aureus  No source of infection.   Continue Cefazolin. Repeat bloo    Cryptogenic organizing pneumonia:   Worsening SOB.   WBC 20->16  Chest X-ray: diffuse interstitial bilateral opacities, no acute consolidation  CT angio chest: negative for pulmonary embolism  Follow procalcitonin, Fungitell, and sputum culture  Continue Prednisone 20mg daily.     Dermatomyositis:   Continue with mycophenolate 500mg PO BID, azathioprine 150mg PO QD    High risk for osteoporosis given chronic steroid use  Continue calcium and vitamin D      DVT Prophylaxis: Lovenox 40mg SQ QD    Pending: blood cx, improving SOB.

## 2020-10-31 NOTE — PROGRESS NOTE ADULT - ASSESSMENT
ASSESSMENT  40 year old female PMH of dermatomyositis, Cryptogenic organizing PNA diagnosed 2017 was on home oxygen till 2018 since then managed with daily 20mg prednisone and cellcept and albuterol presents for SOB and cough x 6 days, found to have MSSA bacteremia     IMPRESSION  #MSSA bacteremia with severe sepsis on admission (P>90 WBC 20, lactic acidosis 3)    10/29 1/4 bottles    No clear source    Reports cough, but imaging not indicative of PNA    No skin lesions    No recent lines, injections, IVDU    No paraspinal tenderness    No murmur on exam   #Morbid Obesity BMI (kg/m2): 43.5  #Dermatomyositis on immunosuppressives (pred, azathioprine, cellcept)  #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

## 2020-11-01 LAB
ANION GAP SERPL CALC-SCNC: 13 MMOL/L — SIGNIFICANT CHANGE UP (ref 7–14)
BUN SERPL-MCNC: 18 MG/DL — SIGNIFICANT CHANGE UP (ref 10–20)
CALCIUM SERPL-MCNC: 8.5 MG/DL — SIGNIFICANT CHANGE UP (ref 8.5–10.1)
CHLORIDE SERPL-SCNC: 105 MMOL/L — SIGNIFICANT CHANGE UP (ref 98–110)
CO2 SERPL-SCNC: 20 MMOL/L — SIGNIFICANT CHANGE UP (ref 17–32)
CREAT SERPL-MCNC: 0.8 MG/DL — SIGNIFICANT CHANGE UP (ref 0.7–1.5)
GLUCOSE SERPL-MCNC: 93 MG/DL — SIGNIFICANT CHANGE UP (ref 70–99)
HCT VFR BLD CALC: 40.6 % — SIGNIFICANT CHANGE UP (ref 37–47)
HGB BLD-MCNC: 12.6 G/DL — SIGNIFICANT CHANGE UP (ref 12–16)
MCHC RBC-ENTMCNC: 29.9 PG — SIGNIFICANT CHANGE UP (ref 27–31)
MCHC RBC-ENTMCNC: 31 G/DL — LOW (ref 32–37)
MCV RBC AUTO: 96.4 FL — SIGNIFICANT CHANGE UP (ref 81–99)
NRBC # BLD: 0 /100 WBCS — SIGNIFICANT CHANGE UP (ref 0–0)
PLATELET # BLD AUTO: 461 K/UL — HIGH (ref 130–400)
POTASSIUM SERPL-MCNC: 4.3 MMOL/L — SIGNIFICANT CHANGE UP (ref 3.5–5)
POTASSIUM SERPL-SCNC: 4.3 MMOL/L — SIGNIFICANT CHANGE UP (ref 3.5–5)
RBC # BLD: 4.21 M/UL — SIGNIFICANT CHANGE UP (ref 4.2–5.4)
RBC # FLD: 14.4 % — SIGNIFICANT CHANGE UP (ref 11.5–14.5)
SODIUM SERPL-SCNC: 138 MMOL/L — SIGNIFICANT CHANGE UP (ref 135–146)
WBC # BLD: 13.71 K/UL — HIGH (ref 4.8–10.8)
WBC # FLD AUTO: 13.71 K/UL — HIGH (ref 4.8–10.8)

## 2020-11-01 PROCEDURE — 93306 TTE W/DOPPLER COMPLETE: CPT | Mod: 26

## 2020-11-01 PROCEDURE — 99233 SBSQ HOSP IP/OBS HIGH 50: CPT

## 2020-11-01 RX ORDER — MORPHINE SULFATE 50 MG/1
2 CAPSULE, EXTENDED RELEASE ORAL ONCE
Refills: 0 | Status: DISCONTINUED | OUTPATIENT
Start: 2020-11-01 | End: 2020-11-01

## 2020-11-01 RX ADMIN — Medication 100 MILLIGRAM(S): at 13:05

## 2020-11-01 RX ADMIN — Medication 10 MILLIGRAM(S): at 23:38

## 2020-11-01 RX ADMIN — BUDESONIDE AND FORMOTEROL FUMARATE DIHYDRATE 2 PUFF(S): 160; 4.5 AEROSOL RESPIRATORY (INHALATION) at 07:47

## 2020-11-01 RX ADMIN — Medication 20 MILLIGRAM(S): at 05:24

## 2020-11-01 RX ADMIN — VALACYCLOVIR 1000 MILLIGRAM(S): 500 TABLET, FILM COATED ORAL at 11:31

## 2020-11-01 RX ADMIN — Medication 100 MILLIGRAM(S): at 05:22

## 2020-11-01 RX ADMIN — MORPHINE SULFATE 2 MILLIGRAM(S): 50 CAPSULE, EXTENDED RELEASE ORAL at 23:04

## 2020-11-01 RX ADMIN — Medication 400 MILLIGRAM(S): at 18:12

## 2020-11-01 RX ADMIN — Medication 400 MILLIGRAM(S): at 08:21

## 2020-11-01 RX ADMIN — Medication 400 MILLIGRAM(S): at 21:17

## 2020-11-01 RX ADMIN — ENOXAPARIN SODIUM 40 MILLIGRAM(S): 100 INJECTION SUBCUTANEOUS at 11:31

## 2020-11-01 RX ADMIN — Medication 1 TABLET(S): at 11:31

## 2020-11-01 RX ADMIN — Medication 400 MILLIGRAM(S): at 07:48

## 2020-11-01 RX ADMIN — ALBUTEROL 2 PUFF(S): 90 AEROSOL, METERED ORAL at 07:48

## 2020-11-01 RX ADMIN — PANTOPRAZOLE SODIUM 40 MILLIGRAM(S): 20 TABLET, DELAYED RELEASE ORAL at 06:46

## 2020-11-01 RX ADMIN — MYCOPHENOLATE MOFETIL 500 MILLIGRAM(S): 250 CAPSULE ORAL at 17:50

## 2020-11-01 RX ADMIN — CHLORHEXIDINE GLUCONATE 1 APPLICATION(S): 213 SOLUTION TOPICAL at 07:25

## 2020-11-01 RX ADMIN — MYCOPHENOLATE MOFETIL 500 MILLIGRAM(S): 250 CAPSULE ORAL at 05:24

## 2020-11-01 RX ADMIN — MORPHINE SULFATE 2 MILLIGRAM(S): 50 CAPSULE, EXTENDED RELEASE ORAL at 21:19

## 2020-11-01 RX ADMIN — Medication 100 MILLIGRAM(S): at 21:32

## 2020-11-01 RX ADMIN — AZATHIOPRINE 150 MILLIGRAM(S): 100 TABLET ORAL at 11:31

## 2020-11-01 NOTE — PROGRESS NOTE ADULT - ASSESSMENT
41yo female with PMH of dermatomyositis (on prednisone and Cellcept) and cryptogenic organizing pneumonia 2017 on home O2 since 2018 who presented to the hospital complaining of shortness of breath.     A/P:   Staph aureus bacteremia  Blood Cx 10/29/2020: Staph aureus  No source of infection.    seen by ID---Cefazolin 2g q8h IV   - echo shows< from: TTE Echo Complete w/o Contrast w/ Doppler (11.01.20 @ 08:04) >   Left ventricular ejection fraction, by visual estimation, is 60 to 65%.   2. Normal global left ventricular systolic function.   3. Mild concentric left ventricular hypertrophy.   4. Mild tricuspid regurgitatio      needs CONNOR-- cardiology eval  - Sputum cx  - valtrex 1g daily PO for HSV ppx as recurrent lesions  - On bactrim for PCP ppx       Cryptogenic organizing pneumonia:     WBC trending down  Chest X-ray: diffuse interstitial bilateral opacities, no acute consolidation  CT angio chest: negative for pulmonary embolism  Follow procalcitonin, Fungitell, and sputum culture  Continue Prednisone 20mg daily.     Dermatomyositis:   Continue with mycophenolate 500mg PO BID, azathioprine 150mg PO QD    High risk for osteoporosis given chronic steroid use  Continue calcium and vitamin D      DVT Prophylaxis: Lovenox 40mg SQ QD    Pending: CONNOR and further ID recomendations

## 2020-11-01 NOTE — PROGRESS NOTE ADULT - SUBJECTIVE AND OBJECTIVE BOX
JESSI KING  40y, Female  Allergy: contrast media (iodine-based) (Unknown)  iodine (Anaphylaxis)  peanuts (Unknown)  shellfish (Anaphylaxis)  shellfish (Unknown)      LOS  3d    CHIEF COMPLAINT: SOB (31 Oct 2020 17:59)      INTERVAL EVENTS/HPI  - No acute events overnight, repeat BCX NG  - T(F): , Max: 98.4 (10-31-20 @ 22:52)  - Denies any worsening symptoms  - Tolerating medication  - WBC Count: 13.71 (11-01-20 @ 04:30)  WBC Count: 16.95 (10-30-20 @ 08:35)  - Creatinine, Serum: 0.8 (11-01-20 @ 04:30)       ROS  General: Denies rigors, nightsweats  HEENT: Denies headache, rhinorrhea, sore throat, eye pain  CV: Denies CP, palpitations  PULM: Denies wheezing, hemoptysis  GI: Denies hematemesis, hematochezia, melena  : Denies discharge, hematuria  MSK: Denies arthralgias, myalgias  SKIN: Denies rash, lesions  NEURO: Denies paresthesias, weakness  PSYCH: Denies depression, anxiety    VITALS:  T(F): 98.3, Max: 98.4 (10-31-20 @ 22:52)  HR: 76  BP: 119/66  RR: 18Vital Signs Last 24 Hrs  T(C): 36.8 (01 Nov 2020 05:37), Max: 36.9 (31 Oct 2020 22:52)  T(F): 98.3 (01 Nov 2020 05:37), Max: 98.4 (31 Oct 2020 22:52)  HR: 76 (01 Nov 2020 05:37) (76 - 95)  BP: 119/66 (01 Nov 2020 05:37) (115/63 - 130/60)  BP(mean): --  RR: 18 (01 Nov 2020 05:37) (18 - 20)  SpO2: --    PHYSICAL EXAM:  Gen: NAD, resting in bed  HEENT: Normocephalic, atraumatic  Neck: supple, no lymphadenopathy  CV: Regular rate & regular rhythm  Lungs: decreased BS at bases, no fremitus  Abdomen: Soft, BS present  Ext: Warm, well perfused  Neuro: non focal, awake  Skin: no rash, no erythema  Lines: no phlebitis    FH: Non-contributory  Social Hx: Non-contributory    TESTS & MEASUREMENTS:                        12.6   13.71 )-----------( 461      ( 01 Nov 2020 04:30 )             40.6     11-01    138  |  105  |  18  ----------------------------<  93  4.3   |  20  |  0.8    Ca    8.5      01 Nov 2020 04:30      eGFR if Non African American: 92 mL/min/1.73M2 (11-01-20 @ 04:30)  eGFR if African American: 106 mL/min/1.73M2 (11-01-20 @ 04:30)          Culture - Blood (collected 10-31-20 @ 06:36)  Source: .Blood None  Preliminary Report (11-01-20 @ 09:30):    No growth to date.    Culture - Blood (collected 10-30-20 @ 20:00)  Source: .Blood Blood  Preliminary Report (11-01-20 @ 02:22):    No growth to date.    Culture - Blood (collected 10-29-20 @ 07:08)  Source: .Blood None  Gram Stain (10-30-20 @ 02:03):    Growth in anaerobic bottle: Gram Positive Cocci in Clusters  Final Report (10-31-20 @ 15:33):    Growth in anaerobic bottle: Staphylococcus aureus    "Due to technical problems, Proteus sp. will Not be reported as part of    the BCID panel until further notice"    ***Blood Panel PCR results on this specimen are available    approximately 3 hours afterthe Gram stain result.***    Gram stain, PCR, and/or culture results may not always    correspond due to difference in methodologies.    ************************************************************    This PCR assay was performed using [employer].    The following targets are tested for: Enterococcus,    vancomycin resistant enterococci, Listeria monocytogenes,    coagulase negative staphylococci, S. aureus,    methicillin resistant S. aureus, Streptococcus agalactiae    (Group B), S. pneumoniae, S. pyogenes (Group A),    Acinetobacter baumannii, Enterobacter cloacae, E. coli,    Klebsiella oxytoca, K. pneumoniae, Proteus sp.,    Serratia marcescens, Haemophilus influenzae,    Neisseria meningitidis, Pseudomonas aeruginosa, Candida    albicans, C. glabrata, C krusei, C parapsilosis,    C. tropicalis and the KPC resistance gene.  Organism: Blood Culture PCR  Staphylococcus aureus (10-31-20 @ 15:33)  Organism: Staphylococcus aureus (10-31-20 @ 15:33)      -  Ampicillin/Sulbactam: S <=8/4      -  Cefazolin: S <=4      -  Clindamycin: S <=0.25      -  Erythromycin: S 0.5      -  Gentamicin: S <=1 Should not be used as monotherapy      -  Oxacillin: S <=0.25      -  Penicillin: R 4      -  RIF- Rifampin: S <=1 Should not be used as monotherapy      -  Tetra/Doxy: S <=1      -  Trimethoprim/Sulfamethoxazole: S <=0.5/9.5      -  Vancomycin: S 2      Method Type: CONNER  Organism: Blood Culture PCR (10-31-20 @ 15:33)      -  Staphylococcus aureus: Detec Any isolate of Staphylococcus aureus from a blood culture is NOT considered a contaminant.      Method Type: PCR    Culture - Blood (collected 10-29-20 @ 01:50)  Source: .Blood Blood  Preliminary Report (10-30-20 @ 07:02):    No growth to date.    Culture - Urine (collected 10-29-20 @ 00:40)  Source: .Urine Clean Catch (Midstream)  Final Report (10-30-20 @ 06:49):    <10,000 CFU/mL Normal Urogenital Alejandra        Blood Gas Venous - Lactate: 3.2 mmoL/L (10-29-20 @ 00:27)      INFECTIOUS DISEASES TESTING  Procalcitonin, Serum: 0.03 (10-29-20 @ 07:08)  COVID-19 PCR: NotDetec (10-29-20 @ 01:36)  Procalcitonin, Serum: 0.03 (01-22-20 @ 06:26)  Rapid RVP Result: NotDetec (01-22-20 @ 02:12)      INFLAMMATORY MARKERS  Sedimentation Rate, Erythrocyte: 39 mm/Hr (10-30-20 @ 20:00)  C-Reactive Protein, Serum: <0.10 mg/dL (10-30-20 @ 20:00)  Sedimentation Rate, Erythrocyte: 41 mm/Hr (10-29-20 @ 07:08)  C-Reactive Protein, Serum: 0.20 mg/dL (10-29-20 @ 07:08)      RADIOLOGY & ADDITIONAL TESTS:  I have personally reviewed the last available Chest xray  CXR      CT  CT Angio Chest PE Protocol w/ IV Cont:   EXAM:  CT ANGIO CHEST PE PROTOCOL IC            PROCEDURE DATE:  10/30/2020            INTERPRETATION:  CLINICAL INDICATION: Shortness of breath    TECHNIQUE:  CT of the thorax was performed after administration of contrast. Sagittal and coronal reformats were performed as well as MIP reconstructions.  Intravenous contrast: 100 cc Optiray-320    COMPARISON: CT 1/22/2020    INTERPRETATION:    AIRWAYS, LUNGS AND PLEURA: The central tracheobronchial tree is patent. There are low lung volumes. Redemonstrated are peripheral predominant opacities bilaterally with bilateral lower lobe multisegmental atelectasis. There is stable focal opacity within the posterior medial left upper lobe. There is no pleural effusion. There is no pneumothorax.    MEDIASTINUM: There are no enlarged mediastinal, hilar or axillary lymph nodes. The visualized portion of the thyroid gland is heterogeneous.    HEART AND VESSELS: The right atrium and ventricle appear enlarged. There is no pericardial effusion. There areno filling defects in the main pulmonary artery or segmental branches to suggest pulmonary embolus. The main pulmonary artery is dilated, which can be seen with pulmonary arterial hypertension.    UPPER ABDOMEN: Images of the upper abdomen demonstrate no abnormality.    BONES AND SOFT TISSUES: There are degenerative changes of the spine.  The soft tissues are unremarkable.    IMPRESSION:  No central pulmonary embolism.    Enlarged right atrium and ventricle as well as dilated main pulmonary artery which can be seen with pulmonary arterial hypertension.    Low lung volume. Bilateral peripheral predominant opacities as well as multisegmental atelectasis within the bilateral lower lobes without significant change since CT 1/22/2020. Differentialincludes nonspecific interstitial lung disease and organizing pneumonia.                    JAVIER CHIANG MD; Attending Radiologist  This document has been electronically signed. Oct 30 2020 10:30AM (10-30-20 @ 09:51)      CARDIOLOGY TESTING  12 Lead ECG:   Ventricular Rate 100 BPM    Atrial Rate 100 BPM    P-R Interval 146 ms    QRS Duration 82 ms    Q-T Interval 330 ms    QTC Calculation(Bazett) 425 ms    P Axis 39 degrees    R Axis -11 degrees    T Axis 30 degrees    Diagnosis Line Normal sinus rhythm  Possible Left atrial enlargement  Left ventricular hypertrophy  Abnormal ECG    Confirmed by Scott Mosqueda ([id number]) on 10/29/2020 12:52:55 PM (10-29-20 @ 09:50)  12 Lead ECG:   Ventricular Rate 97 BPM    Atrial Rate 97 BPM    P-R Interval 138 ms    QRS Duration 74 ms    Q-T Interval 328 ms    QTC Calculation(Bazett) 416 ms    P Axis 31 degrees    R Axis -12 degrees    T Axis 22 degrees    Diagnosis Line Normal sinus rhythm  Possible Left atrial enlargement  Left ventricular hypertrophy  Abnormal ECG    Confirmed by Scott Mosqueda ([id number]) on 10/29/2020 7:15:20 AM (10-29-20 @ 00:26)      MEDICATIONS  ALBUTerol    90 MICROgram(s) HFA Inhaler 2 Inhalation daily  azaTHIOprine 150 Oral daily  budesonide 160 MICROgram(s)/formoterol 4.5 MICROgram(s) Inhaler 2 Inhalation two times a day  calcium carbonate 1250 mG  + Vitamin D (OsCal 500 + D) 1 Oral daily  ceFAZolin   IVPB 2000 IV Intermittent every 8 hours  chlorhexidine 4% Liquid 1 Topical <User Schedule>  enoxaparin Injectable 40 SubCutaneous daily  melatonin 10 Oral at bedtime  mycophenolate mofetil  Oral Tab/Cap - Peds 500 Oral two times a day  pantoprazole    Tablet 40 Oral before breakfast  predniSONE   Tablet 20 Oral daily  trimethoprim  160 mG/sulfamethoxazole 800 mG 1 Oral <User Schedule>  valACYclovir 1000 Oral daily      WEIGHT  Weight (kg): 115 (10-29-20 @ 15:46)  Creatinine, Serum: 0.8 mg/dL (11-01-20 @ 04:30)      ANTIBIOTICS:  ceFAZolin   IVPB 2000 milliGRAM(s) IV Intermittent every 8 hours  trimethoprim  160 mG/sulfamethoxazole 800 mG 1 Tablet(s) Oral <User Schedule>  valACYclovir 1000 milliGRAM(s) Oral daily      All available historical records have been reviewed

## 2020-11-01 NOTE — PROGRESS NOTE ADULT - ASSESSMENT
ASSESSMENT  40 year old female PMH of dermatomyositis, Cryptogenic organizing PNA diagnosed 2017 was on home oxygen till 2018 since then managed with daily 20mg prednisone and cellcept and albuterol presents for SOB and cough x 6 days, found to have MSSA bacteremia     IMPRESSION  #MSSA bacteremia with severe sepsis on admission (P>90 WBC 20, lactic acidosis 3)    10/29 1/4 bottles    10/30 BCX NGTD     10/31 BCX NGTD     No clear source    Reports cough, but imaging not indicative of PNA    No skin lesions    No recent lines, injections, IVDU    No paraspinal tenderness    No murmur on exam   #Morbid Obesity BMI (kg/m2): 43.5  #Dermatomyositis on immunosuppressives (pred, azathioprine, cellcept)  #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

## 2020-11-01 NOTE — PROGRESS NOTE ADULT - SUBJECTIVE AND OBJECTIVE BOX
SUBJECTIVE:    Patient is a 40y old Female who presents with a chief complaint of SOB (01 Nov 2020 12:35)    Currently admitted to medicine with the primary diagnosis of Cough       Today is hospital day 3d.     PAST MEDICAL & SURGICAL HISTORY  H/O dermatomyositis    Dermatomyositis    Cryptogenic organizing pneumonia    History of cervical cerclage    Ankle fracture      ALLERGIES:  contrast media (iodine-based) (Unknown)  iodine (Anaphylaxis)  peanuts (Unknown)  shellfish (Anaphylaxis)  shellfish (Unknown)    MEDICATIONS:  STANDING MEDICATIONS  ALBUTerol    90 MICROgram(s) HFA Inhaler 2 Puff(s) Inhalation daily  azaTHIOprine 150 milliGRAM(s) Oral daily  budesonide 160 MICROgram(s)/formoterol 4.5 MICROgram(s) Inhaler 2 Puff(s) Inhalation two times a day  calcium carbonate 1250 mG  + Vitamin D (OsCal 500 + D) 1 Tablet(s) Oral daily  ceFAZolin   IVPB 2000 milliGRAM(s) IV Intermittent every 8 hours  chlorhexidine 4% Liquid 1 Application(s) Topical <User Schedule>  enoxaparin Injectable 40 milliGRAM(s) SubCutaneous daily  melatonin 10 milliGRAM(s) Oral at bedtime  mycophenolate mofetil  Oral Tab/Cap - Peds 500 milliGRAM(s) Oral two times a day  pantoprazole    Tablet 40 milliGRAM(s) Oral before breakfast  predniSONE   Tablet 20 milliGRAM(s) Oral daily  trimethoprim  160 mG/sulfamethoxazole 800 mG 1 Tablet(s) Oral <User Schedule>  valACYclovir 1000 milliGRAM(s) Oral daily    PRN MEDICATIONS  acetaminophen   Tablet .. 650 milliGRAM(s) Oral every 6 hours PRN  ALBUTerol    90 MICROgram(s) HFA Inhaler 2 Puff(s) Inhalation every 6 hours PRN  ibuprofen  Tablet. 400 milliGRAM(s) Oral every 6 hours PRN    VITALS:   T(F): 97.4  HR: 92  BP: 114/69  RR: 17  SpO2: --    LABS:                        12.6   13.71 )-----------( 461      ( 01 Nov 2020 04:30 )             40.6     11-01    138  |  105  |  18  ----------------------------<  93  4.3   |  20  |  0.8    Ca    8.5      01 Nov 2020 04:30                Culture - Blood (collected 31 Oct 2020 06:36)  Source: .Blood None  Preliminary Report (01 Nov 2020 09:30):    No growth to date.    Culture - Blood (collected 30 Oct 2020 20:00)  Source: .Blood Blood  Preliminary Report (01 Nov 2020 02:22):    No growth to date.          RADIOLOGY:    PHYSICAL EXAM:  GEN: No acute distress  LUNGS: Clear to auscultation bilaterally   HEART: S1/S2 present. RRR.   ABD/ GI: Soft, non-tender, non-distended. Bowel sounds present  EXT: NC/NC/NE/2+PP/[place]  NEURO: AAOX3

## 2020-11-02 LAB
ANION GAP SERPL CALC-SCNC: 10 MMOL/L — SIGNIFICANT CHANGE UP (ref 7–14)
BUN SERPL-MCNC: 19 MG/DL — SIGNIFICANT CHANGE UP (ref 10–20)
CALCIUM SERPL-MCNC: 8.7 MG/DL — SIGNIFICANT CHANGE UP (ref 8.5–10.1)
CHLORIDE SERPL-SCNC: 103 MMOL/L — SIGNIFICANT CHANGE UP (ref 98–110)
CO2 SERPL-SCNC: 22 MMOL/L — SIGNIFICANT CHANGE UP (ref 17–32)
CREAT SERPL-MCNC: 0.7 MG/DL — SIGNIFICANT CHANGE UP (ref 0.7–1.5)
GLUCOSE SERPL-MCNC: 99 MG/DL — SIGNIFICANT CHANGE UP (ref 70–99)
HCT VFR BLD CALC: 38.5 % — SIGNIFICANT CHANGE UP (ref 37–47)
HGB BLD-MCNC: 11.7 G/DL — LOW (ref 12–16)
MCHC RBC-ENTMCNC: 30.1 PG — SIGNIFICANT CHANGE UP (ref 27–31)
MCHC RBC-ENTMCNC: 30.4 G/DL — LOW (ref 32–37)
MCV RBC AUTO: 99 FL — SIGNIFICANT CHANGE UP (ref 81–99)
NRBC # BLD: 0 /100 WBCS — SIGNIFICANT CHANGE UP (ref 0–0)
PLATELET # BLD AUTO: 422 K/UL — HIGH (ref 130–400)
POTASSIUM SERPL-MCNC: 4.1 MMOL/L — SIGNIFICANT CHANGE UP (ref 3.5–5)
POTASSIUM SERPL-SCNC: 4.1 MMOL/L — SIGNIFICANT CHANGE UP (ref 3.5–5)
RBC # BLD: 3.89 M/UL — LOW (ref 4.2–5.4)
RBC # FLD: 14.4 % — SIGNIFICANT CHANGE UP (ref 11.5–14.5)
SODIUM SERPL-SCNC: 135 MMOL/L — SIGNIFICANT CHANGE UP (ref 135–146)
WBC # BLD: 14.83 K/UL — HIGH (ref 4.8–10.8)
WBC # FLD AUTO: 14.83 K/UL — HIGH (ref 4.8–10.8)

## 2020-11-02 PROCEDURE — 99233 SBSQ HOSP IP/OBS HIGH 50: CPT

## 2020-11-02 RX ORDER — TRAMADOL HYDROCHLORIDE 50 MG/1
50 TABLET ORAL ONCE
Refills: 0 | Status: DISCONTINUED | OUTPATIENT
Start: 2020-11-02 | End: 2020-11-02

## 2020-11-02 RX ORDER — MORPHINE SULFATE 50 MG/1
2 CAPSULE, EXTENDED RELEASE ORAL ONCE
Refills: 0 | Status: DISCONTINUED | OUTPATIENT
Start: 2020-11-02 | End: 2020-11-02

## 2020-11-02 RX ADMIN — Medication 10 MILLIGRAM(S): at 21:23

## 2020-11-02 RX ADMIN — PANTOPRAZOLE SODIUM 40 MILLIGRAM(S): 20 TABLET, DELAYED RELEASE ORAL at 05:49

## 2020-11-02 RX ADMIN — BUDESONIDE AND FORMOTEROL FUMARATE DIHYDRATE 2 PUFF(S): 160; 4.5 AEROSOL RESPIRATORY (INHALATION) at 21:16

## 2020-11-02 RX ADMIN — MYCOPHENOLATE MOFETIL 500 MILLIGRAM(S): 250 CAPSULE ORAL at 05:49

## 2020-11-02 RX ADMIN — Medication 100 MILLIGRAM(S): at 05:46

## 2020-11-02 RX ADMIN — Medication 20 MILLIGRAM(S): at 05:50

## 2020-11-02 RX ADMIN — MYCOPHENOLATE MOFETIL 500 MILLIGRAM(S): 250 CAPSULE ORAL at 17:47

## 2020-11-02 RX ADMIN — Medication 400 MILLIGRAM(S): at 05:48

## 2020-11-02 RX ADMIN — ENOXAPARIN SODIUM 40 MILLIGRAM(S): 100 INJECTION SUBCUTANEOUS at 12:38

## 2020-11-02 RX ADMIN — CHLORHEXIDINE GLUCONATE 1 APPLICATION(S): 213 SOLUTION TOPICAL at 05:49

## 2020-11-02 RX ADMIN — VALACYCLOVIR 1000 MILLIGRAM(S): 500 TABLET, FILM COATED ORAL at 12:30

## 2020-11-02 RX ADMIN — Medication 1 TABLET(S): at 12:29

## 2020-11-02 RX ADMIN — MORPHINE SULFATE 2 MILLIGRAM(S): 50 CAPSULE, EXTENDED RELEASE ORAL at 21:19

## 2020-11-02 RX ADMIN — Medication 100 MILLIGRAM(S): at 14:17

## 2020-11-02 RX ADMIN — AZATHIOPRINE 150 MILLIGRAM(S): 100 TABLET ORAL at 12:30

## 2020-11-02 RX ADMIN — TRAMADOL HYDROCHLORIDE 50 MILLIGRAM(S): 50 TABLET ORAL at 15:02

## 2020-11-02 RX ADMIN — Medication 100 MILLIGRAM(S): at 21:16

## 2020-11-02 RX ADMIN — TRAMADOL HYDROCHLORIDE 50 MILLIGRAM(S): 50 TABLET ORAL at 14:15

## 2020-11-02 RX ADMIN — Medication 400 MILLIGRAM(S): at 06:18

## 2020-11-02 NOTE — PROGRESS NOTE ADULT - ASSESSMENT
40 year old female PMH of dermatomyositis, Cryptogenic organizing PNA diagnosed 2017 was on home oxygen till 2018 since then managed with daily 20mg prednisone and cellcept and albuterol presents for SOB.    # Interstitial lung disease with Pulmonary Hypertension  - CT Angio Chest PE Protocol w/ IV Cont (10.30.20 @ 09:51) >No central pulmonary embolism. Enlarged right atrium and ventricle as well as dilated main pulmonary artery which can be seen with pulmonary arterial hypertension. Low lung volume. Bilateral peripheral predominant opacities as well as multisegmental atelectasis within the bilateral lower lobes without significant change since CT 1/22/2020. Differentialincludes nonspecific interstitial lung disease and organizing pneumonia.  -  Xray Chest 1 View-PORTABLE IMMEDIATE (Xray Chest 1 View-PORTABLE IMMEDIATE .) (10.29.20 @ 01:01) >Diffuse bilateral interstitial opacities.  - TTE Echo Complete w/o Contrast w/ Doppler (11.01.20 @ 08:04) >Left ventricular ejection fraction, by visual estimation, is 60 to 65%.Pulmonary Artery: The main pulmonary artery is normal in size.  - c/w steroids  - bactrim prophylaxis  - c/w albuterol, budesonide  - ABG    # MSSA bacteremia  - c/w Ancef  - ID : recomends CONNOR    # H/o Dermatomyositis  - c/w azathioprine, prednisone, mycophenolate    # Herpes Prophylaxis  - c/w valtrex    # DVT prophylaxis    # Full code    #Pending: clinical improvement,  F/u ABG  # Discussed plan of care with patient  # Disposition: Home       40 year old female PMH of dermatomyositis, Cryptogenic organizing PNA diagnosed 2017 was on home oxygen till 2018 since then managed with daily 20mg prednisone and cellcept and albuterol presents for SOB.    # Interstitial lung disease with Pulmonary Hypertension  - CT Angio Chest PE Protocol w/ IV Cont (10.30.20 @ 09:51) >No central pulmonary embolism. Enlarged right atrium and ventricle as well as dilated main pulmonary artery which can be seen with pulmonary arterial hypertension. Low lung volume. Bilateral peripheral predominant opacities as well as multisegmental atelectasis within the bilateral lower lobes without significant change since CT 1/22/2020. Differentialincludes nonspecific interstitial lung disease and organizing pneumonia.  -  Xray Chest 1 View-PORTABLE IMMEDIATE (Xray Chest 1 View-PORTABLE IMMEDIATE .) (10.29.20 @ 01:01) >Diffuse bilateral interstitial opacities.  - TTE Echo Complete w/o Contrast w/ Doppler (11.01.20 @ 08:04) >Left ventricular ejection fraction, by visual estimation, is 60 to 65%.Pulmonary Artery: The main pulmonary artery is normal in size.  - c/w steroids  - bactrim prophylaxis  - c/w albuterol, budesonide  - Start lasix 20 mg daily  - ABG    # MSSA bacteremia  - c/w Ancef  - ID : recomends CONNOR    # H/o Dermatomyositis  - c/w azathioprine, prednisone, mycophenolate    # Herpes Prophylaxis  - c/w valtrex    # DVT prophylaxis    # Full code    #Pending: clinical improvement,  F/u ABG  # Discussed plan of care with patient  # Disposition: Home

## 2020-11-02 NOTE — PROGRESS NOTE ADULT - SUBJECTIVE AND OBJECTIVE BOX
SUBJECTIVE:    Patient is a 40y old Female who presents with a chief complaint of SOB (02 Nov 2020 08:15)    Currently admitted to medicine with the primary diagnosis of Cough       Today is hospital day 4d. This morning she is resting comfortably in bed and reports no new issues or overnight events.     PAST MEDICAL & SURGICAL HISTORY  H/O dermatomyositis    Dermatomyositis    Cryptogenic organizing pneumonia    History of cervical cerclage    Ankle fracture      SOCIAL HISTORY:  Negative for smoking/alcohol/drug use.     ALLERGIES:  contrast media (iodine-based) (Unknown)  iodine (Anaphylaxis)  peanuts (Unknown)  shellfish (Anaphylaxis)  shellfish (Unknown)    MEDICATIONS:  STANDING MEDICATIONS  ALBUTerol    90 MICROgram(s) HFA Inhaler 2 Puff(s) Inhalation daily  azaTHIOprine 150 milliGRAM(s) Oral daily  budesonide 160 MICROgram(s)/formoterol 4.5 MICROgram(s) Inhaler 2 Puff(s) Inhalation two times a day  calcium carbonate 1250 mG  + Vitamin D (OsCal 500 + D) 1 Tablet(s) Oral daily  ceFAZolin   IVPB 2000 milliGRAM(s) IV Intermittent every 8 hours  chlorhexidine 4% Liquid 1 Application(s) Topical <User Schedule>  enoxaparin Injectable 40 milliGRAM(s) SubCutaneous daily  melatonin 10 milliGRAM(s) Oral at bedtime  mycophenolate mofetil  Oral Tab/Cap - Peds 500 milliGRAM(s) Oral two times a day  pantoprazole    Tablet 40 milliGRAM(s) Oral before breakfast  predniSONE   Tablet 20 milliGRAM(s) Oral daily  traMADol 50 milliGRAM(s) Oral once  trimethoprim  160 mG/sulfamethoxazole 800 mG 1 Tablet(s) Oral <User Schedule>  valACYclovir 1000 milliGRAM(s) Oral daily    PRN MEDICATIONS  acetaminophen   Tablet .. 650 milliGRAM(s) Oral every 6 hours PRN  ALBUTerol    90 MICROgram(s) HFA Inhaler 2 Puff(s) Inhalation every 6 hours PRN  ibuprofen  Tablet. 400 milliGRAM(s) Oral every 6 hours PRN    VITALS:   T(F): 96.7  HR: 94  BP: 127/56  RR: 19  SpO2: --    LABS:                        11.7   14.83 )-----------( 422      ( 02 Nov 2020 07:19 )             38.5     11-02    135  |  103  |  19  ----------------------------<  99  4.1   |  22  |  0.7    Ca    8.7      02 Nov 2020 07:19                Culture - Blood (collected 31 Oct 2020 06:36)  Source: .Blood None  Preliminary Report (01 Nov 2020 09:30):    No growth to date.    Culture - Blood (collected 30 Oct 2020 20:00)  Source: .Blood Blood  Preliminary Report (01 Nov 2020 02:22):    No growth to date.          RADIOLOGY:  tte showed no vegetations     PHYSICAL EXAM:  GEN: No acute distress  LUNGS: Clear to auscultation bilaterally   HEART: S1/S2 present. RRR.   ABD: Soft, non-tender, non-distended. Bowel sounds present  EXT: NC/NC/NE/2+PP/FRANKLIN/Skin Intact.   NEURO: AAOX3    Intravenous access:   NG tube:   Crenshaw Catheter:

## 2020-11-02 NOTE — PROGRESS NOTE ADULT - SUBJECTIVE AND OBJECTIVE BOX
JESSI KING  40y, Female  Allergy: contrast media (iodine-based) (Unknown)  iodine (Anaphylaxis)  peanuts (Unknown)  shellfish (Anaphylaxis)  shellfish (Unknown)      LOS  4d    CHIEF COMPLAINT: SOB (01 Nov 2020 15:11)      INTERVAL EVENTS/HPI  - No acute events overnight  - T(F): , Max: 97.4 (11-01-20 @ 13:14)  - Denies any worsening symptoms  - Tolerating medication  - WBC Count: 14.83 (11-02-20 @ 07:19)  WBC Count: 13.71 (11-01-20 @ 04:30)     - Creatinine, Serum: 0.8 (11-01-20 @ 04:30)       ROS  General: Denies rigors, nightsweats  HEENT: Denies headache, rhinorrhea, sore throat, eye pain  CV: Denies CP, palpitations  PULM: Denies wheezing, hemoptysis  GI: Denies hematemesis, hematochezia, melena  : Denies discharge, hematuria  MSK: Denies arthralgias, myalgias  SKIN: Denies rash, lesions  NEURO: Denies paresthesias, weakness  PSYCH: Denies depression, anxiety    VITALS:  T(F): 96.7, Max: 97.4 (11-01-20 @ 13:14)  HR: 94  BP: 102/78  RR: 18Vital Signs Last 24 Hrs  T(C): 35.9 (02 Nov 2020 05:19), Max: 36.3 (01 Nov 2020 13:14)  T(F): 96.7 (02 Nov 2020 05:19), Max: 97.4 (01 Nov 2020 13:14)  HR: 94 (02 Nov 2020 05:19) (92 - 94)  BP: 102/78 (02 Nov 2020 05:19) (102/78 - 114/69)  BP(mean): --  RR: 18 (02 Nov 2020 05:19) (17 - 18)  SpO2: --    PHYSICAL EXAM:  Gen: NAD, resting in bed  HEENT: Normocephalic, atraumatic  Neck: supple, no lymphadenopathy  CV: Regular rate & regular rhythm  Lungs: decreased BS at bases, no fremitus  Abdomen: Soft, BS present  Ext: Warm, well perfused  Neuro: non focal, awake  Skin: no rash, no erythema  Lines: no phlebitis    FH: Non-contributory  Social Hx: Non-contributory    TESTS & MEASUREMENTS:                        11.7   14.83 )-----------( 422      ( 02 Nov 2020 07:19 )             38.5     11-01    138  |  105  |  18  ----------------------------<  93  4.3   |  20  |  0.8    Ca    8.5      01 Nov 2020 04:30              Culture - Blood (collected 10-31-20 @ 06:36)  Source: .Blood None  Preliminary Report (11-01-20 @ 09:30):    No growth to date.    Culture - Blood (collected 10-30-20 @ 20:00)  Source: .Blood Blood  Preliminary Report (11-01-20 @ 02:22):    No growth to date.    Culture - Blood (collected 10-29-20 @ 07:08)  Source: .Blood None  Gram Stain (10-30-20 @ 02:03):    Growth in anaerobic bottle: Gram Positive Cocci in Clusters  Final Report (10-31-20 @ 15:33):    Growth in anaerobic bottle: Staphylococcus aureus    "Due to technical problems, Proteus sp. will Not be reported as part of    the BCID panel until further notice"    ***Blood Panel PCR results on this specimen are available    approximately 3 hours afterthe Gram stain result.***    Gram stain, PCR, and/or culture results may not always    correspond due to difference in methodologies.    ************************************************************    This PCR assay was performed using [employer].    The following targets are tested for: Enterococcus,    vancomycin resistant enterococci, Listeria monocytogenes,    coagulase negative staphylococci, S. aureus,    methicillin resistant S. aureus, Streptococcus agalactiae    (Group B), S. pneumoniae, S. pyogenes (Group A),    Acinetobacter baumannii, Enterobacter cloacae, E. coli,    Klebsiella oxytoca, K. pneumoniae, Proteus sp.,    Serratia marcescens, Haemophilus influenzae,    Neisseria meningitidis, Pseudomonas aeruginosa, Candida    albicans, C. glabrata, C krusei, C parapsilosis,    C. tropicalis and the KPC resistance gene.  Organism: Blood Culture PCR  Staphylococcus aureus (10-31-20 @ 15:33)  Organism: Staphylococcus aureus (10-31-20 @ 15:33)      -  Ampicillin/Sulbactam: S <=8/4      -  Cefazolin: S <=4      -  Clindamycin: S <=0.25      -  Erythromycin: S 0.5      -  Gentamicin: S <=1 Should not be used as monotherapy      -  Oxacillin: S <=0.25      -  Penicillin: R 4      -  RIF- Rifampin: S <=1 Should not be used as monotherapy      -  Tetra/Doxy: S <=1      -  Trimethoprim/Sulfamethoxazole: S <=0.5/9.5      -  Vancomycin: S 2      Method Type: CONNER  Organism: Blood Culture PCR (10-31-20 @ 15:33)      -  Staphylococcus aureus: Detec Any isolate of Staphylococcus aureus from a blood culture is NOT considered a contaminant.      Method Type: PCR    Culture - Blood (collected 10-29-20 @ 01:50)  Source: .Blood Blood  Preliminary Report (10-30-20 @ 07:02):    No growth to date.    Culture - Urine (collected 10-29-20 @ 00:40)  Source: .Urine Clean Catch (Midstream)  Final Report (10-30-20 @ 06:49):    <10,000 CFU/mL Normal Urogenital Alejandra        Blood Gas Venous - Lactate: 3.2 mmoL/L (10-29-20 @ 00:27)      INFECTIOUS DISEASES TESTING  Procalcitonin, Serum: 0.03 (10-29-20 @ 07:08)  COVID-19 PCR: NotDetec (10-29-20 @ 01:36)  Procalcitonin, Serum: 0.03 (01-22-20 @ 06:26)  Rapid RVP Result: NotDetec (01-22-20 @ 02:12)      INFLAMMATORY MARKERS  Sedimentation Rate, Erythrocyte: 39 mm/Hr (10-30-20 @ 20:00)  C-Reactive Protein, Serum: <0.10 mg/dL (10-30-20 @ 20:00)  Sedimentation Rate, Erythrocyte: 41 mm/Hr (10-29-20 @ 07:08)  C-Reactive Protein, Serum: 0.20 mg/dL (10-29-20 @ 07:08)      RADIOLOGY & ADDITIONAL TESTS:  I have personally reviewed the last available Chest xray  CXR      CT  CT Angio Chest PE Protocol w/ IV Cont:   EXAM:  CT ANGIO CHEST PE PROTOCOL IC            PROCEDURE DATE:  10/30/2020            INTERPRETATION:  CLINICAL INDICATION: Shortness of breath    TECHNIQUE:  CT of the thorax was performed after administration of contrast. Sagittal and coronal reformats were performed as well as MIP reconstructions.  Intravenous contrast: 100 cc Optiray-320    COMPARISON: CT 1/22/2020    INTERPRETATION:    AIRWAYS, LUNGS AND PLEURA: The central tracheobronchial tree is patent. There are low lung volumes. Redemonstrated are peripheral predominant opacities bilaterally with bilateral lower lobe multisegmental atelectasis. There is stable focal opacity within the posterior medial left upper lobe. There is no pleural effusion. There is no pneumothorax.    MEDIASTINUM: There are no enlarged mediastinal, hilar or axillary lymph nodes. The visualized portion of the thyroid gland is heterogeneous.    HEART AND VESSELS: The right atrium and ventricle appear enlarged. There is no pericardial effusion. There areno filling defects in the main pulmonary artery or segmental branches to suggest pulmonary embolus. The main pulmonary artery is dilated, which can be seen with pulmonary arterial hypertension.    UPPER ABDOMEN: Images of the upper abdomen demonstrate no abnormality.    BONES AND SOFT TISSUES: There are degenerative changes of the spine.  The soft tissues are unremarkable.    IMPRESSION:  No central pulmonary embolism.    Enlarged right atrium and ventricle as well as dilated main pulmonary artery which can be seen with pulmonary arterial hypertension.    Low lung volume. Bilateral peripheral predominant opacities as well as multisegmental atelectasis within the bilateral lower lobes without significant change since CT 1/22/2020. Differentialincludes nonspecific interstitial lung disease and organizing pneumonia.                    JAVIER CHIANG MD; Attending Radiologist  This document has been electronically signed. Oct 30 2020 10:30AM (10-30-20 @ 09:51)      CARDIOLOGY TESTING  12 Lead ECG:   Ventricular Rate 100 BPM    Atrial Rate 100 BPM    P-R Interval 146 ms    QRS Duration 82 ms    Q-T Interval 330 ms    QTC Calculation(Bazett) 425 ms    P Axis 39 degrees    R Axis -11 degrees    T Axis 30 degrees    Diagnosis Line Normal sinus rhythm  Possible Left atrial enlargement  Left ventricular hypertrophy  Abnormal ECG    Confirmed by Scott Mosqueda ([id number]) on 10/29/2020 12:52:55 PM (10-29-20 @ 09:50)  12 Lead ECG:   Ventricular Rate 97 BPM    Atrial Rate 97 BPM    P-R Interval 138 ms    QRS Duration 74 ms    Q-T Interval 328 ms    QTC Calculation(Bazett) 416 ms    P Axis 31 degrees    R Axis -12 degrees    T Axis 22 degrees    Diagnosis Line Normal sinus rhythm  Possible Left atrial enlargement  Left ventricular hypertrophy  Abnormal ECG    Confirmed by Scott Mosqueda ([id number]) on 10/29/2020 7:15:20 AM (10-29-20 @ 00:26)      MEDICATIONS  ALBUTerol    90 MICROgram(s) HFA Inhaler 2 Inhalation daily  azaTHIOprine 150 Oral daily  budesonide 160 MICROgram(s)/formoterol 4.5 MICROgram(s) Inhaler 2 Inhalation two times a day  calcium carbonate 1250 mG  + Vitamin D (OsCal 500 + D) 1 Oral daily  ceFAZolin   IVPB 2000 IV Intermittent every 8 hours  chlorhexidine 4% Liquid 1 Topical <User Schedule>  enoxaparin Injectable 40 SubCutaneous daily  melatonin 10 Oral at bedtime  mycophenolate mofetil  Oral Tab/Cap - Peds 500 Oral two times a day  pantoprazole    Tablet 40 Oral before breakfast  predniSONE   Tablet 20 Oral daily  trimethoprim  160 mG/sulfamethoxazole 800 mG 1 Oral <User Schedule>  valACYclovir 1000 Oral daily      WEIGHT  Weight (kg): 115 (10-29-20 @ 15:46)      ANTIBIOTICS:  ceFAZolin   IVPB 2000 milliGRAM(s) IV Intermittent every 8 hours  trimethoprim  160 mG/sulfamethoxazole 800 mG 1 Tablet(s) Oral <User Schedule>  valACYclovir 1000 milliGRAM(s) Oral daily      All available historical records have been reviewed       JESSI KING  40y, Female  Allergy: contrast media (iodine-based) (Unknown)  iodine (Anaphylaxis)  peanuts (Unknown)  shellfish (Anaphylaxis)  shellfish (Unknown)      LOS  4d    CHIEF COMPLAINT: SOB (01 Nov 2020 15:11)      INTERVAL EVENTS/HPI  - No acute events overnight  - T(F): , Max: 97.4 (11-01-20 @ 13:14)  - started to have left sided chest pain and right sided neck pain yesterday  - continues to have clear productive coughing   - Tolerating medication  - WBC Count: 14.83 (11-02-20 @ 07:19)  WBC Count: 13.71 (11-01-20 @ 04:30)     - Creatinine, Serum: 0.8 (11-01-20 @ 04:30)       ROS  General: Denies rigors, nightsweats  HEENT: Denies headache, rhinorrhea, sore throat, eye pain  CV: Denies CP, palpitations  PULM: Denies wheezing, hemoptysis  GI: Denies hematemesis, hematochezia, melena  : Denies discharge, hematuria  MSK: Denies arthralgias, myalgias  SKIN: Denies rash, lesions  NEURO: Denies paresthesias, weakness  PSYCH: Denies depression, anxiety    VITALS:  T(F): 96.7, Max: 97.4 (11-01-20 @ 13:14)  HR: 94  BP: 102/78  RR: 18Vital Signs Last 24 Hrs  T(C): 35.9 (02 Nov 2020 05:19), Max: 36.3 (01 Nov 2020 13:14)  T(F): 96.7 (02 Nov 2020 05:19), Max: 97.4 (01 Nov 2020 13:14)  HR: 94 (02 Nov 2020 05:19) (92 - 94)  BP: 102/78 (02 Nov 2020 05:19) (102/78 - 114/69)  BP(mean): --  RR: 18 (02 Nov 2020 05:19) (17 - 18)  SpO2: --    PHYSICAL EXAM:  Gen: NAD, resting in bed  HEENT: Normocephalic, atraumatic  Neck: supple, no lymphadenopathy  CV: Regular rate & regular rhythm  Lungs: decreased BS at bases, no fremitus  Abdomen: Soft, BS present  Ext: Warm, well perfused  Neuro: non focal, awake  Skin: no rash, no erythema  Lines: no phlebitis    FH: Non-contributory  Social Hx: Non-contributory    TESTS & MEASUREMENTS:                        11.7   14.83 )-----------( 422      ( 02 Nov 2020 07:19 )             38.5     11-01    138  |  105  |  18  ----------------------------<  93  4.3   |  20  |  0.8    Ca    8.5      01 Nov 2020 04:30              Culture - Blood (collected 10-31-20 @ 06:36)  Source: .Blood None  Preliminary Report (11-01-20 @ 09:30):    No growth to date.    Culture - Blood (collected 10-30-20 @ 20:00)  Source: .Blood Blood  Preliminary Report (11-01-20 @ 02:22):    No growth to date.    Culture - Blood (collected 10-29-20 @ 07:08)  Source: .Blood None  Gram Stain (10-30-20 @ 02:03):    Growth in anaerobic bottle: Gram Positive Cocci in Clusters  Final Report (10-31-20 @ 15:33):    Growth in anaerobic bottle: Staphylococcus aureus    "Due to technical problems, Proteus sp. will Not be reported as part of    the BCID panel until further notice"    ***Blood Panel PCR results on this specimen are available    approximately 3 hours afterthe Gram stain result.***    Gram stain, PCR, and/or culture results may not always    correspond due to difference in methodologies.    ************************************************************    This PCR assay was performed using [employer].    The following targets are tested for: Enterococcus,    vancomycin resistant enterococci, Listeria monocytogenes,    coagulase negative staphylococci, S. aureus,    methicillin resistant S. aureus, Streptococcus agalactiae    (Group B), S. pneumoniae, S. pyogenes (Group A),    Acinetobacter baumannii, Enterobacter cloacae, E. coli,    Klebsiella oxytoca, K. pneumoniae, Proteus sp.,    Serratia marcescens, Haemophilus influenzae,    Neisseria meningitidis, Pseudomonas aeruginosa, Candida    albicans, C. glabrata, C krusei, C parapsilosis,    C. tropicalis and the KPC resistance gene.  Organism: Blood Culture PCR  Staphylococcus aureus (10-31-20 @ 15:33)  Organism: Staphylococcus aureus (10-31-20 @ 15:33)      -  Ampicillin/Sulbactam: S <=8/4      -  Cefazolin: S <=4      -  Clindamycin: S <=0.25      -  Erythromycin: S 0.5      -  Gentamicin: S <=1 Should not be used as monotherapy      -  Oxacillin: S <=0.25      -  Penicillin: R 4      -  RIF- Rifampin: S <=1 Should not be used as monotherapy      -  Tetra/Doxy: S <=1      -  Trimethoprim/Sulfamethoxazole: S <=0.5/9.5      -  Vancomycin: S 2      Method Type: CONNER  Organism: Blood Culture PCR (10-31-20 @ 15:33)      -  Staphylococcus aureus: Detec Any isolate of Staphylococcus aureus from a blood culture is NOT considered a contaminant.      Method Type: PCR    Culture - Blood (collected 10-29-20 @ 01:50)  Source: .Blood Blood  Preliminary Report (10-30-20 @ 07:02):    No growth to date.    Culture - Urine (collected 10-29-20 @ 00:40)  Source: .Urine Clean Catch (Midstream)  Final Report (10-30-20 @ 06:49):    <10,000 CFU/mL Normal Urogenital Alejandra        Blood Gas Venous - Lactate: 3.2 mmoL/L (10-29-20 @ 00:27)      INFECTIOUS DISEASES TESTING  Procalcitonin, Serum: 0.03 (10-29-20 @ 07:08)  COVID-19 PCR: NotDetec (10-29-20 @ 01:36)  Procalcitonin, Serum: 0.03 (01-22-20 @ 06:26)  Rapid RVP Result: NotDetec (01-22-20 @ 02:12)      INFLAMMATORY MARKERS  Sedimentation Rate, Erythrocyte: 39 mm/Hr (10-30-20 @ 20:00)  C-Reactive Protein, Serum: <0.10 mg/dL (10-30-20 @ 20:00)  Sedimentation Rate, Erythrocyte: 41 mm/Hr (10-29-20 @ 07:08)  C-Reactive Protein, Serum: 0.20 mg/dL (10-29-20 @ 07:08)      RADIOLOGY & ADDITIONAL TESTS:  I have personally reviewed the last available Chest xray  CXR      CT  CT Angio Chest PE Protocol w/ IV Cont:   EXAM:  CT ANGIO CHEST PE PROTOCOL IC            PROCEDURE DATE:  10/30/2020            INTERPRETATION:  CLINICAL INDICATION: Shortness of breath    TECHNIQUE:  CT of the thorax was performed after administration of contrast. Sagittal and coronal reformats were performed as well as MIP reconstructions.  Intravenous contrast: 100 cc Optiray-320    COMPARISON: CT 1/22/2020    INTERPRETATION:    AIRWAYS, LUNGS AND PLEURA: The central tracheobronchial tree is patent. There are low lung volumes. Redemonstrated are peripheral predominant opacities bilaterally with bilateral lower lobe multisegmental atelectasis. There is stable focal opacity within the posterior medial left upper lobe. There is no pleural effusion. There is no pneumothorax.    MEDIASTINUM: There are no enlarged mediastinal, hilar or axillary lymph nodes. The visualized portion of the thyroid gland is heterogeneous.    HEART AND VESSELS: The right atrium and ventricle appear enlarged. There is no pericardial effusion. There areno filling defects in the main pulmonary artery or segmental branches to suggest pulmonary embolus. The main pulmonary artery is dilated, which can be seen with pulmonary arterial hypertension.    UPPER ABDOMEN: Images of the upper abdomen demonstrate no abnormality.    BONES AND SOFT TISSUES: There are degenerative changes of the spine.  The soft tissues are unremarkable.    IMPRESSION:  No central pulmonary embolism.    Enlarged right atrium and ventricle as well as dilated main pulmonary artery which can be seen with pulmonary arterial hypertension.    Low lung volume. Bilateral peripheral predominant opacities as well as multisegmental atelectasis within the bilateral lower lobes without significant change since CT 1/22/2020. Differentialincludes nonspecific interstitial lung disease and organizing pneumonia.                    JAVIER CHIANG MD; Attending Radiologist  This document has been electronically signed. Oct 30 2020 10:30AM (10-30-20 @ 09:51)      CARDIOLOGY TESTING  12 Lead ECG:   Ventricular Rate 100 BPM    Atrial Rate 100 BPM    P-R Interval 146 ms    QRS Duration 82 ms    Q-T Interval 330 ms    QTC Calculation(Bazett) 425 ms    P Axis 39 degrees    R Axis -11 degrees    T Axis 30 degrees    Diagnosis Line Normal sinus rhythm  Possible Left atrial enlargement  Left ventricular hypertrophy  Abnormal ECG    Confirmed by Scott Mosquead ([id number]) on 10/29/2020 12:52:55 PM (10-29-20 @ 09:50)  12 Lead ECG:   Ventricular Rate 97 BPM    Atrial Rate 97 BPM    P-R Interval 138 ms    QRS Duration 74 ms    Q-T Interval 328 ms    QTC Calculation(Bazett) 416 ms    P Axis 31 degrees    R Axis -12 degrees    T Axis 22 degrees    Diagnosis Line Normal sinus rhythm  Possible Left atrial enlargement  Left ventricular hypertrophy  Abnormal ECG    Confirmed by Scott Mosqueda ([id number]) on 10/29/2020 7:15:20 AM (10-29-20 @ 00:26)      MEDICATIONS  ALBUTerol    90 MICROgram(s) HFA Inhaler 2 Inhalation daily  azaTHIOprine 150 Oral daily  budesonide 160 MICROgram(s)/formoterol 4.5 MICROgram(s) Inhaler 2 Inhalation two times a day  calcium carbonate 1250 mG  + Vitamin D (OsCal 500 + D) 1 Oral daily  ceFAZolin   IVPB 2000 IV Intermittent every 8 hours  chlorhexidine 4% Liquid 1 Topical <User Schedule>  enoxaparin Injectable 40 SubCutaneous daily  melatonin 10 Oral at bedtime  mycophenolate mofetil  Oral Tab/Cap - Peds 500 Oral two times a day  pantoprazole    Tablet 40 Oral before breakfast  predniSONE   Tablet 20 Oral daily  trimethoprim  160 mG/sulfamethoxazole 800 mG 1 Oral <User Schedule>  valACYclovir 1000 Oral daily      WEIGHT  Weight (kg): 115 (10-29-20 @ 15:46)      ANTIBIOTICS:  ceFAZolin   IVPB 2000 milliGRAM(s) IV Intermittent every 8 hours  trimethoprim  160 mG/sulfamethoxazole 800 mG 1 Tablet(s) Oral <User Schedule>  valACYclovir 1000 milliGRAM(s) Oral daily      All available historical records have been reviewed

## 2020-11-02 NOTE — PROGRESS NOTE ADULT - SUBJECTIVE AND OBJECTIVE BOX
Patient is a 40y old  Female who presents with a chief complaint of SOB (02 Nov 2020 13:48)    Patient was seen and examined.  C/o MAK, dry cough  Denies any other complaints.  All systems reviewed positive history as mentioned above.    PAST MEDICAL & SURGICAL HISTORY:  H/O dermatomyositis  Cryptogenic organizing pneumonia  History of cervical cerclage  Ankle fracture    Allergies  contrast media (iodine-based) (Unknown)  iodine (Anaphylaxis)  peanuts (Unknown)  shellfish (Anaphylaxis)  shellfish (Unknown)      MEDICATIONS  (STANDING):  ALBUTerol    90 MICROgram(s) HFA Inhaler 2 Puff(s) Inhalation daily  azaTHIOprine 150 milliGRAM(s) Oral daily  budesonide 160 MICROgram(s)/formoterol 4.5 MICROgram(s) Inhaler 2 Puff(s) Inhalation two times a day  calcium carbonate 1250 mG  + Vitamin D (OsCal 500 + D) 1 Tablet(s) Oral daily  ceFAZolin   IVPB 2000 milliGRAM(s) IV Intermittent every 8 hours  chlorhexidine 4% Liquid 1 Application(s) Topical <User Schedule>  enoxaparin Injectable 40 milliGRAM(s) SubCutaneous daily  melatonin 10 milliGRAM(s) Oral at bedtime  mycophenolate mofetil  Oral Tab/Cap - Peds 500 milliGRAM(s) Oral two times a day  pantoprazole    Tablet 40 milliGRAM(s) Oral before breakfast  predniSONE   Tablet 20 milliGRAM(s) Oral daily  trimethoprim  160 mG/sulfamethoxazole 800 mG 1 Tablet(s) Oral <User Schedule>  valACYclovir 1000 milliGRAM(s) Oral daily    MEDICATIONS  (PRN):  acetaminophen   Tablet .. 650 milliGRAM(s) Oral every 6 hours PRN Moderate Pain (4 - 6)  ALBUTerol    90 MICROgram(s) HFA Inhaler 2 Puff(s) Inhalation every 6 hours PRN Bronchospasm  ibuprofen  Tablet. 400 milliGRAM(s) Oral every 6 hours PRN Moderate Pain (4 - 6)    Vital Signs Last 24 Hrs  T(C): 35.9  T(F): 96.7  HR: 94  BP: 127/56  BP(mean): --  RR: 19  SpO2: --    O/E:  Awake, alert, not in distress.  HEENT: atraumatic, EOMI.  Chest: clear  CVS: SIS2 +, no murmur.  P/A: obese, BS+  CNS: non focal.  Ext: no edema feet.  Skin: no rash, no ulcers.  All systems reviewed positive findings as above.                              11.7<L>  14.83<H> )-----------( 422<H>    ( 02 Nov 2020 07:19 )             38.5                         12.6   13.71<H> )-----------( 461<H>    ( 01 Nov 2020 04:30 )             40.6     11-02    135  |  103  |  19  ----------------------------<  99  4.1   |  22  |  0.7  11-01    138  |  105  |  18  ----------------------------<  93  4.3   |  20  |  0.8    Ca    8.7      02 Nov 2020 07:19  Ca    8.5      01 Nov 2020 04:30                  Culture - Blood (collected 31 Oct 2020 06:36)  Source: .Blood None  Preliminary Report (01 Nov 2020 09:30):    No growth to date.    Culture - Blood (collected 30 Oct 2020 20:00)  Source: .Blood Blood  Preliminary Report (01 Nov 2020 02:22):    No growth to date.

## 2020-11-02 NOTE — PROGRESS NOTE ADULT - ASSESSMENT
40 year old female PMH of dermatomyositis, Cryptogenic organizing PNA diagnosed 2017 was on home oxygen till 2018 since then managed with daily 20mg prednisone and cellcept and albuterol presents for SOB.  Pt. reports feeling progressively worsening SOB .  the pt has a dermatomyositis associated ILD , and she is on immunomodulators and rituximab in addition to steroids 	  #Staph aureus bacteremia  - No sepsis present on admission  - Blood Cx 10/29/2020: Staph aureus--> bcx on 30 are negative    cefazolin 2g IV Q8H  - Follow ESR, CRP, and procalcitonin  - Follow sputum culture  tte showed no vegetation   less lilely endocarditis due to clearance of blood cx 1 day after starting antibniotics  f/u with id     #Shortness of breath possibly secondary to pulmonary hypertension vs PCP  - Chest X-ray: diffuse interstitial bilateral opacities, no acute consolidation  - CT angio chest: negative for pulmonary embolism  - LDH: 219 (hemolyzed)  -abgs , sputum culture pending , the pt was on pcp prophylaxis at home   prednisone 20mg PO QD  - Continue with mycophenolate 500mg PO BID, azathioprine 150mg PO QD, albuterol 90mcg HFA 2 puffs PO QD and Q6H PRN  - continue calcium and vitamin D for osteoporosis prophylaxis given chronic steroid use  spo2 is 97% on room air  try ambulate the pt but she complains of dyspnea exertion   has left pleuretic chest pain , xray ordered .cta showed no pe      #Elevated troponin, resolved  - Troponin 0.04->0.01      #History of dermatomyositis and cryptogenic organizing pneumonia  - Continue with mycophenolate 500mg PO BID, azathioprine 150mg PO QD  - Discontinue methylprednisolone and start home dose of prednisone 20mg PO QD  the infiltrates are ild associated with dermatomyocitis        #Urinary frequency without dysuria  - Urinalysis negative  - Urine culture 10/29/2020: <10,000 normal joon    #High risk for osteoporosis given chronic steroid use  - Start calcium and vitamin D  - Follow outpatient with DEXA scan    #Misc  - DVT Prophylaxis: Lovenox 40mg SQ QD  - GI Prophylaxis: pantoprazole 40mg PO QD   - Diet: Regular  - Activity: increase as tolerated  - IV Fluids: not indicated   - Code Status: Full Code    Dispo: pending repeat blood cultures

## 2020-11-02 NOTE — PROGRESS NOTE ADULT - ASSESSMENT
ASSESSMENT  40 year old female PMH of dermatomyositis, Cryptogenic organizing PNA diagnosed 2017 was on home oxygen till 2018 since then managed with daily 20mg prednisone and cellcept and albuterol presents for SOB and cough x 6 days, found to have MSSA bacteremia     IMPRESSION  #MSSA bacteremia with severe sepsis on admission (P>90 WBC 20, lactic acidosis 3)    10/29 1/4 bottles    10/30 BCX NGTD     10/31 BCX NGTD     No clear source    Reports cough, but imaging not indicative of PNA    No skin lesions    No recent lines, injections, IVDU    No paraspinal tenderness    No murmur on exam     TTE 11/1, mild TR, no significant valvulopathy    #Morbid Obesity BMI (kg/m2): 43.5  #Dermatomyositis on immunosuppressives (pred, azathioprine, cellcept)  #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  Spectra [phone]    This is a preliminary incomplete pended note, all final recommendations to follow after interview and examination of the patient.   ASSESSMENT  40 year old female PMH of dermatomyositis, Cryptogenic organizing PNA diagnosed 2017 was on home oxygen till 2018 since then managed with daily 20mg prednisone and cellcept and albuterol presents for SOB and cough x 6 days, found to have MSSA bacteremia     IMPRESSION  #MSSA bacteremia with severe sepsis on admission (P>90 WBC 20, lactic acidosis 3)    10/29 1/4 bottles    10/30 BCX NGTD     10/31 BCX NGTD     No clear source    Reports cough, but imaging not indicative of PNA: CTA CHest 10/31 with stable opacity in GAR[place]T    No skin lesions    No recent lines, injections, IVDU    No paraspinal tenderness    No murmur on exam     TTE 11/1, mild TR, no significant valvulopathy    #Morbid Obesity BMI (kg/m2): 43.5  #Dermatomyositis on immunosuppressives (pred, azathioprine, cellcept)  #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  Spectra [phone]

## 2020-11-03 LAB
BASE EXCESS BLDA CALC-SCNC: 0.1 MMOL/L — SIGNIFICANT CHANGE UP (ref -2–2)
BASE EXCESS BLDA CALC-SCNC: 0.7 MMOL/L — SIGNIFICANT CHANGE UP (ref -2–2)
CULTURE RESULTS: SIGNIFICANT CHANGE UP
FUNGITELL: <31 PG/ML — SIGNIFICANT CHANGE UP
FUNGITELL: <31 PG/ML — SIGNIFICANT CHANGE UP
HCO3 BLDA-SCNC: 24 MMOL/L — SIGNIFICANT CHANGE UP (ref 21–29)
HCO3 BLDA-SCNC: 25 MMOL/L — SIGNIFICANT CHANGE UP (ref 23–27)
PCO2 BLDA: 37 MMHG — LOW (ref 38–42)
PCO2 BLDA: 38 MMHG — SIGNIFICANT CHANGE UP (ref 38–42)
PH BLDA: 7.42 — SIGNIFICANT CHANGE UP (ref 7.38–7.42)
PH BLDA: 7.44 — HIGH (ref 7.38–7.42)
PO2 BLDA: 104 MMHG — HIGH (ref 78–95)
PO2 BLDA: 41 MMHG — CRITICAL LOW (ref 78–95)
SAO2 % BLDA: 80 % — LOW (ref 94–98)
SAO2 % BLDA: 99 % — HIGH (ref 92–96)
SPECIMEN SOURCE: SIGNIFICANT CHANGE UP

## 2020-11-03 PROCEDURE — 99233 SBSQ HOSP IP/OBS HIGH 50: CPT

## 2020-11-03 PROCEDURE — 71045 X-RAY EXAM CHEST 1 VIEW: CPT | Mod: 26

## 2020-11-03 RX ADMIN — PANTOPRAZOLE SODIUM 40 MILLIGRAM(S): 20 TABLET, DELAYED RELEASE ORAL at 06:08

## 2020-11-03 RX ADMIN — Medication 10 MILLIGRAM(S): at 21:27

## 2020-11-03 RX ADMIN — BUDESONIDE AND FORMOTEROL FUMARATE DIHYDRATE 2 PUFF(S): 160; 4.5 AEROSOL RESPIRATORY (INHALATION) at 20:12

## 2020-11-03 RX ADMIN — Medication 100 MILLIGRAM(S): at 14:45

## 2020-11-03 RX ADMIN — BUDESONIDE AND FORMOTEROL FUMARATE DIHYDRATE 2 PUFF(S): 160; 4.5 AEROSOL RESPIRATORY (INHALATION) at 11:01

## 2020-11-03 RX ADMIN — Medication 400 MILLIGRAM(S): at 11:30

## 2020-11-03 RX ADMIN — MYCOPHENOLATE MOFETIL 500 MILLIGRAM(S): 250 CAPSULE ORAL at 06:09

## 2020-11-03 RX ADMIN — ENOXAPARIN SODIUM 40 MILLIGRAM(S): 100 INJECTION SUBCUTANEOUS at 10:58

## 2020-11-03 RX ADMIN — Medication 20 MILLIGRAM(S): at 06:09

## 2020-11-03 RX ADMIN — Medication 400 MILLIGRAM(S): at 10:56

## 2020-11-03 RX ADMIN — Medication 1 TABLET(S): at 06:09

## 2020-11-03 RX ADMIN — VALACYCLOVIR 1000 MILLIGRAM(S): 500 TABLET, FILM COATED ORAL at 14:46

## 2020-11-03 RX ADMIN — MYCOPHENOLATE MOFETIL 500 MILLIGRAM(S): 250 CAPSULE ORAL at 20:12

## 2020-11-03 RX ADMIN — AZATHIOPRINE 150 MILLIGRAM(S): 100 TABLET ORAL at 10:57

## 2020-11-03 RX ADMIN — Medication 100 MILLIGRAM(S): at 06:08

## 2020-11-03 RX ADMIN — Medication 400 MILLIGRAM(S): at 22:08

## 2020-11-03 RX ADMIN — Medication 1 TABLET(S): at 13:53

## 2020-11-03 RX ADMIN — Medication 100 MILLIGRAM(S): at 21:26

## 2020-11-03 NOTE — PHYSICAL THERAPY INITIAL EVALUATION ADULT - SPECIFY REASON(S)
PT attempted to see the pt for the PT IE. Pt declined participating in PT. Pt reported that she was too tired. PT will f/u later. PT attempted to see the pt for the PT IE. Pt declined participating in PT. Pt reported that she was too tired and had pain on her Left side of the body.

## 2020-11-03 NOTE — PROGRESS NOTE ADULT - SUBJECTIVE AND OBJECTIVE BOX
JESSI KING  40y, Female  Allergy: contrast media (iodine-based) (Unknown)  iodine (Anaphylaxis)  peanuts (Unknown)  shellfish (Anaphylaxis)  shellfish (Unknown)      LOS  5d    CHIEF COMPLAINT: SOB (02 Nov 2020 14:19)      INTERVAL EVENTS/HPI  - No acute events overnight, neck pain improving  - T(F): , Max: 96.8 (11-03-20 @ 05:08)  - Denies any worsening symptoms  - Tolerating medication  - WBC Count: 14.83 (11-02-20 @ 07:19)  WBC Count: 13.71 (11-01-20 @ 04:30)  - Creatinine, Serum: 0.7 (11-02-20 @ 07:19)       ROS  General: Denies rigors, nightsweats  HEENT: Denies headache, rhinorrhea, sore throat, eye pain  CV: Denies CP, palpitations  PULM: Denies wheezing, hemoptysis  GI: Denies hematemesis, hematochezia, melena  : Denies discharge, hematuria  MSK: Denies arthralgias, myalgias  SKIN: Denies rash, lesions  NEURO: Denies paresthesias, weakness  PSYCH: Denies depression, anxiety    VITALS:  T(F): 96.8, Max: 96.8 (11-03-20 @ 05:08)  HR: 110  BP: 102/63  RR: 19Vital Signs Last 24 Hrs  T(C): 36 (03 Nov 2020 05:08), Max: 36 (03 Nov 2020 05:08)  T(F): 96.8 (03 Nov 2020 05:08), Max: 96.8 (03 Nov 2020 05:08)  HR: 110 (03 Nov 2020 10:51) (74 - 110)  BP: 102/63 (03 Nov 2020 05:08) (102/63 - 127/56)  BP(mean): --  RR: 19 (03 Nov 2020 09:59) (18 - 19)  SpO2: --    PHYSICAL EXAM:  Gen: NAD, resting in bed, obese  HEENT: Normocephalic, atraumatic  Neck: supple, no lymphadenopathy  CV: Regular rate & regular rhythm  Lungs: decreased BS at bases, no fremitus  Abdomen: Soft, BS present  Ext: Warm, well perfused  Neuro: non focal, awake  Skin: no rash, no erythema  Lines: no phlebitis    FH: Non-contributory  Social Hx: Non-contributory    TESTS & MEASUREMENTS:                        11.7   14.83 )-----------( 422      ( 02 Nov 2020 07:19 )             38.5     11-02    135  |  103  |  19  ----------------------------<  99  4.1   |  22  |  0.7    Ca    8.7      02 Nov 2020 07:19              Culture - Blood (collected 11-01-20 @ 04:30)  Source: .Blood None  Preliminary Report (11-02-20 @ 17:01):    No growth to date.    Culture - Blood (collected 10-31-20 @ 06:36)  Source: .Blood None  Preliminary Report (11-01-20 @ 09:30):    No growth to date.    Culture - Blood (collected 10-30-20 @ 20:00)  Source: .Blood Blood  Preliminary Report (11-01-20 @ 02:22):    No growth to date.    Culture - Blood (collected 10-29-20 @ 07:08)  Source: .Blood None  Gram Stain (10-30-20 @ 02:03):    Growth in anaerobic bottle: Gram Positive Cocci in Clusters  Final Report (10-31-20 @ 15:33):    Growth in anaerobic bottle: Staphylococcus aureus    "Due to technical problems, Proteus sp. will Not be reported as part of    the BCID panel until further notice"    ***Blood Panel PCR results on this specimen are available    approximately 3 hours afterthe Gram stain result.***    Gram stain, PCR, and/or culture results may not always    correspond due to difference in methodologies.    ************************************************************    This PCR assay was performed using [employer].    The following targets are tested for: Enterococcus,    vancomycin resistant enterococci, Listeria monocytogenes,    coagulase negative staphylococci, S. aureus,    methicillin resistant S. aureus, Streptococcus agalactiae    (Group B), S. pneumoniae, S. pyogenes (Group A),    Acinetobacter baumannii, Enterobacter cloacae, E. coli,    Klebsiella oxytoca, K. pneumoniae, Proteus sp.,    Serratia marcescens, Haemophilus influenzae,    Neisseria meningitidis, Pseudomonas aeruginosa, Candida    albicans, C. glabrata, C krusei, C parapsilosis,    C. tropicalis and the KPC resistance gene.  Organism: Blood Culture PCR  Staphylococcus aureus (10-31-20 @ 15:33)  Organism: Staphylococcus aureus (10-31-20 @ 15:33)      -  Ampicillin/Sulbactam: S <=8/4      -  Cefazolin: S <=4      -  Clindamycin: S <=0.25      -  Erythromycin: S 0.5      -  Gentamicin: S <=1 Should not be used as monotherapy      -  Oxacillin: S <=0.25      -  Penicillin: R 4      -  RIF- Rifampin: S <=1 Should not be used as monotherapy      -  Tetra/Doxy: S <=1      -  Trimethoprim/Sulfamethoxazole: S <=0.5/9.5      -  Vancomycin: S 2      Method Type: CONNER  Organism: Blood Culture PCR (10-31-20 @ 15:33)      -  Staphylococcus aureus: Detec Any isolate of Staphylococcus aureus from a blood culture is NOT considered a contaminant.      Method Type: PCR    Culture - Blood (collected 10-29-20 @ 01:50)  Source: .Blood Blood  Final Report (11-03-20 @ 06:01):    No Growth Final    Culture - Urine (collected 10-29-20 @ 00:40)  Source: .Urine Clean Catch (Midstream)  Final Report (10-30-20 @ 06:49):    <10,000 CFU/mL Normal Urogenital Alejandra            INFECTIOUS DISEASES TESTING  Procalcitonin, Serum: 0.03 (10-29-20 @ 07:08)  COVID-19 PCR: NotDetec (10-29-20 @ 01:36)  Procalcitonin, Serum: 0.03 (01-22-20 @ 06:26)  Rapid RVP Result: NotDetec (01-22-20 @ 02:12)      INFLAMMATORY MARKERS  Sedimentation Rate, Erythrocyte: 39 mm/Hr (10-30-20 @ 20:00)  C-Reactive Protein, Serum: <0.10 mg/dL (10-30-20 @ 20:00)  Sedimentation Rate, Erythrocyte: 41 mm/Hr (10-29-20 @ 07:08)  C-Reactive Protein, Serum: 0.20 mg/dL (10-29-20 @ 07:08)      RADIOLOGY & ADDITIONAL TESTS:  I have personally reviewed the last available Chest xray  CXR      CT      CARDIOLOGY TESTING  12 Lead ECG:   Ventricular Rate 100 BPM    Atrial Rate 100 BPM    P-R Interval 146 ms    QRS Duration 82 ms    Q-T Interval 330 ms    QTC Calculation(Bazett) 425 ms    P Axis 39 degrees    R Axis -11 degrees    T Axis 30 degrees    Diagnosis Line Normal sinus rhythm  Possible Left atrial enlargement  Left ventricular hypertrophy  Abnormal ECG    Confirmed by Scott Mosqueda ([id number]) on 10/29/2020 12:52:55 PM (10-29-20 @ 09:50)  12 Lead ECG:   Ventricular Rate 97 BPM    Atrial Rate 97 BPM    P-R Interval 138 ms    QRS Duration 74 ms    Q-T Interval 328 ms    QTC Calculation(Bazett) 416 ms    P Axis 31 degrees    R Axis -12 degrees    T Axis 22 degrees    Diagnosis Line Normal sinus rhythm  Possible Left atrial enlargement  Left ventricular hypertrophy  Abnormal ECG    Confirmed by Scott Mosqueda ([id number]) on 10/29/2020 7:15:20 AM (10-29-20 @ 00:26)      MEDICATIONS  ALBUTerol    90 MICROgram(s) HFA Inhaler 2 Inhalation daily  azaTHIOprine 150 Oral daily  budesonide 160 MICROgram(s)/formoterol 4.5 MICROgram(s) Inhaler 2 Inhalation two times a day  calcium carbonate 1250 mG  + Vitamin D (OsCal 500 + D) 1 Oral daily  ceFAZolin   IVPB 2000 IV Intermittent every 8 hours  chlorhexidine 4% Liquid 1 Topical <User Schedule>  enoxaparin Injectable 40 SubCutaneous daily  melatonin 10 Oral at bedtime  mycophenolate mofetil  Oral Tab/Cap - Peds 500 Oral two times a day  pantoprazole    Tablet 40 Oral before breakfast  predniSONE   Tablet 20 Oral daily  trimethoprim  160 mG/sulfamethoxazole 800 mG 1 Oral <User Schedule>  valACYclovir 1000 Oral daily      WEIGHT  Weight (kg): 115 (10-29-20 @ 15:46)      ANTIBIOTICS:  ceFAZolin   IVPB 2000 milliGRAM(s) IV Intermittent every 8 hours  trimethoprim  160 mG/sulfamethoxazole 800 mG 1 Tablet(s) Oral <User Schedule>  valACYclovir 1000 milliGRAM(s) Oral daily      All available historical records have been reviewed

## 2020-11-03 NOTE — PROGRESS NOTE ADULT - ASSESSMENT
ASSESSMENT  40 year old female PMH of dermatomyositis, Cryptogenic organizing PNA diagnosed 2017 was on home oxygen till 2018 since then managed with daily 20mg prednisone and cellcept and albuterol presents for SOB and cough x 6 days, found to have MSSA bacteremia     IMPRESSION  #MSSA bacteremia with severe sepsis on admission (P>90 WBC 20, lactic acidosis 3)    10/29 1/4 bottles    10/30 BCX NGTD     10/31 BCX NGTD     No clear source    Reports cough, but imaging not indicative of PNA: CTA CHest 10/31 with stable opacity in [place]    No skin lesions    No recent lines, injections, IVDU    No paraspinal tenderness    No murmur on exam     TTE 11/1, mild TR, no significant valvulopathy    #Morbid Obesity BMI (kg/m2): 43.5  #Dermatomyositis on immunosuppressives (pred, azathioprine, cellcept)  #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 If + then 6 weeks IV  - valtrex 1g daily PO for HSV ppx as recurrent lesions  - On bactrim for PCP ppx     Please call with any questions or send a message on [employer] Teams  Spectra [phone]

## 2020-11-03 NOTE — PROGRESS NOTE ADULT - ASSESSMENT
· Assessment	  40 year old female PMH of dermatomyositis, Cryptogenic organizing PNA diagnosed 2017 was on home oxygen till 2018 since then managed with daily 20mg prednisone and cellcept and albuterol presents for SOB.  Pt. reports feeling progressively worsening SOB .  the pt has a dermatomyositis associated ILD , and she is on immunomodulators and rituximab in addition to steroids 	  #Staph aureus bacteremia  - No sepsis present on admission  - Blood Cx 10/29/2020: Staph aureus--> bcx on 30 are negative    cefazolin 2g IV Q8H  - Follow ESR, CRP, and procalcitonin  - Follow sputum culture  tte showed no vegetation   CONNOR today  less lilely endocarditis due to clearance of blood cx 1 day after starting antibniotics  f/u with id     #Shortness of breath possibly secondary to pulmonary hypertension vs PCP  - Chest X-ray: diffuse interstitial bilateral opacities, no acute consolidation  - CT angio chest: negative for pulmonary embolism  - LDH: 219 (hemolyzed)  -abgs , sputum culture pending , the pt was on pcp prophylaxis at home   prednisone 20mg PO QD  - Continue with mycophenolate 500mg PO BID, azathioprine 150mg PO QD, albuterol 90mcg HFA 2 puffs PO QD and Q6H PRN  - continue calcium and vitamin D for osteoporosis prophylaxis given chronic steroid use  spo2 is 97% on room air at rest then desat till 92% while ambulating   pt on oxygen   try ambulate the pt but she complains of dyspnea exertion   has left pleuretic chest pain , xray ordered .cta showed no pe      #Elevated troponin, resolved  - Troponin 0.04->0.01      #History of dermatomyositis and cryptogenic organizing pneumonia  - Continue with mycophenolate 500mg PO BID, azathioprine 150mg PO QD  - Discontinue methylprednisolone and start home dose of prednisone 20mg PO QD  the infiltrates are ild associated with dermatomyocitis        #Urinary frequency without dysuria  - Urinalysis negative  - Urine culture 10/29/2020: <10,000 normal joon    #High risk for osteoporosis given chronic steroid use  - Start calcium and vitamin D  - Follow outpatient with DEXA scan    #Misc  - DVT Prophylaxis: Lovenox 40mg SQ QD  - GI Prophylaxis: pantoprazole 40mg PO QD   - Diet: Regular  - Activity: increase as tolerated  - IV Fluids: not indicated   - Code Status: Full Code

## 2020-11-03 NOTE — PROGRESS NOTE ADULT - SUBJECTIVE AND OBJECTIVE BOX
SUBJECTIVE:    Patient is a 40y old Female who presents with a chief complaint of SOB (03 Nov 2020 11:31)    Currently admitted to medicine with the primary diagnosis of Cough       Today is hospital day 5d. This morning she is resting comfortably in bed and reports no new issues or overnight events.     PAST MEDICAL & SURGICAL HISTORY  H/O dermatomyositis    Dermatomyositis    Cryptogenic organizing pneumonia    History of cervical cerclage    Ankle fracture      SOCIAL HISTORY:  Negative for smoking/alcohol/drug use.     ALLERGIES:  contrast media (iodine-based) (Unknown)  iodine (Anaphylaxis)  peanuts (Unknown)  shellfish (Anaphylaxis)  shellfish (Unknown)    MEDICATIONS:  STANDING MEDICATIONS  ALBUTerol    90 MICROgram(s) HFA Inhaler 2 Puff(s) Inhalation daily  azaTHIOprine 150 milliGRAM(s) Oral daily  budesonide 160 MICROgram(s)/formoterol 4.5 MICROgram(s) Inhaler 2 Puff(s) Inhalation two times a day  calcium carbonate 1250 mG  + Vitamin D (OsCal 500 + D) 1 Tablet(s) Oral daily  ceFAZolin   IVPB 2000 milliGRAM(s) IV Intermittent every 8 hours  chlorhexidine 4% Liquid 1 Application(s) Topical <User Schedule>  enoxaparin Injectable 40 milliGRAM(s) SubCutaneous daily  melatonin 10 milliGRAM(s) Oral at bedtime  mycophenolate mofetil  Oral Tab/Cap - Peds 500 milliGRAM(s) Oral two times a day  pantoprazole    Tablet 40 milliGRAM(s) Oral before breakfast  predniSONE   Tablet 20 milliGRAM(s) Oral daily  trimethoprim  160 mG/sulfamethoxazole 800 mG 1 Tablet(s) Oral <User Schedule>  valACYclovir 1000 milliGRAM(s) Oral daily    PRN MEDICATIONS  acetaminophen   Tablet .. 650 milliGRAM(s) Oral every 6 hours PRN  ALBUTerol    90 MICROgram(s) HFA Inhaler 2 Puff(s) Inhalation every 6 hours PRN  ibuprofen  Tablet. 400 milliGRAM(s) Oral every 6 hours PRN    VITALS:   T(F): 96.8  HR: 88  BP: 105/66  RR: 18  SpO2: 96%    LABS:                        11.7   14.83 )-----------( 422      ( 02 Nov 2020 07:19 )             38.5     11-02    135  |  103  |  19  ----------------------------<  99  4.1   |  22  |  0.7    Ca    8.7      02 Nov 2020 07:19          ABG - ( 03 Nov 2020 11:25 )  pH, Arterial: 7.44  pH, Blood: x     /  pCO2: 37    /  pO2: 41    / HCO3: 25    / Base Excess: 0.7   /  SaO2: 80                    Culture - Blood (collected 01 Nov 2020 04:30)  Source: .Blood None  Preliminary Report (02 Nov 2020 17:01):    No growth to date.          RADIOLOGY:    PHYSICAL EXAM:  GEN: No acute distress  LUNGS: Clear to auscultation bilaterally   HEART: S1/S2 present. RRR.   ABD: Soft, non-tender, non-distended. Bowel sounds present  EXT: NC/NC/NE/2+PP/FRANKLIN/Skin Intact.   NEURO: AAOX3    Intravenous access:   NG tube:   Crenshaw Catheter:

## 2020-11-03 NOTE — PHYSICAL THERAPY INITIAL EVALUATION ADULT - SPECIFY REASON(S)
PT attempted to see the pt for the PT IE. Pt declined participating in PT. Pt reported that she was too tired. PT will f/u later.

## 2020-11-03 NOTE — PROGRESS NOTE ADULT - ASSESSMENT
40 year old female PMH of dermatomyositis, Cryptogenic organizing PNA diagnosed 2017 was on home oxygen till 2018 since then managed with daily 20mg prednisone and cellcept and albuterol presents for SOB.    # Interstitial lung disease with Pulmonary Hypertension  - CT Angio Chest PE Protocol w/ IV Cont (10.30.20 @ 09:51) >No central pulmonary embolism. Enlarged right atrium and ventricle as well as dilated main pulmonary artery which can be seen with pulmonary arterial hypertension. Low lung volume. Bilateral peripheral predominant opacities as well as multisegmental atelectasis within the bilateral lower lobes without significant change since CT 1/22/2020. Differentialincludes nonspecific interstitial lung disease and organizing pneumonia.  -  Xray Chest 1 View-PORTABLE IMMEDIATE (Xray Chest 1 View-PORTABLE IMMEDIATE .) (10.29.20 @ 01:01) >Diffuse bilateral interstitial opacities.  - TTE Echo Complete w/o Contrast w/ Doppler (11.01.20 @ 08:04) >Left ventricular ejection fraction, by visual estimation, is 60 to 65%.Pulmonary Artery: The main pulmonary artery is normal in size.  - c/w steroids  - bactrim prophylaxis  - c/w albuterol, budesonide  -c/w  lasix 20 mg daily  - oxygen sat on RA at rst 96 in exertion 93  - ABG    # MSSA bacteremia  - c/w Ancef  - ID : recomends CONNOR today    # H/o Dermatomyositis  - c/w azathioprine, prednisone, mycophenolate    # Herpes Prophylaxis  - c/w valtrex    # DVT prophylaxis    # Full code    #Pending: clinical improvement,  F/u ABG, CONNOR today  # Discussed plan of care with patient  # Disposition: Home

## 2020-11-03 NOTE — PHYSICAL THERAPY INITIAL EVALUATION ADULT - GENERAL OBSERVATIONS, REHAB EVAL
Pt did not quantify the pain or the type of pain. BRITTNEY Langley is aware. PT will f/u tomorrow if the pt is agreeable.

## 2020-11-03 NOTE — PROGRESS NOTE ADULT - SUBJECTIVE AND OBJECTIVE BOX
Patient is a 40y old  Female who presents with a chief complaint of SOB (02 Nov 2020 13:48)    Patient was seen and examined.  Pt continues to  MAK, dry cough  Denies any other complaints.  All systems reviewed positive history as mentioned above.    PAST MEDICAL & SURGICAL HISTORY:  H/O dermatomyositis  Cryptogenic organizing pneumonia  History of cervical cerclage  Ankle fracture    Allergies  contrast media (iodine-based) (Unknown)  iodine (Anaphylaxis)  peanuts (Unknown)  shellfish (Anaphylaxis)  shellfish (Unknown)    MEDICATIONS  (STANDING):  ALBUTerol    90 MICROgram(s) HFA Inhaler 2 Puff(s) Inhalation daily  azaTHIOprine 150 milliGRAM(s) Oral daily  budesonide 160 MICROgram(s)/formoterol 4.5 MICROgram(s) Inhaler 2 Puff(s) Inhalation two times a day  calcium carbonate 1250 mG  + Vitamin D (OsCal 500 + D) 1 Tablet(s) Oral daily  ceFAZolin   IVPB 2000 milliGRAM(s) IV Intermittent every 8 hours  chlorhexidine 4% Liquid 1 Application(s) Topical <User Schedule>  enoxaparin Injectable 40 milliGRAM(s) SubCutaneous daily  melatonin 10 milliGRAM(s) Oral at bedtime  mycophenolate mofetil  Oral Tab/Cap - Peds 500 milliGRAM(s) Oral two times a day  pantoprazole    Tablet 40 milliGRAM(s) Oral before breakfast  predniSONE   Tablet 20 milliGRAM(s) Oral daily  trimethoprim  160 mG/sulfamethoxazole 800 mG 1 Tablet(s) Oral <User Schedule>  valACYclovir 1000 milliGRAM(s) Oral daily    MEDICATIONS  (PRN):  acetaminophen   Tablet .. 650 milliGRAM(s) Oral every 6 hours PRN Moderate Pain (4 - 6)  ALBUTerol    90 MICROgram(s) HFA Inhaler 2 Puff(s) Inhalation every 6 hours PRN Bronchospasm  ibuprofen  Tablet. 400 milliGRAM(s) Oral every 6 hours PRN Moderate Pain (4 - 6)    Vital Signs Last 24 Hrs  T(C): 36 (03 Nov 2020 05:08), Max: 36 (03 Nov 2020 05:08)  T(F): 96.8 (03 Nov 2020 05:08), Max: 96.8 (03 Nov 2020 05:08)  HR: 110 (03 Nov 2020 10:51) (74 - 110)  BP: 102/63 (03 Nov 2020 05:08) (102/63 - 127/56)  BP(mean): --  RR: 19 (03 Nov 2020 09:59) (18 - 19)  SpO2: --      O/E:  Awake, alert, not in distress.  HEENT: atraumatic, EOMI.  Chest: clear  CVS: SIS2 +, no murmur.  P/A: obese, BS+  CNS: non focal.  Ext: no edema feet.  Skin: no rash, no ulcers.  All systems reviewed positive findings as above.                          11.7<L>  14.83<H> )-----------( 422<H>    ( 02 Nov 2020 07:19 )             38.5   11-02    135  |  103  |  19  ----------------------------<  99  4.1   |  22  |  0.7    Ca    8.7      02 Nov 2020 07:19

## 2020-11-04 PROCEDURE — 99233 SBSQ HOSP IP/OBS HIGH 50: CPT

## 2020-11-04 RX ADMIN — MYCOPHENOLATE MOFETIL 500 MILLIGRAM(S): 250 CAPSULE ORAL at 06:10

## 2020-11-04 RX ADMIN — Medication 100 MILLIGRAM(S): at 22:09

## 2020-11-04 RX ADMIN — MYCOPHENOLATE MOFETIL 500 MILLIGRAM(S): 250 CAPSULE ORAL at 17:12

## 2020-11-04 RX ADMIN — Medication 100 MILLIGRAM(S): at 06:10

## 2020-11-04 RX ADMIN — BUDESONIDE AND FORMOTEROL FUMARATE DIHYDRATE 2 PUFF(S): 160; 4.5 AEROSOL RESPIRATORY (INHALATION) at 20:44

## 2020-11-04 RX ADMIN — CHLORHEXIDINE GLUCONATE 1 APPLICATION(S): 213 SOLUTION TOPICAL at 06:10

## 2020-11-04 RX ADMIN — PANTOPRAZOLE SODIUM 40 MILLIGRAM(S): 20 TABLET, DELAYED RELEASE ORAL at 06:10

## 2020-11-04 RX ADMIN — Medication 400 MILLIGRAM(S): at 20:38

## 2020-11-04 RX ADMIN — Medication 400 MILLIGRAM(S): at 03:39

## 2020-11-04 RX ADMIN — Medication 20 MILLIGRAM(S): at 06:10

## 2020-11-04 RX ADMIN — Medication 400 MILLIGRAM(S): at 18:57

## 2020-11-04 RX ADMIN — Medication 100 MILLIGRAM(S): at 16:09

## 2020-11-04 RX ADMIN — AZATHIOPRINE 150 MILLIGRAM(S): 100 TABLET ORAL at 17:12

## 2020-11-04 RX ADMIN — VALACYCLOVIR 1000 MILLIGRAM(S): 500 TABLET, FILM COATED ORAL at 17:13

## 2020-11-04 RX ADMIN — Medication 10 MILLIGRAM(S): at 22:09

## 2020-11-04 RX ADMIN — Medication 1 TABLET(S): at 17:13

## 2020-11-04 NOTE — PHYSICAL THERAPY INITIAL EVALUATION ADULT - GENERAL OBSERVATIONS, REHAB EVAL
Pt was seen from 8:45-9:10 for Pt IE. Pt was rec'd in semi recline in bed, NAD, agreeable to participate in PT.

## 2020-11-04 NOTE — PHYSICAL THERAPY INITIAL EVALUATION ADULT - PERTINENT HX OF CURRENT PROBLEM, REHAB EVAL
40 year old female presented to ED with PMH of dermatomyositis, Cryptogenic organizing PNA diagnosed 2017 was on home oxygen till 2018 since then managed with daily 20mg prednisone and cellcept and albuterol presents for SOB.

## 2020-11-04 NOTE — PROGRESS NOTE ADULT - SUBJECTIVE AND OBJECTIVE BOX
40 year old female PMH of dermatomyositis, Cryptogenic organizing PNA diagnosed 2017 was on home oxygen till 2018 since then managed with daily 20mg prednisone and cellcept and albuterol presents for SOB.  Pt. reports feeling progressively worsening SOB .  the pt has a dermatomyositis associated ILD , and she is on immunomodulators and rituximab in addition to steroids 	  #Staph aureus bacteremia  - No sepsis present on admission  - Blood Cx 10/29/2020: Staph aureus--> bcx on 30 are negative    cefazolin 2g IV Q8H  - Follow sputum culture  tte showed no vegetation   JERICHO was not done yesterday because the pt ate depite the NPO order  JERICHO today   less lilely endocarditis due to clearance of blood cx 1 day after starting antibniotics  te plan is to dc the pt on cefazolin iv , if no endocarditis : 14 days after last negative bcx, if jericho showed endocarditis --> 6 weeks    #Shortness of breath possibly secondary to pulmonary hypertension vs PCP  - Chest X-ray: diffuse interstitial bilateral opacities, no acute consolidation  - CT angio chest: negative for pulmonary embolism  -abgs were normal   off oxygen   , sputum culture pending  on PCP propylaxis   prednisone 20mg PO QD   Continue with mycophenolate 500mg PO BID, azathioprine 150mg PO QD, albuterol 90mcg HFA 2 puffs PO QD and Q6H PRN  continue calcium and vitamin D for osteoporosis prophylaxis given chronic steroid use  no dyspnea today  was able to ambulate independently with PT   f/u with pulm as out pt for pft  there is a possibility pf PHTN , spap 29           #History of dermatomyositis and cryptogenic organizing pneumonia  - Continue with mycophenolate 500mg PO BID, azathioprine 150mg PO QD  - prednisone 20mg PO QD  the infiltrates are ild associated with dermatomyocitis        #Urinary frequency without dysuria  - Urinalysis negative  - Urine culture 10/29/2020: <10,000 normal joon    #High risk for osteoporosis given chronic steroid use  - Start calcium and vitamin D  - Follow outpatient with DEXA scan    #Misc  - DVT Prophylaxis: Lovenox 40mg SQ QD  - GI Prophylaxis: pantoprazole 40mg PO QD   - Diet: Regular  - Activity: increase as tolerated  - IV Fluids: not indicated   - Code Status: Full Code

## 2020-11-04 NOTE — PROGRESS NOTE ADULT - ASSESSMENT
40 year old female PMH of dermatomyositis, Cryptogenic organizing PNA diagnosed 2017 was on home oxygen till 2018 since then managed with daily 20mg prednisone and cellcept and albuterol presents for SOB.    # Interstitial lung disease with Pulmonary Hypertension  - CT Angio Chest PE Protocol w/ IV Cont (10.30.20 @ 09:51) >No central pulmonary embolism. Enlarged right atrium and ventricle as well as dilated main pulmonary artery which can be seen with pulmonary arterial hypertension. Low lung volume. Bilateral peripheral predominant opacities as well as multisegmental atelectasis within the bilateral lower lobes without significant change since CT 1/22/2020. Differentialincludes nonspecific interstitial lung disease and organizing pneumonia.  -  Xray Chest 1 View-PORTABLE IMMEDIATE (Xray Chest 1 View-PORTABLE IMMEDIATE .) (10.29.20 @ 01:01) >Diffuse bilateral interstitial opacities.  - TTE Echo Complete w/o Contrast w/ Doppler (11.01.20 @ 08:04) >Left ventricular ejection fraction, by visual estimation, is 60 to 65%.Pulmonary Artery: The main pulmonary artery is normal in size.  - c/w steroids  - bactrim prophylaxis  - c/w albuterol, budesonide  -c/w  lasix 20 mg daily  - oxygen sat on RA at rest 96 in exertion 93  - pH, Arterial: 7.42, pO2, Arterial: 104 mmHg . pCO2, Arterial: 38 mmHg,Oxygen Saturation, Arterial: 99 % (11.03.20 @ 14:13)    # MSSA bacteremia  - c/w Ancef  -CONNOR today    # H/o Dermatomyositis  - c/w azathioprine, prednisone, mycophenolate    # Herpes Prophylaxis  - c/w valtrex    # DVT prophylaxis    # Full code    #Pending: clinical improvement, CONNOR today  # Discussed plan of care with patient  # Disposition: Home

## 2020-11-04 NOTE — PROGRESS NOTE ADULT - SUBJECTIVE AND OBJECTIVE BOX
Patient is a 40y old  Female who presents with a chief complaint of SOB (02 Nov 2020 13:48)    Patient was seen and examined.  Planned for CONNOR today  Pt continues to  MAK  Denies any other complaints.  All systems reviewed positive history as mentioned above.    PAST MEDICAL & SURGICAL HISTORY:  H/O dermatomyositis  Cryptogenic organizing pneumonia  History of cervical cerclage  Ankle fracture    Allergies  contrast media (iodine-based) (Unknown)  iodine (Anaphylaxis)  peanuts (Unknown)  shellfish (Anaphylaxis)  shellfish (Unknown)    MEDICATIONS  (STANDING):  ALBUTerol    90 MICROgram(s) HFA Inhaler 2 Puff(s) Inhalation daily  azaTHIOprine 150 milliGRAM(s) Oral daily  budesonide 160 MICROgram(s)/formoterol 4.5 MICROgram(s) Inhaler 2 Puff(s) Inhalation two times a day  calcium carbonate 1250 mG  + Vitamin D (OsCal 500 + D) 1 Tablet(s) Oral daily  ceFAZolin   IVPB 2000 milliGRAM(s) IV Intermittent every 8 hours  chlorhexidine 4% Liquid 1 Application(s) Topical <User Schedule>  enoxaparin Injectable 40 milliGRAM(s) SubCutaneous daily  melatonin 10 milliGRAM(s) Oral at bedtime  mycophenolate mofetil  Oral Tab/Cap - Peds 500 milliGRAM(s) Oral two times a day  pantoprazole    Tablet 40 milliGRAM(s) Oral before breakfast  predniSONE   Tablet 20 milliGRAM(s) Oral daily  trimethoprim  160 mG/sulfamethoxazole 800 mG 1 Tablet(s) Oral <User Schedule>  valACYclovir 1000 milliGRAM(s) Oral daily    MEDICATIONS  (PRN):  acetaminophen   Tablet .. 650 milliGRAM(s) Oral every 6 hours PRN Moderate Pain (4 - 6)  ALBUTerol    90 MICROgram(s) HFA Inhaler 2 Puff(s) Inhalation every 6 hours PRN Bronchospasm  ibuprofen  Tablet. 400 milliGRAM(s) Oral every 6 hours PRN Moderate Pain (4 - 6)    Vital Signs Last 24 Hrs  T(C): 35.7 (04 Nov 2020 05:24), Max: 36.2 (03 Nov 2020 14:45)  T(F): 96.2 (04 Nov 2020 05:24), Max: 97.2 (03 Nov 2020 14:45)  HR: 78 (04 Nov 2020 05:24) (78 - 92)  BP: 108/56 (04 Nov 2020 05:24) (108/56 - 114/66)  BP(mean): --  RR: 18 (04 Nov 2020 05:24) (18 - 19)  SpO2: --    O/E:  Awake, alert, not in distress.  HEENT: atraumatic, EOMI.  Chest: clear  CVS: SIS2 +, no murmur.  P/A: obese, BS+  CNS: non focal.  Ext: no edema feet.  Skin: no rash, no ulcers.  All systems reviewed positive findings as above.

## 2020-11-05 LAB
CULTURE RESULTS: SIGNIFICANT CHANGE UP
CULTURE RESULTS: SIGNIFICANT CHANGE UP
HCG UR QL: NEGATIVE — SIGNIFICANT CHANGE UP
SPECIMEN SOURCE: SIGNIFICANT CHANGE UP
SPECIMEN SOURCE: SIGNIFICANT CHANGE UP

## 2020-11-05 PROCEDURE — 93312 ECHO TRANSESOPHAGEAL: CPT | Mod: 26,XU

## 2020-11-05 PROCEDURE — 99233 SBSQ HOSP IP/OBS HIGH 50: CPT

## 2020-11-05 RX ORDER — BENZOCAINE 10 %
1 GEL (GRAM) MUCOUS MEMBRANE ONCE
Refills: 0 | Status: COMPLETED | OUTPATIENT
Start: 2020-11-05 | End: 2020-11-05

## 2020-11-05 RX ORDER — MORPHINE SULFATE 50 MG/1
2 CAPSULE, EXTENDED RELEASE ORAL ONCE
Refills: 0 | Status: DISCONTINUED | OUTPATIENT
Start: 2020-11-05 | End: 2020-11-05

## 2020-11-05 RX ADMIN — Medication 10 MILLIGRAM(S): at 22:24

## 2020-11-05 RX ADMIN — BUDESONIDE AND FORMOTEROL FUMARATE DIHYDRATE 2 PUFF(S): 160; 4.5 AEROSOL RESPIRATORY (INHALATION) at 20:30

## 2020-11-05 RX ADMIN — BUDESONIDE AND FORMOTEROL FUMARATE DIHYDRATE 2 PUFF(S): 160; 4.5 AEROSOL RESPIRATORY (INHALATION) at 08:26

## 2020-11-05 RX ADMIN — AZATHIOPRINE 150 MILLIGRAM(S): 100 TABLET ORAL at 14:45

## 2020-11-05 RX ADMIN — MYCOPHENOLATE MOFETIL 500 MILLIGRAM(S): 250 CAPSULE ORAL at 17:18

## 2020-11-05 RX ADMIN — ENOXAPARIN SODIUM 40 MILLIGRAM(S): 100 INJECTION SUBCUTANEOUS at 14:46

## 2020-11-05 RX ADMIN — MORPHINE SULFATE 2 MILLIGRAM(S): 50 CAPSULE, EXTENDED RELEASE ORAL at 06:11

## 2020-11-05 RX ADMIN — Medication 1 SPRAY(S): at 22:58

## 2020-11-05 RX ADMIN — Medication 100 MILLIGRAM(S): at 22:23

## 2020-11-05 RX ADMIN — ALBUTEROL 2 PUFF(S): 90 AEROSOL, METERED ORAL at 08:26

## 2020-11-05 RX ADMIN — VALACYCLOVIR 1000 MILLIGRAM(S): 500 TABLET, FILM COATED ORAL at 14:47

## 2020-11-05 RX ADMIN — MORPHINE SULFATE 2 MILLIGRAM(S): 50 CAPSULE, EXTENDED RELEASE ORAL at 04:36

## 2020-11-05 RX ADMIN — Medication 400 MILLIGRAM(S): at 22:24

## 2020-11-05 RX ADMIN — Medication 400 MILLIGRAM(S): at 23:00

## 2020-11-05 RX ADMIN — Medication 1 TABLET(S): at 14:44

## 2020-11-05 RX ADMIN — Medication 100 MILLIGRAM(S): at 14:45

## 2020-11-05 NOTE — DIETITIAN INITIAL EVALUATION ADULT. - ORAL INTAKE PTA/DIET HISTORY
Unable to reach pt at room phone today. Likely off unit for procedure. Noted shellfish, peanuts allergy per EMR. Pt. frequently NPO for tests/procedures during LOS.

## 2020-11-05 NOTE — DIETITIAN INITIAL EVALUATION ADULT. - OTHER INFO
P/w: SOB. Staph aureus bacteremia. S/p CONNOR. Shortness of breath possibly secondary to pulmonary hypertension vs PCP. Urinary frequency without dysuria. Chronic steroid use.

## 2020-11-05 NOTE — PROGRESS NOTE ADULT - SUBJECTIVE AND OBJECTIVE BOX
JESSI KING  40y, Female  Allergy: contrast media (iodine-based) (Unknown)  iodine (Anaphylaxis)  peanuts (Unknown)  shellfish (Anaphylaxis)  shellfish (Unknown)      LOS  7d    CHIEF COMPLAINT: SOB (05 Nov 2020 14:22)      INTERVAL EVENTS/HPI  - No acute events overnight  - T(F): , Max: 97.9 (11-04-20 @ 21:11)  - Denies any worsening symptoms  - Tolerating medication  -    -      ROS  General: Denies rigors, nightsweats  HEENT: Denies headache, rhinorrhea, sore throat, eye pain  CV: Denies CP, palpitations  PULM: Denies wheezing, hemoptysis  GI: Denies hematemesis, hematochezia, melena  : Denies discharge, hematuria  MSK: Denies arthralgias, myalgias  SKIN: Denies rash, lesions  NEURO: Denies paresthesias, weakness  PSYCH: Denies depression, anxiety    VITALS:  T(F): 97.7, Max: 97.9 (11-04-20 @ 21:11)  HR: 91  BP: 99/55  RR: 18Vital Signs Last 24 Hrs  T(C): 36.5 (05 Nov 2020 13:13), Max: 36.6 (04 Nov 2020 21:11)  T(F): 97.7 (05 Nov 2020 13:13), Max: 97.9 (04 Nov 2020 21:11)  HR: 91 (05 Nov 2020 13:13) (75 - 95)  BP: 99/55 (05 Nov 2020 13:13) (99/55 - 116/78)  BP(mean): --  RR: 18 (05 Nov 2020 13:13) (18 - 19)  SpO2: --    PHYSICAL EXAM:  Gen: NAD, resting in bed  HEENT: Normocephalic, atraumatic  Neck: supple, no lymphadenopathy  CV: Regular rate & regular rhythm  Lungs: decreased BS at bases, no fremitus  Abdomen: Soft, BS present  Ext: Warm, well perfused  Neuro: non focal, awake  Skin: no rash, no erythema  Lines: no phlebitis    FH: Non-contributory  Social Hx: Non-contributory    TESTS & MEASUREMENTS:                  Culture - Blood (collected 11-02-20 @ 07:19)  Source: .Blood None  Preliminary Report (11-03-20 @ 14:01):    No growth to date.    Culture - Blood (collected 11-01-20 @ 04:30)  Source: .Blood None  Preliminary Report (11-02-20 @ 17:01):    No growth to date.    Culture - Blood (collected 10-31-20 @ 06:36)  Source: .Blood None  Final Report (11-05-20 @ 10:00):    No Growth Final    Culture - Blood (collected 10-30-20 @ 20:00)  Source: .Blood Blood  Final Report (11-05-20 @ 02:10):    No Growth Final    Culture - Blood (collected 10-29-20 @ 07:08)  Source: .Blood None  Gram Stain (10-30-20 @ 02:03):    Growth in anaerobic bottle: Gram Positive Cocci in Clusters  Final Report (10-31-20 @ 15:33):    Growth in anaerobic bottle: Staphylococcus aureus    "Due to technical problems, Proteus sp. will Not be reported as part of    the BCID panel until further notice"    ***Blood Panel PCR results on this specimen are available    approximately 3 hours afterthe Gram stain result.***    Gram stain, PCR, and/or culture results may not always    correspond due to difference in methodologies.    ************************************************************    This PCR assay was performed using [employer].    The following targets are tested for: Enterococcus,    vancomycin resistant enterococci, Listeria monocytogenes,    coagulase negative staphylococci, S. aureus,    methicillin resistant S. aureus, Streptococcus agalactiae    (Group B), S. pneumoniae, S. pyogenes (Group A),    Acinetobacter baumannii, Enterobacter cloacae, E. coli,    Klebsiella oxytoca, K. pneumoniae, Proteus sp.,    Serratia marcescens, Haemophilus influenzae,    Neisseria meningitidis, Pseudomonas aeruginosa, Candida    albicans, C. glabrata, C krusei, C parapsilosis,    C. tropicalis and the KPC resistance gene.  Organism: Blood Culture PCR  Staphylococcus aureus (10-31-20 @ 15:33)  Organism: Staphylococcus aureus (10-31-20 @ 15:33)      -  Ampicillin/Sulbactam: S <=8/4      -  Cefazolin: S <=4      -  Clindamycin: S <=0.25      -  Erythromycin: S 0.5      -  Gentamicin: S <=1 Should not be used as monotherapy      -  Oxacillin: S <=0.25      -  Penicillin: R 4      -  RIF- Rifampin: S <=1 Should not be used as monotherapy      -  Tetra/Doxy: S <=1      -  Trimethoprim/Sulfamethoxazole: S <=0.5/9.5      -  Vancomycin: S 2      Method Type: CONNER  Organism: Blood Culture PCR (10-31-20 @ 15:33)      -  Staphylococcus aureus: Detec Any isolate of Staphylococcus aureus from a blood culture is NOT considered a contaminant.      Method Type: PCR    Culture - Blood (collected 10-29-20 @ 01:50)  Source: .Blood Blood  Final Report (11-03-20 @ 06:01):    No Growth Final    Culture - Urine (collected 10-29-20 @ 00:40)  Source: .Urine Clean Catch (Midstream)  Final Report (10-30-20 @ 06:49):    <10,000 CFU/mL Normal Urogenital Alejandra            INFECTIOUS DISEASES TESTING  Fungitell: <31 (10-30-20 @ 08:35)  Fungitell: <31 (10-29-20 @ 16:01)  Procalcitonin, Serum: 0.03 (10-29-20 @ 07:08)  COVID-19 PCR: NotDetec (10-29-20 @ 01:36)  Procalcitonin, Serum: 0.03 (01-22-20 @ 06:26)  Rapid RVP Result: NotDetec (01-22-20 @ 02:12)      INFLAMMATORY MARKERS  Sedimentation Rate, Erythrocyte: 39 mm/Hr (10-30-20 @ 20:00)  C-Reactive Protein, Serum: <0.10 mg/dL (10-30-20 @ 20:00)  Sedimentation Rate, Erythrocyte: 41 mm/Hr (10-29-20 @ 07:08)  C-Reactive Protein, Serum: 0.20 mg/dL (10-29-20 @ 07:08)      RADIOLOGY & ADDITIONAL TESTS:  I have personally reviewed the last available Chest xray  CXR      CT      CARDIOLOGY TESTING  12 Lead ECG:   Ventricular Rate 100 BPM    Atrial Rate 100 BPM    P-R Interval 146 ms    QRS Duration 82 ms    Q-T Interval 330 ms    QTC Calculation(Bazett) 425 ms    P Axis 39 degrees    R Axis -11 degrees    T Axis 30 degrees    Diagnosis Line Normal sinus rhythm  Possible Left atrial enlargement  Left ventricular hypertrophy  Abnormal ECG    Confirmed by Scott Mosqueda ([id number]) on 10/29/2020 12:52:55 PM (10-29-20 @ 09:50)  12 Lead ECG:   Ventricular Rate 97 BPM    Atrial Rate 97 BPM    P-R Interval 138 ms    QRS Duration 74 ms    Q-T Interval 328 ms    QTC Calculation(Bazett) 416 ms    P Axis 31 degrees    R Axis -12 degrees    T Axis 22 degrees    Diagnosis Line Normal sinus rhythm  Possible Left atrial enlargement  Left ventricular hypertrophy  Abnormal ECG    Confirmed by Scott Mosqueda ([id number]) on 10/29/2020 7:15:20 AM (10-29-20 @ 00:26)      MEDICATIONS  ALBUTerol    90 MICROgram(s) HFA Inhaler 2 Inhalation daily  azaTHIOprine 150 Oral daily  budesonide 160 MICROgram(s)/formoterol 4.5 MICROgram(s) Inhaler 2 Inhalation two times a day  calcium carbonate 1250 mG  + Vitamin D (OsCal 500 + D) 1 Oral daily  ceFAZolin   IVPB 2000 IV Intermittent every 8 hours  chlorhexidine 4% Liquid 1 Topical <User Schedule>  enoxaparin Injectable 40 SubCutaneous daily  melatonin 10 Oral at bedtime  mycophenolate mofetil  Oral Tab/Cap - Peds 500 Oral two times a day  pantoprazole    Tablet 40 Oral before breakfast  predniSONE   Tablet 20 Oral daily  trimethoprim  160 mG/sulfamethoxazole 800 mG 1 Oral <User Schedule>  valACYclovir 1000 Oral daily      WEIGHT  Weight (kg): 115 (11-05-20 @ 11:43)      ANTIBIOTICS:  ceFAZolin   IVPB 2000 milliGRAM(s) IV Intermittent every 8 hours  trimethoprim  160 mG/sulfamethoxazole 800 mG 1 Tablet(s) Oral <User Schedule>  valACYclovir 1000 milliGRAM(s) Oral daily      All available historical records have been reviewed

## 2020-11-05 NOTE — PROGRESS NOTE ADULT - ASSESSMENT
40 year old female PMH of dermatomyositis, Cryptogenic organizing PNA diagnosed 2017 was on home oxygen till 2018 since then managed with daily 20mg prednisone and cellcept and albuterol presents for SOB.  Pt. reports feeling progressively worsening SOB .  the pt has a dermatomyositis associated ILD , and she is on immunomodulators and rituximab in addition to steroids 	  #Staph aureus bacteremia  - No sepsis present on admission  - Blood Cx 10/29/2020: Staph aureus--> bcx on 30 are negative    cefazolin 2g IV Q8H  - Follow sputum culture  tte showed no vegetation   CONNOR today showed no vegetation   te plan is to dc the pt on cefazolin iv ,  : 14 days after last negative bcx,     #Shortness of breath possibly secondary to pulmonary hypertension vs PCP  - Chest X-ray: diffuse interstitial bilateral opacities, no acute consolidation  - CT angio chest: negative for pulmonary embolism  -abgs were normal   off oxygen   on PCP propylaxis   prednisone 20mg PO QD   Continue with mycophenolate 500mg PO BID, azathioprine 150mg PO QD, albuterol 90mcg HFA 2 puffs PO QD and Q6H PRN  continue calcium and vitamin D for osteoporosis prophylaxis given chronic steroid use  no dyspnea today  was able to ambulate independently with PT   f/u with pulm as out pt for pft  there is a possibility pf PHTN , spap 29           #History of dermatomyositis and cryptogenic organizing pneumonia  - Continue with mycophenolate 500mg PO BID, azathioprine 150mg PO QD  - prednisone 20mg PO QD  the infiltrates are ild associated with dermatomyocitis        #Urinary frequency without dysuria  - Urinalysis negative  - Urine culture 10/29/2020: <10,000 normal joon    #High risk for osteoporosis given chronic steroid use  - Start calcium and vitamin D  - Follow outpatient with DEXA scan    #Misc  - DVT Prophylaxis: Lovenox 40mg SQ QD  - GI Prophylaxis: pantoprazole 40mg PO QD   - Diet: Regular  - Activity: increase as tolerated  - IV Fluids: not indicated   - Code Status: Full Code   	      40 year old female PMH of dermatomyositis, Cryptogenic organizing PNA diagnosed 2017 was on home oxygen till 2018 since then managed with daily 20mg prednisone and cellcept and albuterol presents for SOB.  Pt. reports feeling progressively worsening SOB .  the pt has a dermatomyositis associated ILD , and she is on immunomodulators and rituximab in addition to steroids 	  #Staph aureus bacteremia  - No sepsis present on admission  - Blood Cx 10/29/2020: Staph aureus--> bcx on 30 are negative    cefazolin 2g IV Q8H  - Follow sputum culture  tte showed no vegetation   CONNOR today showed no vegetation   te plan is to dc the pt on cefazolin iv ,  : 14 days after last negative bcx,     #Shortness of breath possibly secondary to pulmonary hypertension vs PCP  - Chest X-ray: diffuse interstitial bilateral opacities, no acute consolidation  - CT angio chest: negative for pulmonary embolism  -abgs were normal   off oxygen   on PCP propylaxis   prednisone 20mg PO QD   Continue with mycophenolate 500mg PO BID, azathioprine 150mg PO QD, albuterol 90mcg HFA 2 puffs PO QD and Q6H PRN  continue calcium and vitamin D for osteoporosis prophylaxis given chronic steroid use  no dyspnea today  was able to ambulate independently with PT   f/u with pulm as out pt for pft  there is a possibility pf PHTN , spap 29           #History of dermatomyositis and cryptogenic organizing pneumonia  - Continue with mycophenolate 500mg PO BID, azathioprine 150mg PO QD  - prednisone 20mg PO QD  the infiltrates are ild associated with dermatomyocitis        #Urinary frequency without dysuria  - Urinalysis negative  - Urine culture 10/29/2020: <10,000 normal joon    #High risk for osteoporosis given chronic steroid use  - Start calcium and vitamin D  - Follow outpatient with DEXA scan    #Misc  - DVT Prophylaxis: Lovenox 40mg SQ QD  - GI Prophylaxis: pantoprazole 40mg PO QD   - Diet: Regular  - Activity: increase as tolerated  - IV Fluids: not indicated   - Code Status: Full Code  the patient will be discharged on ipratropium-albuterol nebulizers

## 2020-11-05 NOTE — PRE-ANESTHESIA EVALUATION ADULT - NSANTHOSAYNRD_GEN_A_CORE
No. RODRICK screening performed.  STOP BANG Legend: 0-2 = LOW Risk; 3-4 = INTERMEDIATE Risk; 5-8 = HIGH Risk

## 2020-11-05 NOTE — CHART NOTE - NSCHARTNOTEFT_GEN_A_CORE
POST OPERATIVE PROCEDURAL DOCUMENTATION  PRE-OP DIAGNOSIS:  R/O ENDOCARDITIS. MSSA bacteremia      POST-OP DIAGNOSIS:  No Vegetations seen to suggest endocarditis.     PROCEDURE: Transesophageal echocardiogram    Primary Physician:  Dr. Mckeon  Assistant: Dr. Douglass    ANESTHESIA TYPE  [  ] General Anesthesia  [ x ] Conscious Sedation  [  ] Local/Regional    CONDITION  [  ] Critical  [  ] Serious  [  ] Fair  [ x ] Good    SPECIMENS REMOVED (IF APPLICABLE): N/A    IMPLANTS (IF APPLICABLE): None    ESTIMATED BLOOD LOSS: None    COMPLICATIONS: None      FINDINGS:    After risks and benefits of procedures were explained, informed consent was obtained and placed in chart. Refer to Anesthesia note for sedation details.  The CONNOR probe was passed into the esophagus without difficulty.  Transesophageal and transgastric images were obtained.  The CONNOR probe was removed without difficulty and examined.  There was no evidence for bleeding.  The patient tolerated the procedure well without any immediate CONNOR-related complications.      Preliminary Findings:  LA:     ANGY: not visualized  LV: LVEF normal  MV: No evidence of MR, no evidence of MS.   AV: No evidence of AI, no evidence of AS.  mildly thickened AV  RA:  TV: mild TR.  Thickened TV.   PV: no PI.   IAS: no PFO. No R-> L shunt.       DIAGNOSIS/IMPRESSION:    No evidence of vegetations to suggest endocarditis.     PLAN OF CARE:  return to floor  f/u with primary team / id POST OPERATIVE PROCEDURAL DOCUMENTATION  PRE-OP DIAGNOSIS:  R/O ENDOCARDITIS. MSSA bacteremia      POST-OP DIAGNOSIS:  No Vegetations seen to suggest endocarditis.     PROCEDURE: Transesophageal echocardiogram    Primary Physician:  Dr. Mckeon  Assistant: Dr. Douglass    ANESTHESIA TYPE  [  ] General Anesthesia  [ x ] Conscious Sedation  [  ] Local/Regional    CONDITION  [  ] Critical  [  ] Serious  [  ] Fair  [ x ] Good    SPECIMENS REMOVED (IF APPLICABLE): N/A    IMPLANTS (IF APPLICABLE): None    ESTIMATED BLOOD LOSS: None    COMPLICATIONS: None      FINDINGS:    After risks and benefits of procedures were explained, informed consent was obtained and placed in chart. Refer to Anesthesia note for sedation details.  The CONNOR probe was passed into the esophagus without difficulty.  Transesophageal and transgastric images were obtained.  The CONNOR probe was removed without difficulty and examined.  There was no evidence for bleeding.  The patient tolerated the procedure well without any immediate CONNOR-related complications.      Preliminary Findings:  LA:   no thrombus   ANGY: not visualized  LV: LVEF normal  MV: No evidence of MR, no evidence of MS.   AV: No evidence of AI, no evidence of AS.  mildly thickened AV  RA: no thrombus   TV: mild TR.  Thickened TV.   PV: no PI.   IAS: no PFO. No R-> L shunt.       DIAGNOSIS/IMPRESSION:    No evidence of vegetations to suggest endocarditis.     PLAN OF CARE:  return to floor  f/u with primary team / ID

## 2020-11-05 NOTE — DIETITIAN INITIAL EVALUATION ADULT. - IDEAL BODY WEIGHT (KG)
Problem: Delirium, Risk for  Goal: # No symptoms of delirium  Evaluate delirium symptoms under active problem when present   Outcome: Outcome Not Met, Continue to Monitor  Patient is not oriented to date and place. Patient able to distinguish that she is in the hospital, but unsure of which one. Patient has dementia at baseline ,     Problem: At Risk for Falls  Goal: # Patient does not fall  Outcome: Outcome Not Met, Continue to Monitor  Patient is up with moderate/maximal assistance. Patient is turned every two hours and has not fallen this shift.        54.4

## 2020-11-05 NOTE — DIETITIAN INITIAL EVALUATION ADULT. - CURRENT DIET ORDER MEETS ESTIMATED NUTRIENT REQUIREMENTS
Patient advised form for Dental Clearance ready for . Form placed up front. Copy sent for scanning. Statement Selected

## 2020-11-05 NOTE — DIETITIAN INITIAL EVALUATION ADULT. - OTHER CALCULATIONS
ideal weight 54.4kg  calorie 1360-1632kcal (25-30kcal/kg IBW) for obesity  protein 54-65g 91-1.2g/kg IBW) as above fluid 1ml/kcal or per LIP

## 2020-11-05 NOTE — PROGRESS NOTE ADULT - SUBJECTIVE AND OBJECTIVE BOX
Patient is a 40y old  Female who presents with a chief complaint of SOB (02 Nov 2020 13:48)    Patient was seen and examined.  Planned for CONNOR today  Pt continues to  MAK  Denies any other complaints.  All systems reviewed positive history as mentioned above.    PAST MEDICAL & SURGICAL HISTORY:  H/O dermatomyositis  Cryptogenic organizing pneumonia  History of cervical cerclage  Ankle fracture    Allergies  contrast media (iodine-based) (Unknown)  iodine (Anaphylaxis)  peanuts (Unknown)  shellfish (Anaphylaxis)  shellfish (Unknown)    MEDICATIONS  (STANDING):  ALBUTerol    90 MICROgram(s) HFA Inhaler 2 Puff(s) Inhalation daily  azaTHIOprine 150 milliGRAM(s) Oral daily  budesonide 160 MICROgram(s)/formoterol 4.5 MICROgram(s) Inhaler 2 Puff(s) Inhalation two times a day  calcium carbonate 1250 mG  + Vitamin D (OsCal 500 + D) 1 Tablet(s) Oral daily  ceFAZolin   IVPB 2000 milliGRAM(s) IV Intermittent every 8 hours  chlorhexidine 4% Liquid 1 Application(s) Topical <User Schedule>  enoxaparin Injectable 40 milliGRAM(s) SubCutaneous daily  melatonin 10 milliGRAM(s) Oral at bedtime  mycophenolate mofetil  Oral Tab/Cap - Peds 500 milliGRAM(s) Oral two times a day  pantoprazole    Tablet 40 milliGRAM(s) Oral before breakfast  predniSONE   Tablet 20 milliGRAM(s) Oral daily  trimethoprim  160 mG/sulfamethoxazole 800 mG 1 Tablet(s) Oral <User Schedule>  valACYclovir 1000 milliGRAM(s) Oral daily    MEDICATIONS  (PRN):  acetaminophen   Tablet .. 650 milliGRAM(s) Oral every 6 hours PRN Moderate Pain (4 - 6)  ALBUTerol    90 MICROgram(s) HFA Inhaler 2 Puff(s) Inhalation every 6 hours PRN Bronchospasm  ibuprofen  Tablet. 400 milliGRAM(s) Oral every 6 hours PRN Moderate Pain (4 - 6)    T(C): 36.5 (11-05-20 @ 13:13), Max: 36.6 (11-04-20 @ 21:11)  HR: 91 (11-05-20 @ 13:13) (75 - 95)  BP: 99/55 (11-05-20 @ 13:13) (99/55 - 116/78)  RR: 18 (11-05-20 @ 13:13) (18 - 19)  SpO2: --    O/E:  Awake, alert, not in distress.  HEENT: atraumatic, EOMI.  Chest: clear  CVS: SIS2 +, no murmur.  P/A: obese, BS+  CNS: non focal.  Ext: no edema feet.  Skin: no rash, no ulcers.  All systems reviewed positive findings as above.

## 2020-11-05 NOTE — PROGRESS NOTE ADULT - SUBJECTIVE AND OBJECTIVE BOX
SUBJECTIVE:    Patient is a 40y old Female who presents with a chief complaint of SOB (05 Nov 2020 12:48)    Currently admitted to medicine with the primary diagnosis of Cough       Today is hospital day 7d. This morning she is resting comfortably in bed and reports no new issues or overnight events.     PAST MEDICAL & SURGICAL HISTORY  H/O dermatomyositis    Dermatomyositis    Cryptogenic organizing pneumonia    History of cervical cerclage    Ankle fracture      SOCIAL HISTORY:  Negative for smoking/alcohol/drug use.     ALLERGIES:  contrast media (iodine-based) (Unknown)  iodine (Anaphylaxis)  peanuts (Unknown)  shellfish (Anaphylaxis)  shellfish (Unknown)    MEDICATIONS:  STANDING MEDICATIONS  ALBUTerol    90 MICROgram(s) HFA Inhaler 2 Puff(s) Inhalation daily  azaTHIOprine 150 milliGRAM(s) Oral daily  budesonide 160 MICROgram(s)/formoterol 4.5 MICROgram(s) Inhaler 2 Puff(s) Inhalation two times a day  calcium carbonate 1250 mG  + Vitamin D (OsCal 500 + D) 1 Tablet(s) Oral daily  ceFAZolin   IVPB 2000 milliGRAM(s) IV Intermittent every 8 hours  chlorhexidine 4% Liquid 1 Application(s) Topical <User Schedule>  enoxaparin Injectable 40 milliGRAM(s) SubCutaneous daily  melatonin 10 milliGRAM(s) Oral at bedtime  mycophenolate mofetil  Oral Tab/Cap - Peds 500 milliGRAM(s) Oral two times a day  pantoprazole    Tablet 40 milliGRAM(s) Oral before breakfast  predniSONE   Tablet 20 milliGRAM(s) Oral daily  trimethoprim  160 mG/sulfamethoxazole 800 mG 1 Tablet(s) Oral <User Schedule>  valACYclovir 1000 milliGRAM(s) Oral daily    PRN MEDICATIONS  acetaminophen   Tablet .. 650 milliGRAM(s) Oral every 6 hours PRN  ALBUTerol    90 MICROgram(s) HFA Inhaler 2 Puff(s) Inhalation every 6 hours PRN  ibuprofen  Tablet. 400 milliGRAM(s) Oral every 6 hours PRN    VITALS:   T(F): 97.7  HR: 91  BP: 99/55  RR: 18  SpO2: --    LABS:              ABG - ( 03 Nov 2020 14:13 )  pH, Arterial: 7.42  pH, Blood: x     /  pCO2: 38    /  pO2: 104   / HCO3: 24    / Base Excess: 0.1   /  SaO2: 99                        RADIOLOGY:    PHYSICAL EXAM:  GEN: No acute distress  LUNGS: Clear to auscultation bilaterally   HEART: S1/S2 present. RRR.   ABD: Soft, non-tender, non-distended. Bowel sounds present  EXT: NC/NC/NE/2+PP/FRANKLIN/Skin Intact.   NEURO: AAOX3    Intravenous access:   NG tube:   Crenshaw Catheter:

## 2020-11-05 NOTE — PROGRESS NOTE ADULT - ASSESSMENT
ASSESSMENT  40 year old female PMH of dermatomyositis, Cryptogenic organizing PNA diagnosed 2017 was on home oxygen till 2018 since then managed with daily 20mg prednisone and cellcept and albuterol presents for SOB and cough x 6 days, found to have MSSA bacteremia     IMPRESSION  #MSSA bacteremia with severe sepsis on admission (P>90 WBC 20, lactic acidosis 3)    CONNOR no vegetations     10/29 1/4 bottles    10/30 BCX NGTD     10/31 BCX NGTD     No clear source    Reports cough, but imaging not indicative of PNA: CTA CHest 10/31 with stable opacity in [place]    No skin lesions    No recent lines, injections, IVDU    No paraspinal tenderness    No murmur on exam     TTE 11/1, mild TR, no significant valvulopathy    #Morbid Obesity BMI (kg/m2): 43.5  #Dermatomyositis on immunosuppressives (pred, azathioprine, cellcept)  #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

## 2020-11-06 ENCOUNTER — TRANSCRIPTION ENCOUNTER (OUTPATIENT)
Age: 40
End: 2020-11-06

## 2020-11-06 VITALS
RESPIRATION RATE: 18 BRPM | HEART RATE: 107 BPM | TEMPERATURE: 97 F | OXYGEN SATURATION: 97 % | SYSTOLIC BLOOD PRESSURE: 116 MMHG | DIASTOLIC BLOOD PRESSURE: 75 MMHG

## 2020-11-06 LAB
CULTURE RESULTS: SIGNIFICANT CHANGE UP
SPECIMEN SOURCE: SIGNIFICANT CHANGE UP

## 2020-11-06 PROCEDURE — 99239 HOSP IP/OBS DSCHRG MGMT >30: CPT | Mod: 25

## 2020-11-06 PROCEDURE — 76937 US GUIDE VASCULAR ACCESS: CPT | Mod: 26

## 2020-11-06 RX ORDER — CEFAZOLIN SODIUM 1 G
2 VIAL (EA) INJECTION
Qty: 48 | Refills: 0
Start: 2020-11-06 | End: 2020-11-13

## 2020-11-06 RX ORDER — AZATHIOPRINE 100 MG/1
3 TABLET ORAL
Qty: 180 | Refills: 0
Start: 2020-11-06 | End: 2021-01-04

## 2020-11-06 RX ORDER — FUROSEMIDE 40 MG
1 TABLET ORAL
Qty: 30 | Refills: 0
Start: 2020-11-06 | End: 2020-12-05

## 2020-11-06 RX ORDER — VALACYCLOVIR 500 MG/1
1 TABLET, FILM COATED ORAL
Qty: 30 | Refills: 0
Start: 2020-11-06 | End: 2020-12-05

## 2020-11-06 RX ADMIN — ENOXAPARIN SODIUM 40 MILLIGRAM(S): 100 INJECTION SUBCUTANEOUS at 13:04

## 2020-11-06 RX ADMIN — AZATHIOPRINE 150 MILLIGRAM(S): 100 TABLET ORAL at 13:04

## 2020-11-06 RX ADMIN — BUDESONIDE AND FORMOTEROL FUMARATE DIHYDRATE 2 PUFF(S): 160; 4.5 AEROSOL RESPIRATORY (INHALATION) at 07:51

## 2020-11-06 RX ADMIN — Medication 650 MILLIGRAM(S): at 00:33

## 2020-11-06 RX ADMIN — Medication 100 MILLIGRAM(S): at 15:09

## 2020-11-06 RX ADMIN — PANTOPRAZOLE SODIUM 40 MILLIGRAM(S): 20 TABLET, DELAYED RELEASE ORAL at 05:32

## 2020-11-06 RX ADMIN — Medication 1 TABLET(S): at 13:04

## 2020-11-06 RX ADMIN — MYCOPHENOLATE MOFETIL 500 MILLIGRAM(S): 250 CAPSULE ORAL at 05:32

## 2020-11-06 RX ADMIN — Medication 100 MILLIGRAM(S): at 05:32

## 2020-11-06 RX ADMIN — VALACYCLOVIR 1000 MILLIGRAM(S): 500 TABLET, FILM COATED ORAL at 13:04

## 2020-11-06 RX ADMIN — Medication 650 MILLIGRAM(S): at 01:03

## 2020-11-06 RX ADMIN — Medication 20 MILLIGRAM(S): at 05:32

## 2020-11-06 RX ADMIN — CHLORHEXIDINE GLUCONATE 1 APPLICATION(S): 213 SOLUTION TOPICAL at 05:32

## 2020-11-06 NOTE — DISCHARGE NOTE PROVIDER - HOSPITAL COURSE
40 year old female PMH of dermatomyositis, Cryptogenic organizing PNA diagnosed 2017 was on home oxygen till 2018 since then managed with daily 20mg prednisone and cellcept and albuterol presents for SOB.  Pt. reports feeling progressively worsening SOB .  the pt has a dermatomyositis associated ILD , and she is on immunomodulators and rituximab in addition to steroids     during her admission blood culture grew MSSA   bcx on 30 are negative and she was starterd on cefazolin   TTE and CONNOR done showed no endocarditis, so the pt will be dc on iv cefazolin to complete a 14 days of iv antibiotics after the last -ve bcx  as for her ILD and dermatomyositis, the pt will followup as an outpt with pulmonary.    the pt is stable and she is ready for dc with a midline            40 year old female PMH of dermatomyositis, Cryptogenic organizing PNA diagnosed 2017 was on home oxygen till 2018 since then managed with daily 20mg prednisone and cellcept and albuterol presents for SOB.        # Interstitial lung disease with Pulmonary Hypertension  - CT Angio Chest PE Protocol w/ IV Cont (10.30.20 @ 09:51) >No central pulmonary embolism. Enlarged right atrium and ventricle as well as dilated main pulmonary artery which can be seen with pulmonary arterial hypertension. Low lung volume. Bilateral peripheral predominant opacities as well as multisegmental atelectasis within the bilateral lower lobes without significant change since CT 1/22/2020. Differentialincludes nonspecific interstitial lung disease and organizing pneumonia.  -  Xray Chest 1 View-PORTABLE IMMEDIATE (Xray Chest 1 View-PORTABLE IMMEDIATE .) (10.29.20 @ 01:01) >Diffuse bilateral interstitial opacities.  - TTE Echo Complete w/o Contrast w/ Doppler (11.01.20 @ 08:04) >Left ventricular ejection fraction, by visual estimation, is 60 to 65%.Pulmonary Artery: The main pulmonary artery is normal in size.  - c/w steroids  - bactrim prophylaxis  - c/w home inhalers  -c/w  lasix 20 mg daily  - oxygen sat on RA at rest 96 in exertion 93  - pH, Arterial: 7.42, pO2, Arterial: 104 mmHg . pCO2, Arterial: 38 mmHg,Oxygen Saturation, Arterial: 99 % (11.03.20 @ 14:13)  - outpt F/u with pulmonary    # MSSA bacteremia  - c/w Ancef, end date 11/13  -CONNOR - neg for vegetations  - midline placed    # H/o Dermatomyositis  - c/w azathioprine, prednisone, mycophenolate    # Herpes Prophylaxis  - c/w valtrex

## 2020-11-06 NOTE — DISCHARGE NOTE PROVIDER - CARE PROVIDER_API CALL
NOEL CONNER  Internal Medicine  [address] [place] [address]  Phone: [phone]  Fax: [phone]  Follow Up Time: 2 weeks    Bhavani Lobo)  Internal Medicine  [address] [place] [address]  Phone: [phone]  Fax: [phone]  Follow Up Time: 1 week   Bhavani Lobo)  Internal Medicine  [address] [place] [address]  Phone: [phone]  Fax: [phone]  Follow Up Time: 1 week    Cole Godinez  CRITICAL CARE MEDICINE  [address] [place] [address], [facility] [address]  [place] [address]  Phone: [phone]  Fax: [phone]  Follow Up Time: 2 weeks    Rafael Espinoza  INTERNAL MEDICINE  [address] [address]  Phone: [phone]  Fax: [phone]  Follow Up Time: 1 week

## 2020-11-06 NOTE — DISCHARGE NOTE PROVIDER - PROVIDER TOKENS
PROVIDER:[TOKEN:[[id number]],FOLLOWUP:[2 weeks]],PROVIDER:[TOKEN:[[id number]],FOLLOWUP:[1 week]] PROVIDER:[TOKEN:[[id number]],FOLLOWUP:[1 week]],PROVIDER:[TOKEN:[[id number]],FOLLOWUP:[2 weeks]],PROVIDER:[TOKEN:[[id number]],FOLLOWUP:[1 week]]

## 2020-11-06 NOTE — DISCHARGE NOTE NURSING/CASE MANAGEMENT/SOCIAL WORK - PATIENT PORTAL LINK FT
You can access the FollowMyHealth Patient Portal offered by [facility] by registering at the following website: [email or web address]. By joining [facility]’s FollowMyHealth portal, you will also be able to view your health information using other applications (apps) compatible with our system.

## 2020-11-06 NOTE — DISCHARGE NOTE PROVIDER - CARE PROVIDERS DIRECT ADDRESSES
,DirectAddress_Unknown,DirectAddress_Unknown ,DirectAddress_Unknown,[email or web address],DirectAddress_Unknown

## 2020-11-06 NOTE — DISCHARGE NOTE PROVIDER - NSDCCPCAREPLAN_GEN_ALL_CORE_FT
PRINCIPAL DISCHARGE DIAGNOSIS  Diagnosis: Cough  Assessment and Plan of Treatment: You presented for shortness of reath and cough. You have no lung infection or clot. Those symptoms are due to the dermatomyositis lung menifistations . There is also a concern for pulmonary hypertension . Please followup with the pulmonary      SECONDARY DISCHARGE DIAGNOSES  Diagnosis: Bacteremia due to Staphylococcus  Assessment and Plan of Treatment: Your blood cultures grew staph bacteria. Echo cardio showed no endocarditis. You need 8 more days of iv antibiotics.

## 2020-11-06 NOTE — PROCEDURE NOTE - NSPROCDETAILS_GEN_ALL_CORE
sterile technique, catheter placed/location identified, draped/prepped, sterile technique used/supine position/sterile dressing applied/ultrasound guidance

## 2020-11-06 NOTE — DISCHARGE NOTE PROVIDER - NSDCMRMEDTOKEN_GEN_ALL_CORE_FT
albuterol 90 mcg/inh inhalation aerosol: 2 puff(s) inhaled 4 times a day, As Needed  azaTHIOprine 50 mg oral tablet: 3 tab(s) orally once a day  calcium-vitamin D 500 mg-200 intl units (5 mcg) oral tablet: 1 tab(s) orally once a day  ceFAZolin 2 g intravenous injection: 2 gram(s) intravenous every 8 hours  Melatonin 10 mg oral capsule: 1 cap(s) orally once a day (at bedtime)  mycophenolate mofetil 500 mg oral tablet: 1 tab(s) orally 2 times a day  predniSONE 20 mg oral tablet: 1 tab(s) orally once a day  sulfamethoxazole-trimethoprim 800 mg-160 mg oral tablet: 1 tab(s) orally 3 times a week   Symbicort 160 mcg-4.5 mcg/inh inhalation aerosol: 2 puff(s) inhaled 2 times a day  valACYclovir 1 g oral tablet: 1 tab(s) orally once a day   albuterol 90 mcg/inh inhalation aerosol: 2 puff(s) inhaled 4 times a day, As Needed  azaTHIOprine 50 mg oral tablet: 3 tab(s) orally once a day  calcium-vitamin D 500 mg-200 intl units (5 mcg) oral tablet: 1 tab(s) orally once a day  ceFAZolin 2 g intravenous injection: 2 gram(s) intravenous every 8 hours  Lasix 20 mg oral tablet: 1 tab(s) orally once a day   Melatonin 10 mg oral capsule: 1 cap(s) orally once a day (at bedtime)  mycophenolate mofetil 500 mg oral tablet: 1 tab(s) orally 2 times a day  predniSONE 20 mg oral tablet: 1 tab(s) orally once a day  sulfamethoxazole-trimethoprim 800 mg-160 mg oral tablet: 1 tab(s) orally 3 times a week   Symbicort 160 mcg-4.5 mcg/inh inhalation aerosol: 2 puff(s) inhaled 2 times a day  valACYclovir 1 g oral tablet: 1 tab(s) orally once a day

## 2020-11-07 LAB
CULTURE RESULTS: SIGNIFICANT CHANGE UP
SPECIMEN SOURCE: SIGNIFICANT CHANGE UP

## 2020-11-12 DIAGNOSIS — Z87.81 PERSONAL HISTORY OF (HEALED) TRAUMATIC FRACTURE: ICD-10-CM

## 2020-11-12 DIAGNOSIS — D72.828 OTHER ELEVATED WHITE BLOOD CELL COUNT: ICD-10-CM

## 2020-11-12 DIAGNOSIS — Z79.899 OTHER LONG TERM (CURRENT) DRUG THERAPY: ICD-10-CM

## 2020-11-12 DIAGNOSIS — E66.01 MORBID (SEVERE) OBESITY DUE TO EXCESS CALORIES: ICD-10-CM

## 2020-11-12 DIAGNOSIS — T38.0X5A ADVERSE EFFECT OF GLUCOCORTICOIDS AND SYNTHETIC ANALOGUES, INITIAL ENCOUNTER: ICD-10-CM

## 2020-11-12 DIAGNOSIS — J98.11 ATELECTASIS: ICD-10-CM

## 2020-11-12 DIAGNOSIS — R35.0 FREQUENCY OF MICTURITION: ICD-10-CM

## 2020-11-12 DIAGNOSIS — R78.81 BACTEREMIA: ICD-10-CM

## 2020-11-12 DIAGNOSIS — R79.89 OTHER SPECIFIED ABNORMAL FINDINGS OF BLOOD CHEMISTRY: ICD-10-CM

## 2020-11-12 DIAGNOSIS — Z91.041 RADIOGRAPHIC DYE ALLERGY STATUS: ICD-10-CM

## 2020-11-12 DIAGNOSIS — Z79.51 LONG TERM (CURRENT) USE OF INHALED STEROIDS: ICD-10-CM

## 2020-11-12 DIAGNOSIS — J84.116 CRYPTOGENIC ORGANIZING PNEUMONIA: ICD-10-CM

## 2020-11-12 DIAGNOSIS — B00.9 HERPESVIRAL INFECTION, UNSPECIFIED: ICD-10-CM

## 2020-11-12 DIAGNOSIS — M33.11 OTHER DERMATOMYOSITIS WITH RESPIRATORY INVOLVEMENT: ICD-10-CM

## 2020-11-12 DIAGNOSIS — M54.2 CERVICALGIA: ICD-10-CM

## 2020-11-12 DIAGNOSIS — Z91.013 ALLERGY TO SEAFOOD: ICD-10-CM

## 2020-11-12 DIAGNOSIS — B95.61 METHICILLIN SUSCEPTIBLE STAPHYLOCOCCUS AUREUS INFECTION AS THE CAUSE OF DISEASES CLASSIFIED ELSEWHERE: ICD-10-CM

## 2020-11-12 DIAGNOSIS — Z79.52 LONG TERM (CURRENT) USE OF SYSTEMIC STEROIDS: ICD-10-CM

## 2020-11-12 DIAGNOSIS — I27.20 PULMONARY HYPERTENSION, UNSPECIFIED: ICD-10-CM

## 2020-11-12 DIAGNOSIS — E87.2 ACIDOSIS: ICD-10-CM

## 2020-11-12 DIAGNOSIS — Z91.010 ALLERGY TO PEANUTS: ICD-10-CM

## 2020-11-12 DIAGNOSIS — Z91.19 PATIENT'S NONCOMPLIANCE WITH OTHER MEDICAL TREATMENT AND REGIMEN: ICD-10-CM

## 2020-11-12 DIAGNOSIS — J84.89 OTHER SPECIFIED INTERSTITIAL PULMONARY DISEASES: ICD-10-CM
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
## 2020-11-12 DIAGNOSIS — R05 COUGH: ICD-10-CM

## 2020-11-27 NOTE — ED PROCEDURE NOTE - PROCEDURE ADDITIONAL DETAILS
from provider note:   29-Oct-2020 00:09  · Peripheral IV Insertion Date: 29-Oct-2020  · Inserted by: Provider:  · Peripheral IV Location: Right:; median cubital vein  · Device Device: Angiocath  · Peripheral IV Gauge/Length: 20 gauge  · IV Device Number of Insertion Attempts: 1  · IV Device Patient Tolerance: Insertion: tolerated well  · IV Device Dressing/Securement: dressing dry and intact; catheter securement device utilized  · IV Device Patency/Placement: flushed without difficulty; blood return present  · Specimen Drawn: Blood  · Specimen Drawn Date/Time: 29-Oct-2020 00:11  · Method: Peripheral IV

## 2021-01-29 ENCOUNTER — APPOINTMENT (OUTPATIENT)
Dept: PULMONOLOGY | Facility: CLINIC | Age: 41
End: 2021-01-29

## 2021-02-26 ENCOUNTER — APPOINTMENT (OUTPATIENT)
Dept: PULMONOLOGY | Facility: CLINIC | Age: 41
End: 2021-02-26

## 2021-03-04 NOTE — PROGRESS NOTE ADULT - SUBJECTIVE AND OBJECTIVE BOX
Khushbu Salter is a 51 year old female who presents today for discussion re: recent Pap smear results.  Pt was referred by Dr. Reynolds, most recent Pap smear results from 2/17/21- satisfactory, endocervical/ transformation zone component absent.  HRHPV negative.   Pt had pelvic US 2/26/21 - pt wishes to discuss results/ fibroids.     Orders placed in this encounter were verbally authorized by Dr. Nunez.   Medications verified and updated.     S: No new events/complaints  BReathing stable  Cough controlled      All other pertinent ROS negative.      12-22-18 @ 07:01  -  12-23-18 @ 07:00  --------------------------------------------------------  IN: 660 mL / OUT: 0 mL / NET: 660 mL      Vital Signs Last 24 Hrs  T(C): 36.5 (23 Dec 2018 12:39), Max: 37.5 (22 Dec 2018 18:54)  T(F): 97.7 (23 Dec 2018 12:39), Max: 99.5 (22 Dec 2018 18:54)  HR: 106 (23 Dec 2018 12:39) (99 - 106)  BP: 101/55 (23 Dec 2018 12:39) (101/55 - 131/61)  BP(mean): --  RR: 18 (23 Dec 2018 12:39) (18 - 18)  SpO2: 97% (23 Dec 2018 09:32) (96% - 97%)  PHYSICAL EXAM:    Constitutional: NAD, awake and alert, well-developed  HEENT: PERR, EOMI, Normal Hearing, MMM  Neck: Soft and supple, No LAD, No JVD  Respiratory: Breath sounds are clear bilaterally, No wheezing, rales or rhonchi  Cardiovascular: S1 and S2, regular rate and rhythm, no Murmurs, gallops or rubs  Gastrointestinal: Bowel Sounds present, soft, nontender, nondistended, no guarding, no rebound  Extremities: No peripheral edema      MEDICATIONS:  MEDICATIONS  (STANDING):  azaTHIOprine 150 milliGRAM(s) Oral daily  enoxaparin Injectable 40 milliGRAM(s) SubCutaneous daily  guaiFENesin ER 1200 milliGRAM(s) Oral every 12 hours  levoFLOXacin IVPB 750 milliGRAM(s) IV Intermittent every 24 hours  mycophenolate mofetil 1500 milliGRAM(s) Oral two times a day  predniSONE   Tablet 10 milliGRAM(s) Oral daily  trimethoprim  160 mG/sulfamethoxazole 800 mG 1 Tablet(s) Oral every 24 hours      LABS: All Labs Reviewed:                        12.0   8.09  )-----------( 344      ( 22 Dec 2018 09:45 )             38.1     12-23    136  |  99  |  10  ----------------------------<  99  4.2   |  25  |  0.5<L>    Ca    8.5      23 Dec 2018 06:40  Mg     1.8     12-23    TPro  7.1  /  Alb  3.0<L>  /  TBili  0.3  /  DBili  x   /  AST  42<H>  /  ALT  27  /  AlkPhos  48  12-23    PT/INR - ( 22 Dec 2018 09:45 )   PT: 11.40 sec;   INR: 0.99 ratio         PTT - ( 22 Dec 2018 09:45 )  PTT:30.0 sec  CARDIAC MARKERS ( 23 Dec 2018 06:40 )  x     / x     / 1669 U/L / x     / x      CARDIAC MARKERS ( 22 Dec 2018 09:45 )  x     / x     / 1941 U/L / x     / x          Blood Culture: 12-21 @ 22:40  Organism --  Gram Stain Blood -- Gram Stain --  Specimen Source .Urine Clean Catch (Midstream)  Culture-Blood --    12-21 @ 20:38  Organism --  Gram Stain Blood -- Gram Stain --  Specimen Source .Blood Blood-Peripheral  Culture-Blood --        Radiology: reviewed S: No new events/complaints  BReathing stable  Cough still not better      All other pertinent ROS negative.      12-22-18 @ 07:01  -  12-23-18 @ 07:00  --------------------------------------------------------  IN: 660 mL / OUT: 0 mL / NET: 660 mL      Vital Signs Last 24 Hrs  T(C): 36.5 (23 Dec 2018 12:39), Max: 37.5 (22 Dec 2018 18:54)  T(F): 97.7 (23 Dec 2018 12:39), Max: 99.5 (22 Dec 2018 18:54)  HR: 106 (23 Dec 2018 12:39) (99 - 106)  BP: 101/55 (23 Dec 2018 12:39) (101/55 - 131/61)  BP(mean): --  RR: 18 (23 Dec 2018 12:39) (18 - 18)  SpO2: 97% (23 Dec 2018 09:32) (96% - 97%)  PHYSICAL EXAM:    Constitutional: NAD, awake and alert, well-developed  HEENT: PERR, EOMI, Normal Hearing, MMM  Neck: Soft and supple, No LAD, No JVD  Respiratory: Breath sounds are clear bilaterally, No wheezing, rales or rhonchi  Cardiovascular: S1 and S2, regular rate and rhythm, no Murmurs, gallops or rubs  Gastrointestinal: Bowel Sounds present, soft, nontender, nondistended, no guarding, no rebound  Extremities: No peripheral edema      MEDICATIONS:  MEDICATIONS  (STANDING):  azaTHIOprine 150 milliGRAM(s) Oral daily  enoxaparin Injectable 40 milliGRAM(s) SubCutaneous daily  guaiFENesin ER 1200 milliGRAM(s) Oral every 12 hours  levoFLOXacin IVPB 750 milliGRAM(s) IV Intermittent every 24 hours  mycophenolate mofetil 1500 milliGRAM(s) Oral two times a day  predniSONE   Tablet 10 milliGRAM(s) Oral daily  trimethoprim  160 mG/sulfamethoxazole 800 mG 1 Tablet(s) Oral every 24 hours      LABS: All Labs Reviewed:                        12.0   8.09  )-----------( 344      ( 22 Dec 2018 09:45 )             38.1     12-23    136  |  99  |  10  ----------------------------<  99  4.2   |  25  |  0.5<L>    Ca    8.5      23 Dec 2018 06:40  Mg     1.8     12-23    TPro  7.1  /  Alb  3.0<L>  /  TBili  0.3  /  DBili  x   /  AST  42<H>  /  ALT  27  /  AlkPhos  48  12-23    PT/INR - ( 22 Dec 2018 09:45 )   PT: 11.40 sec;   INR: 0.99 ratio         PTT - ( 22 Dec 2018 09:45 )  PTT:30.0 sec  CARDIAC MARKERS ( 23 Dec 2018 06:40 )  x     / x     / 1669 U/L / x     / x      CARDIAC MARKERS ( 22 Dec 2018 09:45 )  x     / x     / 1941 U/L / x     / x          Blood Culture: 12-21 @ 22:40  Organism --  Gram Stain Blood -- Gram Stain --  Specimen Source .Urine Clean Catch (Midstream)  Culture-Blood --    12-21 @ 20:38  Organism --  Gram Stain Blood -- Gram Stain --  Specimen Source .Blood Blood-Peripheral  Culture-Blood --        Radiology: reviewed

## 2021-03-10 NOTE — PATIENT PROFILE ADULT - NSPROPATIENTLACTATING_GEN_A_NUR
Body Location Override (Optional - Billing Will Still Be Based On Selected Body Map Location If Applicable): left sideburn Mohs Case Number: [id number] Date Of Previous Biopsy (Optional): 2/9/2021 Previous Accession (Optional): [id number]) Biopsy Photograph Reviewed: Yes Referring Physician (Optional): Dr. Gruber Consent Type: Consent 1 (Standard) Eye Shield Used: No Surgeon Performing Repair (Optional): Kraig Initial Size Of Lesion: 1.2 X Size Of Lesion In Cm (Optional): 1 Primary Defect Length In Cm (Final Defect Size - Required For Flaps/Grafts): 1.4 Repair Type: Intermediate Layered Repair Oculoplastic Surgeon Procedure Text (A): After obtaining clear surgical margins the patient was sent to oculoplastics for surgical repair.  The patient understands they will receive post-surgical care and follow-up from the referring physician's office. Oculoplastic Surgeon Procedure Text (B): After obtaining clear surgical margins the patient was sent to oculoplastics for surgical repair.  The patient understands they will receive post-surgical care and follow-up from the referring physician's office. Otolaryngologist Procedure Text (A): After obtaining clear surgical margins the patient was sent to otolaryngology for surgical repair.  The patient understands they will receive post-surgical care and follow-up from the referring physician's office. Otolaryngologist Procedure Text (B): After obtaining clear surgical margins the patient was sent to otolaryngology for surgical repair.  The patient understands they will receive post-surgical care and follow-up from the referring physician's office. Plastic Surgeon Procedure Text (A): After obtaining clear surgical margins the patient was sent to plastics for surgical repair.  The patient understands they will receive post-surgical care and follow-up from the referring physician's office. Plastic Surgeon Procedure Text (B): After obtaining clear surgical margins the patient was sent to plastics for surgical repair.  The patient understands they will receive post-surgical care and follow-up from the referring physician's office. unknown Mid-Level Procedure Text (A): After obtaining clear surgical margins the patient was sent to a mid-level provider for surgical repair.  The patient understands they will receive post-surgical care and follow-up from the mid-level provider. Mid-Level Procedure Text (B): After obtaining clear surgical margins the patient was sent to a mid-level provider for surgical repair.  The patient understands they will receive post-surgical care and follow-up from the mid-level provider. Provider Procedure Text (A): After obtaining clear surgical margins the defect was repaired by another provider. Asc Procedure Text (A): After obtaining clear surgical margins the patient was sent to an ASC for surgical repair.  The patient understands they will receive post-surgical care and follow-up from the ASC physician. Asc Procedure Text (B): After obtaining clear surgical margins the patient was sent to an ASC for surgical repair.  The patient understands they will receive post-surgical care and follow-up from the ASC physician. Suturegard Retention Suture: 2-0 Nylon Retention Suture Bite Size: 3 mm Length To Time In Minutes Device Was In Place: 10 Simple / Intermediate / Complex Repair - Final Wound Length In Cm: 3.9 Undermining Type: Entire Wound Debridement Text: The wound edges were debrided prior to proceeding with the closure to facilitate wound healing. Helical Rim Text: The closure involved the helical rim. Vermilion Border Text: The closure involved the vermilion border. Nostril Rim Text: The closure involved the nostril rim. Retention Suture Text: Retention sutures were placed to support the closure and prevent dehiscence. Secondary Defect Length In Cm (Required For Flaps): 0 Location Indication Override (Is Already Calculated Based On Selected Body Location): Area M Area H Indication Text: Tumors in this location are included in Area H (eyelids, eyebrows, nose, lips, chin, ear, pre-auricular, post-auricular, temple, genitalia, hands, feet, ankles and areola).  Tissue conservation is critical in these anatomic locations. Area M Indication Text: Tumors in this location are included in Area M (cheek, forehead, scalp, neck, jawline and pretibial skin).  Mohs surgery is indicated for tumors in these anatomic locations. Area L Indication Text: Tumors in this location are included in Area L (trunk and extremities).  Mohs surgery is indicated for larger tumors, or tumors with aggressive histologic features, in these anatomic locations. Tumor Debulked?: curette Stage 1: Number Of Blocks?: 2 Special Stains Stage 1 - Results: Base On Clearance Noted Above Stage 2: Additional Anesthesia Type: 1% lidocaine with 1:100,000 epinephrine and 408mcg clindamycin/ml and a 1:10 solution of 8.4% sodium bicarbonate Staging Info: By selecting yes to the question above you will include information on AJCC 8 tumor staging in your Mohs note. Information on tumor staging will be automatically added for SCCs on the head and neck. AJCC 8 includes tumor size, tumor depth, perineural involvement and bone invasion. Tumor Depth: Less than 6mm from granular layer and no invasion beyond the subcutaneous fat Was The Patient On Physician Recommended Anticoagulation Therapy?: Please Select the Appropriate Response Medical Necessity Statement: Based on my medical judgement, Mohs surgery is the most appropriate treatment for this cancer compared to other treatments. Alternatives Discussed Intro (Do Not Add Period): I discussed alternative treatments to Mohs surgery and specifically discussed the risks and benefits of Consent 1/Introductory Paragraph: The rationale for Mohs was explained to the patient and consent was obtained. The risks, benefits and alternatives to therapy were discussed in detail. Specifically, the risks of infection, scarring, bleeding, prolonged wound healing, incomplete removal, allergy to anesthesia, nerve injury and recurrence were addressed. Prior to the procedure, the treatment site was clearly identified and confirmed by the patient. All components of Universal Protocol/PAUSE Rule completed. Consent 2/Introductory Paragraph: Mohs surgery was explained to the patient and consent was obtained. The risks, benefits and alternatives to therapy were discussed in detail. Specifically, the risks of infection, scarring, bleeding, prolonged wound healing, incomplete removal, allergy to anesthesia, nerve injury and recurrence were addressed. Prior to the procedure, the treatment site was clearly identified and confirmed by the patient. All components of Universal Protocol/PAUSE Rule completed. Consent 3/Introductory Paragraph: I gave the patient a chance to ask questions they had about the procedure.  Following this I explained the Mohs procedure and consent was obtained. The risks, benefits and alternatives to therapy were discussed in detail. Specifically, the risks of infection, scarring, bleeding, prolonged wound healing, incomplete removal, allergy to anesthesia, nerve injury and recurrence were addressed. Prior to the procedure, the treatment site was clearly identified and confirmed by the patient. All components of Universal Protocol/PAUSE Rule completed. Consent (Temporal Branch)/Introductory Paragraph: The rationale for Mohs was explained to the patient and consent was obtained. The risks, benefits and alternatives to therapy were discussed in detail. Specifically, the risks of damage to the temporal branch of the facial nerve, infection, scarring, bleeding, prolonged wound healing, incomplete removal, allergy to anesthesia, and recurrence were addressed. Prior to the procedure, the treatment site was clearly identified and confirmed by the patient. All components of Universal Protocol/PAUSE Rule completed. Consent (Marginal Mandibular)/Introductory Paragraph: The rationale for Mohs was explained to the patient and consent was obtained. The risks, benefits and alternatives to therapy were discussed in detail. Specifically, the risks of damage to the marginal mandibular branch of the facial nerve, infection, scarring, bleeding, prolonged wound healing, incomplete removal, allergy to anesthesia, and recurrence were addressed. Prior to the procedure, the treatment site was clearly identified and confirmed by the patient. All components of Universal Protocol/PAUSE Rule completed. Consent (Spinal Accessory)/Introductory Paragraph: The rationale for Mohs was explained to the patient and consent was obtained. The risks, benefits and alternatives to therapy were discussed in detail. Specifically, the risks of damage to the spinal accessory nerve, infection, scarring, bleeding, prolonged wound healing, incomplete removal, allergy to anesthesia, and recurrence were addressed. Prior to the procedure, the treatment site was clearly identified and confirmed by the patient. All components of Universal Protocol/PAUSE Rule completed. Consent (Near Eyelid Margin)/Introductory Paragraph: The rationale for Mohs was explained to the patient and consent was obtained. The risks, benefits and alternatives to therapy were discussed in detail. Specifically, the risks of ectropion or eyelid deformity, infection, scarring, bleeding, prolonged wound healing, incomplete removal, allergy to anesthesia, nerve injury and recurrence were addressed. Prior to the procedure, the treatment site was clearly identified and confirmed by the patient. All components of Universal Protocol/PAUSE Rule completed. Consent (Ear)/Introductory Paragraph: The rationale for Mohs was explained to the patient and consent was obtained. The risks, benefits and alternatives to therapy were discussed in detail. Specifically, the risks of ear deformity, infection, scarring, bleeding, prolonged wound healing, incomplete removal, allergy to anesthesia, nerve injury and recurrence were addressed. Prior to the procedure, the treatment site was clearly identified and confirmed by the patient. All components of Universal Protocol/PAUSE Rule completed. Consent (Nose)/Introductory Paragraph: The rationale for Mohs was explained to the patient and consent was obtained. The risks, benefits and alternatives to therapy were discussed in detail. Specifically, the risks of nasal deformity, changes in the flow of air through the nose, infection, scarring, bleeding, prolonged wound healing, incomplete removal, allergy to anesthesia, nerve injury and recurrence were addressed. Prior to the procedure, the treatment site was clearly identified and confirmed by the patient. All components of Universal Protocol/PAUSE Rule completed. Consent (Lip)/Introductory Paragraph: The rationale for Mohs was explained to the patient and consent was obtained. The risks, benefits and alternatives to therapy were discussed in detail. Specifically, the risks of lip deformity, changes in the oral aperture, infection, scarring, bleeding, prolonged wound healing, incomplete removal, allergy to anesthesia, nerve injury and recurrence were addressed. Prior to the procedure, the treatment site was clearly identified and confirmed by the patient. All components of Universal Protocol/PAUSE Rule completed. Consent (Scalp)/Introductory Paragraph: The rationale for Mohs was explained to the patient and consent was obtained. The risks, benefits and alternatives to therapy were discussed in detail. Specifically, the risks of changes in hair growth pattern secondary to repair, infection, scarring, bleeding, prolonged wound healing, incomplete removal, allergy to anesthesia, nerve injury and recurrence were addressed. Prior to the procedure, the treatment site was clearly identified and confirmed by the patient. All components of Universal Protocol/PAUSE Rule completed. Detail Level: Detailed Postop Diagnosis: same Surgeon: Dr. Cornell Additional Anesthesia Volume In Cc: 6 Hemostasis: Electrocautery Estimated Blood Loss (Cc): minimal Stage 5: Additional Anesthesia Type: 1% lidocaine with epinephrine Repair Anesthesia Method: local infiltration Anesthesia Volume In Cc: 0.5 Undermining Location (Optional): in the deep fat Brow Lift Text: A midfrontal incision was made medially to the defect to allow access to the tissues just superior to the left eyebrow. Following careful dissection inferiorly in a supraperiosteal plane to the level of the left eyebrow, several 3-0 monocryl sutures were used to resuspend the eyebrow orbicularis oculi muscular unit to the superior frontal bone periosteum. This resulted in an appropriate reapproximation of static eyebrow symmetry and correction of the left brow ptosis. Deep Sutures: 5-0 Monocryl Epidermal Sutures: 6-0 Prolene Epidermal Closure: running Suturegard Intro: Intraoperative tissue expansion was performed, utilizing the SUTUREGARD device, in order to reduce wound tension. Suturegard Body: The suture ends were repeatedly re-tightened and re-clamped to achieve the desired tissue expansion. Hemigard Intro: Due to skin fragility and wound tension, it was decided to use HEMIGARD adhesive retention suture devices to permit a linear closure. The skin was cleaned and dried for a 6cm distance away from the wound. Excessive hair, if present, was removed to allow for adhesion. Hemigard Postcare Instructions: The HEMIGARD strips are to remain completely dry for at least 5-7 days. Donor Site Anesthesia Type: same as repair anesthesia Graft Donor Site Epidermal Sutures (Optional): 5-0 Fast Absorbing Gut Epidermal Closure Graft Donor Site (Optional): simple interrupted Graft Donor Site Bandage (Optional-Leave Blank If You Don't Want In Note): A pressure bandage was applied to the donor site. Closure 2 Information: This tab is for additional flaps and grafts, including complex repair and grafts and complex repair and flaps. You can also specify a different location for the additional defect, if the location is the same you do not need to select a new one. We will insert the automated text for the repair you select below just as we do for solitary flaps and grafts. Please note that at this time if you select a location with a different insurance zone you will need to override the ICD10 and CPT if appropriate. Closure 3 Information: This tab is for additional flaps and grafts above and beyond our usual structured repairs.  Please note if you enter information here it will not currently bill and you will need to add the billing information manually. Closure 4 Information: This tab is for additional flaps and grafts above and beyond our usual structured repairs.  Please note if you enter information here it will not currently bill and you will need to add the billing information manually. Wound Care: Petrolatum Dressing: pressure dressing with telfa Suture Removal: 8 days Unna Boot Text: An Unna boot was placed to help minimize edema and facilitate more rapid healing. Home Suture Removal Text: Patient was provided instructions on removing sutures and will remove their sutures at home.  If they have any questions or difficulties they will call the office. Post-Care Instructions: I reviewed with the patient in detail post-care instructions. Patient is not to engage in any heavy lifting, exercise, or swimming for the next 5 days. Should the patient develop bleeding, signs of infection, severe pain patient will contact the office immediately. Opioid Counseling: A written handout about side effects and safe use of opioids was provided.  The patient was also verbally counseled about risk of sedation, not to drive, and constipation. Pain Refusal Text: I offered to prescribe pain medication but the patient declined. Mauc Instructions: By selecting yes to the question below the MAUC number will be added into the note.  This will be calculated automatically based on the diagnosis chosen, the size entered, the body zone selected (H,M,L) and the specific indications you chose. You will also have the option to override the Mohs AUC if you disagree with the automatically calculated number and this option is found in the Case Summary tab. Where Do You Want The Question To Include Opioid Counseling Located?: Case Summary Tab Eye Protection Verbiage: Before proceeding with the stage, the globe was anesthetized and a lubricated, scleral shield was inserted.  The shield was gently removed at the end of each stage. Mohs Method Verbiage: A beveled incision following the standard Mohs approach was done and the specimen was harvested as a microscopic controlled layer. Surgeon/Pathologist Verbiage (Will Incorporate Name Of Surgeon From Intro If Not Blank): operated in two distinct and integrated capacities as the surgeon and pathologist. Mohs Histo Method Verbiage: Each section was then chromacoded, mapped and processed in the Mohs lab using the Mohs protocol. Subsequent Stages Histo Method Verbiage: Using a similar technique to that described above, a thin layer of tissue was removed from all areas where tumor was visible on the previous stage.  The tissue was again oriented, mapped, dyed, and processed as above. Mohs Rapid Report Verbiage: The area of clinically evident tumor was marked with skin marking ink and appropriately hatched.  The initial incision was made following the Mohs approach through the skin.  The specimen was taken to the lab, divided into the necessary number of pieces, chromacoded and processed according to the Mohs protocol.  This was repeated in successive stages until a tumor free defect was achieved. Complex Repair Preamble Text (Leave Blank If You Do Not Want): Wide undermining equal or greated to the maximum width of the defect was performed along at least one edge of the wound to minimize wound tension and allow for a more elegant closure. Intermediate Repair Preamble Text (Leave Blank If You Do Not Want): Undermining was performed to minimize wound tension. Crescentic Complex Repair Preamble Text (Leave Blank If You Do Not Want): Extensive wide undermining was performed. Crescentic Intermediate Repair Preamble Text (Leave Blank If You Do Not Want): Undermining was performed with blunt dissection. Non-Graft Cartilage Fenestration Text: The cartilage was fenestrated with a 2mm punch biopsy to help facilitate healing. Graft Cartilage Fenestration Text: The cartilage was fenestrated with a 15 blade to help facilitate graft survival and healing. Secondary Intention Text (Leave Blank If You Do Not Want): The defect will heal with secondary intention. No Repair - Repaired With Adjacent Surgical Defect Text (Leave Blank If You Do Not Want): After obtaining clear surgical margins the defect was repaired concurrently with an adjacent surgical defect. Referred To Oculoplastics For Closure Text (Leave Blank If You Do Not Want): After obtaining clear surgical margins the patient was discharged to oculoplastics for surgical repair.  The patient understands they will receive post-surgical care and follow-up from the oculoplastic physician. Advancement Flap (Single) Text: The defect edges were debeveled with a #15 scalpel blade.  Given the location and shape of the defect a single advancement flap was deemed most appropriate.  Using a sterile surgical marker, an appropriate advancement flap was drawn incorporating the defect and placing the expected incisions to access skin laxity and within the relaxed skin tension lines where possible.    The area thus outlined was incised with a #15 Personna scalpel blade.  The skin margins were undermined in a subcutaneous plane to allow flap advancement under minimal tension. Advancement Flap (Double) Text: The defect edges were debeveled with a #15 scalpel blade.  Given the location and shape of the defect a double advancement flap was deemed most appropriate.  Using a sterile surgical marker, the appropriate advancement flaps were drawn incorporating the defect and placing the expected incisions to access skin laxity and within the relaxed skin tension lines where possible.    The area thus outlined was incised with a #15 Personna scalpel blade.  The skin margins were undermined in a subcutaneous plane to allow flap advancement with minimal tension. Burow's Advancement Flap Text: The defect edges were debeveled with a #15 scalpel blade.  Given the location of the defect and the proximity to free margins a Burow's advancement flap was deemed most appropriate.  Using a sterile surgical marker, the appropriate advancement flap was drawn incorporating the defect and placing the expected incisions within the relaxed skin tension lines where possible.    The area thus outlined was incised deep to adipose tissue with a #15 scalpel blade.  The skin margins were undermined to an appropriate distance in all directions utilizing iris scissors. Chonodrocutaneous Helical Advancement Flap Text: The defect edges were debeveled with a #15 scalpel blade.  Given the location of the defect and the proximity to free margins a chondrocutaneous helical advancement flap was deemed most appropriate.  Using a sterile surgical marker, the appropriate advancement flap was drawn incorporating the defect and placing the expected incisions within the relaxed skin tension lines where possible.    The area thus outlined was incised deep to adipose tissue with a #15 scalpel blade.  The skin margins were undermined to an appropriate distance in all directions utilizing iris scissors. Crescentic Advancement Flap Text: The defect edges were debeveled with a #15 scalpel blade.  Given the location of the defect and the proximity to free margins a crescentic advancement flap was deemed most appropriate.  Using a sterile surgical marker, the appropriate advancement flap was drawn incorporating the defect and placing the expected incisions within the relaxed skin tension lines where possible.    The area thus outlined was incised deep to adipose tissue with a #15 scalpel blade.  The skin margins were undermined to an appropriate distance in all directions utilizing iris scissors. A-T Advancement Flap Text: The defect edges were debeveled with a #15 scalpel blade.  Given the location of the defect, shape of the defect and the proximity to free margins an A-T advancement flap was deemed most appropriate.  Using a sterile surgical marker, an appropriate advancement flap was drawn incorporating the defect and placing the expected incisions within the relaxed skin tension lines where possible.    The area thus outlined was incised deep to adipose tissue with a #15 scalpel blade.  The skin margins were undermined to an appropriate distance in all directions utilizing iris scissors. O-T Advancement Flap Text: The defect edges were debeveled with a #15 scalpel blade.  Given the location and shape of the defect an O-T advancement flap was deemed most appropriate.  Using a sterile surgical marker, an appropriate advancement flap was drawn incorporating the defect and placing the expected incisions to access surrounding tissue laxity and within the relaxed skin tension lines where possible.    The area thus outlined was incised with a #15 Personna scalpel blade.  The skin margins were undermined to an appropriate distance to allow flap advancement under minimial tension. O-L Flap Text: The defect edges were debeveled with a #15 scalpel blade.  Given the location of the defect, shape of the defect and the proximity to free margins an O-L flap was deemed most appropriate.  Using a sterile surgical marker, an appropriate advancement flap was drawn incorporating the defect and placing the expected incisions within the relaxed skin tension lines where possible.    The area thus outlined was incised deep to adipose tissue with a #15 scalpel blade.  The skin margins were undermined to an appropriate distance in all directions utilizing iris scissors. O-Z Flap Text: The defect edges were debeveled with a #15 scalpel blade.  Given the location of the defect, shape of the defect and the proximity to free margins an O-Z flap was deemed most appropriate.  Using a sterile surgical marker, an appropriate transposition flap was drawn incorporating the defect and placing the expected incisions within the relaxed skin tension lines where possible. The area thus outlined was incised deep to adipose tissue with a #15 scalpel blade.  The skin margins were undermined to an appropriate distance in all directions utilizing iris scissors. Double O-Z Flap Text: The defect edges were debeveled with a #15 scalpel blade.  Given the location of the defect, shape of the defect and the proximity to free margins a Double O-Z flap was deemed most appropriate.  Using a sterile surgical marker, an appropriate transposition flap was drawn incorporating the defect and placing the expected incisions within the relaxed skin tension lines where possible. The area thus outlined was incised deep to adipose tissue with a #15 scalpel blade.  The skin margins were undermined to an appropriate distance in all directions utilizing iris scissors. V-Y Flap Text: The defect edges were debeveled with a #15 scalpel blade.  Given the location and shape of the defect a V-Y flap was deemed most appropriate.  Using a sterile surgical marker, an appropriate flap was drawn incorporating the defect and placing the expected incisions within the relaxed skin tension lines where possible.  The area thus outlined was incised with a #15 scalpel blade.  The surrounding tissue margins were undermined to allow flap advancement with minimal-to-no tension while maintaining a robust, central pedicle. Advancement-Rotation Flap Text: The defect edges were debeveled with a #15 scalpel blade.  Given the location of the defect, shape of the defect and the proximity to free margins an advancement-rotation flap was deemed most appropriate.  Using a sterile surgical marker, an appropriate flap was drawn incorporating the defect and placing the expected incisions within the relaxed skin tension lines where possible. The area thus outlined was incised deep to adipose tissue with a #15 scalpel blade.  The skin margins were undermined to an appropriate distance in all directions utilizing iris scissors. Mercedes Flap Text: The defect edges were debeveled with a #15 scalpel blade.  Given the location and shape of the defect a Mercedes flap was deemed most appropriate.  Using a sterile surgical marker, an appropriate triple advancement flap was drawn incorporating the defect and placing the expected incisions to access skin laxity where possible. The area thus outlined was incised with a #15 scalpel blade.  The skin margins were widely undermined in the subcutaneous plane to minimize tension. Modified Advancement Flap Text: The defect edges were debeveled with a #15 scalpel blade.  Given the location and shape of the defect and the proximity to free margins a modified advancement flap was deemed most appropriate.  Using a sterile surgical marker, an appropriate advancement flap was drawn incorporating the defect and placing the expected incisions superiorly on the nose and inferior-medially over the columella.    The area thus outlined was incised with a #15 scalpel blade.  The skin margins were undermined submuscular to minimize wound tension. Mucosal Advancement Flap Text: Given the location of the defect, shape of the defect and the proximity to free margins a mucosal advancement flap was deemed most appropriate. Incisions were made with a 15 blade scalpel in the appropriate fashion along the cutaneous vermilion border and the mucosal lip. The remaining actinically damaged mucosal tissue was excised.  The mucosal advancement flap was then elevated to the gingival sulcus with care taken to preserve the neurovascular structures and advanced into the primary defect. Care was taken to ensure that precise realignment of the vermilion border was achieved. Peng Advancement Flap Text: The defect edges were debeveled with a #15 scalpel blade.  Given the location of the defect, shape of the defect and the proximity to free margins a Peng advancement flap was deemed most appropriate.  Using a sterile surgical marker, an appropriate advancement flap was drawn incorporating the defect and placing the expected incisions within the relaxed skin tension lines where possible. The area thus outlined was incised deep to adipose tissue with a #15 scalpel blade.  The skin margins were undermined to an appropriate distance in all directions utilizing iris scissors. Hatchet Flap Text: The defect edges were debeveled with a #15 scalpel blade.  Given the location and shape of the defect and the proximity to free margins a hatchet flap was deemed most appropriate.  Using a sterile surgical marker, an appropriate hatchet flap was drawn incorporating the defect and placing the expected incisions within the relaxed skin tension lines where possible.    The area thus outlined was incised with a #15 scalpel blade.  The skin margins were undermined in a submuscular plane to an appropriate distance in all directions to allow for flap closure under minimal tension. Rotation Flap Text: The defect edges were debeveled with a #15 scalpel blade.  Given the location and shape of the defect a rotation flap was deemed most appropriate.  Using a sterile surgical marker, an appropriate rotation flap was drawn incorporating the defect and placing the expected incisions to access skin laxity and within the relaxed skin tension lines where possible.    The area thus outlined was incised with a #15 scalpel blade.  The skin margins were undermined to an appropriate distance to allow for closure under minimal tension. Spiral Flap Text: The defect edges were debeveled with a #15 scalpel blade.  Given the location and shape of the defect, and the proximity to free margins a spiral flap was deemed most appropriate.  Using a sterile surgical marker, an appropriate rotation flap was drawn incorporating the defect and placing the expected incisions to access skin laxity and within the relaxed skin tension lines where possible. The area thus outlined was incised with a #15 scalpel blade.  The skin margins were undermined to an appropriate distance in all directions to minimize tension with closure. Star Wedge Flap Text: The defect edges were debeveled with a #15 scalpel blade.  Given the location of the defect, shape of the defect and the proximity to free margins a star wedge flap was deemed most appropriate.  Using a sterile surgical marker, an appropriate rotation flap was drawn incorporating the defect and placing the expected incisions within the relaxed skin tension lines where possible. The area thus outlined was incised deep to adipose tissue with a #15 scalpel blade.  The skin margins were undermined to an appropriate distance in all directions utilizing iris scissors. Transposition Flap Text: The defect edges were debeveled with a #15 scalpel blade.  Given the location and shape of the defect, a transposition flap was deemed most appropriate.  Using a sterile surgical marker, an appropriate transposition flap was drawn incorporating the defect, accessing surrounding skin laxity.    The area thus outlined was incised with a #15 scalpel blade.  The skin margins were undermined to minimize wound tension. Muscle Hinge Flap Text: The defect edges were debeveled with a #15 scalpel blade.  Given the size, depth and location of the defect a muscle hinge flap was deemed most appropriate.  Using a sterile surgical marker, an appropriate hinge flap was drawn incorporating the defect. The area thus outlined was incised with a #15 scalpel blade and the cutaneous layer  from the underlying muscle.  The muscular flap was then incised, lifted and flipped into the primary defect.  The secondary defect cutaneous layer was then replaced and closed in a standard, layered fashion. Nasal Turnover Hinge Flap Text: The defect edges were debeveled with a #15 scalpel blade.  Given the size, depth, location of the defect and the defect being full thickness a nasal turnover hinge flap was deemed most appropriate.  Using a sterile surgical marker, an appropriate hinge flap was drawn incorporating the defect. The area thus outlined was incised with a #15 scalpel blade. The flap was designed to recreate the nasal mucosal lining and the alar rim. The skin margins were undermined to an appropriate distance in all directions utilizing iris scissors. Nasalis-Muscle-Based Myocutaneous Island Pedicle Flap Text: Using a #15 blade, an incision was made around the donor flap to the level of the nasalis muscle. Wide lateral undermining was then performed in both the subcutaneous plane above the nasalis muscle, and in a submuscular plane just above periosteum. This allowed the formation of a free nasalis muscle axial pedicle (based on the angular artery) which was still attached to the actual cutaneous flap, increasing its mobility and vascular viability. Hemostasis was obtained with pinpoint electrocoagulation. The flap was mobilized into position and the pivotal anchor points positioned and stabilized with buried interrupted sutures. Subcutaneous and dermal tissues were closed in a multilayered fashion with sutures. Tissue redundancies were excised, and the epidermal edges were apposed without significant tension and sutured with sutures. Orbicularis Oris Muscle Flap Text: The defect edges were debeveled with a #15 scalpel blade.  Given that the defect affected the competency of the oral sphincter an obicularis oris muscle flap was deemed most appropriate to restore this competency and normal muscle function.  Using a sterile surgical marker, an appropriate flap was drawn incorporating the defect. The area thus outlined was incised with a #15 scalpel blade. Melolabial Transposition Flap Text: The defect edges were debeveled with a #15 scalpel blade.  Given the location, shape and depth of the defect, and the desire to limit reconstruction to a single-stage procedure, a melolabial flap was deemed most appropriate.  Using a sterile surgical marker, an appropriate melolabial transposition flap was drawn incorporating the defect and extending along the nasolabial fold.    The area thus outlined was incised with a #15 scalpel blade and the flap lifted distal to proximal, maintaining a robust vascular pedicle.  The surrounding tissue was widely undermined in a subcutaneous plane.  A pexing suture was used to advance the cheek to the piriform aperture and close much of the secondary defect.  The flap was then transposed, thinned and trimmed to match the primary defect. Rhombic Flap Text: The defect edges were debeveled with a #15 scalpel blade.  Given the location and shape of the defect a rhombic flap was deemed most appropriate.  Using a sterile surgical marker, an appropriate rhombic flap was drawn incorporating the defect and access surrounding tissue laxity.    The area thus outlined was incised with a #15 scalpel blade.  The skin margins were undermined to an appropriate distance in all directions to minimize tension with closure. Rhomboid Transposition Flap Text: The defect edges were debeveled with a #15 scalpel blade.  Given the location of the defect and the proximity to free margins a rhomboid transposition flap was deemed most appropriate.  Using a sterile surgical marker, an appropriate rhomboid flap was drawn incorporating the defect.    The area thus outlined was incised deep to adipose tissue with a #15 scalpel blade.  The skin margins were undermined to an appropriate distance in all directions utilizing iris scissors. Bi-Rhombic Flap Text: The defect edges were debeveled with a #15 scalpel blade.  Given the location of the defect and the proximity to free margins a bi-rhombic flap was deemed most appropriate.  Using a sterile surgical marker, appropriate rhombic flaps were drawn incorporating the defect. The area thus outlined was incised deep with a #15 scalpel blade.  The skin margins were undermined to an appropriate distance in all directions to minimize tension with closure. Helical Rim Advancement Flap Text: The defect edges were debeveled with a #15 blade scalpel.  Given the location and shape of the defect a helical rim advancement flap was deemed most appropriate.  Using a sterile surgical marker, the appropriate advancement flap was drawn incorporating the defect and placing the expected incision along the helical crease to the ear lobule.  The area thus outlined was incised to the posterior cutaneous surface with a #15 scalpel blade.  The flap is the undermined to the post-auricular sulcus to allow advancement under minimal tension.  If necessary the cartilaginous rim is trimmed and thinned to optimize closure. Bilateral Helical Rim Advancement Flap Text: The defect edges were debeveled with a #15 blade scalpel.  Given the location of the defect and the proximity to free margins (helical rim) a bilateral helical rim advancement flap was deemed most appropriate.  Using a sterile surgical marker, the appropriate advancement flaps were drawn incorporating the defect and placing the expected incisions between the helical rim and antihelix where possible.  The area thus outlined was incised through and through with a #15 scalpel blade.  With a skin hook and iris scissors, the flaps were gently and sharply undermined and freed up. Ear Star Wedge Flap Text: The defect edges were debeveled with a #15 blade scalpel.  Given the location of the defect and the proximity to free margins (helical rim) an ear star wedge flap was deemed most appropriate.  Using a sterile surgical marker, the appropriate flap was drawn incorporating the defect and placing the expected incisions between the helical rim and antihelix where possible.  The area thus outlined was incised through and through with a #15 scalpel blade. Banner Transposition Flap Text: The defect edges were debeveled with a #15 scalpel blade.  Given the location and shape of the defect a Banner transposition flap was deemed most appropriate.  Using a sterile surgical marker, an appropriate flap drawn around the defect to access adjacent skin laxity. The area thus outlined was incised deep to adipose tissue with a #15 scalpel blade.  The skin margins were undermined to an appropriate distance in all directions maintaining a robust vascular pedicle. Bilobed Flap Text: The defect edges were debeveled with a #15 scalpel blade.  Given the location of the defect and the proximity to free margins a Zitelli-modified bilobed transposition flap was deemed most appropriate.  Using a sterile surgical marker, an appropriate bilobed flap was measured and drawn around the defect undergoing a 90-degree arc of rotation.    The area thus outlined was incised with a #15 scalpel blade.  The skin margins were undermined submuscularly to allow flap closure with minimal tension. Bilobed Transposition Flap Text: The defect edges were debeveled with a #15 scalpel blade.  Given the location of the defect and the proximity to free margins a Zitelli-modified bilobed transposition flap was deemed most appropriate.  Using a sterile surgical marker, an appropriate bilobed flap was measured and drawn around the defect undergoing a 90-degree arc of rotation.    The area thus outlined was incised with a #15 scalpel blade.  The skin margins were undermined submuscularly to allow flap closure with minimal tension. Trilobed Flap Text: Given the location and shape of the defect and the proximity to free margins a trilobed transposition flap was deemed most appropriate.  Using a sterile surgical marker, an appropriate trilobed flap was measured and drawn around the defect undergoing a 135-degree arc of rotation.    The area thus outlined was incised with a #15 scalpel blade.  The skin margins were undermined submuscularly to allow closure with minimal tension and minimize disruption of adjacent free margins. Dorsal Nasal Flap Text: The defect edges were debeveled with a #15 scalpel blade.  Given the location and shape of the defect and the proximity to free margins a dorsal nasal flap was deemed most appropriate.  Using a sterile surgical marker, an appropriate dorsal nasal flap was drawn from the defect, along the nasofacial sulcus and onto the glabella.    The area thus outlined was incised deep to adipose tissue along the glabella and submuscular over the nose with a #15 scalpel blade.  The skin margins were widely undermined to an appropriate distance in all directions. Island Pedicle Flap Text: The defect edges were debeveled with a #15 scalpel blade.  Given the location of the defect, shape of the defect and the proximity to free margins an island pedicle advancement flap was deemed most appropriate.  Using a sterile surgical marker, an appropriate advancement flap was drawn incorporating the defect, outlining the appropriate donor tissue and placing the expected incisions within the relaxed skin tension lines where possible.    The area thus outlined was incised deep to adipose tissue with a #15 scalpel blade.  The skin margins were undermined to an appropriate distance in all directions around the primary defect and laterally outward around the island pedicle utilizing iris scissors.  There was minimal undermining beneath the pedicle flap. Island Pedicle Flap With Canthal Suspension Text: The defect edges were debeveled with a #15 scalpel blade.  Given the location of the defect, shape of the defect and the proximity to free margins an island pedicle advancement flap was deemed most appropriate.  Using a sterile surgical marker, an appropriate advancement flap was drawn incorporating the defect, outlining the appropriate donor tissue and placing the expected incisions within the relaxed skin tension lines where possible. The area thus outlined was incised deep to adipose tissue with a #15 scalpel blade.  The skin margins were undermined to an appropriate distance in all directions around the primary defect and laterally outward around the island pedicle utilizing iris scissors.  There was minimal undermining beneath the pedicle flap. A suspension suture was placed in the canthal tendon to prevent tension and prevent ectropion. Alar Island Pedicle Flap Text: The defect edges were debeveled with a #15 scalpel blade.  Given the location of the defect, shape of the defect and the proximity to the alar rim an island pedicle advancement flap was deemed most appropriate.  Using a sterile surgical marker, an appropriate advancement flap was drawn incorporating the defect, outlining the appropriate donor tissue and placing the expected incisions within the nasal ala running parallel to the alar rim. The area thus outlined was incised with a #15 scalpel blade.  The skin margins were undermined minimally to an appropriate distance in all directions around the primary defect and laterally outward around the island pedicle utilizing iris scissors.  There was minimal undermining beneath the pedicle flap. Double Island Pedicle Flap Text: The defect edges were debeveled with a #15 scalpel blade.  Given the location of the defect, shape of the defect and the proximity to free margins a double island pedicle advancement flap was deemed most appropriate.  Using a sterile surgical marker, an appropriate advancement flap was drawn incorporating the defect, outlining the appropriate donor tissue and placing the expected incisions within the relaxed skin tension lines where possible.    The area thus outlined was incised deep to adipose tissue with a #15 scalpel blade.  The skin margins were undermined to an appropriate distance in all directions around the primary defect and laterally outward around the island pedicle utilizing iris scissors.  There was minimal undermining beneath the pedicle flap. Island Pedicle Flap-Requiring Vessel Identification Text: The defect edges were debeveled with a #15 scalpel blade.  Given the location of the defect, shape of the defect and the proximity to free margins an island pedicle advancement flap was deemed most appropriate.  Using a sterile surgical marker, an appropriate advancement flap was drawn, based on the axial vessel mentioned above, incorporating the defect, outlining the appropriate donor tissue and placing the expected incisions within the relaxed skin tension lines where possible.    The area thus outlined was incised deep to adipose tissue with a #15 scalpel blade.  The skin margins were undermined to an appropriate distance in all directions around the primary defect and laterally outward around the island pedicle utilizing iris scissors.  There was minimal undermining beneath the pedicle flap. Keystone Flap Text: The defect edges were debeveled with a #15 scalpel blade.  Given the location and shape of the defect a keystone flap was deemed most appropriate to access adjacent skin laxity.  Using a sterile surgical marker, an appropriate keystone flap was drawn incorporating the defect and outlining the appropriate donor tissue. The area thus outlined was incised deep to adipose tissue with a #15 scalpel blade.  The surrounding skin margins were undermined to an appropriate distance in all directions maintaining a robust central pedicle.  Tension holding sutures were then placed to advance the flap and then the tissue closed in a standard layered fashion. O-T Plasty Text: The defect edges were debeveled with a #15 scalpel blade.  Given the location of the defect, shape of the defect and the proximity to free margins an O-T plasty was deemed most appropriate.  Using a sterile surgical marker, an appropriate O-T plasty was drawn incorporating the defect and placing the expected incisions within the relaxed skin tension lines where possible.    The area thus outlined was incised deep to adipose tissue with a #15 scalpel blade.  The skin margins were undermined to an appropriate distance in all directions utilizing iris scissors. O-Z Plasty Text: The defect edges were debeveled with a #15 scalpel blade.  Given the location of the defect, shape of the defect and the proximity to free margins an O-Z plasty (double transposition flap) was deemed most appropriate.  Using a sterile surgical marker, the appropriate transposition flaps were drawn incorporating the defect and placing the expected incisions within the relaxed skin tension lines where possible.    The area thus outlined was incised deep to adipose tissue with a #15 scalpel blade.  The skin margins were undermined to an appropriate distance in all directions utilizing iris scissors.  Hemostasis was achieved with electrocautery.  The flaps were then transposed into place, one clockwise and the other counterclockwise, and anchored with interrupted buried subcutaneous sutures. Double O-Z Plasty Text: The defect edges were debeveled with a #15 scalpel blade.  Given the location of the defect, shape of the defect and the proximity to free margins a Double O-Z plasty (double transposition flap) was deemed most appropriate.  Using a sterile surgical marker, the appropriate transposition flaps were drawn incorporating the defect and placing the expected incisions within the relaxed skin tension lines where possible. The area thus outlined was incised deep to adipose tissue with a #15 scalpel blade.  The skin margins were undermined to an appropriate distance in all directions utilizing iris scissors.  Hemostasis was achieved with electrocautery.  The flaps were then transposed into place, one clockwise and the other counterclockwise, and anchored with interrupted buried subcutaneous sutures. V-Y Plasty Text: The defect edges were debeveled with a #15 scalpel blade.  Given the location of the defect, shape of the defect and the proximity to free margins an V-Y advancement flap was deemed most appropriate.  Using a sterile surgical marker, an appropriate advancement flap was drawn incorporating the defect and placing the expected incisions within the relaxed skin tension lines where possible.    The area thus outlined was incised deep to adipose tissue with a #15 scalpel blade.  The skin margins were undermined to an appropriate distance in all directions utilizing iris scissors. H Plasty Text: Given the location of the defect, shape of the defect and the proximity to free margins a H-plasty was deemed most appropriate for repair.  Using a sterile surgical marker, the appropriate advancement arms of the H-plasty were drawn incorporating the defect and placing the expected incisions within the relaxed skin tension lines where possible. The area thus outlined was incised deep to adipose tissue with a #15 scalpel blade. The skin margins were undermined to an appropriate distance in all directions utilizing iris scissors.  The opposing advancement arms were then advanced into place in opposite direction and anchored with interrupted buried subcutaneous sutures. W Plasty Text: The lesion was extirpated to the level of the fat with a #15 scalpel blade.  Given the location of the defect, shape of the defect and the proximity to free margins a W-plasty was deemed most appropriate for repair.  Using a sterile surgical marker, the appropriate transposition arms of the W-plasty were drawn incorporating the defect and placing the expected incisions within the relaxed skin tension lines where possible.    The area thus outlined was incised deep to adipose tissue with a #15 scalpel blade.  The skin margins were undermined to an appropriate distance in all directions utilizing iris scissors.  The opposing transposition arms were then transposed into place in opposite direction and anchored with interrupted buried subcutaneous sutures. Z Plasty Text: The lesion was extirpated to the level of the fat with a #15 scalpel blade.  Given the location of the defect, shape of the defect and the proximity to free margins a Z-plasty was deemed most appropriate for repair.  Using a sterile surgical marker, the appropriate transposition arms of the Z-plasty were drawn incorporating the defect and placing the expected incisions within the relaxed skin tension lines where possible.    The area thus outlined was incised deep to adipose tissue with a #15 scalpel blade.  The skin margins were undermined to an appropriate distance in all directions utilizing iris scissors.  The opposing transposition arms were then transposed into place in opposite direction and anchored with interrupted buried subcutaneous sutures. Zygomaticofacial Flap Text: Given the location of the defect, shape of the defect and the proximity to free margins a zygomaticofacial flap was deemed most appropriate for repair.  Using a sterile surgical marker, the appropriate flap was drawn incorporating the defect and placing the expected incisions within the relaxed skin tension lines where possible. The area thus outlined was incised deep to adipose tissue with a #15 scalpel blade with preservation of a vascular pedicle.  The skin margins were undermined to an appropriate distance in all directions utilizing iris scissors.  The flap was then placed into the defect and anchored with interrupted buried subcutaneous sutures. Cheek Interpolation Flap Text: A decision was made to reconstruct the defect utilizing an interpolation  staged reconstruction.  A template was made of the defect.  This  template was then used to outline the flap along the nasolabial fold.    The flap was incised with a 15 blade and lifted distal to proximal, preserving a robust vascular based pedicle.  The edges of the donor site were undermined.   The donor site was closed in a primary layered fashion.  The flap was then rotated into position, trimmed and thinned  and sutured.   The pedicle was then wrapped with xeroform gauze and dressed appropriately with a telfa and gauze bandage. Cheek-To-Nose Interpolation Flap Text: A decision was made to reconstruct the defect utilizing an interpolation  staged reconstruction.  A template was made of the defect.  This  template was then used to outline the flap along the nasolabial fold.    The flap was incised with a 15 blade and lifted distal to proximal, preserving a robust vascular based pedicle.  The edges of the donor site were undermined.   The donor site was closed in a primary layered fashion.  The flap was then rotated into position, trimmed and thinned  and sutured.  A nasal cone was placed to promote airway patency and flap placement.  The pedicle was then wrapped with xeroform gauze and dressed appropriately with a telfa and gauze bandage. Interpolation Flap Text: A decision was made to reconstruct the defect utilizing an interpolation axial flap and a staged reconstruction.  A telfa template was made of the defect.  This telfa template was then used to outline the interpolation flap.  The donor area for the pedicle flap was then injected with anesthesia.  The flap was excised through the skin and subcutaneous tissue down to the layer of the underlying musculature.  The interpolation flap was carefully excised within this deep plane to maintain its blood supply.  The edges of the donor site were undermined.   The donor site was closed in a primary fashion.  The pedicle was then rotated into position and sutured.  The pedicle was then wrapped with xeroform gauze and dressed appropriately with a telfa and gauze bandage to ensure continued blood supply and protect the attached pedicle. Melolabial Interpolation Flap Text: A decision was made to reconstruct the defect utilizing an interpolation axial flap and a staged reconstruction.  A telfa template was made of the defect.  This telfa template was then used to outline the melolabial interpolation flap.  The donor area for the pedicle flap was then injected with anesthesia.  The flap was excised through the skin and subcutaneous tissue down to the layer of the underlying musculature.  The pedicle flap was carefully excised within this deep plane to maintain its blood supply.  The edges of the donor site were undermined.   The donor site was closed in a primary fashion.  The pedicle was then rotated into position and sutured.  Once the tube was sutured into place, adequate blood supply was confirmed with blanching and refill.  The pedicle was then wrapped with xeroform gauze and dressed appropriately with a telfa and gauze bandage to ensure continued blood supply and protect the attached pedicle. Mastoid Interpolation Flap Text: A decision was made to reconstruct the defect utilizing an interpolation  staged reconstruction.  A telfa template was made of the defect.  This telfa template was then used to outline the mastoid interpolation flap.  The donor area for the pedicle flap was then injected with anesthesia.  The flap was excised through the skin and subcutaneous tissue down to the layer of the underlying musculature.  The pedicle flap was carefully excised within this deep plane to maintain its blood supply.  The edges of the donor site were undermined.   The donor site was closed in a primary fashion.  The pedicle was then rotated into position and sutured.  Once the tube was sutured into place, adequate blood supply was confirmed with blanching and refill.  The pedicle was then wrapped with xeroform gauze and dressed appropriately with a telfa and gauze bandage to ensure continued blood supply and protect the attached pedicle. Posterior Auricular Interpolation Flap Text: A decision was made to reconstruct the defect utilizing an interpolation staged reconstruction.  The ear was pressed back against the scalp and a flap outline created using a sterile marker from the post-auricular surface, slightly widening onto the scalp.  The donor area for the pedicle flap was then injected with anesthesia.  The flap was excised through the skin and subcutaneous tissue down to the layer of the underlying musculature.  The pedicle flap was carefully excised within this deep plane to maintain its blood supply.  The edges of the donor site were undermined.   The flap was then advanced over the helical rim and sutured into position.  The donor site was partially closed leaving a postauricular dead space.  The pedicle and deadspace were bandaged with xeroform gauze and a telfa/gauze bandage. Paramedian Forehead Flap Text: After consultation with the patient and a review of the expected surgical process, a decision was made to reconstruct the defect utilizing an interpolation axial flap and a staged reconstruction.  A template was made of the defect.  The glabellar frown lines were marked and the pedicle marked from 3mm medial and 5mm lateral to this landmark.  The template was then used to outline the paramedian forehead pedicle flap extending from the orbital rim on to the forehead. \\n Gauze was used to measure from the flap to the defect to ensure adequate reach.  Supratrochlear and supraorbital nerve blocks were done and the donor area for the pedicle flap was  injected with anesthesia.  The flap was excised through the skin and subcutaneous tissue down to the layer of the underlying musculature.  The flap was then lifted in the subcutaneous plane superiorly and then more deeply and bluntly over the orbital rim to preserve the supratrochlear artery. \\n The edges of the donor site were undermined submuscularly and .   the donor site was closed in a primary trilaminar (fascia-muscle, adipose-dermis, dermis-epidermis fashion.  The pedicle was then rotated into position, thinned, and sutured.  The pedicle was then wrapped with xeroform gauze and dressed appropriately with a telfa and gauze bandage to protect the attached pedicle. Cheiloplasty (Less Than 50%) Text: A decision was made to reconstruct the defect with a  cheiloplasty.  The defect was undermined extensively.  Additional obicularis oris muscle was excised with a 15 blade scalpel.  The defect was converted into a full thickness wedge, of less than 50% of the vertical height of the lip, to facilite a better cosmetic result.  Small vessels were then tied off with 5-0 monocyrl. The obicularis oris, superficial fascia, adipose and dermis were then reapproximated.  After the deeper layers were approximated the epidermis was reapproximated with particular care given to realign the vermilion border. Cheiloplasty (Complex) Text: A decision was made to reconstruct the defect with a  cheiloplasty.  The defect was undermined extensively.  Additional obicularis oris muscle was excised with a 15 blade scalpel.  The defect was converted into a full thickness wedge to facilite a better cosmetic result.  Small vessels were then tied off with 5-0 monocyrl. The obicularis oris, superficial fascia, adipose and dermis were then reapproximated.  After the deeper layers were approximated the epidermis was reapproximated with particular care given to realign the vermilion border. Ear Wedge Repair Text: A wedge excision was completed by carrying down an excision through the full thickness of the ear and cartilage with an inward facing Burrow's triangle. The wound was then closed in a layered fashion. Full Thickness Lip Wedge Repair (Flap) Text: Given the location of the defect and the proximity to free margins a full thickness wedge repair was deemed most appropriate.  Using a sterile surgical marker, the appropriate repair was drawn incorporating the defect and placing the expected incisions perpendicular to the vermilion border.  The vermilion border was also meticulously outlined to ensure appropriate reapproximation during the repair.  The area thus outlined was incised through and through with a #15 scalpel blade.  The closure was then performed in layered fashion.  Mucosal first, then muscularis and dermis were reapproximated. Care was taken to realign the vermilion border before proceeding with the superficial closure. Ftsg Text: The defect edges were debeveled with a #15 scalpel blade.  Given the location, shape, and depth of the defect a full thickness skin graft was deemed most appropriate.  Using a sterile surgical marker, the primary defect shape was transferred to the donor site. The area thus outlined was incised deep to adipose tissue with a #15 scalpel blade.  The harvested graft was then trimmed of adipose tissue until only dermis and epidermis was left.  The skin margins of the secondary defect were undermined to an appropriate distance to minimize tension with closure.  The secondary defect was closed with interrupted buried subcutaneous sutures.  The skin edges were then re-apposed with  sutures.  The skin graft was then placed in the primary defect and sutured into place. Split-Thickness Skin Graft Text: The defect edges were debeveled with a #15 scalpel blade.  Given the location, shape, and depth of the defect, a split thickness skin graft was deemed most appropriate.  Using a sterile surgical marker, the primary defect shape was transferred to the donor site. The split thickness graft was then harvested.  The skin graft was then placed in the primary defect, oriented appropriately, and sutured into place. Burow's Graft Text: The defect edges were debeveled with a #15 scalpel blade.  Given the location of the defect, shape of the defect, the proximity to free margins and the presence of a standing cone deformity a Burow's skin graft was deemed most appropriate. The standing cone was removed and this tissue was then trimmed to the shape of the primary defect. The adipose tissue was also removed until only dermis and epidermis were left.  The skin margins of the secondary defect were undermined to an appropriate distance in all directions utilizing iris scissors.  The secondary defect was closed with interrupted buried subcutaneous sutures.  The skin edges were then re-apposed with running  sutures.  The skin graft was then placed in the primary defect and oriented appropriately. Cartilage Graft Text: The defect edges were debeveled with a #15 scalpel blade.  Given the location, shape, and depth of the defect a cartilage batten graft was deemed necessary to protect the free margin and restore contour.  An appropriate donor site was identified, cleansed, and anesthetized. The overlying skin was incised and lifted.  A cartilage batten graft was then harvested and the overlying skin replaced and sutured closed.  The cartilage graft was then placed into stab pocket incisions in the recipient site and secured with sutures. Composite Graft Text: The defect edges were debeveled with a #15 scalpel blade.  Given the location of the defect, shape of the defect, the proximity to free margins and the fact the defect was full thickness a composite graft was deemed most appropriate.  The defect was outline and then transferred to the donor site.  A full thickness graft was then excised from the donor site. The graft was then placed in the primary defect, oriented appropriately and then sutured into place.  The secondary defect was then repaired using a primary closure. Epidermal Autograft Text: The defect edges were debeveled with a #15 scalpel blade.  Given the location of the defect, shape of the defect and the proximity to free margins an epidermal autograft was deemed most appropriate.  Using a sterile surgical marker, the primary defect shape was transferred to the donor site. The epidermal graft was then harvested.  The skin graft was then placed in the primary defect and oriented appropriately. Dermal Autograft Text: The defect edges were debeveled with a #15 scalpel blade.  Given the location of the defect, shape of the defect and the proximity to free margins a dermal autograft was deemed most appropriate.  Using a sterile surgical marker, the primary defect shape was transferred to the donor site. The area thus outlined was incised deep to adipose tissue with a #15 scalpel blade.  The harvested graft was then trimmed of adipose and epidermal tissue until only dermis was left.  The skin graft was then placed in the primary defect and oriented appropriately. Skin Substitute Text: The defect edges were debeveled with a #15 scalpel blade.  Given the location of the defect, shape of the defect and the proximity to free margins a skin substitute graft was deemed most appropriate.  The graft material was trimmed to fit the size of the defect. The graft was then placed in the primary defect and oriented appropriately. Tissue Cultured Epidermal Autograft Text: The defect edges were debeveled with a #15 scalpel blade.  Given the location of the defect, shape of the defect and the proximity to free margins a tissue cultured epidermal autograft was deemed most appropriate.  The graft was then trimmed to fit the size of the defect.  The graft was then placed in the primary defect and oriented appropriately. Xenograft Text: The defect edges were debeveled with a #15 scalpel blade.  Given the location of the defect, shape of the defect and the proximity to free margins a xenograft was deemed most appropriate.  The graft was then trimmed to fit the size of the defect.  The graft was then placed in the primary defect and oriented appropriately. Purse String (Simple) Text: Given the location of the defect and the characteristics of the surrounding skin a purse string closure was deemed most appropriate.  Undermining was performed circumfirentially around the surgical defect.  A purse string suture was then placed and tightened. Purse String (Intermediate) Text: Given the location of the defect and the characteristics of the surrounding skin a purse string intermediate closure was deemed most appropriate.  Undermining was performed circumfirentially around the surgical defect.  A purse string suture was then placed and tightened. Partial Purse String (Simple) Text: Given the location of the defect and the characteristics of the surrounding skin a simple purse string closure was deemed most appropriate.  Undermining was performed circumfirentially around the surgical defect.  A purse string suture was then placed and tightened. Wound tension only allowed a partial closure of the circular defect. Partial Purse String (Intermediate) Text: Given the location of the defect and the characteristics of the surrounding skin an intermediate purse string closure was deemed most appropriate.  Undermining was performed circumfirentially around the surgical defect.  A purse string suture was then placed and tightened. Wound tension only allowed a partial closure of the circular defect. Localized Dermabrasion Text: The patient was draped in routine manner.  Localized dermabrasion using 3 x 17 mm wire brush was performed in routine manner to papillary dermis. This spot dermabrasion is being performed to complete skin cancer reconstruction. It also will eliminate the other sun damaged precancerous cells that are known to be part of the regional effect of a lifetime's worth of sun exposure. This localized dermabrasion is therapeutic and should not be considered cosmetic in any regard. Tarsorrhaphy Text: A tarsorrhaphy was performed using Frost sutures. Complex Repair And Flap Additional Text (Will Appearing After The Standard Complex Repair Text): The complex repair was not sufficient to completely close the primary defect. The remaining additional defect was repaired with the flap mentioned below. Complex Repair And Graft Additional Text (Will Appearing After The Standard Complex Repair Text): The complex repair was not sufficient to completely close the primary defect. The remaining additional defect was repaired with the graft mentioned below. Unique Flap 1 Name: Bipedicle Advancement Flap Unique Flap 1 Text: The defect edges were debeveled with a #15 scalpel blade. A complex repair was not sufficient to completely close the primary defect.  Given the location of the defect and the characteristics of the surrounding skin a bipedicle advancement flap was designed.   The flap was incised and then undermined subgaleal to the primary defect.  This allowed closure of the primary defect with less tension.  The secondary defect was then closed in a layered fashion. Unique Flap 2 Text: The nasalis sling flap was designed to access the more mobile, superior nasal skin.  The medial aspect of the flap was incised through the nasalis muscle to the perichondrium.  Laterally the flap was incised through the subcutaneous layer, preserving the lateral muscular pedicle.  The surrounding skin was undermined in a bilaminar fashion allowing the flap to advance under minimal tension. Manual Repair Warning Statement: We plan on removing the manually selected variable below in favor of our much easier automatic structured text blocks found in the previous tab. We decided to do this to help make the flow better and give you the full power of structured data. Manual selection is never going to be ideal in our platform and I would encourage you to avoid using manual selection from this point on, especially since I will be sunsetting this feature. It is important that you do one of two things with the customized text below. First, you can save all of the text in a word file so you can have it for future reference. Second, transfer the text to the appropriate area in the Library tab. Lastly, if there is a flap or graft type which we do not have you need to let us know right away so I can add it in before the variable is hidden. No need to panic, we plan to give you roughly 6 months to make the change. Same Histology In Subsequent Stages Text: The pattern and morphology of the tumor is as described in the first stage. No Residual Tumor Seen Histology Text: There were no malignant cells seen in the sections examined. Inflammation Suggestive Of Cancer Camouflage Histology Text: There was a dense lymphocytic infiltrate which prevented adequate histologic evaluation of adjacent structures. Incidental Superficial Basal Cell Carcinoma Histology Text: Atypical basaloid epithelial cells extending from the epidermis with peritumoral clefting. Incidental Squamous Cell Carcinoma In Situ Histology Text: Full-thickness epidermal atypia with disruption of the dermal-epidermal junction. Incidental Actinic Keratosis Histology Text: Focal basilar crowding and atypia of keratinocytes. Bcc Histology Text: There were aggregates of atypical basaloid epithelial cells. Bcc Infiltrative Histology Text: There were aggregates of basaloid cells demonstrating an infiltrative pattern. Bcc Infundibulocystic Histology Text: Atypical basaloid tumor aggregates identified. Mixed Superficial And Nodular Bcc Histology Text: Atypical basaloid tumor aggregates extending from the epidermis and in the dermis. Scc Histology Text: Pink keratinocytic tumor cells. Scc In Situ Histology Text: Full thickness epidermal atypia with disruption of the dermal epidermal junction. Mart-1 - Positive Histology Text: MART-1 staining demonstrates areas of higher density and clustering of melanocytes with Pagetoid spread upwards within the epidermis. The surgical margins are positive for tumor cells. Mart-1 - Negative Histology Text: MART-1 staining demonstrates a normal density and pattern of melanocytes along the dermal-epidermal junction. The surgical margins are negative for tumor cells. Information: Selecting Yes will display possible errors in your note based on the variables you have selected. This validation is only offered as a suggestion for you. PLEASE NOTE THAT THE VALIDATION TEXT WILL BE REMOVED WHEN YOU FINALIZE YOUR NOTE. IF YOU WANT TO FAX A PRELIMINARY NOTE YOU WILL NEED TO TOGGLE THIS TO 'NO' IF YOU DO NOT WANT IT IN YOUR FAXED NOTE.

## 2021-05-19 NOTE — H&P ADULT - NSICDXPASTMEDICALHX_GEN_ALL_CORE_FT
PAST MEDICAL HISTORY:  Cryptogenic organizing pneumonia     Dermatomyositis     H/O dermatomyositis     
N/A

## 2021-10-04 NOTE — DISCHARGE NOTE NURSING/CASE MANAGEMENT/SOCIAL WORK - NSDCPETBCESMAN_GEN_ALL_CORE
History of nephrotic syndrome secondary to biopsy-proven FSGS. Mild persistent proteinuria with lower extremity edema, fluctuating blood pressures, hypertriglyceridemia persist.  She is followed up per Dr. Vee Singh. Blood sugar control has improved.   Blo If you are a smoker, it is important for your health to stop smoking. Please be aware that second hand smoke is also harmful.

## 2022-01-19 NOTE — PATIENT PROFILE ADULT - NSPROPATIENTLACTATING_GEN_A_NUR
Spoke with patient.   She said the rash area on her and leg have cleared up.   She states she has new area on shoulder and back. Non itchy or painful.   Just giving a update.  
no

## 2022-06-24 NOTE — PATIENT PROFILE ADULT - DO YOU FEEL LIKE HURTING OTHERS?
Patient asks if he should have had a lipid panel completed with other labs on 6/22/22. He had requested this with original phone call related to his weight gain, as he is now well over 300lbs. Writer checks and a lipid panel can be added onto this blood draw- ok to add lipid panel with reflex? no

## 2023-04-26 NOTE — H&P ADULT - HISTORY OF PRESENT ILLNESS
40 year old female PMH of dermatomyositis, Cryptogenic organizing PNA diagnosed 2017 was on home oxygen till 2018 since then managed with daily 20mg prednisone and cellcept and albuterol presents for SOB.  Pt. reports feeling progressively worsening SOB for 6 days associated with productive cough. No alleviating or aggravating factors. No inciting events, denies changes to medications, no muscle pain, no sick contacts no fevers. Denies chest pain, abdominal pain does endorse frequent urination denies dysuria. Pt. reports feeling similar to her last flare in 1-20.   Vitals In ED T(F): 98 HR: 95 BP: 127/62 RR: 20 SpO2: 97%.   In ED Pt. placed on 2 L NC and feels better, Lab work significant for WBC 20, trop 0.04 which is baseline for Pt. UA neg, Chest Xray with scattered interstitial opacities pending official read.   Closed Head Injury    Closed head injury in an injury to your head that may or may not involve a traumatic brain injury (TBI). Symptoms of TBI can be short or long lasting and include headache, dizziness, interference with memory or speech, fatigue, confusion, changes in sleep, mood changes, nausea, depression/anxiety, and dulling of senses. Make sure to obtain proper rest which includes getting plenty of sleep, avoiding excessive visual stimulation, and avoiding activities that may cause physical or mental stress. Avoid any situation where there is potential for another head injury including sports.    SEEK MEDICAL CARE IF YOU HAVE THE FOLLOWING SYMPTOMS: unusual drowsiness, vomiting, severe dizziness, seizures, lightheadedness, muscular weakness, different pupil sizes, visual changes, or clear or bloody discharge from your ears or nose.     Concussion, Adult  A concussion is a brain injury from a direct hit (blow) to the head or body. This blow causes the brain to shake quickly back and forth inside the skull. This can damage brain cells and cause chemical changes in the brain. A concussion may also be known as a mild traumatic brain injury (TBI).    ImageConcussions are usually not life-threatening, but the effects of a concussion can be serious. If you have a concussion, you are more likely to experience concussion-like symptoms after a direct blow to the head in the future.    What are the causes?  This condition is caused by:    A direct blow to the head, such as from running into another player during a game, being hit in a fight, or hitting your head on a hard surface.  A jolt of the head or neck that causes the brain to move back and forth inside the skull, such as in a car crash.    What are the signs or symptoms?  The signs of a concussion can be hard to notice. Early on, they may be missed by you, family members, and health care providers. You may look fine but act or feel differently.    Symptoms are usually temporary, but they may last for days, weeks, or even longer. Some symptoms may appear right away but other symptoms may not show up for hours or days. Every head injury is different. Symptoms may include:    Headaches. This can include a feeling of pressure in the head.  Memory problems.  Trouble concentrating, organizing, or making decisions.  Slowness in thinking, acting or reacting, speaking, or reading.  Confusion.  Fatigue.  Changes in eating or sleeping patterns.  Problems with coordination or balance.  Nausea or vomiting.  Numbness or tingling.  Sensitivity to light or noise.  Vision or hearing problems.  Reduced sense of smell.  Irritability or mood changes.  Dizziness.  Lack of motivation.  Seeing or hearing things that other people do not see or hear (hallucinations).    How is this diagnosed?  This condition is diagnosed based on:    Your symptoms.  A description of your injury.    You may also have tests, including:    Imaging tests, such as a CT scan or MRI. These are done to look for signs of brain injury.  Neuropsychological tests. These measure your thinking, understanding, learning, and remembering abilities.    How is this treated?  This condition is treated with physical and mental rest and careful observation, usually at home. If the concussion is severe, you may need to stay home from work for a while. You may be referred to a concussion clinic or to other health care providers for management. It is important that you tell your health care provider if:    You are taking any medicines, including prescription medicines, over-the-counter medicines, and natural remedies. Some medicines, such as blood thinners (anticoagulants) and aspirin, may increase the chance of complications, such as bleeding.  You are taking or have taken alcohol or illegal drugs. Alcohol and certain other drugs may slow your recovery and can put you at risk of further injury.    How fast you will recover from a concussion depends on many factors, such as how severe your concussion is, what part of your brain was injured, how old you are, and how healthy you were before the concussion. Recovery can take time. It is important to wait to return to activity until a health care provider says it is safe to do that and your symptoms are completely gone.    Follow these instructions at home:  Activity     Limit activities that require a lot of thought or concentration. These may include:    Doing homework or job-related work.  Watching TV.  Working on the computer.  Playing memory games and puzzles.    Rest. Rest helps the brain to heal. Make sure you:    Get plenty of sleep at night. Avoid staying up late at night.  Keep the same bedtime hours on weekends and weekdays.  Rest during the day. Take naps or rest breaks when you feel tired.    Having another concussion before the first one has healed can be dangerous. Do not do high-risk activities that could cause a second concussion, such as riding a bicycle or playing sports.  Ask your health care provider when you can return to your normal activities, such as school, work, athletics, driving, riding a bicycle, or using heavy machinery. Your ability to react may be slower after a brain injury. Never do these activities if you are dizzy. Your health care provider will likely give you a plan for gradually returning to activities.  General instructions     Take over-the-counter and prescription medicines only as told by your health care provider.  Do not drink alcohol until your health care provider says you can.  If it is harder than usual to remember things, write them down.  If you are easily distracted, try to do one thing at a time. For example, do not try to watch TV while fixing dinner.  Talk with family members or close friends when making important decisions.  Watch your symptoms and tell others to do the same. Complications sometimes occur after a concussion. Older adults with a brain injury may have a higher risk of serious complications, such as a blood clot in the brain.  Tell your teachers, school nurse, school counselor, , , or  about your injury, symptoms, and restrictions. Tell them about what you can or cannot do. They should watch for:    Increased problems with attention or concentration.  Increased difficulty remembering or learning new information.  Increased time needed to complete tasks or assignments.  Increased irritability or decreased ability to cope with stress.  Increased symptoms.    Keep all follow-up visits as told by your health care provider. This is important.  How is this prevented?  It is very important to avoid another brain injury, especially as you recover. In rare cases, another injury can lead to permanent brain damage, brain swelling, or death. The risk of this is greatest during the first 7–10 days after a head injury. Avoid injuries by:    Wearing a seat belt when riding in a car.  Wearing a helmet when biking, skiing, skateboarding, skating, or doing similar activities.  Avoiding activities that could lead to a second concussion, such as contact or recreational sports, until your health care provider says it is okay.  Taking safety measures in your home, such as:    Removing clutter and tripping hazards from floors and stairways.  Using grab bars in bathrooms and handrails by stairs.  Placing non-slip mats on floors and in bathtubs.  Improving lighting in dim areas.      Contact a health care provider if:  Your symptoms get worse.  You have new symptoms.  You continue to have symptoms for more than 2 weeks.  Get help right away if:  You have severe or worsening headaches.  You have weakness or numbness in any part of your body.  Your coordination gets worse.  You vomit repeatedly.  You are sleepier.  The pupil of one eye is larger than the other.  You have convulsions or a seizure.  Your speech is slurred.  Your fatigue, confusion, or irritability gets worse.  You cannot recognize people or places.  You have neck pain.  It is difficult to wake you up.  You have unusual behavior changes.  You lose consciousness.  Summary  A concussion is a brain injury from a direct hit (blow) to the head or body.  A concussion may also be called a mild traumatic brain injury (TBI).  You may have imaging tests and neuropsychological tests to diagnose a concussion.  This condition is treated with physical and mental rest and careful observation.  Ask your health care provider when you can return to your normal activities, such as school, work, athletics, driving, riding a bicycle, or using heavy machinery. Follow safety instructions as told by your health care provider.  This information is not intended to replace advice given to you by your health care provider. Make sure you discuss any questions you have with your health care provider.

## 2023-06-03 NOTE — PHYSICAL THERAPY INITIAL EVALUATION ADULT - SPECIFY REASON(S)
Pt reports she has no difficulty ambulating, has no needs at home. Unable to locate nurse to confirm. Pt D/C from PT as no skilled PT needs required.
DISPLAY PLAN FREE TEXT

## 2023-07-07 NOTE — DISCHARGE NOTE PROVIDER - NSDCFUSCHEDAPPT_GEN_ALL_CORE_FT
Nurses Note -- 4 Eyes      7/7/2023   5:31 PM      Skin assessed during: Admit      [x] No Altered Skin Integrity Present    []Prevention Measures Documented      [] Yes- Altered Skin Integrity Present or Discovered   [] LDA Added if Not in Epic (Describe Wound)   [] New Altered Skin Integrity was Present on Admit and Documented in LDA   [] Wound Image Taken    Wound Care Consulted? No    Attending Nurse:  Bryson Haro RN     Second RN/Staff Member:  Sharee Pablo RN [id number]       JESSI KING ; 11/14/2020 ; [facility] [address]

## 2023-07-24 NOTE — PATIENT PROFILE ADULT - FUNCTIONAL SCREEN CURRENT LEVEL: COMMUNICATION, MLM
Subjective:     Patient ID:  Alexander Kelly is a 81 y.o. male who presents for evaluation of pulmonary hypertension     HPI:  82 yo WM referred by Dr. Artur Healy for right heart catheterization to assess pulmonary hypertension and guide treatment.  He has been bothered with congestive heart failure in his echocardiogram normal left ventricular systolic function, indeterminate left ventricular diastolic function, estimated CVP 8 mm Hg and estimated pulmonary artery systolic pressure 51 mm Hg.  He has wall thickness approximately 1.2 cm but RV trabeculations present difficult measurement.  Calcification noted within the mitral valve annulus and aortic valve slightly calcified with sclerosis.  Read as mild stenosis.  An EKG performed in July 2023 demonstrated atrial flutter with controlled ventricular response and low voltage.  A nuclear stress study performed July 14, 2023 demonstrated no evidence of ischemia or infarction.    His biggest problem seems to be significant peripheral edema, increased abdominal girth and shortness of breath.  He sleeps on 3 pillows (thin) without PND.  He sleeps with his legs elevated to to his chronic edema.  He is a cough with white phlegm but that is present during the daytime as well as night.  He has no symptoms suggestive of angina.    Occupational exposure:  He worked as an  for over 25 years and then became an independent .  He worked until the age of 73.    Past Medical History:   Diagnosis Date    Atrial flutter     Bilateral hearing loss     CHF (congestive heart failure)     Hypertension     Pulmonary hypertension      Current Outpatient Medications   Medication Sig Dispense Refill    APPLE CIDER VINEGAR ORAL Take by mouth.      budesonide-formoterol 160-4.5 mcg (SYMBICORT) 160-4.5 mcg/actuation HFAA Inhale 2 puffs into the lungs every 12 (twelve) hours. 1 g 3    bumetanide (BUMEX) 1 MG tablet Take 1 tablet (1 mg total) by mouth 2 (two) times a day. 90  tablet 3    cetirizine (ZYRTEC) 10 MG tablet Take 10 mg by mouth once daily.      emollient combination no.71 (LUBRIDERM ADVANCED THERAPY TOP) Apply topically 2 (two) times a day.      multivit-min-folic-vit K-lycop (ONE-A-DAY MEN'S 50 PLUS,VIT K,) 400- mcg Tab Take 1 tablet by mouth once daily.      potassium chloride SA (K-DUR,KLOR-CON) 20 MEQ tablet Take 1 tablet (20 mEq total) by mouth once daily. 90 tablet 3    saw palmetto 500 MG capsule Take 500 mg by mouth once daily.      serine, bulk, 100 % Aline Take 1 capsule by mouth once daily.      valsartan (DIOVAN) 320 MG tablet TAKE ONE TABLET BY MOUTH ONCE DAILY. (Patient taking differently: Take 320 mg by mouth once daily.) 90 tablet 3    vitamin D (VITAMIN D3) 1000 units Tab Take 1,000 Units by mouth once daily.       No current facility-administered medications for this visit.       Review of patient's allergies indicates:  No Known Allergies    SOCIAL HISTORY:  He is  and accompanied by his wife and daughter.  He worked until the age of 73.  He spent 25 years as  and the and the remainder of his employment was spent working as an independent .  He smoked for 17 years but quit 51 years ago.  He consumes 8 beers per day until recently when he essentially stopped drinking.    Review of Systems   Constitutional: Positive for malaise/fatigue. Negative for decreased appetite (daughter feels that he is not eating as much as he used to attributed to his swallowing difficulties not an appetite loss), weight gain (While his weight is up and down based upon fluid status there has been no significant or unexplained weight loss) and weight loss.   HENT:  Positive for hearing loss (started around age 69).    Cardiovascular:  Positive for dyspnea on exertion (he reports shortness of breath walking in his home), leg swelling and orthopnea (3 pillows but thin). Negative for chest pain, near-syncope, palpitations, paroxysmal nocturnal dyspnea and  "syncope.   Respiratory:  Positive for cough, shortness of breath and sputum production.    Hematologic/Lymphatic: Bruises/bleeds easily (bruising).   Musculoskeletal:         No history of tendon rupture or carpal tunnel syndrome   Gastrointestinal:  Positive for bloating, constipation (he takes Metamucil 1st constipation and occasional laxatives which in turn the wife feels is responsible for the diarrhea that he experiences), diarrhea and dysphagia (he has trouble swallowing pills and occasionally with food the does not believe anything gets stuck.  One of his physicians recently recommended GI evaluation but he has not undergone that yet. no pain with swallowing and no regurgitation). Negative for change in bowel habit, bowel incontinence, hematemesis, nausea and vomiting.   Genitourinary:  Positive for frequency and nocturia. Negative for bladder incontinence.   Neurological:  Positive for weakness. Negative for brief paralysis, dizziness, focal weakness, light-headedness, numbness and paresthesias.        No symptoms consistent with carpal tunnel syndrome        Objective:   Physical Exam  Constitutional:       General: He is not in acute distress.     Appearance: He is well-developed. He is not ill-appearing, toxic-appearing or diaphoretic.      Comments: BP (!) 151/72 (BP Location: Left arm, Patient Position: Sitting, BP Method: Small (Automatic))   Pulse 80   Ht 5' 11" (1.803 m)   Wt 98.9 kg (218 lb)   SpO2 (!) 94%   BMI 30.40 kg/m²   This gentleman while ill appearing is in no acute distress   HENT:      Head: Normocephalic and atraumatic.   Eyes:      General: No scleral icterus.        Right eye: No discharge.         Left eye: No discharge.      Conjunctiva/sclera: Conjunctivae normal.   Neck:      Thyroid: No thyromegaly.      Vascular: JVD present.      Trachea: No tracheal deviation.      Comments: Venous pulsations are into the upper neck  Cardiovascular:      Rate and Rhythm: Normal rate and " regular rhythm.      Heart sounds: Murmur (grade 1-2/6 systolic murmur base of heart) heard.     No gallop.   Pulmonary:      Effort: Pulmonary effort is normal.      Breath sounds: Normal breath sounds.   Abdominal:      General: Bowel sounds are normal. There is distension.      Palpations: Abdomen is soft. There is hepatomegaly. There is no mass.      Tenderness: There is no abdominal tenderness. There is no guarding or rebound.      Hernia: A hernia (umbilical hernia is present) is present.      Comments: Difficult for me to determine liver edge sitting in the chair but using the scratch test appears to be 16 cm   Musculoskeletal:         General: Swelling (he has swelling of both lower extremities with stasis changes in both legs.  Above the knee edema is 1+ but extends into the abdominal wall.) present. No tenderness.      Right lower leg: Edema present.      Left lower leg: Edema present.   Skin:     General: Skin is warm and dry.   Neurological:      Mental Status: He is alert.      Comments: A formal neurological examination was not performed   Psychiatric:         Mood and Affect: Mood normal.         Behavior: Behavior normal.         Thought Content: Thought content normal.         Judgment: Judgment normal.      Lab Results   Component Value Date     (H) 07/04/2023     (H) 07/04/2023     (L) 07/05/2023    K 3.8 07/05/2023    MG 1.8 07/04/2023    CL 86 (L) 07/05/2023    CO2 31 (H) 07/05/2023    PHOS 3.8 07/04/2023    BUN 11 07/05/2023    BUN 12.0 02/09/2022    CREATININE 0.7 07/05/2023     (H) 07/05/2023    AST 23 07/05/2023    ALT 21 07/05/2023    ALBUMIN 3.4 (L) 07/05/2023    PROT 7.0 07/05/2023    BILITOT 0.9 07/05/2023    WBC 8.21 07/05/2023    HGB 13.1 (L) 07/05/2023    HCT 39.2 (L) 07/05/2023     07/05/2023    INR 1.1 05/26/2023    TSH 0.581 05/25/2023    CHOL 143 02/08/2023    HDL 74 02/08/2023    LDLCALC 58 02/08/2023    TRIG 37 02/08/2023 07/14/2023  pharmacologic nuclear stress study  Normal myocardial perfusion scan.  No evidence of ischemia, infarct.    07/04/2023 EKG atrial flutter with controlled ventricular response, low voltage    5/26/2023 ECHO   The left ventricle is normal in size with concentric remodeling and normal systolic function.  The estimated ejection fraction is 65%.  Indeterminate left ventricular diastolic function.  Normal right ventricular size with normal right ventricular systolic function.  Mild aortic regurgitation.  Mild tricuspid regurgitation.  Moderate left atrial enlargement.  Moderate right atrial enlargement.  There is mild aortic valve stenosis.  Aortic valve area is 1.61 cm2; peak velocity is 2.42 m/s; mean gradient is 13 mmHg.  Intermediate central venous pressure (8 mmHg).  The estimated PA systolic pressure is 51 mmHg.  There is pulmonary hypertension.  Small to moderate posterior pericardial effusion.  The ascending aorta is mildly dilated.    Assessment:     1. Pulmonary hypertension    2. SOB (shortness of breath)    3. Typical atrial flutter    4. Chronic diastolic congestive heart failure    5. Primary hypertension    6. Venous stasis dermatitis of both lower extremities    7. Anemia, unspecified type      Plan:   He either has worse pulmonary hypertension then echo appreciated and right ventricular failure or he has WHO group 2 pulmonary hypertension with significant decompensation.  Options are to push diuretics while watching renal function and blood pressure or proceed with right heart catheterization to determine pulmonary artery pressure, pulmonary vascular resistance and wedge pressure using that information to guide treatment.  I have discussed the right heart catheterization including the risk of the procedure.  I think going with a right heart catheterization this point is quite reasonable and they would like to do that as well.  We will try to get this done within the next 2 weeks while he will continue his  diuretics as it this point he feels that it is continuing to help him remove some volume.    If the atrial flutter is indeed new he would likely benefit from cardioversion or ablation of the flutter pathway.  However, the patient has made himself DNR so I am not certain if he wants to proceed with those procedures which have more risk than a right heart catheterization.  He would certainly not stay a DNR for those procedures and I have also explained that he can not be a DNR for his right heart catheterization it would simply be rescinded for the time he was in the procedure itself.  Right heart catheterization would be very low risk to have a complication from particularly if the [place] is floated under fluoroscopy.    I will pass all along to Dr. Healy.      I appreciate the opportunity to see this nice gentleman in consultation   0 = understands/communicates without difficulty

## 2023-09-04 NOTE — DISCHARGE NOTE ADULT - ADDITIONAL INSTRUCTIONS
drink water , f/u with PMD and rheumatology after discharge and repeat blood work, Betsy exercise recommended [id number]

## 2024-06-03 NOTE — PATIENT PROFILE ADULT - INFORMATION PROVIDED TO:
24 ml of contrast were injected throughout the case. 26 mL of contrast was the total wasted during the case. 50 mL was the total amount used during the case. patient

## 2024-06-13 NOTE — DIETITIAN INITIAL EVALUATION ADULT. - NUTRITION DIAGNOSITC TERMINOLOGY #1
[de-identified] : Patient was seen today for reevaluation of grade 1 left ankle sprain.  He has interval improvement in his condition but not yet full resolution.  He has not yet started physical therapy he still has improvement in his condition which is reassuring.  Patient was able to return to light work duty as of yesterday.  At this time I recommend continued light work duty for the next 2 weeks as a .  He should be able to return to full work duty without restrictions thereafter.  He has been given a work note with these effective dates.  If he does not feel he can return to full work duty in 2 weeks he should follow-up at that time for reassessment.  Patient can transition out of the lace up ankle brace over the next 2 weeks.  Follow up as needed.  Patient appreciates and agrees with current plan.  This note was generated using dragon medical dictation software.  A reasonable effort has been made for proofreading its contents, but typos may still remain.  If there are any questions or points of clarification needed please notify my office.  Inadequate Oral Intake

## 2024-06-22 ENCOUNTER — INPATIENT (INPATIENT)
Facility: HOSPITAL | Age: 44
LOS: 4 days | Discharge: HOME HEALTH | DRG: 346 | End: 2024-06-27
Attending: STUDENT IN AN ORGANIZED HEALTH CARE EDUCATION/TRAINING PROGRAM | Admitting: HOSPITALIST
Payer: MEDICAID

## 2024-06-22 VITALS
DIASTOLIC BLOOD PRESSURE: 85 MMHG | WEIGHT: 278 LBS | TEMPERATURE: 99 F | SYSTOLIC BLOOD PRESSURE: 114 MMHG | RESPIRATION RATE: 19 BRPM | HEART RATE: 68 BPM | OXYGEN SATURATION: 99 %

## 2024-06-22 DIAGNOSIS — Z98.890 OTHER SPECIFIED POSTPROCEDURAL STATES: Chronic | ICD-10-CM

## 2024-06-22 DIAGNOSIS — M33.13 OTHER DERMATOMYOSITIS WITHOUT MYOPATHY: ICD-10-CM

## 2024-06-22 DIAGNOSIS — S82.899A OTHER FRACTURE OF UNSPECIFIED LOWER LEG, INITIAL ENCOUNTER FOR CLOSED FRACTURE: Chronic | ICD-10-CM

## 2024-06-22 LAB
ALBUMIN SERPL ELPH-MCNC: 3.4 G/DL — LOW (ref 3.5–5.2)
ALP SERPL-CCNC: 61 U/L — SIGNIFICANT CHANGE UP (ref 30–115)
ALT FLD-CCNC: 14 U/L — SIGNIFICANT CHANGE UP (ref 0–41)
ANION GAP SERPL CALC-SCNC: 12 MMOL/L — SIGNIFICANT CHANGE UP (ref 7–14)
AST SERPL-CCNC: 22 U/L — SIGNIFICANT CHANGE UP (ref 0–41)
BASOPHILS # BLD AUTO: 0.04 K/UL — SIGNIFICANT CHANGE UP (ref 0–0.2)
BASOPHILS NFR BLD AUTO: 0.3 % — SIGNIFICANT CHANGE UP (ref 0–1)
BILIRUB SERPL-MCNC: <0.2 MG/DL — SIGNIFICANT CHANGE UP (ref 0.2–1.2)
BUN SERPL-MCNC: 13 MG/DL — SIGNIFICANT CHANGE UP (ref 10–20)
CALCIUM SERPL-MCNC: 8.7 MG/DL — SIGNIFICANT CHANGE UP (ref 8.4–10.5)
CHLORIDE SERPL-SCNC: 103 MMOL/L — SIGNIFICANT CHANGE UP (ref 98–110)
CK SERPL-CCNC: 121 U/L — SIGNIFICANT CHANGE UP (ref 0–225)
CO2 SERPL-SCNC: 23 MMOL/L — SIGNIFICANT CHANGE UP (ref 17–32)
CREAT SERPL-MCNC: 0.8 MG/DL — SIGNIFICANT CHANGE UP (ref 0.7–1.5)
CRP SERPL-MCNC: <3 MG/L — SIGNIFICANT CHANGE UP
EGFR: 93 ML/MIN/1.73M2 — SIGNIFICANT CHANGE UP
EOSINOPHIL # BLD AUTO: 0.09 K/UL — SIGNIFICANT CHANGE UP (ref 0–0.7)
EOSINOPHIL NFR BLD AUTO: 0.7 % — SIGNIFICANT CHANGE UP (ref 0–8)
GLUCOSE SERPL-MCNC: 97 MG/DL — SIGNIFICANT CHANGE UP (ref 70–99)
HCG SERPL QL: NEGATIVE — SIGNIFICANT CHANGE UP
HCT VFR BLD CALC: 36.2 % — LOW (ref 37–47)
HGB BLD-MCNC: 11.5 G/DL — LOW (ref 12–16)
IMM GRANULOCYTES NFR BLD AUTO: 0.4 % — HIGH (ref 0.1–0.3)
LDH SERPL L TO P-CCNC: 391 — HIGH (ref 50–242)
LYMPHOCYTES # BLD AUTO: 2.93 K/UL — SIGNIFICANT CHANGE UP (ref 1.2–3.4)
LYMPHOCYTES # BLD AUTO: 23.6 % — SIGNIFICANT CHANGE UP (ref 20.5–51.1)
MAGNESIUM SERPL-MCNC: 2 MG/DL — SIGNIFICANT CHANGE UP (ref 1.8–2.4)
MCHC RBC-ENTMCNC: 31.8 G/DL — LOW (ref 32–37)
MCHC RBC-ENTMCNC: 31.9 PG — HIGH (ref 27–31)
MCV RBC AUTO: 100.3 FL — HIGH (ref 81–99)
MONOCYTES # BLD AUTO: 1.02 K/UL — HIGH (ref 0.1–0.6)
MONOCYTES NFR BLD AUTO: 8.2 % — SIGNIFICANT CHANGE UP (ref 1.7–9.3)
NEUTROPHILS # BLD AUTO: 8.26 K/UL — HIGH (ref 1.4–6.5)
NEUTROPHILS NFR BLD AUTO: 66.8 % — SIGNIFICANT CHANGE UP (ref 42.2–75.2)
NRBC # BLD: 0 /100 WBCS — SIGNIFICANT CHANGE UP (ref 0–0)
PLATELET # BLD AUTO: 429 K/UL — HIGH (ref 130–400)
PMV BLD: 9.4 FL — SIGNIFICANT CHANGE UP (ref 7.4–10.4)
POTASSIUM SERPL-MCNC: 4.6 MMOL/L — SIGNIFICANT CHANGE UP (ref 3.5–5)
POTASSIUM SERPL-SCNC: 4.6 MMOL/L — SIGNIFICANT CHANGE UP (ref 3.5–5)
PROT SERPL-MCNC: 6.8 G/DL — SIGNIFICANT CHANGE UP (ref 6–8)
RBC # BLD: 3.61 M/UL — LOW (ref 4.2–5.4)
RBC # FLD: 14.2 % — SIGNIFICANT CHANGE UP (ref 11.5–14.5)
SODIUM SERPL-SCNC: 138 MMOL/L — SIGNIFICANT CHANGE UP (ref 135–146)
TROPONIN T, HIGH SENSITIVITY RESULT: 15 NG/L — HIGH (ref 6–13)
TROPONIN T, HIGH SENSITIVITY RESULT: 7 NG/L — SIGNIFICANT CHANGE UP (ref 6–13)
WBC # BLD: 12.39 K/UL — HIGH (ref 4.8–10.8)
WBC # FLD AUTO: 12.39 K/UL — HIGH (ref 4.8–10.8)

## 2024-06-22 PROCEDURE — 71045 X-RAY EXAM CHEST 1 VIEW: CPT | Mod: 26

## 2024-06-22 PROCEDURE — 83550 IRON BINDING TEST: CPT

## 2024-06-22 PROCEDURE — 93010 ELECTROCARDIOGRAM REPORT: CPT

## 2024-06-22 PROCEDURE — 81001 URINALYSIS AUTO W/SCOPE: CPT

## 2024-06-22 PROCEDURE — 82085 ASSAY OF ALDOLASE: CPT

## 2024-06-22 PROCEDURE — 71045 X-RAY EXAM CHEST 1 VIEW: CPT

## 2024-06-22 PROCEDURE — 83735 ASSAY OF MAGNESIUM: CPT

## 2024-06-22 PROCEDURE — 80053 COMPREHEN METABOLIC PANEL: CPT

## 2024-06-22 PROCEDURE — 85025 COMPLETE CBC W/AUTO DIFF WBC: CPT

## 2024-06-22 PROCEDURE — 97110 THERAPEUTIC EXERCISES: CPT | Mod: GP

## 2024-06-22 PROCEDURE — 71250 CT THORAX DX C-: CPT | Mod: MC

## 2024-06-22 PROCEDURE — 94640 AIRWAY INHALATION TREATMENT: CPT

## 2024-06-22 PROCEDURE — 84484 ASSAY OF TROPONIN QUANT: CPT

## 2024-06-22 PROCEDURE — 36415 COLL VENOUS BLD VENIPUNCTURE: CPT

## 2024-06-22 PROCEDURE — 0225U NFCT DS DNA&RNA 21 SARSCOV2: CPT

## 2024-06-22 PROCEDURE — 87040 BLOOD CULTURE FOR BACTERIA: CPT

## 2024-06-22 PROCEDURE — 86140 C-REACTIVE PROTEIN: CPT

## 2024-06-22 PROCEDURE — 85652 RBC SED RATE AUTOMATED: CPT

## 2024-06-22 PROCEDURE — 82728 ASSAY OF FERRITIN: CPT

## 2024-06-22 PROCEDURE — 83540 ASSAY OF IRON: CPT

## 2024-06-22 PROCEDURE — 82746 ASSAY OF FOLIC ACID SERUM: CPT

## 2024-06-22 PROCEDURE — 82607 VITAMIN B-12: CPT

## 2024-06-22 PROCEDURE — 82550 ASSAY OF CK (CPK): CPT

## 2024-06-22 PROCEDURE — 99285 EMERGENCY DEPT VISIT HI MDM: CPT

## 2024-06-22 PROCEDURE — 93306 TTE W/DOPPLER COMPLETE: CPT

## 2024-06-22 RX ORDER — IPRATROPIUM BROMIDE AND ALBUTEROL SULFATE .5; 3 MG/3ML; MG/3ML
3 SOLUTION RESPIRATORY (INHALATION)
Refills: 0 | DISCHARGE

## 2024-06-22 RX ORDER — VALACYCLOVIR HYDROCHLORIDE 500 MG/1
1000 TABLET, FILM COATED ORAL DAILY
Refills: 0 | Status: DISCONTINUED | OUTPATIENT
Start: 2024-06-22 | End: 2024-06-27

## 2024-06-22 RX ORDER — SUMATRIPTAN SUCCINATE 25 MG/1
1 TABLET, FILM COATED ORAL
Refills: 0 | DISCHARGE

## 2024-06-22 RX ORDER — PANTOPRAZOLE SODIUM 40 MG/10ML
40 INJECTION, POWDER, FOR SOLUTION INTRAVENOUS
Refills: 0 | Status: DISCONTINUED | OUTPATIENT
Start: 2024-06-22 | End: 2024-06-27

## 2024-06-22 RX ORDER — DEXAMETHASONE 1 MG/1
10 TABLET ORAL ONCE
Refills: 0 | Status: COMPLETED | OUTPATIENT
Start: 2024-06-22 | End: 2024-06-22

## 2024-06-22 RX ORDER — MORPHINE SULFATE 100 MG/1
4 TABLET, EXTENDED RELEASE ORAL ONCE
Refills: 0 | Status: DISCONTINUED | OUTPATIENT
Start: 2024-06-22 | End: 2024-06-22

## 2024-06-22 RX ORDER — TOPIRAMATE 50 MG/1
25 TABLET, FILM COATED ORAL AT BEDTIME
Refills: 0 | Status: DISCONTINUED | OUTPATIENT
Start: 2024-06-22 | End: 2024-06-27

## 2024-06-22 RX ORDER — AZATHIOPRINE 100 1/1
150 TABLET ORAL DAILY
Refills: 0 | Status: DISCONTINUED | OUTPATIENT
Start: 2024-06-22 | End: 2024-06-27

## 2024-06-22 RX ORDER — MYCOPHENOLATE MOFETIL 500 MG/1
500 TABLET, FILM COATED ORAL DAILY
Refills: 0 | Status: DISCONTINUED | OUTPATIENT
Start: 2024-06-22 | End: 2024-06-27

## 2024-06-22 RX ORDER — LANOLIN ALCOHOL/MO/W.PET/CERES
1 CREAM (GRAM) TOPICAL
Qty: 0 | Refills: 0 | DISCHARGE

## 2024-06-22 RX ORDER — METHYLPREDNISOLONE ACETATE 20 MG/ML
60 VIAL (ML) INJECTION
Refills: 0 | Status: DISCONTINUED | OUTPATIENT
Start: 2024-06-22 | End: 2024-06-25

## 2024-06-22 RX ORDER — KETOROLAC TROMETHAMINE 30 MG/ML
15 INJECTION, SOLUTION INTRAMUSCULAR ONCE
Refills: 0 | Status: DISCONTINUED | OUTPATIENT
Start: 2024-06-22 | End: 2024-06-22

## 2024-06-22 RX ORDER — TOPIRAMATE 50 MG/1
1 TABLET, FILM COATED ORAL
Refills: 0 | DISCHARGE

## 2024-06-22 RX ORDER — DEXTROSE MONOHYDRATE AND SODIUM CHLORIDE 5; .3 G/100ML; G/100ML
1000 INJECTION, SOLUTION INTRAVENOUS ONCE
Refills: 0 | Status: COMPLETED | OUTPATIENT
Start: 2024-06-22 | End: 2024-06-22

## 2024-06-22 RX ORDER — IPRATROPIUM BROMIDE AND ALBUTEROL SULFATE .5; 3 MG/3ML; MG/3ML
3 SOLUTION RESPIRATORY (INHALATION) EVERY 6 HOURS
Refills: 0 | Status: DISCONTINUED | OUTPATIENT
Start: 2024-06-22 | End: 2024-06-27

## 2024-06-22 RX ORDER — SULFAMETHOXAZOLE AND TRIMETHOPRIM 800; 160 MG/1; MG/1
1 TABLET ORAL
Refills: 0 | Status: DISCONTINUED | OUTPATIENT
Start: 2024-06-22 | End: 2024-06-27

## 2024-06-22 RX ORDER — CALCIUM CARBONATE/VITAMIN D2 250 MG-125
1 TABLET ORAL DAILY
Refills: 0 | Status: DISCONTINUED | OUTPATIENT
Start: 2024-06-22 | End: 2024-06-27

## 2024-06-22 RX ORDER — KETOROLAC TROMETHAMINE 30 MG/ML
15 INJECTION, SOLUTION INTRAMUSCULAR ONCE
Refills: 0 | Status: DISCONTINUED | OUTPATIENT
Start: 2024-06-22 | End: 2024-06-23

## 2024-06-22 RX ADMIN — VALACYCLOVIR HYDROCHLORIDE 1000 MILLIGRAM(S): 500 TABLET, FILM COATED ORAL at 21:08

## 2024-06-22 RX ADMIN — MYCOPHENOLATE MOFETIL 500 MILLIGRAM(S): 500 TABLET, FILM COATED ORAL at 21:08

## 2024-06-22 RX ADMIN — DEXAMETHASONE 10 MILLIGRAM(S): 1 TABLET ORAL at 15:15

## 2024-06-22 RX ADMIN — MORPHINE SULFATE 4 MILLIGRAM(S): 100 TABLET, EXTENDED RELEASE ORAL at 17:58

## 2024-06-22 RX ADMIN — TOPIRAMATE 25 MILLIGRAM(S): 50 TABLET, FILM COATED ORAL at 21:08

## 2024-06-22 RX ADMIN — KETOROLAC TROMETHAMINE 15 MILLIGRAM(S): 30 INJECTION, SOLUTION INTRAMUSCULAR at 15:15

## 2024-06-22 RX ADMIN — Medication 60 MILLIGRAM(S): at 20:44

## 2024-06-22 RX ADMIN — DEXTROSE MONOHYDRATE AND SODIUM CHLORIDE 1000 MILLILITER(S): 5; .3 INJECTION, SOLUTION INTRAVENOUS at 15:46

## 2024-06-22 RX ADMIN — MORPHINE SULFATE 4 MILLIGRAM(S): 100 TABLET, EXTENDED RELEASE ORAL at 16:21

## 2024-06-22 RX ADMIN — Medication 1 TABLET(S): at 21:08

## 2024-06-22 RX ADMIN — AZATHIOPRINE 150 MILLIGRAM(S): 100 TABLET ORAL at 21:08

## 2024-06-22 RX ADMIN — KETOROLAC TROMETHAMINE 15 MILLIGRAM(S): 30 INJECTION, SOLUTION INTRAMUSCULAR at 17:58

## 2024-06-23 LAB
ALBUMIN SERPL ELPH-MCNC: 3.8 G/DL — SIGNIFICANT CHANGE UP (ref 3.5–5.2)
ALP SERPL-CCNC: 67 U/L — SIGNIFICANT CHANGE UP (ref 30–115)
ALT FLD-CCNC: 14 U/L — SIGNIFICANT CHANGE UP (ref 0–41)
ANION GAP SERPL CALC-SCNC: 14 MMOL/L — SIGNIFICANT CHANGE UP (ref 7–14)
APPEARANCE UR: CLEAR — SIGNIFICANT CHANGE UP
AST SERPL-CCNC: 10 U/L — SIGNIFICANT CHANGE UP (ref 0–41)
BACTERIA # UR AUTO: NEGATIVE /HPF — SIGNIFICANT CHANGE UP
BASOPHILS # BLD AUTO: 0.01 K/UL — SIGNIFICANT CHANGE UP (ref 0–0.2)
BASOPHILS NFR BLD AUTO: 0.1 % — SIGNIFICANT CHANGE UP (ref 0–1)
BILIRUB SERPL-MCNC: 0.3 MG/DL — SIGNIFICANT CHANGE UP (ref 0.2–1.2)
BILIRUB UR-MCNC: NEGATIVE — SIGNIFICANT CHANGE UP
BUN SERPL-MCNC: 14 MG/DL — SIGNIFICANT CHANGE UP (ref 10–20)
CALCIUM SERPL-MCNC: 9 MG/DL — SIGNIFICANT CHANGE UP (ref 8.4–10.5)
CAST: 0 /LPF — SIGNIFICANT CHANGE UP (ref 0–4)
CHLORIDE SERPL-SCNC: 100 MMOL/L — SIGNIFICANT CHANGE UP (ref 98–110)
CK SERPL-CCNC: 63 U/L — SIGNIFICANT CHANGE UP (ref 0–225)
CO2 SERPL-SCNC: 22 MMOL/L — SIGNIFICANT CHANGE UP (ref 17–32)
COLOR SPEC: YELLOW — SIGNIFICANT CHANGE UP
CREAT SERPL-MCNC: 0.8 MG/DL — SIGNIFICANT CHANGE UP (ref 0.7–1.5)
DIFF PNL FLD: ABNORMAL
EGFR: 93 ML/MIN/1.73M2 — SIGNIFICANT CHANGE UP
EOSINOPHIL # BLD AUTO: 0 K/UL — SIGNIFICANT CHANGE UP (ref 0–0.7)
EOSINOPHIL NFR BLD AUTO: 0 % — SIGNIFICANT CHANGE UP (ref 0–8)
ERYTHROCYTE [SEDIMENTATION RATE] IN BLOOD: 28 MM/HR — HIGH (ref 0–20)
GLUCOSE SERPL-MCNC: 184 MG/DL — HIGH (ref 70–99)
GLUCOSE UR QL: NEGATIVE MG/DL — SIGNIFICANT CHANGE UP
HCT VFR BLD CALC: 35.7 % — LOW (ref 37–47)
HGB BLD-MCNC: 11.7 G/DL — LOW (ref 12–16)
IMM GRANULOCYTES NFR BLD AUTO: 0.5 % — HIGH (ref 0.1–0.3)
KETONES UR-MCNC: NEGATIVE MG/DL — SIGNIFICANT CHANGE UP
LEUKOCYTE ESTERASE UR-ACNC: NEGATIVE — SIGNIFICANT CHANGE UP
LYMPHOCYTES # BLD AUTO: 1.11 K/UL — LOW (ref 1.2–3.4)
LYMPHOCYTES # BLD AUTO: 12 % — LOW (ref 20.5–51.1)
MAGNESIUM SERPL-MCNC: 2.1 MG/DL — SIGNIFICANT CHANGE UP (ref 1.8–2.4)
MCHC RBC-ENTMCNC: 32.8 G/DL — SIGNIFICANT CHANGE UP (ref 32–37)
MCHC RBC-ENTMCNC: 33.1 PG — HIGH (ref 27–31)
MCV RBC AUTO: 100.8 FL — HIGH (ref 81–99)
MONOCYTES # BLD AUTO: 0.07 K/UL — LOW (ref 0.1–0.6)
MONOCYTES NFR BLD AUTO: 0.8 % — LOW (ref 1.7–9.3)
NEUTROPHILS # BLD AUTO: 8 K/UL — HIGH (ref 1.4–6.5)
NEUTROPHILS NFR BLD AUTO: 86.6 % — HIGH (ref 42.2–75.2)
NITRITE UR-MCNC: NEGATIVE — SIGNIFICANT CHANGE UP
NRBC # BLD: 0 /100 WBCS — SIGNIFICANT CHANGE UP (ref 0–0)
PH UR: 7 — SIGNIFICANT CHANGE UP (ref 5–8)
PLATELET # BLD AUTO: 452 K/UL — HIGH (ref 130–400)
PMV BLD: 9.6 FL — SIGNIFICANT CHANGE UP (ref 7.4–10.4)
POTASSIUM SERPL-MCNC: 4.7 MMOL/L — SIGNIFICANT CHANGE UP (ref 3.5–5)
POTASSIUM SERPL-SCNC: 4.7 MMOL/L — SIGNIFICANT CHANGE UP (ref 3.5–5)
PROT SERPL-MCNC: 7 G/DL — SIGNIFICANT CHANGE UP (ref 6–8)
PROT UR-MCNC: NEGATIVE MG/DL — SIGNIFICANT CHANGE UP
RBC # BLD: 3.54 M/UL — LOW (ref 4.2–5.4)
RBC # FLD: 13.7 % — SIGNIFICANT CHANGE UP (ref 11.5–14.5)
RBC CASTS # UR COMP ASSIST: 2 /HPF — SIGNIFICANT CHANGE UP (ref 0–4)
SODIUM SERPL-SCNC: 136 MMOL/L — SIGNIFICANT CHANGE UP (ref 135–146)
SP GR SPEC: 1.01 — SIGNIFICANT CHANGE UP (ref 1–1.03)
SQUAMOUS # UR AUTO: 2 /HPF — SIGNIFICANT CHANGE UP (ref 0–5)
UROBILINOGEN FLD QL: 0.2 MG/DL — SIGNIFICANT CHANGE UP (ref 0.2–1)
WBC # BLD: 9.24 K/UL — SIGNIFICANT CHANGE UP (ref 4.8–10.8)
WBC # FLD AUTO: 9.24 K/UL — SIGNIFICANT CHANGE UP (ref 4.8–10.8)
WBC UR QL: 1 /HPF — SIGNIFICANT CHANGE UP (ref 0–5)

## 2024-06-23 PROCEDURE — 71250 CT THORAX DX C-: CPT | Mod: 26

## 2024-06-23 PROCEDURE — 99223 1ST HOSP IP/OBS HIGH 75: CPT

## 2024-06-23 PROCEDURE — 99232 SBSQ HOSP IP/OBS MODERATE 35: CPT

## 2024-06-23 RX ORDER — MORPHINE SULFATE 100 MG/1
4 TABLET, EXTENDED RELEASE ORAL ONCE
Refills: 0 | Status: DISCONTINUED | OUTPATIENT
Start: 2024-06-23 | End: 2024-06-23

## 2024-06-23 RX ORDER — METHYLPREDNISOLONE ACETATE 20 MG/ML
60 VIAL (ML) INJECTION ONCE
Refills: 0 | Status: COMPLETED | OUTPATIENT
Start: 2024-06-23 | End: 2024-06-23

## 2024-06-23 RX ORDER — TOPIRAMATE 50 MG/1
25 TABLET, FILM COATED ORAL ONCE
Refills: 0 | Status: COMPLETED | OUTPATIENT
Start: 2024-06-23 | End: 2024-06-23

## 2024-06-23 RX ORDER — ENOXAPARIN SODIUM 100 MG/ML
40 INJECTION SUBCUTANEOUS EVERY 24 HOURS
Refills: 0 | Status: DISCONTINUED | OUTPATIENT
Start: 2024-06-23 | End: 2024-06-27

## 2024-06-23 RX ADMIN — ENOXAPARIN SODIUM 40 MILLIGRAM(S): 100 INJECTION SUBCUTANEOUS at 17:27

## 2024-06-23 RX ADMIN — MORPHINE SULFATE 4 MILLIGRAM(S): 100 TABLET, EXTENDED RELEASE ORAL at 00:40

## 2024-06-23 RX ADMIN — IPRATROPIUM BROMIDE AND ALBUTEROL SULFATE 3 MILLILITER(S): .5; 3 SOLUTION RESPIRATORY (INHALATION) at 13:38

## 2024-06-23 RX ADMIN — AZATHIOPRINE 150 MILLIGRAM(S): 100 TABLET ORAL at 11:18

## 2024-06-23 RX ADMIN — TOPIRAMATE 25 MILLIGRAM(S): 50 TABLET, FILM COATED ORAL at 11:43

## 2024-06-23 RX ADMIN — MYCOPHENOLATE MOFETIL 500 MILLIGRAM(S): 500 TABLET, FILM COATED ORAL at 11:34

## 2024-06-23 RX ADMIN — PANTOPRAZOLE SODIUM 40 MILLIGRAM(S): 40 INJECTION, POWDER, FOR SOLUTION INTRAVENOUS at 05:50

## 2024-06-23 RX ADMIN — Medication 1 TABLET(S): at 11:18

## 2024-06-23 RX ADMIN — TOPIRAMATE 25 MILLIGRAM(S): 50 TABLET, FILM COATED ORAL at 21:17

## 2024-06-23 RX ADMIN — VALACYCLOVIR HYDROCHLORIDE 1000 MILLIGRAM(S): 500 TABLET, FILM COATED ORAL at 11:19

## 2024-06-23 RX ADMIN — Medication 60 MILLIGRAM(S): at 17:26

## 2024-06-23 RX ADMIN — Medication 60 MILLIGRAM(S): at 05:46

## 2024-06-24 ENCOUNTER — RESULT REVIEW (OUTPATIENT)
Age: 44
End: 2024-06-24

## 2024-06-24 LAB
ALBUMIN SERPL ELPH-MCNC: 3.2 G/DL — LOW (ref 3.5–5.2)
ALP SERPL-CCNC: 58 U/L — SIGNIFICANT CHANGE UP (ref 30–115)
ALT FLD-CCNC: 12 U/L — SIGNIFICANT CHANGE UP (ref 0–41)
ANION GAP SERPL CALC-SCNC: 9 MMOL/L — SIGNIFICANT CHANGE UP (ref 7–14)
AST SERPL-CCNC: 8 U/L — SIGNIFICANT CHANGE UP (ref 0–41)
BASOPHILS # BLD AUTO: 0.01 K/UL — SIGNIFICANT CHANGE UP (ref 0–0.2)
BASOPHILS NFR BLD AUTO: 0.1 % — SIGNIFICANT CHANGE UP (ref 0–1)
BILIRUB SERPL-MCNC: <0.2 MG/DL — SIGNIFICANT CHANGE UP (ref 0.2–1.2)
BUN SERPL-MCNC: 18 MG/DL — SIGNIFICANT CHANGE UP (ref 10–20)
CALCIUM SERPL-MCNC: 8.8 MG/DL — SIGNIFICANT CHANGE UP (ref 8.4–10.5)
CHLORIDE SERPL-SCNC: 105 MMOL/L — SIGNIFICANT CHANGE UP (ref 98–110)
CO2 SERPL-SCNC: 24 MMOL/L — SIGNIFICANT CHANGE UP (ref 17–32)
CREAT SERPL-MCNC: 0.8 MG/DL — SIGNIFICANT CHANGE UP (ref 0.7–1.5)
EGFR: 93 ML/MIN/1.73M2 — SIGNIFICANT CHANGE UP
EOSINOPHIL # BLD AUTO: 0 K/UL — SIGNIFICANT CHANGE UP (ref 0–0.7)
EOSINOPHIL NFR BLD AUTO: 0 % — SIGNIFICANT CHANGE UP (ref 0–8)
FERRITIN SERPL-MCNC: 43 NG/ML — SIGNIFICANT CHANGE UP (ref 15–150)
FOLATE SERPL-MCNC: 4.9 NG/ML — SIGNIFICANT CHANGE UP
GLUCOSE SERPL-MCNC: 124 MG/DL — HIGH (ref 70–99)
HCT VFR BLD CALC: 36.3 % — LOW (ref 37–47)
HGB BLD-MCNC: 11.5 G/DL — LOW (ref 12–16)
IMM GRANULOCYTES NFR BLD AUTO: 0.5 % — HIGH (ref 0.1–0.3)
IRON SATN MFR SERPL: 30 % — SIGNIFICANT CHANGE UP (ref 15–50)
IRON SATN MFR SERPL: 87 UG/DL — SIGNIFICANT CHANGE UP (ref 35–150)
LYMPHOCYTES # BLD AUTO: 1.62 K/UL — SIGNIFICANT CHANGE UP (ref 1.2–3.4)
LYMPHOCYTES # BLD AUTO: 15 % — LOW (ref 20.5–51.1)
MAGNESIUM SERPL-MCNC: 2.2 MG/DL — SIGNIFICANT CHANGE UP (ref 1.8–2.4)
MCHC RBC-ENTMCNC: 31.7 G/DL — LOW (ref 32–37)
MCHC RBC-ENTMCNC: 32.1 PG — HIGH (ref 27–31)
MCV RBC AUTO: 101.4 FL — HIGH (ref 81–99)
MONOCYTES # BLD AUTO: 0.67 K/UL — HIGH (ref 0.1–0.6)
MONOCYTES NFR BLD AUTO: 6.2 % — SIGNIFICANT CHANGE UP (ref 1.7–9.3)
NEUTROPHILS # BLD AUTO: 8.42 K/UL — HIGH (ref 1.4–6.5)
NEUTROPHILS NFR BLD AUTO: 78.2 % — HIGH (ref 42.2–75.2)
NRBC # BLD: 0 /100 WBCS — SIGNIFICANT CHANGE UP (ref 0–0)
PLATELET # BLD AUTO: 461 K/UL — HIGH (ref 130–400)
PMV BLD: 9.5 FL — SIGNIFICANT CHANGE UP (ref 7.4–10.4)
POTASSIUM SERPL-MCNC: 4.4 MMOL/L — SIGNIFICANT CHANGE UP (ref 3.5–5)
POTASSIUM SERPL-SCNC: 4.4 MMOL/L — SIGNIFICANT CHANGE UP (ref 3.5–5)
PROT SERPL-MCNC: 6.4 G/DL — SIGNIFICANT CHANGE UP (ref 6–8)
RAPID RVP RESULT: DETECTED
RBC # BLD: 3.58 M/UL — LOW (ref 4.2–5.4)
RBC # FLD: 13.7 % — SIGNIFICANT CHANGE UP (ref 11.5–14.5)
RV+EV RNA SPEC QL NAA+PROBE: DETECTED
SARS-COV-2 RNA SPEC QL NAA+PROBE: SIGNIFICANT CHANGE UP
SODIUM SERPL-SCNC: 138 MMOL/L — SIGNIFICANT CHANGE UP (ref 135–146)
TIBC SERPL-MCNC: 292 UG/DL — SIGNIFICANT CHANGE UP (ref 220–430)
UIBC SERPL-MCNC: 205 UG/DL — SIGNIFICANT CHANGE UP (ref 110–370)
VIT B12 SERPL-MCNC: 268 PG/ML — SIGNIFICANT CHANGE UP (ref 232–1245)
WBC # BLD: 10.77 K/UL — SIGNIFICANT CHANGE UP (ref 4.8–10.8)
WBC # FLD AUTO: 10.77 K/UL — SIGNIFICANT CHANGE UP (ref 4.8–10.8)

## 2024-06-24 PROCEDURE — 99233 SBSQ HOSP IP/OBS HIGH 50: CPT

## 2024-06-24 PROCEDURE — 93306 TTE W/DOPPLER COMPLETE: CPT | Mod: 26

## 2024-06-24 RX ORDER — OXYCODONE AND ACETAMINOPHEN 5; 325 MG/1; MG/1
1 TABLET ORAL EVERY 6 HOURS
Refills: 0 | Status: DISCONTINUED | OUTPATIENT
Start: 2024-06-24 | End: 2024-06-24

## 2024-06-24 RX ORDER — ACETAMINOPHEN 325 MG
650 TABLET ORAL EVERY 6 HOURS
Refills: 0 | Status: DISCONTINUED | OUTPATIENT
Start: 2024-06-24 | End: 2024-06-27

## 2024-06-24 RX ORDER — OXYCODONE HYDROCHLORIDE 100 MG/5ML
2.5 SOLUTION ORAL EVERY 6 HOURS
Refills: 0 | Status: DISCONTINUED | OUTPATIENT
Start: 2024-06-24 | End: 2024-06-27

## 2024-06-24 RX ORDER — KETOROLAC TROMETHAMINE 30 MG/ML
15 INJECTION, SOLUTION INTRAMUSCULAR ONCE
Refills: 0 | Status: DISCONTINUED | OUTPATIENT
Start: 2024-06-24 | End: 2024-06-24

## 2024-06-24 RX ORDER — KETOROLAC TROMETHAMINE 30 MG/ML
30 INJECTION, SOLUTION INTRAMUSCULAR ONCE
Refills: 0 | Status: DISCONTINUED | OUTPATIENT
Start: 2024-06-24 | End: 2024-06-24

## 2024-06-24 RX ADMIN — TOPIRAMATE 25 MILLIGRAM(S): 50 TABLET, FILM COATED ORAL at 22:04

## 2024-06-24 RX ADMIN — ENOXAPARIN SODIUM 40 MILLIGRAM(S): 100 INJECTION SUBCUTANEOUS at 18:18

## 2024-06-24 RX ADMIN — KETOROLAC TROMETHAMINE 30 MILLIGRAM(S): 30 INJECTION, SOLUTION INTRAMUSCULAR at 09:26

## 2024-06-24 RX ADMIN — SULFAMETHOXAZOLE AND TRIMETHOPRIM 1 TABLET(S): 800; 160 TABLET ORAL at 08:57

## 2024-06-24 RX ADMIN — Medication 60 MILLIGRAM(S): at 18:19

## 2024-06-24 RX ADMIN — Medication 650 MILLIGRAM(S): at 12:03

## 2024-06-24 RX ADMIN — KETOROLAC TROMETHAMINE 30 MILLIGRAM(S): 30 INJECTION, SOLUTION INTRAMUSCULAR at 10:26

## 2024-06-24 RX ADMIN — Medication 60 MILLIGRAM(S): at 05:56

## 2024-06-24 RX ADMIN — PANTOPRAZOLE SODIUM 40 MILLIGRAM(S): 40 INJECTION, POWDER, FOR SOLUTION INTRAVENOUS at 05:56

## 2024-06-24 RX ADMIN — OXYCODONE HYDROCHLORIDE 2.5 MILLIGRAM(S): 100 SOLUTION ORAL at 22:05

## 2024-06-24 RX ADMIN — Medication 650 MILLIGRAM(S): at 13:03

## 2024-06-24 RX ADMIN — Medication 1 TABLET(S): at 12:03

## 2024-06-24 RX ADMIN — OXYCODONE HYDROCHLORIDE 2.5 MILLIGRAM(S): 100 SOLUTION ORAL at 23:05

## 2024-06-24 RX ADMIN — VALACYCLOVIR HYDROCHLORIDE 1000 MILLIGRAM(S): 500 TABLET, FILM COATED ORAL at 12:03

## 2024-06-24 RX ADMIN — Medication 650 MILLIGRAM(S): at 19:19

## 2024-06-24 RX ADMIN — OXYCODONE HYDROCHLORIDE 2.5 MILLIGRAM(S): 100 SOLUTION ORAL at 14:41

## 2024-06-24 RX ADMIN — AZATHIOPRINE 150 MILLIGRAM(S): 100 TABLET ORAL at 12:03

## 2024-06-24 RX ADMIN — MYCOPHENOLATE MOFETIL 500 MILLIGRAM(S): 500 TABLET, FILM COATED ORAL at 12:03

## 2024-06-24 RX ADMIN — Medication 650 MILLIGRAM(S): at 18:19

## 2024-06-24 RX ADMIN — OXYCODONE HYDROCHLORIDE 2.5 MILLIGRAM(S): 100 SOLUTION ORAL at 15:41

## 2024-06-25 LAB
ALBUMIN SERPL ELPH-MCNC: 3.4 G/DL — LOW (ref 3.5–5.2)
ALDOLASE SERPL-CCNC: 6 U/L — SIGNIFICANT CHANGE UP (ref 3.3–10.3)
ALP SERPL-CCNC: 59 U/L — SIGNIFICANT CHANGE UP (ref 30–115)
ALT FLD-CCNC: 12 U/L — SIGNIFICANT CHANGE UP (ref 0–41)
ANION GAP SERPL CALC-SCNC: 8 MMOL/L — SIGNIFICANT CHANGE UP (ref 7–14)
AST SERPL-CCNC: 8 U/L — SIGNIFICANT CHANGE UP (ref 0–41)
BASOPHILS # BLD AUTO: 0.01 K/UL — SIGNIFICANT CHANGE UP (ref 0–0.2)
BASOPHILS NFR BLD AUTO: 0.1 % — SIGNIFICANT CHANGE UP (ref 0–1)
BILIRUB SERPL-MCNC: <0.2 MG/DL — SIGNIFICANT CHANGE UP (ref 0.2–1.2)
BUN SERPL-MCNC: 21 MG/DL — HIGH (ref 10–20)
CALCIUM SERPL-MCNC: 8.8 MG/DL — SIGNIFICANT CHANGE UP (ref 8.4–10.5)
CHLORIDE SERPL-SCNC: 103 MMOL/L — SIGNIFICANT CHANGE UP (ref 98–110)
CO2 SERPL-SCNC: 26 MMOL/L — SIGNIFICANT CHANGE UP (ref 17–32)
CREAT SERPL-MCNC: 0.9 MG/DL — SIGNIFICANT CHANGE UP (ref 0.7–1.5)
EGFR: 81 ML/MIN/1.73M2 — SIGNIFICANT CHANGE UP
EOSINOPHIL # BLD AUTO: 0 K/UL — SIGNIFICANT CHANGE UP (ref 0–0.7)
EOSINOPHIL NFR BLD AUTO: 0 % — SIGNIFICANT CHANGE UP (ref 0–8)
GLUCOSE SERPL-MCNC: 126 MG/DL — HIGH (ref 70–99)
HCT VFR BLD CALC: 37.7 % — SIGNIFICANT CHANGE UP (ref 37–47)
HGB BLD-MCNC: 12.1 G/DL — SIGNIFICANT CHANGE UP (ref 12–16)
IMM GRANULOCYTES NFR BLD AUTO: 0.7 % — HIGH (ref 0.1–0.3)
LYMPHOCYTES # BLD AUTO: 1.52 K/UL — SIGNIFICANT CHANGE UP (ref 1.2–3.4)
LYMPHOCYTES # BLD AUTO: 12.4 % — LOW (ref 20.5–51.1)
MAGNESIUM SERPL-MCNC: 2.5 MG/DL — HIGH (ref 1.8–2.4)
MCHC RBC-ENTMCNC: 32.1 G/DL — SIGNIFICANT CHANGE UP (ref 32–37)
MCHC RBC-ENTMCNC: 32.3 PG — HIGH (ref 27–31)
MCV RBC AUTO: 100.5 FL — HIGH (ref 81–99)
MONOCYTES # BLD AUTO: 0.62 K/UL — HIGH (ref 0.1–0.6)
MONOCYTES NFR BLD AUTO: 5.1 % — SIGNIFICANT CHANGE UP (ref 1.7–9.3)
NEUTROPHILS # BLD AUTO: 10.01 K/UL — HIGH (ref 1.4–6.5)
NEUTROPHILS NFR BLD AUTO: 81.7 % — HIGH (ref 42.2–75.2)
NRBC # BLD: 0 /100 WBCS — SIGNIFICANT CHANGE UP (ref 0–0)
PLATELET # BLD AUTO: 507 K/UL — HIGH (ref 130–400)
PMV BLD: 9.6 FL — SIGNIFICANT CHANGE UP (ref 7.4–10.4)
POTASSIUM SERPL-MCNC: 4.8 MMOL/L — SIGNIFICANT CHANGE UP (ref 3.5–5)
POTASSIUM SERPL-SCNC: 4.8 MMOL/L — SIGNIFICANT CHANGE UP (ref 3.5–5)
PROT SERPL-MCNC: 6.5 G/DL — SIGNIFICANT CHANGE UP (ref 6–8)
RBC # BLD: 3.75 M/UL — LOW (ref 4.2–5.4)
RBC # FLD: 13.5 % — SIGNIFICANT CHANGE UP (ref 11.5–14.5)
SODIUM SERPL-SCNC: 137 MMOL/L — SIGNIFICANT CHANGE UP (ref 135–146)
WBC # BLD: 12.25 K/UL — HIGH (ref 4.8–10.8)
WBC # FLD AUTO: 12.25 K/UL — HIGH (ref 4.8–10.8)

## 2024-06-25 PROCEDURE — 99232 SBSQ HOSP IP/OBS MODERATE 35: CPT

## 2024-06-25 RX ORDER — CYANOCOBALAMIN (VITAMIN B-12) 1000 MCG
1000 TABLET, EXTENDED RELEASE ORAL DAILY
Refills: 0 | Status: DISCONTINUED | OUTPATIENT
Start: 2024-06-25 | End: 2024-06-27

## 2024-06-25 RX ORDER — ALBUTEROL 90 MCG
21 AEROSOL REFILL (GRAM) INHALATION
Refills: 0 | Status: DISCONTINUED | OUTPATIENT
Start: 2024-06-25 | End: 2024-06-25

## 2024-06-25 RX ORDER — ALBUTEROL 90 MCG
2 AEROSOL REFILL (GRAM) INHALATION EVERY 6 HOURS
Refills: 0 | Status: DISCONTINUED | OUTPATIENT
Start: 2024-06-25 | End: 2024-06-25

## 2024-06-25 RX ORDER — ALBUTEROL 90 MCG
2 AEROSOL REFILL (GRAM) INHALATION
Refills: 0 | Status: DISCONTINUED | OUTPATIENT
Start: 2024-06-25 | End: 2024-06-27

## 2024-06-25 RX ORDER — FOLIC ACID
1 POWDER (GRAM) MISCELLANEOUS DAILY
Refills: 0 | Status: DISCONTINUED | OUTPATIENT
Start: 2024-06-25 | End: 2024-06-27

## 2024-06-25 RX ADMIN — AZATHIOPRINE 150 MILLIGRAM(S): 100 TABLET ORAL at 11:36

## 2024-06-25 RX ADMIN — Medication 650 MILLIGRAM(S): at 17:00

## 2024-06-25 RX ADMIN — Medication 650 MILLIGRAM(S): at 23:09

## 2024-06-25 RX ADMIN — Medication 1 TABLET(S): at 11:36

## 2024-06-25 RX ADMIN — ENOXAPARIN SODIUM 40 MILLIGRAM(S): 100 INJECTION SUBCUTANEOUS at 17:01

## 2024-06-25 RX ADMIN — MYCOPHENOLATE MOFETIL 500 MILLIGRAM(S): 500 TABLET, FILM COATED ORAL at 11:36

## 2024-06-25 RX ADMIN — OXYCODONE HYDROCHLORIDE 2.5 MILLIGRAM(S): 100 SOLUTION ORAL at 21:18

## 2024-06-25 RX ADMIN — Medication 60 MILLIGRAM(S): at 05:59

## 2024-06-25 RX ADMIN — TOPIRAMATE 25 MILLIGRAM(S): 50 TABLET, FILM COATED ORAL at 21:19

## 2024-06-25 RX ADMIN — VALACYCLOVIR HYDROCHLORIDE 1000 MILLIGRAM(S): 500 TABLET, FILM COATED ORAL at 11:35

## 2024-06-25 RX ADMIN — PANTOPRAZOLE SODIUM 40 MILLIGRAM(S): 40 INJECTION, POWDER, FOR SOLUTION INTRAVENOUS at 05:59

## 2024-06-25 RX ADMIN — OXYCODONE HYDROCHLORIDE 2.5 MILLIGRAM(S): 100 SOLUTION ORAL at 10:11

## 2024-06-25 RX ADMIN — Medication 650 MILLIGRAM(S): at 11:35

## 2024-06-26 ENCOUNTER — TRANSCRIPTION ENCOUNTER (OUTPATIENT)
Age: 44
End: 2024-06-26

## 2024-06-26 LAB
ALBUMIN SERPL ELPH-MCNC: 3 G/DL — LOW (ref 3.5–5.2)
ALP SERPL-CCNC: 54 U/L — SIGNIFICANT CHANGE UP (ref 30–115)
ALT FLD-CCNC: 11 U/L — SIGNIFICANT CHANGE UP (ref 0–41)
ANION GAP SERPL CALC-SCNC: 8 MMOL/L — SIGNIFICANT CHANGE UP (ref 7–14)
AST SERPL-CCNC: 8 U/L — SIGNIFICANT CHANGE UP (ref 0–41)
BASOPHILS # BLD AUTO: 0.01 K/UL — SIGNIFICANT CHANGE UP (ref 0–0.2)
BASOPHILS NFR BLD AUTO: 0.1 % — SIGNIFICANT CHANGE UP (ref 0–1)
BILIRUB SERPL-MCNC: <0.2 MG/DL — SIGNIFICANT CHANGE UP (ref 0.2–1.2)
BUN SERPL-MCNC: 20 MG/DL — SIGNIFICANT CHANGE UP (ref 10–20)
CALCIUM SERPL-MCNC: 8.3 MG/DL — LOW (ref 8.4–10.5)
CHLORIDE SERPL-SCNC: 102 MMOL/L — SIGNIFICANT CHANGE UP (ref 98–110)
CO2 SERPL-SCNC: 24 MMOL/L — SIGNIFICANT CHANGE UP (ref 17–32)
CREAT SERPL-MCNC: 0.8 MG/DL — SIGNIFICANT CHANGE UP (ref 0.7–1.5)
EGFR: 93 ML/MIN/1.73M2 — SIGNIFICANT CHANGE UP
EOSINOPHIL # BLD AUTO: 0.13 K/UL — SIGNIFICANT CHANGE UP (ref 0–0.7)
EOSINOPHIL NFR BLD AUTO: 1 % — SIGNIFICANT CHANGE UP (ref 0–8)
GLUCOSE SERPL-MCNC: 89 MG/DL — SIGNIFICANT CHANGE UP (ref 70–99)
HCT VFR BLD CALC: 36.8 % — LOW (ref 37–47)
HGB BLD-MCNC: 11.7 G/DL — LOW (ref 12–16)
IMM GRANULOCYTES NFR BLD AUTO: 0.4 % — HIGH (ref 0.1–0.3)
LYMPHOCYTES # BLD AUTO: 23.2 % — SIGNIFICANT CHANGE UP (ref 20.5–51.1)
LYMPHOCYTES # BLD AUTO: 3.18 K/UL — SIGNIFICANT CHANGE UP (ref 1.2–3.4)
MAGNESIUM SERPL-MCNC: 2.3 MG/DL — SIGNIFICANT CHANGE UP (ref 1.8–2.4)
MCHC RBC-ENTMCNC: 31.8 G/DL — LOW (ref 32–37)
MCHC RBC-ENTMCNC: 32.3 PG — HIGH (ref 27–31)
MCV RBC AUTO: 101.7 FL — HIGH (ref 81–99)
MONOCYTES # BLD AUTO: 1.29 K/UL — HIGH (ref 0.1–0.6)
MONOCYTES NFR BLD AUTO: 9.4 % — HIGH (ref 1.7–9.3)
NEUTROPHILS # BLD AUTO: 9.01 K/UL — HIGH (ref 1.4–6.5)
NEUTROPHILS NFR BLD AUTO: 65.9 % — SIGNIFICANT CHANGE UP (ref 42.2–75.2)
NRBC # BLD: 0 /100 WBCS — SIGNIFICANT CHANGE UP (ref 0–0)
PLATELET # BLD AUTO: 507 K/UL — HIGH (ref 130–400)
PMV BLD: 9.2 FL — SIGNIFICANT CHANGE UP (ref 7.4–10.4)
POTASSIUM SERPL-MCNC: 4.3 MMOL/L — SIGNIFICANT CHANGE UP (ref 3.5–5)
POTASSIUM SERPL-SCNC: 4.3 MMOL/L — SIGNIFICANT CHANGE UP (ref 3.5–5)
PROT SERPL-MCNC: 5.6 G/DL — LOW (ref 6–8)
RBC # BLD: 3.62 M/UL — LOW (ref 4.2–5.4)
RBC # FLD: 13.7 % — SIGNIFICANT CHANGE UP (ref 11.5–14.5)
SODIUM SERPL-SCNC: 134 MMOL/L — LOW (ref 135–146)
WBC # BLD: 13.68 K/UL — HIGH (ref 4.8–10.8)
WBC # FLD AUTO: 13.68 K/UL — HIGH (ref 4.8–10.8)

## 2024-06-26 PROCEDURE — 99232 SBSQ HOSP IP/OBS MODERATE 35: CPT

## 2024-06-26 RX ADMIN — OXYCODONE HYDROCHLORIDE 2.5 MILLIGRAM(S): 100 SOLUTION ORAL at 05:13

## 2024-06-26 RX ADMIN — Medication 650 MILLIGRAM(S): at 23:39

## 2024-06-26 RX ADMIN — ENOXAPARIN SODIUM 40 MILLIGRAM(S): 100 INJECTION SUBCUTANEOUS at 18:18

## 2024-06-26 RX ADMIN — Medication 650 MILLIGRAM(S): at 05:12

## 2024-06-26 RX ADMIN — VALACYCLOVIR HYDROCHLORIDE 1000 MILLIGRAM(S): 500 TABLET, FILM COATED ORAL at 11:18

## 2024-06-26 RX ADMIN — Medication 1000 MICROGRAM(S): at 11:18

## 2024-06-26 RX ADMIN — Medication 1 MILLIGRAM(S): at 11:17

## 2024-06-26 RX ADMIN — SULFAMETHOXAZOLE AND TRIMETHOPRIM 1 TABLET(S): 800; 160 TABLET ORAL at 10:22

## 2024-06-26 RX ADMIN — PANTOPRAZOLE SODIUM 40 MILLIGRAM(S): 40 INJECTION, POWDER, FOR SOLUTION INTRAVENOUS at 05:13

## 2024-06-26 RX ADMIN — Medication 2 PUFF(S): at 01:40

## 2024-06-26 RX ADMIN — MYCOPHENOLATE MOFETIL 500 MILLIGRAM(S): 500 TABLET, FILM COATED ORAL at 11:17

## 2024-06-26 RX ADMIN — TOPIRAMATE 25 MILLIGRAM(S): 50 TABLET, FILM COATED ORAL at 21:49

## 2024-06-26 RX ADMIN — Medication 1 TABLET(S): at 11:17

## 2024-06-26 RX ADMIN — AZATHIOPRINE 150 MILLIGRAM(S): 100 TABLET ORAL at 11:18

## 2024-06-26 RX ADMIN — OXYCODONE HYDROCHLORIDE 2.5 MILLIGRAM(S): 100 SOLUTION ORAL at 19:52

## 2024-06-27 ENCOUNTER — TRANSCRIPTION ENCOUNTER (OUTPATIENT)
Age: 44
End: 2024-06-27

## 2024-06-27 VITALS
OXYGEN SATURATION: 100 % | HEART RATE: 86 BPM | SYSTOLIC BLOOD PRESSURE: 119 MMHG | TEMPERATURE: 98 F | DIASTOLIC BLOOD PRESSURE: 85 MMHG | RESPIRATION RATE: 18 BRPM

## 2024-06-27 LAB
ALBUMIN SERPL ELPH-MCNC: 3.3 G/DL — LOW (ref 3.5–5.2)
ALDOLASE SERPL-CCNC: 5.6 U/L — SIGNIFICANT CHANGE UP (ref 3.3–10.3)
ALP SERPL-CCNC: 56 U/L — SIGNIFICANT CHANGE UP (ref 30–115)
ALT FLD-CCNC: 13 U/L — SIGNIFICANT CHANGE UP (ref 0–41)
ANION GAP SERPL CALC-SCNC: 10 MMOL/L — SIGNIFICANT CHANGE UP (ref 7–14)
AST SERPL-CCNC: 9 U/L — SIGNIFICANT CHANGE UP (ref 0–41)
BASOPHILS # BLD AUTO: 0.02 K/UL — SIGNIFICANT CHANGE UP (ref 0–0.2)
BASOPHILS NFR BLD AUTO: 0.1 % — SIGNIFICANT CHANGE UP (ref 0–1)
BILIRUB SERPL-MCNC: <0.2 MG/DL — SIGNIFICANT CHANGE UP (ref 0.2–1.2)
BUN SERPL-MCNC: 16 MG/DL — SIGNIFICANT CHANGE UP (ref 10–20)
CALCIUM SERPL-MCNC: 8.4 MG/DL — SIGNIFICANT CHANGE UP (ref 8.4–10.5)
CHLORIDE SERPL-SCNC: 102 MMOL/L — SIGNIFICANT CHANGE UP (ref 98–110)
CO2 SERPL-SCNC: 25 MMOL/L — SIGNIFICANT CHANGE UP (ref 17–32)
CREAT SERPL-MCNC: 0.9 MG/DL — SIGNIFICANT CHANGE UP (ref 0.7–1.5)
EGFR: 81 ML/MIN/1.73M2 — SIGNIFICANT CHANGE UP
EOSINOPHIL # BLD AUTO: 0.43 K/UL — SIGNIFICANT CHANGE UP (ref 0–0.7)
EOSINOPHIL NFR BLD AUTO: 3 % — SIGNIFICANT CHANGE UP (ref 0–8)
GLUCOSE SERPL-MCNC: 90 MG/DL — SIGNIFICANT CHANGE UP (ref 70–99)
HCT VFR BLD CALC: 40.8 % — SIGNIFICANT CHANGE UP (ref 37–47)
HGB BLD-MCNC: 12.8 G/DL — SIGNIFICANT CHANGE UP (ref 12–16)
IMM GRANULOCYTES NFR BLD AUTO: 0.7 % — HIGH (ref 0.1–0.3)
LYMPHOCYTES # BLD AUTO: 24.4 % — SIGNIFICANT CHANGE UP (ref 20.5–51.1)
LYMPHOCYTES # BLD AUTO: 3.48 K/UL — HIGH (ref 1.2–3.4)
MAGNESIUM SERPL-MCNC: 2.3 MG/DL — SIGNIFICANT CHANGE UP (ref 1.8–2.4)
MCHC RBC-ENTMCNC: 31.4 G/DL — LOW (ref 32–37)
MCHC RBC-ENTMCNC: 32.3 PG — HIGH (ref 27–31)
MCV RBC AUTO: 103 FL — HIGH (ref 81–99)
MONOCYTES # BLD AUTO: 0.86 K/UL — HIGH (ref 0.1–0.6)
MONOCYTES NFR BLD AUTO: 6 % — SIGNIFICANT CHANGE UP (ref 1.7–9.3)
NEUTROPHILS # BLD AUTO: 9.35 K/UL — HIGH (ref 1.4–6.5)
NEUTROPHILS NFR BLD AUTO: 65.8 % — SIGNIFICANT CHANGE UP (ref 42.2–75.2)
NRBC # BLD: 0 /100 WBCS — SIGNIFICANT CHANGE UP (ref 0–0)
PLATELET # BLD AUTO: 531 K/UL — HIGH (ref 130–400)
PMV BLD: 9 FL — SIGNIFICANT CHANGE UP (ref 7.4–10.4)
POTASSIUM SERPL-MCNC: 4.5 MMOL/L — SIGNIFICANT CHANGE UP (ref 3.5–5)
POTASSIUM SERPL-SCNC: 4.5 MMOL/L — SIGNIFICANT CHANGE UP (ref 3.5–5)
PROT SERPL-MCNC: 6.3 G/DL — SIGNIFICANT CHANGE UP (ref 6–8)
RBC # BLD: 3.96 M/UL — LOW (ref 4.2–5.4)
RBC # FLD: 14.1 % — SIGNIFICANT CHANGE UP (ref 11.5–14.5)
SODIUM SERPL-SCNC: 137 MMOL/L — SIGNIFICANT CHANGE UP (ref 135–146)
WBC # BLD: 14.24 K/UL — HIGH (ref 4.8–10.8)
WBC # FLD AUTO: 14.24 K/UL — HIGH (ref 4.8–10.8)

## 2024-06-27 PROCEDURE — 99239 HOSP IP/OBS DSCHRG MGMT >30: CPT

## 2024-06-27 RX ORDER — SULFAMETHOXAZOLE AND TRIMETHOPRIM 800; 160 MG/1; MG/1
1 TABLET ORAL
Qty: 39 | Refills: 0
Start: 2024-06-27 | End: 2024-09-24

## 2024-06-27 RX ORDER — AZATHIOPRINE 100 1/1
3 TABLET ORAL
Qty: 180 | Refills: 0
Start: 2024-06-27 | End: 2024-08-25

## 2024-06-27 RX ORDER — VALACYCLOVIR HYDROCHLORIDE 500 MG/1
1 TABLET, FILM COATED ORAL
Qty: 30 | Refills: 0
Start: 2024-06-27 | End: 2024-07-26

## 2024-06-27 RX ORDER — MYCOPHENOLATE MOFETIL 500 MG/1
1 TABLET, FILM COATED ORAL
Qty: 30 | Refills: 0
Start: 2024-06-27 | End: 2024-07-26

## 2024-06-27 RX ORDER — CYANOCOBALAMIN (VITAMIN B-12) 1000 MCG
1 TABLET, EXTENDED RELEASE ORAL
Qty: 30 | Refills: 0
Start: 2024-06-27 | End: 2024-07-26

## 2024-06-27 RX ORDER — FOLIC ACID
1 POWDER (GRAM) MISCELLANEOUS
Qty: 30 | Refills: 0
Start: 2024-06-27 | End: 2024-07-26

## 2024-06-27 RX ORDER — TRAMADOL HYDROCHLORIDE 50 MG/1
1 TABLET, FILM COATED ORAL
Qty: 15 | Refills: 0
Start: 2024-06-27 | End: 2024-07-01

## 2024-06-27 RX ADMIN — Medication 1 MILLIGRAM(S): at 12:34

## 2024-06-27 RX ADMIN — Medication 1000 MICROGRAM(S): at 12:35

## 2024-06-27 RX ADMIN — MYCOPHENOLATE MOFETIL 500 MILLIGRAM(S): 500 TABLET, FILM COATED ORAL at 12:35

## 2024-06-27 RX ADMIN — VALACYCLOVIR HYDROCHLORIDE 1000 MILLIGRAM(S): 500 TABLET, FILM COATED ORAL at 12:34

## 2024-06-27 RX ADMIN — AZATHIOPRINE 150 MILLIGRAM(S): 100 TABLET ORAL at 12:34

## 2024-06-27 RX ADMIN — OXYCODONE HYDROCHLORIDE 2.5 MILLIGRAM(S): 100 SOLUTION ORAL at 02:07

## 2024-06-27 RX ADMIN — Medication 1 TABLET(S): at 12:34

## 2024-06-27 RX ADMIN — Medication 650 MILLIGRAM(S): at 05:49

## 2024-06-27 RX ADMIN — PANTOPRAZOLE SODIUM 40 MILLIGRAM(S): 40 INJECTION, POWDER, FOR SOLUTION INTRAVENOUS at 05:54

## 2024-07-05 DIAGNOSIS — Z91.010 ALLERGY TO PEANUTS: ICD-10-CM

## 2024-07-05 DIAGNOSIS — Z91.041 RADIOGRAPHIC DYE ALLERGY STATUS: ICD-10-CM

## 2024-07-05 DIAGNOSIS — T45.1X5A ADVERSE EFFECT OF ANTINEOPLASTIC AND IMMUNOSUPPRESSIVE DRUGS, INITIAL ENCOUNTER: ICD-10-CM

## 2024-07-05 DIAGNOSIS — D75.838 OTHER THROMBOCYTOSIS: ICD-10-CM

## 2024-07-05 DIAGNOSIS — R06.00 DYSPNEA, UNSPECIFIED: ICD-10-CM

## 2024-07-05 DIAGNOSIS — J84.116 CRYPTOGENIC ORGANIZING PNEUMONIA: ICD-10-CM

## 2024-07-05 DIAGNOSIS — M33.13 OTHER DERMATOMYOSITIS WITHOUT MYOPATHY: ICD-10-CM

## 2024-07-05 DIAGNOSIS — D53.9 NUTRITIONAL ANEMIA, UNSPECIFIED: ICD-10-CM

## 2024-07-05 DIAGNOSIS — Z91.013 ALLERGY TO SEAFOOD: ICD-10-CM

## 2024-07-05 DIAGNOSIS — Z79.52 LONG TERM (CURRENT) USE OF SYSTEMIC STEROIDS: ICD-10-CM

## 2024-07-05 DIAGNOSIS — Z79.620 LONG TERM (CURRENT) USE OF IMMUNOSUPPRESSIVE BIOLOGIC: ICD-10-CM

## 2024-07-05 DIAGNOSIS — M33.20 POLYMYOSITIS, ORGAN INVOLVEMENT UNSPECIFIED: ICD-10-CM

## 2024-07-05 DIAGNOSIS — K59.03 DRUG INDUCED CONSTIPATION: ICD-10-CM

## 2024-07-05 DIAGNOSIS — T40.2X5A ADVERSE EFFECT OF OTHER OPIOIDS, INITIAL ENCOUNTER: ICD-10-CM

## 2024-07-05 DIAGNOSIS — B97.10 UNSPECIFIED ENTEROVIRUS AS THE CAUSE OF DISEASES CLASSIFIED ELSEWHERE: ICD-10-CM

## 2024-07-05 DIAGNOSIS — J47.9 BRONCHIECTASIS, UNCOMPLICATED: ICD-10-CM

## 2024-07-12 NOTE — ED ADULT TRIAGE NOTE - NS ED NURSE DIRECT TO ROOM YN
Chief Complaint   Patient presents with    Annual Exam     Patient arrived for annual exam and would like to discuss an intermittent pain in her left breast above her nipple.  She states it comes and goes monthly and is currently not hurting.  She also states that she just had a pap smear 2023, so she would just like to establish care today.    Ob/Gyn Hx:  A2  LMP - Patient's last menstrual period was 2024 (exact date).  Menses - regular in occurrence and heavy in flow   Contraception - none  Hx of STI - denies  SA - yes    Health maintenance:  Last Pap: 2023 WNL per patient  Gardasil: yes x3 per patient    1. Have you been to the ER, urgent care clinic, or hospitalized since your last visit?No    2. Have you seen or consulted any other health care providers outside of the [facility] since your last visit?     no    Patient declines chaperone.    Cinthia Magaña RN       
chest pain, palpitations, edema  Resp: negative for cough, shortness of breath, wheezing  GI: negative for change in bowel habits, abdominal pain, black or bloody stools  : negative for frequency, dysuria, hematuria, vaginal discharge  MSK: negative for back pain, joint pain, muscle pain  Breast: negative for breast lumps, nipple discharge, galactorrhea  Skin :negative for itching, rash, hives  Neuro: negative for dizziness, headache, confusion, weakness  Psych: negative for anxiety, depression, change in mood  Heme/lymph: negative for bleeding, bruising, pallor    Physical Exam    BP (!) 140/96   Wt 74.8 kg (165 lb)   LMP 07/09/2024 (Exact Date)   BMI 28.32 kg/m²     Constitutional  Appearance: well-nourished, well developed, alert, in no acute distress    HENT  Head and Face: appears normal    Neck  Inspection/Palpation: normal appearance, no masses or tenderness  Thyroid: gland size normal, nontender, no nodules or masses present on palpation    Chest  Respiratory Effort: non-labored breathing    Cardiovascular  Extremities: no peripheral edema    Breasts  Inspection of Breasts: breasts symmetrical, no skin changes, no discharge present, nipple appearance normal, no skin retraction present  Palpation of Breasts and Axillae: no masses present on palpation, no breast tenderness  Axillary Lymph Nodes: no lymphadenopathy present    Gastrointestinal  Abdominal Examination: abdomen non-tender to palpation, normal bowel sounds, no masses present  Liver and spleen: no hepatomegaly present, spleen not palpable  Hernias: no hernias identified    Genitourinary  Deferred at patient request     Skin  General Inspection: no rash, no lesions identified    Neurologic/Psychiatric  Mental Status:  Orientation: grossly oriented to person, place and time  Mood and Affect: mood normal, affect appropriate      Assessment/Plan:  Zakia Rocha is a 30 y.o. presenting for annual exam. Overall doing well.     1. Well woman exam  - 
No
